# Patient Record
Sex: FEMALE | Race: WHITE | NOT HISPANIC OR LATINO | Employment: OTHER | URBAN - METROPOLITAN AREA
[De-identification: names, ages, dates, MRNs, and addresses within clinical notes are randomized per-mention and may not be internally consistent; named-entity substitution may affect disease eponyms.]

---

## 2017-02-06 ENCOUNTER — LAB VISIT (OUTPATIENT)
Dept: LAB | Facility: OTHER | Age: 82
End: 2017-02-06
Attending: INTERNAL MEDICINE
Payer: MEDICARE

## 2017-02-06 DIAGNOSIS — R35.0 FREQUENCY OF URINATION: ICD-10-CM

## 2017-02-06 LAB
BACTERIA #/AREA URNS AUTO: ABNORMAL /HPF
BILIRUB UR QL STRIP: NEGATIVE
CAOX CRY UR QL COMP ASSIST: ABNORMAL
CLARITY UR REFRACT.AUTO: ABNORMAL
COLOR UR AUTO: YELLOW
GLUCOSE UR QL STRIP: NEGATIVE
HGB UR QL STRIP: ABNORMAL
KETONES UR QL STRIP: NEGATIVE
LEUKOCYTE ESTERASE UR QL STRIP: ABNORMAL
MICROSCOPIC COMMENT: ABNORMAL
NITRITE UR QL STRIP: NEGATIVE
PH UR STRIP: 5 [PH] (ref 5–8)
PROT UR QL STRIP: NEGATIVE
RBC #/AREA URNS AUTO: 6 /HPF (ref 0–4)
SP GR UR STRIP: 1.01 (ref 1–1.03)
SQUAMOUS #/AREA URNS AUTO: 1 /HPF
URN SPEC COLLECT METH UR: ABNORMAL
UROBILINOGEN UR STRIP-ACNC: NEGATIVE EU/DL
WBC #/AREA URNS AUTO: 43 /HPF (ref 0–5)

## 2017-02-06 PROCEDURE — 87077 CULTURE AEROBIC IDENTIFY: CPT

## 2017-02-06 PROCEDURE — 87086 URINE CULTURE/COLONY COUNT: CPT

## 2017-02-06 PROCEDURE — 87088 URINE BACTERIA CULTURE: CPT

## 2017-02-06 PROCEDURE — 87186 SC STD MICRODIL/AGAR DIL: CPT

## 2017-02-06 PROCEDURE — 81001 URINALYSIS AUTO W/SCOPE: CPT

## 2017-02-08 LAB — BACTERIA UR CULT: NORMAL

## 2017-02-09 ENCOUNTER — LAB VISIT (OUTPATIENT)
Dept: LAB | Facility: OTHER | Age: 82
End: 2017-02-09
Attending: INTERNAL MEDICINE
Payer: MEDICARE

## 2017-02-09 DIAGNOSIS — R35.0 FREQUENCY OF URINATION: ICD-10-CM

## 2017-02-09 LAB
CRYPTOSP AG STL QL IA: NEGATIVE
G LAMBLIA AG STL QL IA: NEGATIVE

## 2017-02-09 PROCEDURE — 87046 STOOL CULTR AEROBIC BACT EA: CPT | Mod: 59

## 2017-02-09 PROCEDURE — 87209 SMEAR COMPLEX STAIN: CPT

## 2017-02-09 PROCEDURE — 89055 LEUKOCYTE ASSESSMENT FECAL: CPT

## 2017-02-09 PROCEDURE — 87329 GIARDIA AG IA: CPT

## 2017-02-09 PROCEDURE — 82272 OCCULT BLD FECES 1-3 TESTS: CPT

## 2017-02-09 PROCEDURE — 87427 SHIGA-LIKE TOXIN AG IA: CPT

## 2017-02-09 PROCEDURE — 87045 FECES CULTURE AEROBIC BACT: CPT

## 2017-02-10 LAB
E COLI SXT1 STL QL IA: NEGATIVE
E COLI SXT2 STL QL IA: NEGATIVE
O+P STL TRI STN: NORMAL
OB PNL STL: NEGATIVE
WBC #/AREA STL HPF: NORMAL /[HPF]

## 2017-02-13 LAB — BACTERIA STL CULT: NORMAL

## 2017-03-03 ENCOUNTER — HOSPITAL ENCOUNTER (OUTPATIENT)
Dept: CARDIOLOGY | Facility: OTHER | Age: 82
Discharge: HOME | End: 2017-03-03
Attending: INTERNAL MEDICINE
Payer: MEDICARE

## 2017-03-03 ENCOUNTER — HOSPITAL ENCOUNTER (OUTPATIENT)
Dept: RADIOLOGY | Facility: OTHER | Age: 82
Discharge: HOME | End: 2017-03-03
Attending: INTERNAL MEDICINE
Payer: MEDICARE

## 2017-03-03 DIAGNOSIS — M16.9 OSTEOARTHRITIS OF HIP, UNSPECIFIED LATERALITY, UNSPECIFIED OSTEOARTHRITIS TYPE: ICD-10-CM

## 2017-03-03 PROCEDURE — 93010 ELECTROCARDIOGRAM REPORT: CPT | Mod: ,,, | Performed by: INTERNAL MEDICINE

## 2017-03-03 PROCEDURE — 71020 XR CHEST PA AND LATERAL: CPT | Mod: 26,,, | Performed by: INTERNAL MEDICINE

## 2017-03-03 PROCEDURE — 93005 ELECTROCARDIOGRAM TRACING: CPT

## 2017-03-03 PROCEDURE — 71020 XR CHEST PA AND LATERAL: CPT | Mod: TC

## 2017-06-27 ENCOUNTER — LAB VISIT (OUTPATIENT)
Dept: LAB | Facility: OTHER | Age: 82
End: 2017-06-27
Attending: INTERNAL MEDICINE
Payer: MEDICARE

## 2017-06-27 DIAGNOSIS — Z11.59 ENCOUNTER FOR HEPATITIS C SCREENING TEST FOR LOW RISK PATIENT: ICD-10-CM

## 2017-06-27 DIAGNOSIS — R53.83 FATIGUE, UNSPECIFIED TYPE: ICD-10-CM

## 2017-06-27 LAB
ALBUMIN SERPL BCP-MCNC: 3.7 G/DL
ALP SERPL-CCNC: 84 U/L
ALT SERPL W/O P-5'-P-CCNC: 11 U/L
ANION GAP SERPL CALC-SCNC: 10 MMOL/L
AST SERPL-CCNC: 16 U/L
BASOPHILS # BLD AUTO: 0.05 K/UL
BASOPHILS NFR BLD: 1.1 %
BILIRUB SERPL-MCNC: 0.2 MG/DL
BUN SERPL-MCNC: 24 MG/DL
CALCIUM SERPL-MCNC: 8.9 MG/DL
CHLORIDE SERPL-SCNC: 105 MMOL/L
CO2 SERPL-SCNC: 25 MMOL/L
CREAT SERPL-MCNC: 0.7 MG/DL
DIFFERENTIAL METHOD: NORMAL
EOSINOPHIL # BLD AUTO: 0.1 K/UL
EOSINOPHIL NFR BLD: 1.7 %
ERYTHROCYTE [DISTWIDTH] IN BLOOD BY AUTOMATED COUNT: 13.3 %
ERYTHROCYTE [SEDIMENTATION RATE] IN BLOOD BY WESTERGREN METHOD: 5 MM/HR
EST. GFR  (AFRICAN AMERICAN): >60 ML/MIN/1.73 M^2
EST. GFR  (NON AFRICAN AMERICAN): >60 ML/MIN/1.73 M^2
GLUCOSE SERPL-MCNC: 83 MG/DL
HCT VFR BLD AUTO: 42.9 %
HGB BLD-MCNC: 13.8 G/DL
LYMPHOCYTES # BLD AUTO: 1.7 K/UL
LYMPHOCYTES NFR BLD: 35.4 %
MCH RBC QN AUTO: 30.5 PG
MCHC RBC AUTO-ENTMCNC: 32.2 %
MCV RBC AUTO: 95 FL
MONOCYTES # BLD AUTO: 0.5 K/UL
MONOCYTES NFR BLD: 10 %
NEUTROPHILS # BLD AUTO: 2.4 K/UL
NEUTROPHILS NFR BLD: 51.6 %
PLATELET # BLD AUTO: 275 K/UL
PMV BLD AUTO: 10.2 FL
POTASSIUM SERPL-SCNC: 4.1 MMOL/L
PROT SERPL-MCNC: 6.8 G/DL
RBC # BLD AUTO: 4.53 M/UL
SODIUM SERPL-SCNC: 140 MMOL/L
T4 FREE SERPL-MCNC: 0.84 NG/DL
TSH SERPL DL<=0.005 MIU/L-ACNC: 1.29 UIU/ML
VIT B12 SERPL-MCNC: 619 PG/ML
WBC # BLD AUTO: 4.69 K/UL

## 2017-06-27 PROCEDURE — 82607 VITAMIN B-12: CPT

## 2017-06-27 PROCEDURE — 84443 ASSAY THYROID STIM HORMONE: CPT

## 2017-06-27 PROCEDURE — 80053 COMPREHEN METABOLIC PANEL: CPT

## 2017-06-27 PROCEDURE — 84439 ASSAY OF FREE THYROXINE: CPT

## 2017-06-27 PROCEDURE — 85651 RBC SED RATE NONAUTOMATED: CPT

## 2017-06-27 PROCEDURE — 86803 HEPATITIS C AB TEST: CPT

## 2017-06-27 PROCEDURE — 36415 COLL VENOUS BLD VENIPUNCTURE: CPT

## 2017-06-27 PROCEDURE — 86038 ANTINUCLEAR ANTIBODIES: CPT

## 2017-06-27 PROCEDURE — 85025 COMPLETE CBC W/AUTO DIFF WBC: CPT

## 2017-06-28 LAB
ANA SER QL IF: NORMAL
HCV AB SERPL QL IA: NEGATIVE

## 2017-06-30 ENCOUNTER — HOSPITAL ENCOUNTER (OUTPATIENT)
Dept: RADIOLOGY | Facility: OTHER | Age: 82
Discharge: HOME | End: 2017-06-30
Attending: INTERNAL MEDICINE
Payer: MEDICARE

## 2017-06-30 DIAGNOSIS — M25.569 KNEE PAIN: Primary | ICD-10-CM

## 2017-06-30 DIAGNOSIS — M71.20 BAKER'S CYST: ICD-10-CM

## 2017-06-30 DIAGNOSIS — M25.569 KNEE PAIN: ICD-10-CM

## 2017-06-30 DIAGNOSIS — I82.409 DVT (DEEP VENOUS THROMBOSIS): ICD-10-CM

## 2017-06-30 PROCEDURE — 73560 X-RAY EXAM OF KNEE 1 OR 2: CPT | Mod: TC,LT

## 2017-06-30 PROCEDURE — 93971 EXTREMITY STUDY: CPT | Mod: TC

## 2017-06-30 PROCEDURE — 73560 X-RAY EXAM OF KNEE 1 OR 2: CPT | Mod: 26,LT,, | Performed by: RADIOLOGY

## 2017-06-30 PROCEDURE — 93971 EXTREMITY STUDY: CPT | Mod: 26,,, | Performed by: RADIOLOGY

## 2017-08-24 ENCOUNTER — LAB VISIT (OUTPATIENT)
Dept: LAB | Facility: OTHER | Age: 82
End: 2017-08-24
Attending: INTERNAL MEDICINE
Payer: MEDICARE

## 2017-08-24 DIAGNOSIS — R19.7 DIARRHEA, UNSPECIFIED TYPE: ICD-10-CM

## 2017-08-24 PROCEDURE — 89055 LEUKOCYTE ASSESSMENT FECAL: CPT

## 2017-08-24 PROCEDURE — 87209 SMEAR COMPLEX STAIN: CPT

## 2017-08-24 PROCEDURE — 87045 FECES CULTURE AEROBIC BACT: CPT

## 2017-08-24 PROCEDURE — 87046 STOOL CULTR AEROBIC BACT EA: CPT

## 2017-08-24 PROCEDURE — 82272 OCCULT BLD FECES 1-3 TESTS: CPT

## 2017-08-24 PROCEDURE — 87329 GIARDIA AG IA: CPT

## 2017-08-24 PROCEDURE — 87427 SHIGA-LIKE TOXIN AG IA: CPT | Mod: 59

## 2017-08-26 LAB
OB PNL STL: NEGATIVE
WBC #/AREA STL HPF: NORMAL /[HPF]

## 2017-08-27 LAB
CRYPTOSP AG STL QL IA: NEGATIVE
G LAMBLIA AG STL QL IA: NEGATIVE

## 2017-08-28 LAB
E COLI SXT1 STL QL IA: NEGATIVE
E COLI SXT2 STL QL IA: NEGATIVE
O+P STL TRI STN: NORMAL

## 2017-08-29 LAB
BACTERIA STL CULT: NORMAL
BACTERIA STL CULT: NORMAL

## 2018-02-20 ENCOUNTER — LAB VISIT (OUTPATIENT)
Dept: LAB | Facility: OTHER | Age: 83
End: 2018-02-20
Attending: INTERNAL MEDICINE
Payer: MEDICARE

## 2018-02-20 DIAGNOSIS — E53.8 B12 DEFICIENCY: ICD-10-CM

## 2018-02-20 DIAGNOSIS — R53.82 CHRONIC FATIGUE: ICD-10-CM

## 2018-02-20 DIAGNOSIS — R90.89 OTHER ABNORMAL FINDINGS ON DIAGNOSTIC IMAGING OF CENTRAL NERVOUS SYSTEM: ICD-10-CM

## 2018-02-20 DIAGNOSIS — M25.60 MORNING JOINT STIFFNESS: ICD-10-CM

## 2018-02-20 DIAGNOSIS — M25.50 ARTHRALGIA, UNSPECIFIED JOINT: ICD-10-CM

## 2018-02-20 DIAGNOSIS — R79.9 ABNORMAL FINDING OF BLOOD CHEMISTRY: ICD-10-CM

## 2018-02-20 LAB
ESTIMATED AVG GLUCOSE: 97 MG/DL
HBA1C MFR BLD HPLC: 5 %
RHEUMATOID FACT SERPL-ACNC: <10 IU/ML
VIT B12 SERPL-MCNC: 708 PG/ML

## 2018-02-20 PROCEDURE — 36415 COLL VENOUS BLD VENIPUNCTURE: CPT

## 2018-02-20 PROCEDURE — 85651 RBC SED RATE NONAUTOMATED: CPT

## 2018-02-20 PROCEDURE — 80053 COMPREHEN METABOLIC PANEL: CPT

## 2018-02-20 PROCEDURE — 84439 ASSAY OF FREE THYROXINE: CPT

## 2018-02-20 PROCEDURE — 86431 RHEUMATOID FACTOR QUANT: CPT

## 2018-02-20 PROCEDURE — 86038 ANTINUCLEAR ANTIBODIES: CPT

## 2018-02-20 PROCEDURE — 82607 VITAMIN B-12: CPT

## 2018-02-20 PROCEDURE — 84443 ASSAY THYROID STIM HORMONE: CPT

## 2018-02-20 PROCEDURE — 85025 COMPLETE CBC W/AUTO DIFF WBC: CPT

## 2018-02-20 PROCEDURE — 83036 HEMOGLOBIN GLYCOSYLATED A1C: CPT

## 2018-02-20 PROCEDURE — 82550 ASSAY OF CK (CPK): CPT

## 2018-02-21 ENCOUNTER — OFFICE VISIT (OUTPATIENT)
Dept: OBSTETRICS AND GYNECOLOGY | Facility: CLINIC | Age: 83
End: 2018-02-21
Payer: MEDICARE

## 2018-02-21 VITALS
WEIGHT: 138.25 LBS | HEIGHT: 64 IN | DIASTOLIC BLOOD PRESSURE: 78 MMHG | SYSTOLIC BLOOD PRESSURE: 112 MMHG | BODY MASS INDEX: 23.6 KG/M2

## 2018-02-21 DIAGNOSIS — Z78.0 MENOPAUSE: ICD-10-CM

## 2018-02-21 DIAGNOSIS — Z01.419 WELL WOMAN EXAM WITH ROUTINE GYNECOLOGICAL EXAM: Primary | ICD-10-CM

## 2018-02-21 DIAGNOSIS — Z01.419 PAP SMEAR, AS PART OF ROUTINE GYNECOLOGICAL EXAMINATION: ICD-10-CM

## 2018-02-21 LAB — ANA SER QL IF: NORMAL

## 2018-02-21 PROCEDURE — 99212 OFFICE O/P EST SF 10 MIN: CPT | Mod: PBBFAC | Performed by: OBSTETRICS & GYNECOLOGY

## 2018-02-21 PROCEDURE — 88175 CYTOPATH C/V AUTO FLUID REDO: CPT

## 2018-02-21 PROCEDURE — 99999 PR PBB SHADOW E&M-EST. PATIENT-LVL II: CPT | Mod: PBBFAC,,, | Performed by: OBSTETRICS & GYNECOLOGY

## 2018-02-21 PROCEDURE — G0101 CA SCREEN;PELVIC/BREAST EXAM: HCPCS | Mod: S$PBB,,, | Performed by: OBSTETRICS & GYNECOLOGY

## 2018-02-21 NOTE — PROGRESS NOTES
Subjective:       Patient ID: Cheyenne Garner is a 82 y.o. female.    Chief Complaint:  Gynecologic Exam      History of Present Illness  HPI  This 82 yr old female with history of hyst is here for routine exam.  She is new to me . Her sister had breast ca and her mother had some kind of abdominal tumor but does not think ovarian ca.  Her brother  in fall of bone ca.  She has never taken estrogen.    GYN & OB History  No LMP recorded. Patient has had a hysterectomy.   Date of Last Pap: No result found    OB History   No data available       Review of Systems  Review of Systems   Constitutional: Positive for fatigue. Negative for chills and fever.   Respiratory: Negative for shortness of breath.    Cardiovascular: Negative for chest pain.   Gastrointestinal: Negative for abdominal pain, nausea and vomiting.   Genitourinary: Negative for difficulty urinating, genital sores, menstrual problem, pelvic pain, vaginal bleeding, vaginal discharge and vaginal pain.   Skin: Negative for wound.   Hematological: Negative for adenopathy.   Psychiatric/Behavioral: Positive for sleep disturbance.           Objective:    Physical Exam:   Constitutional: She is oriented to person, place, and time. She appears well-developed and well-nourished.    HENT:   Head: Normocephalic.    Eyes: EOM are normal.    Neck: Normal range of motion.    Cardiovascular: Normal rate.     Pulmonary/Chest: Effort normal. She exhibits no mass and no tenderness. Right breast exhibits no inverted nipple, no mass, no skin change and no tenderness. Left breast exhibits no inverted nipple, no mass, no skin change and no tenderness.        Abdominal: Soft. She exhibits no distension. There is no tenderness.     Genitourinary: Vagina normal. There is no rash, tenderness or lesion on the right labia. There is no rash, tenderness or lesion on the left labia. Uterus is absent. Uterus is not tender. Cervix is normal. Right adnexum displays no mass, no tenderness  and no fullness. Left adnexum displays no mass, no tenderness and no fullness. Cervix exhibits no discharge.   Genitourinary Comments: Vaginal atrophy but tolerated exam well.           Musculoskeletal: Normal range of motion.       Neurological: She is alert and oriented to person, place, and time.    Skin: Skin is warm and dry.    Psychiatric: She has a normal mood and affect.          Assessment:        1. Well woman exam with routine gynecological exam    2. Pap smear, as part of routine gynecological examination    3. Menopause               Plan:      Pt does not need additional gyn exam unless she is having a gyn problem.   Mammogram yearly   Follow up with me as needed

## 2018-02-21 NOTE — LETTER
March 9, 2018      Mary Cortes MD  [address]  [address]  [facility] [address]           [facility] - OB/GYN 5th Floor  [address]  [place] [address]  Phone: [phone]          Patient: Cheyenne Garner   MR Number: [id number]   YOB: 1935   Date of Visit: 2/21/2018       Dear Dr. Mary Cortes:    Thank you for referring Cheyenne Garner to me for evaluation. Attached you will find relevant portions of my assessment and plan of care.    If you have questions, please do not hesitate to call me. I look forward to following Cheyenne Garner along with you.    Sincerely,    Eidlberto Willard  CC:  No Recipients    If you would like to receive this communication electronically, please contact [email or web address] or [phone] to request more information on [employer] access.    For providers and/or their staff who would like to refer a patient to [facility], please contact us through our one-stop-shop provider referral line, [facility] , at [phone].    If you feel you have received this communication in error or would no longer like to receive these types of communications, please e-mail [email or web address]

## 2018-02-26 LAB
ALBUMIN SERPL BCP-MCNC: 3.6 G/DL
ALP SERPL-CCNC: 76 U/L
ALT SERPL W/O P-5'-P-CCNC: 24 U/L
ANION GAP SERPL CALC-SCNC: 10 MMOL/L
AST SERPL-CCNC: 25 U/L
BASOPHILS # BLD AUTO: 0.03 K/UL
BASOPHILS NFR BLD: 0.7 %
BILIRUB SERPL-MCNC: 0.5 MG/DL
BUN SERPL-MCNC: 18 MG/DL
CALCIUM SERPL-MCNC: 8.7 MG/DL
CHLORIDE SERPL-SCNC: 106 MMOL/L
CK SERPL-CCNC: 74 U/L
CO2 SERPL-SCNC: 25 MMOL/L
CREAT SERPL-MCNC: 0.8 MG/DL
DIFFERENTIAL METHOD: NORMAL
EOSINOPHIL # BLD AUTO: 0.1 K/UL
EOSINOPHIL NFR BLD: 2.1 %
ERYTHROCYTE [DISTWIDTH] IN BLOOD BY AUTOMATED COUNT: 14.3 %
ERYTHROCYTE [SEDIMENTATION RATE] IN BLOOD BY WESTERGREN METHOD: 9 MM/HR
EST. GFR  (AFRICAN AMERICAN): >60 ML/MIN/1.73 M^2
EST. GFR  (NON AFRICAN AMERICAN): >60 ML/MIN/1.73 M^2
GLUCOSE SERPL-MCNC: 88 MG/DL
HCT VFR BLD AUTO: 39.5 %
HGB BLD-MCNC: 12.8 G/DL
LYMPHOCYTES # BLD AUTO: 1.4 K/UL
LYMPHOCYTES NFR BLD: 31.3 %
MCH RBC QN AUTO: 30.6 PG
MCHC RBC AUTO-ENTMCNC: 32.4 G/DL
MCV RBC AUTO: 95 FL
MONOCYTES # BLD AUTO: 0.5 K/UL
MONOCYTES NFR BLD: 11.8 %
NEUTROPHILS # BLD AUTO: 2.3 K/UL
NEUTROPHILS NFR BLD: 53.4 %
PLATELET # BLD AUTO: 297 K/UL
PMV BLD AUTO: 10.3 FL
POTASSIUM SERPL-SCNC: 3.9 MMOL/L
PROT SERPL-MCNC: 6.4 G/DL
RBC # BLD AUTO: 4.18 M/UL
SODIUM SERPL-SCNC: 141 MMOL/L
T4 FREE SERPL-MCNC: 0.87 NG/DL
TSH SERPL DL<=0.005 MIU/L-ACNC: 1.12 UIU/ML
WBC # BLD AUTO: 4.32 K/UL

## 2018-02-27 PROBLEM — M15.0 PRIMARY OSTEOARTHRITIS INVOLVING MULTIPLE JOINTS: Status: ACTIVE | Noted: 2018-02-27

## 2018-02-27 PROBLEM — M15.9 PRIMARY OSTEOARTHRITIS INVOLVING MULTIPLE JOINTS: Status: ACTIVE | Noted: 2018-02-27

## 2018-03-07 ENCOUNTER — TELEPHONE (OUTPATIENT)
Dept: OBSTETRICS AND GYNECOLOGY | Facility: CLINIC | Age: 83
End: 2018-03-07

## 2018-03-07 NOTE — TELEPHONE ENCOUNTER
----- Message from Kena Murphy MD sent at 3/6/2018  6:31 PM CST -----  Please let this pt know her pap isnormal

## 2018-06-04 ENCOUNTER — LAB VISIT (OUTPATIENT)
Dept: LAB | Facility: OTHER | Age: 83
End: 2018-06-04
Attending: INTERNAL MEDICINE
Payer: MEDICARE

## 2018-06-04 DIAGNOSIS — K21.9 GASTROESOPHAGEAL REFLUX DISEASE, ESOPHAGITIS PRESENCE NOT SPECIFIED: ICD-10-CM

## 2018-06-04 LAB
ANION GAP SERPL CALC-SCNC: 10 MMOL/L
BUN SERPL-MCNC: 13 MG/DL
CALCIUM SERPL-MCNC: 9.2 MG/DL
CHLORIDE SERPL-SCNC: 103 MMOL/L
CO2 SERPL-SCNC: 25 MMOL/L
CREAT SERPL-MCNC: 0.8 MG/DL
EST. GFR  (AFRICAN AMERICAN): >60 ML/MIN/1.73 M^2
EST. GFR  (NON AFRICAN AMERICAN): >60 ML/MIN/1.73 M^2
GLUCOSE SERPL-MCNC: 96 MG/DL
POTASSIUM SERPL-SCNC: 4.4 MMOL/L
SODIUM SERPL-SCNC: 138 MMOL/L

## 2018-06-04 PROCEDURE — 80048 BASIC METABOLIC PNL TOTAL CA: CPT

## 2018-06-04 PROCEDURE — 86677 HELICOBACTER PYLORI ANTIBODY: CPT

## 2018-06-04 PROCEDURE — 36415 COLL VENOUS BLD VENIPUNCTURE: CPT

## 2018-06-05 ENCOUNTER — HOSPITAL ENCOUNTER (OUTPATIENT)
Dept: RADIOLOGY | Facility: OTHER | Age: 83
Discharge: HOME | End: 2018-06-05
Attending: INTERNAL MEDICINE
Payer: MEDICARE

## 2018-06-05 DIAGNOSIS — R07.81 RIB PAIN ON RIGHT SIDE: ICD-10-CM

## 2018-06-05 PROCEDURE — 71110 X-RAY EXAM RIBS BIL 3 VIEWS: CPT | Mod: TC,FY

## 2018-06-05 PROCEDURE — 71110 X-RAY EXAM RIBS BIL 3 VIEWS: CPT | Mod: 26,,, | Performed by: RADIOLOGY

## 2018-06-06 LAB — H PYLORI IGG SERPL QL IA: NEGATIVE

## 2018-11-29 ENCOUNTER — TELEPHONE (OUTPATIENT)
Dept: DERMATOLOGY | Facility: CLINIC | Age: 83
End: 2018-11-29

## 2018-11-29 NOTE — TELEPHONE ENCOUNTER
----- Message from Abigail Valera sent at 11/29/2018  4:10 PM CST -----  Contact: Self [phone]  Pt is requesting a call back to schedule as a new patient for what she describes as an eyelid rash that sometimes itches.    Pt states she was recommended to see Dr. Coelho.  I attempted to schedule but was not given any appointments.    Pt may be reached at [phone].    Thank you.  LC

## 2018-12-12 ENCOUNTER — TELEPHONE (OUTPATIENT)
Dept: DERMATOLOGY | Facility: CLINIC | Age: 83
End: 2018-12-12

## 2018-12-12 NOTE — TELEPHONE ENCOUNTER
----- Message from Vishal Bills sent at 12/12/2018  3:09 PM CST -----  Contact: Patient   Needs Advice    Reason for call: please contact the patient in regard to rescheduling appt at soonest convenience         Communication Preference: [phone]    Additional Information:

## 2018-12-26 ENCOUNTER — OFFICE VISIT (OUTPATIENT)
Dept: DERMATOLOGY | Facility: CLINIC | Age: 83
End: 2018-12-26
Payer: MEDICARE

## 2018-12-26 VITALS — WEIGHT: 138 LBS | BODY MASS INDEX: 23.99 KG/M2

## 2018-12-26 DIAGNOSIS — L30.9 DERMATITIS: Primary | ICD-10-CM

## 2018-12-26 PROCEDURE — 99202 OFFICE O/P NEW SF 15 MIN: CPT | Mod: S$PBB,,, | Performed by: DERMATOLOGY

## 2018-12-26 PROCEDURE — 99213 OFFICE O/P EST LOW 20 MIN: CPT | Mod: PBBFAC,PO | Performed by: DERMATOLOGY

## 2018-12-26 PROCEDURE — 99999 PR PBB SHADOW E&M-EST. PATIENT-LVL III: CPT | Mod: PBBFAC,,, | Performed by: DERMATOLOGY

## 2018-12-26 NOTE — PROGRESS NOTES
Subjective:       Patient ID:  Cheyenne Garner is a 83 y.o. female who presents for   Chief Complaint   Patient presents with    Skin Check     bilateral eyelids     History of Present Illness: The patient presents with chief complaint of rash.  Location: eyelids  Duration: years  Signs/Symptoms: itch    Prior treatments: antibiotic eye ug          Review of Systems   Constitutional: Negative for fever.   Skin: Positive for itching and rash.   Hematologic/Lymphatic: Does not bruise/bleed easily.        Objective:    Physical Exam   Skin:   Areas Examined (abnormalities noted in diagram):   Head / Face Inspection Performed  Neck Inspection Performed  Chest / Axilla Inspection Performed  RUE Inspected  LUE Inspection Performed  Nails and Digits Inspection Performed                       Diagram Legend     Erythematous scaling macule/papule c/w actinic keratosis       Vascular papule c/w angioma      Pigmented verrucoid papule/plaque c/w seborrheic keratosis      Yellow umbilicated papule c/w sebaceous hyperplasia      Irregularly shaped tan macule c/w lentigo     1-2 mm smooth white papules consistent with Milia      Movable subcutaneous cyst with punctum c/w epidermal inclusion cyst      Subcutaneous movable cyst c/w pilar cyst      Firm pink to brown papule c/w dermatofibroma      Pedunculated fleshy papule(s) c/w skin tag(s)      Evenly pigmented macule c/w junctional nevus     Mildly variegated pigmented, slightly irregular-bordered macule c/w mildly atypical nevus      Flesh colored to evenly pigmented papule c/w intradermal nevus       Pink pearly papule/plaque c/w basal cell carcinoma      Erythematous hyperkeratotic cursted plaque c/w SCC      Surgical scar with no sign of skin cancer recurrence      Open and closed comedones      Inflammatory papules and pustules      Verrucoid papule consistent consistent with wart     Erythematous eczematous patches and plaques     Dystrophic onycholytic nail with  subungual debris c/w onychomycosis     Umbilicated papule    Erythematous-base heme-crusted tan verrucoid plaque consistent with inflamed seborrheic keratosis     Erythematous Silvery Scaling Plaque c/w Psoriasis     See annotation      Assessment / Plan:        Dermatitis, irritant  Dc rubbing eyes  Dc using tissues on eyelids  Dc eye make up remover, use cerave lotion instead  Use cerave lotion for itching, keep in fridge  eldel cream bid prn erythema    Also has onychomycosis of left great toenail, took pills previously but has recurred  Explained treatments are not 100%   Deferred for now             Follow-up in about 1 year (around 12/26/2019).

## 2019-01-08 ENCOUNTER — HOSPITAL ENCOUNTER (OUTPATIENT)
Dept: RADIOLOGY | Facility: OTHER | Age: 84
Discharge: HOME | End: 2019-01-08
Attending: INTERNAL MEDICINE
Payer: MEDICARE

## 2019-01-08 DIAGNOSIS — Z78.0 POSTMENOPAUSAL: ICD-10-CM

## 2019-01-08 PROCEDURE — 77080 DXA BONE DENSITY AXIAL: CPT | Mod: 26,,, | Performed by: RADIOLOGY

## 2019-01-08 PROCEDURE — 77080 DXA BONE DENSITY AXIAL: CPT | Mod: TC

## 2019-01-08 PROCEDURE — 77080 DEXA BONE DENSITY SPINE HIP: ICD-10-PCS | Mod: 26,,, | Performed by: RADIOLOGY

## 2019-01-14 PROBLEM — M81.0 OSTEOPOROSIS: Status: ACTIVE | Noted: 2019-01-14

## 2019-02-21 ENCOUNTER — TELEPHONE (OUTPATIENT)
Dept: OBSTETRICS AND GYNECOLOGY | Facility: CLINIC | Age: 84
End: 2019-02-21

## 2019-02-21 NOTE — TELEPHONE ENCOUNTER
----- Message from Blanca Fisher sent at 2/21/2019 11:33 AM CST -----  Contact: Self            Name of Who is Calling: MCKAYLA,MARY [[id number]]      What is the request in detail: Pt is unable to keep the appt on 03/21 due to she will be out of town. Pt states she can come anytime before. Please contact to further discuss and advise.      Can the clinic reply by [facility]: N      [facility]: [phone]

## 2019-02-21 NOTE — TELEPHONE ENCOUNTER
----- Message from Kai Gr sent at 2/21/2019 12:00 PM CST -----  Contact: RICKIE BLOCK [[id number]]  Type:  Patient Returning Call    Who Called: RICKIE BLOCK [[id number]]    Who Left Message for Patient: Ludwig    Does the patient know what this is regarding?:yes    Best Call Back Number: [phone]    Additional Information:

## 2019-02-21 NOTE — TELEPHONE ENCOUNTER
Attempted to reach patient.. Voicemail box not set up on cell phone and home phone is disconnected.

## 2019-04-25 ENCOUNTER — OFFICE VISIT (OUTPATIENT)
Dept: OBSTETRICS AND GYNECOLOGY | Facility: CLINIC | Age: 84
End: 2019-04-25
Attending: OBSTETRICS & GYNECOLOGY
Payer: MEDICARE

## 2019-04-25 VITALS
HEIGHT: 63 IN | DIASTOLIC BLOOD PRESSURE: 80 MMHG | BODY MASS INDEX: 24.73 KG/M2 | SYSTOLIC BLOOD PRESSURE: 122 MMHG | WEIGHT: 139.56 LBS

## 2019-04-25 DIAGNOSIS — Z01.419 WELL WOMAN EXAM WITH ROUTINE GYNECOLOGICAL EXAM: Primary | ICD-10-CM

## 2019-04-25 PROCEDURE — G0101 PR CA SCREEN;PELVIC/BREAST EXAM: ICD-10-PCS | Mod: S$GLB,,, | Performed by: OBSTETRICS & GYNECOLOGY

## 2019-04-25 PROCEDURE — G0101 CA SCREEN;PELVIC/BREAST EXAM: HCPCS | Mod: S$GLB,,, | Performed by: OBSTETRICS & GYNECOLOGY

## 2019-04-25 NOTE — PROGRESS NOTES
Subjective:       Patient ID: Cheyenne Garenr is a 83 y.o. female.    Chief Complaint:  Well Woman      History of Present Illness  HPI  This 83 yr old female who has had a hyst is here for routine exam.  She had normal pap last yr.  She is not on HRT  We discussed and she no longer needs to see gyn.  Her sister  of breast ca and she is getting mammograms from outside facility.    GYN & OB History  No LMP recorded. Patient has had a hysterectomy.   Date of Last Pap: 2018    OB History   No data available       Review of Systems  Review of Systems   Constitutional: Negative for chills and fever.   Respiratory: Negative for shortness of breath.    Cardiovascular: Negative for chest pain.   Gastrointestinal: Negative for abdominal pain, nausea and vomiting.   Genitourinary: Negative for difficulty urinating, dyspareunia, genital sores, menstrual problem, pelvic pain, vaginal bleeding, vaginal discharge and vaginal pain.   Musculoskeletal: Positive for arthralgias.   Skin: Negative for wound.   Hematological: Negative for adenopathy.           Objective:   Physical Exam:   Constitutional: She is oriented to person, place, and time. She appears well-developed and well-nourished.    HENT:   Head: Normocephalic.    Eyes: EOM are normal.    Neck: Normal range of motion.    Cardiovascular: Normal rate.     Pulmonary/Chest: Effort normal. She exhibits no mass and no tenderness. Right breast exhibits no inverted nipple, no mass, no skin change and no tenderness. Left breast exhibits no inverted nipple, no mass, no skin change and no tenderness.        Abdominal: Soft. She exhibits no distension. There is no tenderness.     Genitourinary: Vagina normal. There is no rash, tenderness or lesion on the right labia. There is no rash, tenderness or lesion on the left labia. Uterus is absent. Uterus is not tender. Cervix is normal. Right adnexum displays no mass, no tenderness and no fullness. Left adnexum displays no mass, no  tenderness and no fullness. Cervix exhibits no discharge.           Musculoskeletal: Normal range of motion.       Neurological: She is alert and oriented to person, place, and time.    Skin: Skin is warm and dry.    Psychiatric: She has a normal mood and affect.          Assessment:        1. Well woman exam with routine gynecological exam               Plan:      She no longer needs to see gyn.

## 2019-06-19 ENCOUNTER — OFFICE VISIT (OUTPATIENT)
Dept: PODIATRY | Facility: CLINIC | Age: 84
End: 2019-06-19
Payer: MEDICARE

## 2019-06-19 VITALS
HEART RATE: 98 BPM | WEIGHT: 139 LBS | DIASTOLIC BLOOD PRESSURE: 67 MMHG | SYSTOLIC BLOOD PRESSURE: 113 MMHG | BODY MASS INDEX: 24.63 KG/M2 | HEIGHT: 63 IN

## 2019-06-19 DIAGNOSIS — L84 CORN OR CALLUS: Primary | ICD-10-CM

## 2019-06-19 DIAGNOSIS — S90.811A ABRASION, RIGHT FOOT, INITIAL ENCOUNTER: ICD-10-CM

## 2019-06-19 DIAGNOSIS — L90.9 FAT PAD ATROPHY OF FOOT: ICD-10-CM

## 2019-06-19 PROCEDURE — 99999 PR PBB SHADOW E&M-EST. PATIENT-LVL III: CPT | Mod: PBBFAC,,,

## 2019-06-19 PROCEDURE — 99202 OFFICE O/P NEW SF 15 MIN: CPT | Mod: S$PBB,,,

## 2019-06-19 PROCEDURE — 99213 OFFICE O/P EST LOW 20 MIN: CPT | Mod: PBBFAC,PN

## 2019-06-19 PROCEDURE — 99999 PR PBB SHADOW E&M-EST. PATIENT-LVL III: ICD-10-PCS | Mod: PBBFAC,,,

## 2019-06-19 PROCEDURE — 99202 PR OFFICE/OUTPT VISIT, NEW, LEVL II, 15-29 MIN: ICD-10-PCS | Mod: S$PBB,,,

## 2019-06-19 NOTE — PATIENT INSTRUCTIONS
Shoes or sandals:    Mckayla Gutiérrez  Ecco  Vionic  Mephisto  Vionics  Spenco  Sketchers  New Balance   [facility]          Arch supports:    Spenco Orthotic Arch Supports                               SPENCO Orthotic Arch Supports (AKA Spenco Orthotic Insoles) are ¾ length nylon (plastic arch supports that are covered with [employer] classic green neoprene cushion material. SPENCO Orthotic Arch Supports are designed to support the medial and lateral arch. A SPENCO Orthotic Arch Support is ideal for people that need corrective support, but have tried other higher or harder orthotics and found them to be uncomfortable to wear. The nylon support of the SPENCO Orthotic Arch support is softer and more flexible than plastics used in other, harder orthotics and arch supports. This means that people with naturally lower arches or people with extremely flat feet with find that these arch supports are more comfortable to get used to than other higher arch supports.  May be obtained at:     [employer], [employer] or online

## 2019-06-24 NOTE — PROGRESS NOTES
Chief Complaint   Patient presents with    Tinea Pedis     Right Foot    Foot Pain     Pain under 5th met head bilateral         History of present illness: Patient presents to the clinic reporting an abrasion to her right foot.  It is healing and not painful.  She has been putting antibiotic cream on this.  She is also reporting bilateral 5th metatarsal head pain.  No history of injury.  She does not wear orthotics.  Wears dress shoes or tennis shoes.    ROS:  Constitution: Negative for chills, fever, weakness and malaise/fatigue.   HEENT: Negative for headaches.   Cardiovascular: Negative for chest pain and claudication.   Respiratory: Negative for cough and shortness of breath.   Musculoskeletal: Positive for foot pain.  Negative for muscle cramps and muscle weakness.   Gastrointestinal: Negative for nausea and vomiting.   Neurological: Negative for numbness and paresthesias.   Dermatological: Negative for wound.      Past Medical, Family and Social History reviewed.    Constitutional:  Patient is oriented to person, place, and time. Vital signs are normal.  Appears well-developed and well-nourished.     Vascular:  Dorsalis pedis pulses are 2+ on the right side, and 2+ on the left side.   Posterior tibial pulses are 2+ on the right side, and 2+ on the left side.   - digital hair growth, capillary fill time to all toes >4 secs    Musc/ortho:  Fat pad atrophy.  ROM pedal joints wnl.  Mild bunions, ttp p[lantar 5 th MTH    Skin/Dermatological:  Abrasion right foot that is nearly healed, no soi, no clubbing or cyanosis, feet warm.  Keratosis b/l 5th MTh.       Neurological:  No deficits to sharp/dull, light touch or vibratory sensation.     Assessment/Plan:    Corn or callus    Abrasion, right foot, initial encounter    Fat pad atrophy of foot      Discussion of the etiology of the problem/s and the treatment plan.      Quality supportive shoes.  Recommend Spenco orthotics, padding.  Callus pared to  tolerance.    Continue antibiotic cream and border dressing until abrasion healed.    RTC prn.

## 2019-08-07 ENCOUNTER — OFFICE VISIT (OUTPATIENT)
Dept: DERMATOLOGY | Facility: CLINIC | Age: 84
End: 2019-08-07
Payer: MEDICARE

## 2019-08-07 VITALS — BODY MASS INDEX: 24.62 KG/M2 | WEIGHT: 139 LBS

## 2019-08-07 DIAGNOSIS — B35.3 TINEA PEDIS OF RIGHT FOOT: ICD-10-CM

## 2019-08-07 DIAGNOSIS — L57.0 ACTINIC KERATOSIS: ICD-10-CM

## 2019-08-07 DIAGNOSIS — L30.9 DERMATITIS: Primary | ICD-10-CM

## 2019-08-07 PROCEDURE — 17000 DESTRUCT PREMALG LESION: CPT | Mod: S$PBB,,, | Performed by: DERMATOLOGY

## 2019-08-07 PROCEDURE — 99213 PR OFFICE/OUTPT VISIT, EST, LEVL III, 20-29 MIN: ICD-10-PCS | Mod: 25,S$PBB,, | Performed by: DERMATOLOGY

## 2019-08-07 PROCEDURE — 99999 PR PBB SHADOW E&M-EST. PATIENT-LVL III: ICD-10-PCS | Mod: PBBFAC,,, | Performed by: DERMATOLOGY

## 2019-08-07 PROCEDURE — 99213 OFFICE O/P EST LOW 20 MIN: CPT | Mod: 25,S$PBB,, | Performed by: DERMATOLOGY

## 2019-08-07 PROCEDURE — 17000 DESTRUCT PREMALG LESION: CPT | Mod: PBBFAC,PO | Performed by: DERMATOLOGY

## 2019-08-07 PROCEDURE — 99213 OFFICE O/P EST LOW 20 MIN: CPT | Mod: PBBFAC,PO,25 | Performed by: DERMATOLOGY

## 2019-08-07 PROCEDURE — 17000 PR DESTRUCTION(LASER SURGERY,CRYOSURGERY,CHEMOSURGERY),PREMALIGNANT LESIONS,FIRST LESION: ICD-10-PCS | Mod: S$PBB,,, | Performed by: DERMATOLOGY

## 2019-08-07 PROCEDURE — 99999 PR PBB SHADOW E&M-EST. PATIENT-LVL III: CPT | Mod: PBBFAC,,, | Performed by: DERMATOLOGY

## 2019-08-07 NOTE — PROGRESS NOTES
Subjective:       Patient ID:  Cheyenne Garner is a 83 y.o. female who presents for   Chief Complaint   Patient presents with    Spot     face,right foot,      The dermatitis on her eyelids has improved however she related there is scaling in the area on her right nose removed per Dr Kaminski last fall she is not sure if it was a cancer. She picks the dry skin off of it every morning.   Also new area on right lateral foot not painful not tx thinks a show may have rubbed it.  New lesion on right cheek not painful no tx.  Previous pt of Dr Monteiro    Spot  - Initial  Affected locations: face and right foot  Signs / symptoms: itching and pain  Aggravated by: nothing  Relieving factors/Treatments tried: nothing        Review of Systems   Constitutional: Negative for fever, chills, weight loss, weight gain, fatigue, night sweats and malaise.   Skin: Positive for wears hat. Negative for daily sunscreen use and activity-related sunscreen use.   Hematologic/Lymphatic: Does not bruise/bleed easily.        Objective:    Physical Exam   Constitutional: She appears well-developed and well-nourished.   Neurological: She is alert and oriented to person, place, and time.   Psychiatric: She has a normal mood and affect.   Skin:   Areas Examined (abnormalities noted in diagram):   Scalp / Hair Palpated and Inspected  Head / Face Inspection Performed  Neck Inspection Performed  Chest / Axilla Inspection Performed  RUE Inspected  LUE Inspection Performed  RLE Inspected  Nails and Digits Inspection Performed                       Diagram Legend     Erythematous scaling macule/papule c/w actinic keratosis       Vascular papule c/w angioma      Pigmented verrucoid papule/plaque c/w seborrheic keratosis      Yellow umbilicated papule c/w sebaceous hyperplasia      Irregularly shaped tan macule c/w lentigo     1-2 mm smooth white papules consistent with Milia      Movable subcutaneous cyst with punctum c/w epidermal inclusion cyst       Subcutaneous movable cyst c/w pilar cyst      Firm pink to brown papule c/w dermatofibroma      Pedunculated fleshy papule(s) c/w skin tag(s)      Evenly pigmented macule c/w junctional nevus     Mildly variegated pigmented, slightly irregular-bordered macule c/w mildly atypical nevus      Flesh colored to evenly pigmented papule c/w intradermal nevus       Pink pearly papule/plaque c/w basal cell carcinoma      Erythematous hyperkeratotic cursted plaque c/w SCC      Surgical scar with no sign of skin cancer recurrence      Open and closed comedones      Inflammatory papules and pustules      Verrucoid papule consistent consistent with wart     Erythematous eczematous patches and plaques     Dystrophic onycholytic nail with subungual debris c/w onychomycosis     Umbilicated papule    Erythematous-base heme-crusted tan verrucoid plaque consistent with inflamed seborrheic keratosis     Erythematous Silvery Scaling Plaque c/w Psoriasis     See annotation      Assessment / Plan:        Dermatitis right nose  Get outside path report Dr Haris Ch picking at area  cerave cream bid      Actinic keratosis   Cryosurgery Procedure Note    Verbal consent from the patient is obtained and the patient is aware of the precancerous quality and need for treatment of these lesions. Liquid nitrogen cryosurgery is applied to the 1 actinic keratoses, as detailed in the physical exam, to produce a freeze injury.;    Tinea pedis of right foot  luzu cream bid  Gold calderón cream               Follow up in about 6 months (around 2/7/2020).

## 2019-09-26 ENCOUNTER — OFFICE VISIT (OUTPATIENT)
Dept: PODIATRY | Facility: CLINIC | Age: 84
End: 2019-09-26
Payer: MEDICARE

## 2019-09-26 VITALS — DIASTOLIC BLOOD PRESSURE: 68 MMHG | SYSTOLIC BLOOD PRESSURE: 146 MMHG | HEART RATE: 86 BPM

## 2019-09-26 DIAGNOSIS — Q82.8 POROKERATOSIS: ICD-10-CM

## 2019-09-26 DIAGNOSIS — L90.9 FAT PAD ATROPHY OF FOOT: Primary | ICD-10-CM

## 2019-09-26 PROCEDURE — 99213 PR OFFICE/OUTPT VISIT, EST, LEVL III, 20-29 MIN: ICD-10-PCS | Mod: S$PBB,,, | Performed by: PODIATRIST

## 2019-09-26 PROCEDURE — 99213 OFFICE O/P EST LOW 20 MIN: CPT | Mod: PBBFAC,PN | Performed by: PODIATRIST

## 2019-09-26 PROCEDURE — 99999 PR PBB SHADOW E&M-EST. PATIENT-LVL III: CPT | Mod: PBBFAC,,, | Performed by: PODIATRIST

## 2019-09-26 PROCEDURE — 99999 PR PBB SHADOW E&M-EST. PATIENT-LVL III: ICD-10-PCS | Mod: PBBFAC,,, | Performed by: PODIATRIST

## 2019-09-26 PROCEDURE — 99213 OFFICE O/P EST LOW 20 MIN: CPT | Mod: S$PBB,,, | Performed by: PODIATRIST

## 2019-09-26 RX ORDER — LORAZEPAM 0.5 MG/1
0.5 TABLET ORAL NIGHTLY
Refills: 5 | COMMUNITY
Start: 2019-09-19 | End: 2020-06-30

## 2019-09-26 RX ORDER — ARIPIPRAZOLE 2 MG/1
2 TABLET ORAL EVERY MORNING
Refills: 11 | COMMUNITY
Start: 2019-09-19 | End: 2020-09-14

## 2019-09-26 RX ORDER — ZOLPIDEM TARTRATE 12.5 MG/1
5 TABLET, FILM COATED, EXTENDED RELEASE ORAL
COMMUNITY
End: 2020-06-30

## 2019-09-26 RX ORDER — TRAZODONE HYDROCHLORIDE 100 MG/1
100 TABLET ORAL
COMMUNITY
End: 2020-05-13

## 2019-09-26 RX ORDER — PITAVASTATIN CALCIUM 1.04 MG/1
1 TABLET, FILM COATED ORAL
COMMUNITY
End: 2020-05-13

## 2019-09-26 RX ORDER — AMMONIUM LACTATE 12 G/100G
CREAM TOPICAL
Qty: 140 G | Refills: 11 | Status: SHIPPED | OUTPATIENT
Start: 2019-09-26 | End: 2020-06-30

## 2019-09-26 RX ORDER — SIMVASTATIN 10 MG/1
10 TABLET, FILM COATED ORAL NIGHTLY
Refills: 3 | COMMUNITY
Start: 2019-09-19 | End: 2019-11-07

## 2019-09-26 RX ORDER — DICLOFENAC SODIUM 10 MG/G
GEL TOPICAL
COMMUNITY
Start: 2019-09-16 | End: 2020-06-30

## 2019-09-26 RX ORDER — MELOXICAM 15 MG/1
15 TABLET ORAL
COMMUNITY
End: 2020-05-13 | Stop reason: SDUPTHER

## 2019-09-29 NOTE — PROGRESS NOTES
Chief Complaint   Patient presents with    Callouses         History of present illness: Patient presents to the clinic reporting an abrasion to her right foot.  It is healing and not painful.  She has been putting antibiotic cream on this.  She is also reporting bilateral 5th metatarsal head pain.  No history of injury.  She does not wear orthotics.  Wears dress shoes or tennis shoes.  She is here for routine care for painful lesion both feet.    ROS:  Constitution: Negative for chills, fever, weakness and malaise/fatigue.   HEENT: Negative for headaches.   Cardiovascular: Negative for chest pain and claudication.   Respiratory: Negative for cough and shortness of breath.   Musculoskeletal: Positive for foot pain.  Negative for muscle cramps and muscle weakness.   Gastrointestinal: Negative for nausea and vomiting.   Neurological: Negative for numbness and paresthesias.   Dermatological: Negative for wound.      Past Medical, Family and Social History reviewed.    Constitutional:  Patient is oriented to person, place, and time. Vital signs are normal.  Appears well-developed and well-nourished.     Vascular:  Dorsalis pedis pulses are 2+ on the right side, and 2+ on the left side.   Posterior tibial pulses are 2+ on the right side, and 2+ on the left side.   - digital hair growth, capillary fill time to all toes >4 secs    Musc/ortho:  Fat pad atrophy.  ROM pedal joints wnl.  Mild bunions, ttp p[lantar 5 th MTH    Skin/Dermatological:  Abrasion right foot that is nearly healed, no soi, no clubbing or cyanosis, feet warm.  Keratosis b/l 5th MTh.       Neurological:  No deficits to sharp/dull, light touch or vibratory sensation.     Assessment/Plan:    Corn or callus    Abrasion, right foot, initial encounter    Fat pad atrophy of foot      Discussion of the etiology of the problem/s and the treatment plan.     Lesion Excision     Performed by: Darell Donahue DPM  Authorized by: Patient     Consent Done?:  Yes  (Verbal)     Care Type:  Debride/Excise  Location(s):  right foot  Patient tolerance:  Patient tolerated the procedure well with no immediate complications      With patient's permission, the lesion(s) mentioned above were aggressively reduced and debrided using a 15 blade, tissue nipper, and curette to remove all lesion and associated debris. Topical trichloroacetic acid applied with a sterile cover. The patient will continue to monitor the areas daily, inspect the feet, wear protective shoe gear when ambulatory, and moisturizer to maintain skin integrity.      Quality supportive shoes.  Recommend Spenco orthotics, padding.  Callus pared to tolerance.    Return as needed.

## 2019-10-04 ENCOUNTER — TELEPHONE (OUTPATIENT)
Dept: DERMATOLOGY | Facility: CLINIC | Age: 84
End: 2019-10-04

## 2019-10-04 NOTE — TELEPHONE ENCOUNTER
----- Message from Anny Coelho MD sent at 10/3/2019 12:55 PM CDT -----  Contact: pt at [phone]  fransisco is expensive brand name, she can use lamisil cream instead which is over the counter  ----- Message -----  From: Aundrea Stephens MA  Sent: 10/3/2019  10:41 AM CDT  To: MD Dr. Meliton Noriega I don't see the  Luzu cream 1%  On med card please send to   Preferred Pharmacy with phone number: [facility] pharmacy on [address]  ----- Message -----  From: Marika Tyler LPN  Sent: 10/2/2019   5:03 PM CDT  To: Aundrea Stephens MA        ----- Message -----  From: Sonja Molina  Sent: 10/2/2019   2:41 PM CDT  To: Meliton SARABIA Staff    Rx Refill/Request     Is this a Refill or New Rx:  refill  Rx Name and Strength:  Luzu cream 1%  Preferred Pharmacy with phone number: [facility] pharmacy on [address]  Communication Preference:call  Additional Information: Pt used the sample that was given and wants a rx called in for it to [facility] [place].

## 2019-10-29 ENCOUNTER — HOSPITAL ENCOUNTER (OUTPATIENT)
Dept: RADIOLOGY | Facility: OTHER | Age: 84
Discharge: HOME | End: 2019-10-29
Attending: INTERNAL MEDICINE
Payer: MEDICARE

## 2019-10-29 DIAGNOSIS — R05.9 COUGH: ICD-10-CM

## 2019-10-29 PROCEDURE — 71046 X-RAY EXAM CHEST 2 VIEWS: CPT | Mod: 26,,, | Performed by: RADIOLOGY

## 2019-10-29 PROCEDURE — 71046 X-RAY EXAM CHEST 2 VIEWS: CPT | Mod: TC,FY

## 2019-10-29 PROCEDURE — 71046 XR CHEST PA AND LATERAL: ICD-10-PCS | Mod: 26,,, | Performed by: RADIOLOGY

## 2019-11-20 ENCOUNTER — INFUSION (OUTPATIENT)
Dept: INFUSION THERAPY | Facility: OTHER | Age: 84
End: 2019-11-20
Attending: OBSTETRICS & GYNECOLOGY
Payer: MEDICARE

## 2019-11-20 VITALS
TEMPERATURE: 98 F | OXYGEN SATURATION: 97 % | DIASTOLIC BLOOD PRESSURE: 68 MMHG | SYSTOLIC BLOOD PRESSURE: 131 MMHG | HEART RATE: 81 BPM | RESPIRATION RATE: 20 BRPM

## 2019-11-20 DIAGNOSIS — M81.0 OSTEOPOROSIS WITHOUT CURRENT PATHOLOGICAL FRACTURE, UNSPECIFIED OSTEOPOROSIS TYPE: Primary | ICD-10-CM

## 2019-11-20 PROCEDURE — 63600175 PHARM REV CODE 636 W HCPCS: Mod: JG | Performed by: INTERNAL MEDICINE

## 2019-11-20 PROCEDURE — 96372 THER/PROPH/DIAG INJ SC/IM: CPT

## 2019-11-20 RX ADMIN — DENOSUMAB 60 MG: 60 INJECTION SUBCUTANEOUS at 10:11

## 2019-11-20 NOTE — PLAN OF CARE
Prolia injection to left upper arm complete. Pt tolerated well. VSS. NAD.   AVS provided. Pt verbalized understanding of discharge instructions before leaving with self.

## 2019-12-14 NOTE — PROGRESS NOTES
"Subjective:     Patient ID: Cheyenne Garner is a 84 y.o. female sent by Dr. Cortes for unspecified arthralgia    Chief Complaint: No chief complaint on file.       HPI   84 yr old lady here for muscle/joint aches. "I just hurt all over" x 2 years. Pain wakes her up at night.  Abilify & adderall prescribed by Dr. Olivo help her get started in the AM as she is stiff x 30 minutes or so. She tosses and turns at night and wakes up fatigued. Hot showers help. She denies joint swelling. Worse pains: upper & lower back, left arm and some R groin pain but has had bilateral THRs. He has had some left upper arm pain, upper   Nevertheless, she is able to exercise but tires easily. She used to exercise more. Now she just meets with a  twice a week and does some weight lifting. She has been on a statin x 2 years. She thinks maybe statins have something to do with her all over pain.     She has some whitish discoloration of her fingertips in the cold, but does not report any other color changes. She denies dysphagia, tight skin, oral ulcers, pleurisy, pericarditis, photosensitivity, skin rashes, miscarriages (0/2), thromboses. She has dryness in her eyes but not mouth. She has some hair thinning. She denies family hx of CTD but her sister had hands like hers (ulnarly deviated, especially on R).     She has persistent diarrhea dx as intestinal sarcoid in 2005 by bx at [facility]. She was on steroids for a while,but she wanted to get off them and so they switched her to cholestyramine. She is followed by a gastroenterologist at [facility]. Her diarrhea has not changed over the years.     She's been dx w depression by Dr. Olivo, but she does not think she is depressed.     She is not taking all meds listed. Only taken a statin, abilify, adderall, trazodone and valtrex for genital herpes prn.                Current Outpatient Medications   Medication Sig Dispense Refill    ARIPiprazole (ABILIFY) 2 MG Tab Take 2 mg by mouth " every morning.  11    cholestyramine, with sugar, 4 gram Powd mix ONE scoopful-in 4-8 ounces OF water OR beverage OF choice AND drink TWICE DAILY AS NEEDED 378 g 2    simvastatin (ZOCOR) 10 MG tablet Take 10 mg by mouth every evening.      traZODone (DESYREL) 100 MG tablet Take 100 mg by mouth.      valACYclovir (VALTREX) 1000 MG tablet TAKE ONE TABLET BY MOUTH TWICE DAILY FOR 5 DAYS FOR cold sores 10 tablet 3    alendronate (FOSAMAX) 70 MG tablet Take 1 tablet (70 mg total) by mouth every 7 days. (Patient not taking: Reported on 12/18/2019) 12 tablet 1    ammonium lactate 12 % Crea Apply twice daily to affected parts both feet as needed. (Patient not taking: Reported on 12/18/2019) 140 g 11    clonazePAM (KLONOPIN) 0.5 MG tablet Take 1 tablet (0.5 mg total) by mouth nightly as needed for Anxiety. (Patient not taking: Reported on 12/18/2019) 20 tablet 2    clotrimazole-betamethasone 1-0.05% (LOTRISONE) cream APPLY TO RASH THREE TIMES DAILY (Patient not taking: Reported on 12/18/2019) 15 g 1    dextroamphetamine-amphetamine 10 mg Tab       diclofenac sodium (VOLTAREN) 1 % Gel       diphenoxylate-atropine 2.5-0.025 mg (LOMOTIL) 2.5-0.025 mg per tablet Take 1 tablet by mouth 4 (four) times daily as needed for Diarrhea. (Patient not taking: Reported on 12/18/2019) 30 tablet 6    doxycycline (VIBRA-TABS) 100 MG tablet Take 1 tablet (100 mg total) by mouth 2 (two) times daily. 14 tablet 0    hydroCHLOROthiazide (HYDRODIURIL) 25 MG tablet Take 1 tablet (25 mg total) by mouth once daily. 30 tablet 6    loperamide (IMODIUM) 2 mg capsule       LORazepam (ATIVAN) 0.5 MG tablet Take 0.5 mg by mouth nightly.  5    meloxicam (MOBIC) 15 MG tablet Take 15 mg by mouth.      pitavastatin calcium (LIVALO) 1 mg Tab tablet Take 1 mg by mouth.      traZODone (DESYREL) 150 MG tablet Take 1 tablet (150 mg total) by mouth every evening. (Patient not taking: Reported on 12/18/2019) 90 tablet 1    zolpidem (AMBIEN CR) 12.5  MG CR tablet Take 5 mg by mouth.       No current facility-administered medications for this visit.    Also on vitamin D3 2,000 IU      Review of patient's allergies indicates:  No Known Allergies    Review of Systems   Constitutional: Positive for fatigue. Negative for diaphoresis and fever.   HENT: Negative.  Negative for mouth sores, sore throat, tinnitus and trouble swallowing.    Eyes: Positive for redness. Negative for visual disturbance.        Dry eyes   Respiratory: Positive for cough (has URI; white phlegm used to be green). Negative for choking, chest tightness, shortness of breath and wheezing.    Cardiovascular: Positive for leg swelling. Negative for chest pain and palpitations.   Gastrointestinal: Positive for diarrhea. Negative for abdominal distention, abdominal pain, blood in stool, constipation, nausea and vomiting.   Endocrine: Negative.  Negative for cold intolerance and heat intolerance.   Genitourinary: Negative for difficulty urinating, frequency, hematuria, menstrual problem and vaginal bleeding.   Musculoskeletal: Positive for back pain and myalgias. Negative for joint swelling, neck pain and neck stiffness.   Skin: Negative.  Negative for rash.   Neurological: Positive for tremors (R hand). Negative for dizziness, seizures, syncope, weakness, light-headedness and numbness.   Hematological: Negative for adenopathy. Does not bruise/bleed easily.   Psychiatric/Behavioral: Positive for dysphoric mood and sleep disturbance (needs meds but still wakes up in middle of night). The patient is nervous/anxious.         Also c/o memory loss;        Past Medical History:   Diagnosis Date    Cataract     Hyperlipidemia     Inflammatory bowel disease     Sarcoidosis with granulomatous hepatitis     UTI (urinary tract infection) 7/17/2013       Past Surgical History:   Procedure Laterality Date    BLADDER SURGERY      BREAST SURGERY      EYE SURGERY      HIP SURGERY      HYSTERECTOMY      JOINT  "REPLACEMENT      SINUS SURGERY         Family History   Problem Relation Age of Onset    Cancer Mother     Cancer Sister    Mom  at 84., Colon cancer at 70.  Dad  at 80 with cva. Glaucoma.   Brother is 69 with DM 1, had pancreatic and bilat kidney transplants.   Sister  at 50 with BR cancer.  Son  of drugs and alcoholic    Social History     Tobacco Use    Smoking status: Former Smoker    Smokeless tobacco: Never Used   Substance Use Topics    Alcohol use: Yes     Alcohol/week: 3.0 standard drinks     Types: 1 Glasses of wine, 1 Cans of beer, 1 Shots of liquor per week    Drug use: No    x2.   Relocated and down sized to [facility].   1 son  about 2 yrs ago. Poss drug versus intentional OD. Not sure.   One son () who is healthy. 3 grandkids; 2/3 attorneys.     Objective:       BP (!) 146/86   Pulse 74   Ht 5' 3.6" (1.615 m)   Wt 63.2 kg (139 lb 5.3 oz)   BMI 24.22 kg/m²     Physical Exam   Vitals reviewed.  Constitutional: She is oriented to person, place, and time and well-developed, well-nourished, and in no distress. No distress.   HENT:   Head: Normocephalic and atraumatic.   Mouth/Throat: Oropharynx is clear and moist. No oropharyngeal exudate.   No parotidomegaly;   Temporal arteries with good pulsations.   No temporal artery tenderness;   No scalp tenderness.  No oral ulcers;   Eyes: Conjunctivae and EOM are normal. Pupils are equal, round, and reactive to light. No scleral icterus.   Neck: No JVD present. No tracheal deviation present. No thyromegaly present.   Cardiovascular: Normal rate, regular rhythm, normal heart sounds and intact distal pulses.  Exam reveals no gallop and no friction rub.    No murmur heard.  Pulmonary/Chest: Effort normal and breath sounds normal. No respiratory distress. She has no wheezes. She has no rales. She exhibits no tenderness.   Abdominal: Soft. Bowel sounds are normal. She exhibits no distension and no mass. There is no splenomegaly " or hepatomegaly. There is no tenderness. There is no rebound and no guarding.   Lymphadenopathy:     She has no cervical adenopathy.        Right: No inguinal adenopathy present.        Left: No inguinal adenopathy present.   Neurological: She is alert and oriented to person, place, and time. She has normal reflexes. No cranial nerve deficit.   Motor strength: 4+/5 prox & distal.  Difficulty getting out of a chair wo using arms.    Skin: Skin is warm and dry. No rash noted.     Psychiatric: Mood, memory, affect and judgment normal.   Musculoskeletal: She exhibits edema (trace) and tenderness (traps very tight & tender).   Cspine adequate ROM no tenderness  Tspine adequate ROM no tenderness  Lspine adequate ROM no tenderness.  TMJ: unremarkable  Shoulders: bilateral decrease in abduction L>R; no tenderness; no synovitis  Elbows: FROM; no synovitis; no tophi or nodules  Wrists: FROM; no synovitis  MCPs: FROM; no synovitis; no metacarpalgia;  ok;  Bilateral ulnar deviation R>L;   PIPs:FROM; no synovitis;  DIPs: FROM; no synovitis  HIPS: Good ROM  Knees: FROM; no synovitis; no instability;  Ankles: FROM: no synovitis   Toes: ok;              2/26/18: ESR 9;CBC ok; CMP ok;   2/20/18: NANDO neg; RF neg;   Assessment:     Joint and muscle pain   R/O statin induced    Patient felt crestor resulted in myalgia    Mild bilateral adhesive capsulitis   L>R    Hand discoloration in cold (white)    Ulnar deviation hands   Sister same    Intestinal sarcoid by hx presenting as diarrhea   Dx 2005 at [facility]   S/P short term steroids    S/P B THR    Osteoporosis   On denosumab   On vitamin D 2,000 IU/d    Dyslipidemia   S/P myalgia due to crestor    Depression & ADHD   On abilify & adderall          Plan:   Myalgias discussed; appear benign.  In an abundance of caution will get labs & image hands.  3 types of ROM exercises shown for shoulders.  Educated on value of aerobic exercise.  Daily, aerobic low impact exercise.  A copy  of [facility] provided.  We will contact her if we need to see her again based on labs.         CC: Mary Cortes MD    Addendum: 12/18/19: ESR <2: CRP 0.8; CBC ok; CMPok; Vit D 36; RF neg; CCP neg;     12/18/19: Bilateral hands: personally viewed: R: OA DIPs & MCP;& wrist; L OA tam 1 C-MCP & carpals.

## 2019-12-18 ENCOUNTER — OFFICE VISIT (OUTPATIENT)
Dept: RHEUMATOLOGY | Facility: CLINIC | Age: 84
End: 2019-12-18
Payer: MEDICARE

## 2019-12-18 ENCOUNTER — HOSPITAL ENCOUNTER (OUTPATIENT)
Dept: RADIOLOGY | Facility: HOSPITAL | Age: 84
Discharge: HOME | End: 2019-12-18
Attending: INTERNAL MEDICINE
Payer: MEDICARE

## 2019-12-18 VITALS
HEART RATE: 74 BPM | BODY MASS INDEX: 23.78 KG/M2 | SYSTOLIC BLOOD PRESSURE: 146 MMHG | DIASTOLIC BLOOD PRESSURE: 86 MMHG | WEIGHT: 139.31 LBS | HEIGHT: 64 IN

## 2019-12-18 DIAGNOSIS — M75.01 BILATERAL ADHESIVE CAPSULITIS OF SHOULDERS: ICD-10-CM

## 2019-12-18 DIAGNOSIS — M25.50 PAIN IN JOINT INVOLVING MULTIPLE SITES: ICD-10-CM

## 2019-12-18 DIAGNOSIS — M25.50 PAIN IN JOINT INVOLVING MULTIPLE SITES: Primary | ICD-10-CM

## 2019-12-18 DIAGNOSIS — Z55.9 EDUCATIONAL CIRCUMSTANCE: ICD-10-CM

## 2019-12-18 DIAGNOSIS — E55.9 VITAMIN D INSUFFICIENCY: ICD-10-CM

## 2019-12-18 DIAGNOSIS — M75.02 BILATERAL ADHESIVE CAPSULITIS OF SHOULDERS: ICD-10-CM

## 2019-12-18 PROCEDURE — 73130 X-RAY EXAM OF HAND: CPT | Mod: 26,50,, | Performed by: RADIOLOGY

## 2019-12-18 PROCEDURE — 99213 OFFICE O/P EST LOW 20 MIN: CPT | Mod: PBBFAC,25 | Performed by: INTERNAL MEDICINE

## 2019-12-18 PROCEDURE — 73130 X-RAY EXAM OF HAND: CPT | Mod: 50,TC

## 2019-12-18 PROCEDURE — 99205 OFFICE O/P NEW HI 60 MIN: CPT | Mod: S$PBB,,, | Performed by: INTERNAL MEDICINE

## 2019-12-18 PROCEDURE — 73130 XR HAND COMPLETE 3 VIEWS BILATERAL: ICD-10-PCS | Mod: 26,50,, | Performed by: RADIOLOGY

## 2019-12-18 PROCEDURE — 1159F MED LIST DOCD IN RCRD: CPT | Mod: ,,, | Performed by: INTERNAL MEDICINE

## 2019-12-18 PROCEDURE — 99999 PR PBB SHADOW E&M-EST. PATIENT-LVL III: ICD-10-PCS | Mod: PBBFAC,,, | Performed by: INTERNAL MEDICINE

## 2019-12-18 PROCEDURE — 1159F PR MEDICATION LIST DOCUMENTED IN MEDICAL RECORD: ICD-10-PCS | Mod: ,,, | Performed by: INTERNAL MEDICINE

## 2019-12-18 PROCEDURE — 1125F PR PAIN SEVERITY QUANTIFIED, PAIN PRESENT: ICD-10-PCS | Mod: ,,, | Performed by: INTERNAL MEDICINE

## 2019-12-18 PROCEDURE — 99205 PR OFFICE/OUTPT VISIT, NEW, LEVL V, 60-74 MIN: ICD-10-PCS | Mod: S$PBB,,, | Performed by: INTERNAL MEDICINE

## 2019-12-18 PROCEDURE — 1125F AMNT PAIN NOTED PAIN PRSNT: CPT | Mod: ,,, | Performed by: INTERNAL MEDICINE

## 2019-12-18 PROCEDURE — 99999 PR PBB SHADOW E&M-EST. PATIENT-LVL III: CPT | Mod: PBBFAC,,, | Performed by: INTERNAL MEDICINE

## 2019-12-18 SDOH — SOCIAL DETERMINANTS OF HEALTH (SDOH): PROBLEMS RELATED TO EDUCATION AND LITERACY, UNSPECIFIED: Z55.9

## 2019-12-18 ASSESSMENT — ROUTINE ASSESSMENT OF PATIENT INDEX DATA (RAPID3)
AM STIFFNESS SCORE: 1, YES
FATIGUE SCORE: 8
PSYCHOLOGICAL DISTRESS SCORE: 3.3
TOTAL RAPID3 SCORE: 2.78
PATIENT GLOBAL ASSESSMENT SCORE: 4.5
WHEN YOU AWAKENED IN THE MORNING OVER THE LAST WEEK, PLEASE INDICATE THE AMOUNT OF TIME IT TAKES UNTIL YOU ARE AS LIMBER AS YOU WILL BE FOR THE DAY: 30MIN
PAIN SCORE: 2.5
MDHAQ FUNCTION SCORE: .4

## 2019-12-18 NOTE — LETTER
December 18, 2019      Mary Cortes MD  [address]  [address]  [place] [address]           [facility]  [address]  [place] [address]  Phone: [phone]  Fax: [phone]          Patient: Cheyenne Garner   MR Number: [id number]   YOB: 1935   Date of Visit: 12/18/2019       Dear Dr. Mary Cortes:    Thank you for referring Cheyenne Garner to me for evaluation. Attached you will find relevant portions of my assessment and plan of care.    If you have questions, please do not hesitate to call me. I look forward to following Cheyenne Garner along with you.    Sincerely,    Nette White MD    Enclosure  CC:  No Recipients    If you would like to receive this communication electronically, please contact [email or web address] or [phone] to request more information on [employer] access.    For providers and/or their staff who would like to refer a patient to [facility], please contact us through our one-stop-shop provider referral line, [facility], at [phone].    If you feel you have received this communication in error or would no longer like to receive these types of communications, please e-mail [email or web address]

## 2019-12-18 NOTE — PATIENT INSTRUCTIONS
Do the 3 range of motion exercises of the shoulders shown.    Daily, aerobic, low impact exercises.     Read [employer] [facility].

## 2020-01-06 ENCOUNTER — TELEPHONE (OUTPATIENT)
Dept: RHEUMATOLOGY | Facility: CLINIC | Age: 85
End: 2020-01-06

## 2020-01-06 NOTE — TELEPHONE ENCOUNTER
----- Message from Gabbi Cage sent at 1/6/2020  9:11 AM CST -----  Contact: self  Pt states she never received any information regarding her xray nor test results. Please give a call back to further discuss. (Labs and Xray)      Contact Info

## 2020-01-06 NOTE — TELEPHONE ENCOUNTER
No surprises, as expected.   All labs normal or negative.   Kidneys, liver, blood all ok.   Has degenerative osteoarthritis on imaging as expected.   We talked about this.   This is wear and tear arthritis.   She can follow up with Dr. Cortes. I sent her a copy of my note and labs    Called and informed patient of test results.  Verbalizes understanding.

## 2020-02-11 ENCOUNTER — OFFICE VISIT (OUTPATIENT)
Dept: DERMATOLOGY | Facility: CLINIC | Age: 85
End: 2020-02-11
Payer: MEDICARE

## 2020-02-11 VITALS — BODY MASS INDEX: 24.16 KG/M2 | WEIGHT: 139 LBS

## 2020-02-11 DIAGNOSIS — D48.5 NEOPLASM OF UNCERTAIN BEHAVIOR OF SKIN: ICD-10-CM

## 2020-02-11 DIAGNOSIS — L82.1 SEBORRHEIC KERATOSES: ICD-10-CM

## 2020-02-11 DIAGNOSIS — L30.9 DERMATITIS: Primary | ICD-10-CM

## 2020-02-11 PROCEDURE — 11102 PR TANGENTIAL BIOPSY, SKIN, SINGLE LESION: ICD-10-PCS | Mod: S$PBB,,, | Performed by: DERMATOLOGY

## 2020-02-11 PROCEDURE — 99999 PR PBB SHADOW E&M-EST. PATIENT-LVL III: ICD-10-PCS | Mod: PBBFAC,,, | Performed by: DERMATOLOGY

## 2020-02-11 PROCEDURE — 88305 TISSUE EXAM BY PATHOLOGIST: CPT | Mod: 26,,, | Performed by: PATHOLOGY

## 2020-02-11 PROCEDURE — 99213 OFFICE O/P EST LOW 20 MIN: CPT | Mod: PBBFAC,PO,25 | Performed by: DERMATOLOGY

## 2020-02-11 PROCEDURE — 99213 OFFICE O/P EST LOW 20 MIN: CPT | Mod: 25,S$PBB,, | Performed by: DERMATOLOGY

## 2020-02-11 PROCEDURE — 11102 TANGNTL BX SKIN SINGLE LES: CPT | Mod: PBBFAC,PO | Performed by: DERMATOLOGY

## 2020-02-11 PROCEDURE — 88305 TISSUE EXAM BY PATHOLOGIST: CPT | Performed by: PATHOLOGY

## 2020-02-11 PROCEDURE — 88305 TISSUE EXAM BY PATHOLOGIST: ICD-10-PCS | Mod: 26,,, | Performed by: PATHOLOGY

## 2020-02-11 PROCEDURE — 99999 PR PBB SHADOW E&M-EST. PATIENT-LVL III: CPT | Mod: PBBFAC,,, | Performed by: DERMATOLOGY

## 2020-02-11 PROCEDURE — 11102 TANGNTL BX SKIN SINGLE LES: CPT | Mod: S$PBB,,, | Performed by: DERMATOLOGY

## 2020-02-11 PROCEDURE — 99213 PR OFFICE/OUTPT VISIT, EST, LEVL III, 20-29 MIN: ICD-10-PCS | Mod: 25,S$PBB,, | Performed by: DERMATOLOGY

## 2020-02-11 RX ORDER — TACROLIMUS 0.3 MG/G
OINTMENT TOPICAL 2 TIMES DAILY
Qty: 60 G | Refills: 3 | Status: SHIPPED | OUTPATIENT
Start: 2020-02-11 | End: 2020-02-18

## 2020-02-11 NOTE — PROGRESS NOTES
Subjective:       Patient ID:  Cheyenne Garner is a 84 y.o. female who presents for   Chief Complaint   Patient presents with    Spot     upper and lower exam, itching      She still has problems with redness on eyelids the elidel does not help.  Mostly worried about an area of crusting at superior edge of scar from [facility] 2018 she does not have the report she thinks it was cancer not sure what type she was referred there per Dr Monteiro.  Also new spot on chest and an area on her right foot which is tender to touch present for months, see previous note.     Spot  - Initial  Affected locations: face, left arm, right arm, chest, back and abdomen  Signs / symptoms: itching  Aggravated by: nothing  Relieving factors/Treatments tried: nothing        Review of Systems   Constitutional: Negative for fever, chills, weight loss, weight gain, fatigue, night sweats and malaise.   Skin: Positive for daily sunscreen use and activity-related sunscreen use. Negative for wears hat.   Hematologic/Lymphatic: Does not bruise/bleed easily.        Objective:    Physical Exam   Constitutional: She appears well-developed and well-nourished.   Neurological: She is alert and oriented to person, place, and time.   Psychiatric: She has a normal mood and affect.   Skin:   Areas Examined (abnormalities noted in diagram):   Scalp / Hair Palpated and Inspected  Head / Face Inspection Performed  Neck Inspection Performed  Chest / Axilla Inspection Performed  RUE Inspected  LUE Inspection Performed  RLE Inspected  Nails and Digits Inspection Performed                           Diagram Legend     Erythematous scaling macule/papule c/w actinic keratosis       Vascular papule c/w angioma      Pigmented verrucoid papule/plaque c/w seborrheic keratosis      Yellow umbilicated papule c/w sebaceous hyperplasia      Irregularly shaped tan macule c/w lentigo     1-2 mm smooth white papules consistent with Milia      Movable subcutaneous cyst with  punctum c/w epidermal inclusion cyst      Subcutaneous movable cyst c/w pilar cyst      Firm pink to brown papule c/w dermatofibroma      Pedunculated fleshy papule(s) c/w skin tag(s)      Evenly pigmented macule c/w junctional nevus     Mildly variegated pigmented, slightly irregular-bordered macule c/w mildly atypical nevus      Flesh colored to evenly pigmented papule c/w intradermal nevus       Pink pearly papule/plaque c/w basal cell carcinoma      Erythematous hyperkeratotic cursted plaque c/w SCC      Surgical scar with no sign of skin cancer recurrence      Open and closed comedones      Inflammatory papules and pustules      Verrucoid papule consistent consistent with wart     Erythematous eczematous patches and plaques     Dystrophic onycholytic nail with subungual debris c/w onychomycosis     Umbilicated papule    Erythematous-base heme-crusted tan verrucoid plaque consistent with inflamed seborrheic keratosis     Erythematous Silvery Scaling Plaque c/w Psoriasis     See annotation      Assessment / Plan:      Pathology Orders:     Normal Orders This Visit    Specimen to Pathology, Dermatology     Questions:    Procedure Type:  Dermatology and skin neoplasms    Number of Specimens:  1    ------------------------:  -------------------------    Spec 1 Procedure:  Biopsy    Spec 1 Clinical Impression:  hypertrophic ak    Spec 1 Source:  right foot        Dermatitis eyelids irritant vs allergic  -     tacrolimus (PROTOPIC) 0.03 % ointment; Apply topically 2 (two) times daily.  Dispense: 60 g; Refill: 3  Do not rub eyelids  Consider patch tests  [facility]    Neoplasm of uncertain behavior of skin  -     Specimen to Pathology, Dermatology  Shave biopsy performed after verbal consent including risk of infection, scar, recurrence, need for additional treatment of site. Area prepped with alcohol, anesthetized with 1% lidocaine with epinephrine. . Hemostasis achieved with monsels. No  complications. Dressing applied. Wound care explained. Will need further rx if + ca          Seborrheic keratoses  reassurance               No follow-ups on file.

## 2020-02-14 LAB
FINAL PATHOLOGIC DIAGNOSIS: NORMAL
GROSS: NORMAL

## 2020-02-18 ENCOUNTER — TELEPHONE (OUTPATIENT)
Dept: DERMATOLOGY | Facility: CLINIC | Age: 85
End: 2020-02-18

## 2020-02-18 RX ORDER — TACROLIMUS 1 MG/G
OINTMENT TOPICAL 2 TIMES DAILY
Qty: 60 G | Refills: 3 | Status: SHIPPED | OUTPATIENT
Start: 2020-02-18 | End: 2021-10-14

## 2020-02-18 NOTE — TELEPHONE ENCOUNTER
----- Message from Yury Grace MA sent at 2/17/2020  4:49 PM CST -----  Contact: patient      ----- Message -----  From: Aundrea Coy  Sent: 2/17/2020   4:24 PM CST  To: Mel[facility] SARABIA Staff    Please call above patient at [phone] need to speak with the nurse about cancer waiting on a call back thanks.

## 2020-06-22 ENCOUNTER — LAB VISIT (OUTPATIENT)
Dept: LAB | Facility: OTHER | Age: 85
End: 2020-06-22
Attending: INTERNAL MEDICINE
Payer: MEDICARE

## 2020-06-22 DIAGNOSIS — M25.50 PAIN IN JOINT INVOLVING MULTIPLE SITES: ICD-10-CM

## 2020-06-22 LAB
ALBUMIN SERPL BCP-MCNC: 4 G/DL (ref 3.5–5.2)
ALP SERPL-CCNC: 61 U/L (ref 55–135)
ALT SERPL W/O P-5'-P-CCNC: 12 U/L (ref 10–44)
ANION GAP SERPL CALC-SCNC: 11 MMOL/L (ref 8–16)
AST SERPL-CCNC: 17 U/L (ref 10–40)
BILIRUB SERPL-MCNC: 0.4 MG/DL (ref 0.1–1)
BUN SERPL-MCNC: 22 MG/DL (ref 8–23)
CALCIUM SERPL-MCNC: 9.3 MG/DL (ref 8.7–10.5)
CHLORIDE SERPL-SCNC: 104 MMOL/L (ref 95–110)
CO2 SERPL-SCNC: 23 MMOL/L (ref 23–29)
CREAT SERPL-MCNC: 0.9 MG/DL (ref 0.5–1.4)
EST. GFR  (AFRICAN AMERICAN): >60 ML/MIN/1.73 M^2
EST. GFR  (NON AFRICAN AMERICAN): 60 ML/MIN/1.73 M^2
GLUCOSE SERPL-MCNC: 84 MG/DL (ref 70–110)
POTASSIUM SERPL-SCNC: 4.4 MMOL/L (ref 3.5–5.1)
PROT SERPL-MCNC: 6.8 G/DL (ref 6–8.4)
SODIUM SERPL-SCNC: 138 MMOL/L (ref 136–145)

## 2020-06-22 PROCEDURE — 36415 COLL VENOUS BLD VENIPUNCTURE: CPT

## 2020-06-22 PROCEDURE — 80053 COMPREHEN METABOLIC PANEL: CPT

## 2020-06-25 ENCOUNTER — INFUSION (OUTPATIENT)
Dept: INFUSION THERAPY | Facility: OTHER | Age: 85
End: 2020-06-25
Attending: INTERNAL MEDICINE
Payer: MEDICARE

## 2020-06-25 VITALS
RESPIRATION RATE: 18 BRPM | SYSTOLIC BLOOD PRESSURE: 162 MMHG | TEMPERATURE: 99 F | HEART RATE: 89 BPM | OXYGEN SATURATION: 98 % | DIASTOLIC BLOOD PRESSURE: 74 MMHG

## 2020-06-25 DIAGNOSIS — M81.0 OSTEOPOROSIS WITHOUT CURRENT PATHOLOGICAL FRACTURE, UNSPECIFIED OSTEOPOROSIS TYPE: Primary | ICD-10-CM

## 2020-06-25 PROCEDURE — 63600175 PHARM REV CODE 636 W HCPCS: Mod: JG | Performed by: INTERNAL MEDICINE

## 2020-06-25 PROCEDURE — 96372 THER/PROPH/DIAG INJ SC/IM: CPT

## 2020-06-25 RX ADMIN — DENOSUMAB 60 MG: 60 INJECTION SUBCUTANEOUS at 02:06

## 2020-06-25 NOTE — PLAN OF CARE
Prolia subcutaneous injection administered, no reaction. Patient tolerated well. No apparent distress noted. Discharge instructions given to patient. Patient understands instructions. Follow up appointment scheduled.

## 2020-07-28 ENCOUNTER — OFFICE VISIT (OUTPATIENT)
Dept: DERMATOLOGY | Facility: CLINIC | Age: 85
End: 2020-07-28
Payer: MEDICARE

## 2020-07-28 VITALS — BODY MASS INDEX: 24.16 KG/M2 | WEIGHT: 139 LBS

## 2020-07-28 DIAGNOSIS — Z87.2 HISTORY OF DERMATITIS: ICD-10-CM

## 2020-07-28 DIAGNOSIS — L57.0 MULTIPLE ACTINIC KERATOSES: Primary | ICD-10-CM

## 2020-07-28 DIAGNOSIS — L81.4 LENTIGINES: ICD-10-CM

## 2020-07-28 DIAGNOSIS — Z85.828 HISTORY OF SKIN CANCER: ICD-10-CM

## 2020-07-28 PROCEDURE — 17000 DESTRUCT PREMALG LESION: CPT | Mod: S$PBB,,, | Performed by: DERMATOLOGY

## 2020-07-28 PROCEDURE — 17000 DESTRUCT PREMALG LESION: CPT | Mod: PBBFAC,PO | Performed by: DERMATOLOGY

## 2020-07-28 PROCEDURE — 99212 OFFICE O/P EST SF 10 MIN: CPT | Mod: PBBFAC,PO,25 | Performed by: DERMATOLOGY

## 2020-07-28 PROCEDURE — 99999 PR PBB SHADOW E&M-EST. PATIENT-LVL II: CPT | Mod: PBBFAC,,, | Performed by: DERMATOLOGY

## 2020-07-28 PROCEDURE — 99999 PR PBB SHADOW E&M-EST. PATIENT-LVL II: ICD-10-PCS | Mod: PBBFAC,,, | Performed by: DERMATOLOGY

## 2020-07-28 PROCEDURE — 17003 DESTRUCT PREMALG LES 2-14: CPT | Mod: S$PBB,,, | Performed by: DERMATOLOGY

## 2020-07-28 PROCEDURE — 17003 DESTRUCTION, PREMALIGNANT LESIONS; SECOND THROUGH 14 LESIONS: ICD-10-PCS | Mod: S$PBB,,, | Performed by: DERMATOLOGY

## 2020-07-28 PROCEDURE — 99213 PR OFFICE/OUTPT VISIT, EST, LEVL III, 20-29 MIN: ICD-10-PCS | Mod: 25,S$PBB,, | Performed by: DERMATOLOGY

## 2020-07-28 PROCEDURE — 17000 PR DESTRUCTION(LASER SURGERY,CRYOSURGERY,CHEMOSURGERY),PREMALIGNANT LESIONS,FIRST LESION: ICD-10-PCS | Mod: S$PBB,,, | Performed by: DERMATOLOGY

## 2020-07-28 PROCEDURE — 99213 OFFICE O/P EST LOW 20 MIN: CPT | Mod: 25,S$PBB,, | Performed by: DERMATOLOGY

## 2020-07-28 PROCEDURE — 17003 DESTRUCT PREMALG LES 2-14: CPT | Mod: PBBFAC,PO | Performed by: DERMATOLOGY

## 2020-07-28 NOTE — PROGRESS NOTES
Subjective:       Patient ID:  Cheyenne Garner is a 84 y.o. female who presents for   Chief Complaint   Patient presents with    Skin Check     Upper and lower exam     Lesion     left mid lef, face, right foot     New lesions on right foot and lower legs not painful. This is a high risk patient here to check for the development of new lesions.      Lesion - Initial  Affected locations: face, right foot, left lower leg, right lower leg, left arm and right arm  Signs / symptoms: asymptomatic  Aggravated by: nothing  Relieving factors/Treatments tried: nothing        Review of Systems   Constitutional: Negative for fever, chills, weight loss, weight gain, fatigue, night sweats and malaise.   Skin: Positive for daily sunscreen use and activity-related sunscreen use. Negative for wears hat.   Hematologic/Lymphatic: Does not bruise/bleed easily.        Objective:    Physical Exam   Constitutional: She appears well-developed and well-nourished.   Neurological: She is alert and oriented to person, place, and time.   Psychiatric: She has a normal mood and affect.   Skin:   Areas Examined (abnormalities noted in diagram):   Head / Face Inspection Performed  Neck Inspection Performed  Chest / Axilla Inspection Performed  RUE Inspected  LUE Inspection Performed  RLE Inspected  Nails and Digits Inspection Performed                           Diagram Legend     Erythematous scaling macule/papule c/w actinic keratosis       Vascular papule c/w angioma      Pigmented verrucoid papule/plaque c/w seborrheic keratosis      Yellow umbilicated papule c/w sebaceous hyperplasia      Irregularly shaped tan macule c/w lentigo     1-2 mm smooth white papules consistent with Milia      Movable subcutaneous cyst with punctum c/w epidermal inclusion cyst      Subcutaneous movable cyst c/w pilar cyst      Firm pink to brown papule c/w dermatofibroma      Pedunculated fleshy papule(s) c/w skin tag(s)      Evenly pigmented macule c/w  "junctional nevus     Mildly variegated pigmented, slightly irregular-bordered macule c/w mildly atypical nevus      Flesh colored to evenly pigmented papule c/w intradermal nevus       Pink pearly papule/plaque c/w basal cell carcinoma      Erythematous hyperkeratotic cursted plaque c/w SCC      Surgical scar with no sign of skin cancer recurrence      Open and closed comedones      Inflammatory papules and pustules      Verrucoid papule consistent consistent with wart     Erythematous eczematous patches and plaques     Dystrophic onycholytic nail with subungual debris c/w onychomycosis     Umbilicated papule    Erythematous-base heme-crusted tan verrucoid plaque consistent with inflamed seborrheic keratosis     Erythematous Silvery Scaling Plaque c/w Psoriasis     See annotation      Assessment / Plan:        Multiple actinic keratoses   Cryosurgery Procedure Note    Verbal consent from the patient is obtained and the patient is aware of the precancerous quality and need for treatment of these lesions. Liquid nitrogen cryosurgery is applied to the 3  actinic keratoses, as detailed in the physical exam, to produce a freeze injury.      Lentigines  The "ABCD" rules to observe pigmented lesions were reviewed.      History of skin cancer  Scc right foot     History of dermatitis  Eyelid, resolved             Follow up in about 6 months (around 1/28/2021).  "

## 2020-09-14 PROBLEM — F51.01 PRIMARY INSOMNIA: Status: ACTIVE | Noted: 2020-09-14

## 2020-09-25 ENCOUNTER — APPOINTMENT (OUTPATIENT)
Dept: LAB | Facility: OTHER | Age: 85
End: 2020-09-25
Attending: INTERNAL MEDICINE
Payer: MEDICARE

## 2020-09-25 DIAGNOSIS — R19.7 DIARRHEA OF PRESUMED INFECTIOUS ORIGIN: Primary | ICD-10-CM

## 2020-09-25 PROCEDURE — 87493 C DIFF AMPLIFIED PROBE: CPT

## 2020-09-26 LAB — C DIFF TOX GENS STL QL NAA+PROBE: NEGATIVE

## 2020-10-13 ENCOUNTER — LAB VISIT (OUTPATIENT)
Dept: LAB | Facility: OTHER | Age: 85
End: 2020-10-13
Attending: INTERNAL MEDICINE
Payer: MEDICARE

## 2020-10-13 DIAGNOSIS — E53.8 FOLATE DEFICIENCY: ICD-10-CM

## 2020-10-13 DIAGNOSIS — Z00.00 ROUTINE GENERAL MEDICAL EXAMINATION AT A HEALTH CARE FACILITY: ICD-10-CM

## 2020-10-13 DIAGNOSIS — K52.9 INFLAMMATORY BOWEL DISEASE: ICD-10-CM

## 2020-10-13 DIAGNOSIS — R19.7 DIARRHEA, UNSPECIFIED TYPE: ICD-10-CM

## 2020-10-13 DIAGNOSIS — R19.7 DIARRHEA OF PRESUMED INFECTIOUS ORIGIN: Primary | ICD-10-CM

## 2020-10-13 LAB
ALBUMIN SERPL BCP-MCNC: 4.2 G/DL (ref 3.5–5.2)
ALP SERPL-CCNC: 56 U/L (ref 55–135)
ALT SERPL W/O P-5'-P-CCNC: 17 U/L (ref 10–44)
ANION GAP SERPL CALC-SCNC: 12 MMOL/L (ref 8–16)
AST SERPL-CCNC: 15 U/L (ref 10–40)
BASOPHILS # BLD AUTO: 0.04 K/UL (ref 0–0.2)
BASOPHILS NFR BLD: 0.4 % (ref 0–1.9)
BILIRUB SERPL-MCNC: 0.5 MG/DL (ref 0.1–1)
BUN SERPL-MCNC: 19 MG/DL (ref 8–23)
CALCIUM SERPL-MCNC: 9.1 MG/DL (ref 8.7–10.5)
CHLORIDE SERPL-SCNC: 104 MMOL/L (ref 95–110)
CO2 SERPL-SCNC: 24 MMOL/L (ref 23–29)
CREAT SERPL-MCNC: 1 MG/DL (ref 0.5–1.4)
CRP SERPL-MCNC: 0.8 MG/L (ref 0–8.2)
DIFFERENTIAL METHOD: ABNORMAL
EOSINOPHIL # BLD AUTO: 0 K/UL (ref 0–0.5)
EOSINOPHIL NFR BLD: 0.1 % (ref 0–8)
ERYTHROCYTE [DISTWIDTH] IN BLOOD BY AUTOMATED COUNT: 13.8 % (ref 11.5–14.5)
EST. GFR  (AFRICAN AMERICAN): >60 ML/MIN/1.73 M^2
EST. GFR  (NON AFRICAN AMERICAN): 52 ML/MIN/1.73 M^2
FOLATE SERPL-MCNC: 10.6 NG/ML (ref 4–24)
GLUCOSE SERPL-MCNC: 122 MG/DL (ref 70–110)
HCT VFR BLD AUTO: 45.3 % (ref 37–48.5)
HGB BLD-MCNC: 13.9 G/DL (ref 12–16)
IMM GRANULOCYTES # BLD AUTO: 0.12 K/UL (ref 0–0.04)
IMM GRANULOCYTES NFR BLD AUTO: 1.2 % (ref 0–0.5)
LYMPHOCYTES # BLD AUTO: 0.8 K/UL (ref 1–4.8)
LYMPHOCYTES NFR BLD: 7.7 % (ref 18–48)
MCH RBC QN AUTO: 30.8 PG (ref 27–31)
MCHC RBC AUTO-ENTMCNC: 30.7 G/DL (ref 32–36)
MCV RBC AUTO: 100 FL (ref 82–98)
MONOCYTES # BLD AUTO: 0.2 K/UL (ref 0.3–1)
MONOCYTES NFR BLD: 1.8 % (ref 4–15)
NEUTROPHILS # BLD AUTO: 9.2 K/UL (ref 1.8–7.7)
NEUTROPHILS NFR BLD: 88.8 % (ref 38–73)
NRBC BLD-RTO: 0 /100 WBC
PLATELET # BLD AUTO: 263 K/UL (ref 150–350)
PMV BLD AUTO: 10.8 FL (ref 9.2–12.9)
POTASSIUM SERPL-SCNC: 4.2 MMOL/L (ref 3.5–5.1)
PROT SERPL-MCNC: 6.9 G/DL (ref 6–8.4)
RBC # BLD AUTO: 4.51 M/UL (ref 4–5.4)
SODIUM SERPL-SCNC: 140 MMOL/L (ref 136–145)
WBC # BLD AUTO: 10.3 K/UL (ref 3.9–12.7)

## 2020-10-13 PROCEDURE — 87449 NOS EACH ORGANISM AG IA: CPT

## 2020-10-13 PROCEDURE — 80053 COMPREHEN METABOLIC PANEL: CPT

## 2020-10-13 PROCEDURE — 83516 IMMUNOASSAY NONANTIBODY: CPT | Mod: 59

## 2020-10-13 PROCEDURE — 82272 OCCULT BLD FECES 1-3 TESTS: CPT

## 2020-10-13 PROCEDURE — 87324 CLOSTRIDIUM AG IA: CPT

## 2020-10-13 PROCEDURE — 87046 STOOL CULTR AEROBIC BACT EA: CPT

## 2020-10-13 PROCEDURE — 87427 SHIGA-LIKE TOXIN AG IA: CPT

## 2020-10-13 PROCEDURE — 89055 LEUKOCYTE ASSESSMENT FECAL: CPT

## 2020-10-13 PROCEDURE — 82746 ASSAY OF FOLIC ACID SERUM: CPT

## 2020-10-13 PROCEDURE — 87045 FECES CULTURE AEROBIC BACT: CPT

## 2020-10-13 PROCEDURE — 85025 COMPLETE CBC W/AUTO DIFF WBC: CPT

## 2020-10-13 PROCEDURE — 36415 COLL VENOUS BLD VENIPUNCTURE: CPT

## 2020-10-13 PROCEDURE — 86140 C-REACTIVE PROTEIN: CPT

## 2020-10-15 LAB
C DIFF GDH STL QL: NEGATIVE
C DIFF TOX A+B STL QL IA: NEGATIVE
OB PNL STL: NEGATIVE
WBC #/AREA STL HPF: NORMAL /[HPF]

## 2020-10-16 LAB
E COLI SXT1 STL QL IA: NEGATIVE
E COLI SXT2 STL QL IA: NEGATIVE
GLIADIN PEPTIDE IGA SER-ACNC: 3 UNITS
GLIADIN PEPTIDE IGG SER-ACNC: 2 UNITS
IGA SERPL-MCNC: 146 MG/DL (ref 70–400)
TTG IGA SER-ACNC: 5 UNITS
TTG IGG SER-ACNC: 3 UNITS

## 2020-10-20 LAB — BACTERIA STL CULT: NORMAL

## 2020-12-14 PROBLEM — K52.9 COLITIS: Status: ACTIVE | Noted: 2020-12-14

## 2020-12-29 ENCOUNTER — INFUSION (OUTPATIENT)
Dept: INFUSION THERAPY | Facility: OTHER | Age: 85
End: 2020-12-29
Attending: INTERNAL MEDICINE
Payer: MEDICARE

## 2020-12-29 VITALS
SYSTOLIC BLOOD PRESSURE: 150 MMHG | RESPIRATION RATE: 16 BRPM | DIASTOLIC BLOOD PRESSURE: 70 MMHG | HEART RATE: 76 BPM | OXYGEN SATURATION: 98 % | TEMPERATURE: 99 F

## 2020-12-29 DIAGNOSIS — M81.0 OSTEOPOROSIS WITHOUT CURRENT PATHOLOGICAL FRACTURE, UNSPECIFIED OSTEOPOROSIS TYPE: Primary | ICD-10-CM

## 2020-12-29 PROCEDURE — 63600175 PHARM REV CODE 636 W HCPCS: Mod: JG | Performed by: INTERNAL MEDICINE

## 2020-12-29 PROCEDURE — 96372 THER/PROPH/DIAG INJ SC/IM: CPT

## 2020-12-29 RX ADMIN — DENOSUMAB 60 MG: 60 INJECTION SUBCUTANEOUS at 01:12

## 2020-12-29 NOTE — PLAN OF CARE
Prolia injection complete. Pt tolerated well. VSS. NAD. Pt verbalized understanding of discharge instructions before leaving.

## 2021-01-07 ENCOUNTER — IMMUNIZATION (OUTPATIENT)
Dept: INTERNAL MEDICINE | Facility: CLINIC | Age: 86
End: 2021-01-07
Payer: MEDICARE

## 2021-01-07 DIAGNOSIS — Z23 NEED FOR VACCINATION: ICD-10-CM

## 2021-01-07 PROCEDURE — 91300 COVID-19, MRNA, LNP-S, PF, 30 MCG/0.3 ML DOSE VACCINE: CPT | Mod: PBBFAC

## 2021-01-29 ENCOUNTER — IMMUNIZATION (OUTPATIENT)
Dept: INTERNAL MEDICINE | Facility: CLINIC | Age: 86
End: 2021-01-29
Payer: MEDICARE

## 2021-01-29 DIAGNOSIS — Z23 NEED FOR VACCINATION: Primary | ICD-10-CM

## 2021-01-29 PROCEDURE — 91300 COVID-19, MRNA, LNP-S, PF, 30 MCG/0.3 ML DOSE VACCINE: CPT | Mod: PBBFAC

## 2021-01-29 PROCEDURE — 0002A COVID-19, MRNA, LNP-S, PF, 30 MCG/0.3 ML DOSE VACCINE: CPT | Mod: PBBFAC

## 2021-02-24 ENCOUNTER — TELEPHONE (OUTPATIENT)
Dept: DERMATOLOGY | Facility: CLINIC | Age: 86
End: 2021-02-24

## 2021-02-24 RX ORDER — KETOCONAZOLE 20 MG/ML
SHAMPOO, SUSPENSION TOPICAL
Qty: 120 ML | Refills: 6 | Status: SHIPPED | OUTPATIENT
Start: 2021-02-24 | End: 2021-10-14

## 2021-04-01 ENCOUNTER — TELEPHONE (OUTPATIENT)
Dept: DERMATOLOGY | Facility: CLINIC | Age: 86
End: 2021-04-01

## 2021-04-05 ENCOUNTER — OFFICE VISIT (OUTPATIENT)
Dept: DERMATOLOGY | Facility: CLINIC | Age: 86
End: 2021-04-05
Payer: MEDICARE

## 2021-04-05 DIAGNOSIS — D18.01 CHERRY ANGIOMA: ICD-10-CM

## 2021-04-05 DIAGNOSIS — L82.0 INFLAMED SEBORRHEIC KERATOSIS: ICD-10-CM

## 2021-04-05 DIAGNOSIS — L82.1 SK (SEBORRHEIC KERATOSIS): ICD-10-CM

## 2021-04-05 DIAGNOSIS — L81.4 LENTIGINES: ICD-10-CM

## 2021-04-05 DIAGNOSIS — Z12.83 SCREENING FOR SKIN CANCER: ICD-10-CM

## 2021-04-05 DIAGNOSIS — D22.9 MULTIPLE BENIGN NEVI: Primary | ICD-10-CM

## 2021-04-05 DIAGNOSIS — L57.0 AK (ACTINIC KERATOSIS): ICD-10-CM

## 2021-04-05 DIAGNOSIS — Z85.828 HISTORY OF NONMELANOMA SKIN CANCER: ICD-10-CM

## 2021-04-05 PROCEDURE — 17110 DESTRUCTION B9 LES UP TO 14: CPT | Mod: 59,PBBFAC | Performed by: DERMATOLOGY

## 2021-04-05 PROCEDURE — 17000 DESTRUCT PREMALG LESION: CPT | Mod: S$PBB,59,, | Performed by: DERMATOLOGY

## 2021-04-05 PROCEDURE — 17110 DESTRUCTION B9 LES UP TO 14: CPT | Mod: S$PBB,,, | Performed by: DERMATOLOGY

## 2021-04-05 PROCEDURE — 99999 PR PBB SHADOW E&M-EST. PATIENT-LVL III: ICD-10-PCS | Mod: PBBFAC,,, | Performed by: DERMATOLOGY

## 2021-04-05 PROCEDURE — 99213 OFFICE O/P EST LOW 20 MIN: CPT | Mod: PBBFAC | Performed by: DERMATOLOGY

## 2021-04-05 PROCEDURE — 17000 DESTRUCT PREMALG LESION: CPT | Mod: PBBFAC | Performed by: DERMATOLOGY

## 2021-04-05 PROCEDURE — 99999 PR PBB SHADOW E&M-EST. PATIENT-LVL III: CPT | Mod: PBBFAC,,, | Performed by: DERMATOLOGY

## 2021-04-05 PROCEDURE — 17003 DESTRUCT PREMALG LES 2-14: CPT | Mod: S$PBB,59,, | Performed by: DERMATOLOGY

## 2021-04-05 PROCEDURE — 99213 PR OFFICE/OUTPT VISIT, EST, LEVL III, 20-29 MIN: ICD-10-PCS | Mod: 25,S$PBB,, | Performed by: DERMATOLOGY

## 2021-04-05 PROCEDURE — 17003 DESTRUCTION, PREMALIGNANT LESIONS; SECOND THROUGH 14 LESIONS: ICD-10-PCS | Mod: S$PBB,59,, | Performed by: DERMATOLOGY

## 2021-04-05 PROCEDURE — 17003 DESTRUCT PREMALG LES 2-14: CPT | Mod: PBBFAC | Performed by: DERMATOLOGY

## 2021-04-05 PROCEDURE — 17110 PR DESTRUCTION BENIGN LESIONS UP TO 14: ICD-10-PCS | Mod: S$PBB,,, | Performed by: DERMATOLOGY

## 2021-04-05 PROCEDURE — 99213 OFFICE O/P EST LOW 20 MIN: CPT | Mod: 25,S$PBB,, | Performed by: DERMATOLOGY

## 2021-04-05 PROCEDURE — 17000 PR DESTRUCTION(LASER SURGERY,CRYOSURGERY,CHEMOSURGERY),PREMALIGNANT LESIONS,FIRST LESION: ICD-10-PCS | Mod: S$PBB,59,, | Performed by: DERMATOLOGY

## 2021-05-24 ENCOUNTER — LAB VISIT (OUTPATIENT)
Dept: LAB | Facility: OTHER | Age: 86
End: 2021-05-24
Attending: INTERNAL MEDICINE
Payer: MEDICARE

## 2021-05-24 DIAGNOSIS — E78.5 HYPERLIPIDEMIA, UNSPECIFIED HYPERLIPIDEMIA TYPE: ICD-10-CM

## 2021-05-24 DIAGNOSIS — R53.83 FATIGUE, UNSPECIFIED TYPE: ICD-10-CM

## 2021-05-24 DIAGNOSIS — E53.8 VITAMIN B12 DEFICIENCY: ICD-10-CM

## 2021-05-24 DIAGNOSIS — R89.9 ABNORMAL LABORATORY TEST: ICD-10-CM

## 2021-05-24 DIAGNOSIS — R79.9 ABNORMAL BLOOD FINDINGS: ICD-10-CM

## 2021-05-24 LAB
ALBUMIN SERPL BCP-MCNC: 3.9 G/DL (ref 3.5–5.2)
ALP SERPL-CCNC: 65 U/L (ref 55–135)
ALT SERPL W/O P-5'-P-CCNC: 19 U/L (ref 10–44)
ANION GAP SERPL CALC-SCNC: 10 MMOL/L (ref 8–16)
AST SERPL-CCNC: 19 U/L (ref 10–40)
BASOPHILS # BLD AUTO: 0.07 K/UL (ref 0–0.2)
BASOPHILS NFR BLD: 0.9 % (ref 0–1.9)
BILIRUB SERPL-MCNC: 0.5 MG/DL (ref 0.1–1)
BUN SERPL-MCNC: 18 MG/DL (ref 8–23)
CALCIUM SERPL-MCNC: 8.9 MG/DL (ref 8.7–10.5)
CHLORIDE SERPL-SCNC: 103 MMOL/L (ref 95–110)
CO2 SERPL-SCNC: 24 MMOL/L (ref 23–29)
CREAT SERPL-MCNC: 0.9 MG/DL (ref 0.5–1.4)
DIFFERENTIAL METHOD: ABNORMAL
EOSINOPHIL # BLD AUTO: 0.1 K/UL (ref 0–0.5)
EOSINOPHIL NFR BLD: 0.9 % (ref 0–8)
ERYTHROCYTE [DISTWIDTH] IN BLOOD BY AUTOMATED COUNT: 12.2 % (ref 11.5–14.5)
EST. GFR  (AFRICAN AMERICAN): >60 ML/MIN/1.73 M^2
EST. GFR  (NON AFRICAN AMERICAN): 59 ML/MIN/1.73 M^2
ESTIMATED AVG GLUCOSE: 97 MG/DL (ref 68–131)
GLUCOSE SERPL-MCNC: 90 MG/DL (ref 70–110)
HBA1C MFR BLD: 5 % (ref 4–5.6)
HCT VFR BLD AUTO: 44.1 % (ref 37–48.5)
HGB BLD-MCNC: 14 G/DL (ref 12–16)
IMM GRANULOCYTES # BLD AUTO: 0.03 K/UL (ref 0–0.04)
IMM GRANULOCYTES NFR BLD AUTO: 0.4 % (ref 0–0.5)
LYMPHOCYTES # BLD AUTO: 2 K/UL (ref 1–4.8)
LYMPHOCYTES NFR BLD: 27.1 % (ref 18–48)
MCH RBC QN AUTO: 31.3 PG (ref 27–31)
MCHC RBC AUTO-ENTMCNC: 31.7 G/DL (ref 32–36)
MCV RBC AUTO: 98 FL (ref 82–98)
MONOCYTES # BLD AUTO: 0.7 K/UL (ref 0.3–1)
MONOCYTES NFR BLD: 8.8 % (ref 4–15)
NEUTROPHILS # BLD AUTO: 4.6 K/UL (ref 1.8–7.7)
NEUTROPHILS NFR BLD: 61.9 % (ref 38–73)
NRBC BLD-RTO: 0 /100 WBC
PLATELET # BLD AUTO: 328 K/UL (ref 150–450)
PMV BLD AUTO: 9.9 FL (ref 9.2–12.9)
POTASSIUM SERPL-SCNC: 4.4 MMOL/L (ref 3.5–5.1)
PROT SERPL-MCNC: 6.9 G/DL (ref 6–8.4)
RBC # BLD AUTO: 4.48 M/UL (ref 4–5.4)
SODIUM SERPL-SCNC: 137 MMOL/L (ref 136–145)
T4 FREE SERPL-MCNC: 0.97 NG/DL (ref 0.71–1.51)
TSH SERPL DL<=0.005 MIU/L-ACNC: 1.58 UIU/ML (ref 0.4–4)
WBC # BLD AUTO: 7.49 K/UL (ref 3.9–12.7)

## 2021-05-24 PROCEDURE — 80053 COMPREHEN METABOLIC PANEL: CPT | Performed by: INTERNAL MEDICINE

## 2021-05-24 PROCEDURE — 84443 ASSAY THYROID STIM HORMONE: CPT | Performed by: INTERNAL MEDICINE

## 2021-05-24 PROCEDURE — 84439 ASSAY OF FREE THYROXINE: CPT | Performed by: INTERNAL MEDICINE

## 2021-05-24 PROCEDURE — 36415 COLL VENOUS BLD VENIPUNCTURE: CPT | Performed by: INTERNAL MEDICINE

## 2021-05-24 PROCEDURE — 83036 HEMOGLOBIN GLYCOSYLATED A1C: CPT | Performed by: INTERNAL MEDICINE

## 2021-05-24 PROCEDURE — 85025 COMPLETE CBC W/AUTO DIFF WBC: CPT | Performed by: INTERNAL MEDICINE

## 2021-05-25 ENCOUNTER — HOSPITAL ENCOUNTER (OUTPATIENT)
Dept: RADIOLOGY | Facility: OTHER | Age: 86
Discharge: HOME | End: 2021-05-25
Attending: INTERNAL MEDICINE
Payer: MEDICARE

## 2021-05-25 DIAGNOSIS — M89.8X8 BONY PELVIC PAIN: ICD-10-CM

## 2021-05-25 PROCEDURE — 72190 X-RAY EXAM OF PELVIS: CPT | Mod: TC,FY

## 2021-05-25 PROCEDURE — 72190 XR PELVIS COMPLETE MIN 3 VIEWS: ICD-10-PCS | Mod: 26,,, | Performed by: RADIOLOGY

## 2021-05-25 PROCEDURE — 72190 X-RAY EXAM OF PELVIS: CPT | Mod: 26,,, | Performed by: RADIOLOGY

## 2021-09-20 ENCOUNTER — INFUSION (OUTPATIENT)
Dept: INFUSION THERAPY | Facility: OTHER | Age: 86
End: 2021-09-20
Attending: STUDENT IN AN ORGANIZED HEALTH CARE EDUCATION/TRAINING PROGRAM
Payer: MEDICARE

## 2021-09-20 VITALS
RESPIRATION RATE: 16 BRPM | OXYGEN SATURATION: 98 % | HEART RATE: 84 BPM | DIASTOLIC BLOOD PRESSURE: 69 MMHG | TEMPERATURE: 98 F | SYSTOLIC BLOOD PRESSURE: 122 MMHG

## 2021-09-20 DIAGNOSIS — M81.0 OSTEOPOROSIS WITHOUT CURRENT PATHOLOGICAL FRACTURE, UNSPECIFIED OSTEOPOROSIS TYPE: Primary | ICD-10-CM

## 2021-09-20 PROCEDURE — 63600175 PHARM REV CODE 636 W HCPCS: Mod: JG | Performed by: INTERNAL MEDICINE

## 2021-09-20 PROCEDURE — 96372 THER/PROPH/DIAG INJ SC/IM: CPT

## 2021-09-20 RX ADMIN — DENOSUMAB 60 MG: 60 INJECTION SUBCUTANEOUS at 10:09

## 2021-10-11 ENCOUNTER — LAB VISIT (OUTPATIENT)
Dept: LAB | Facility: OTHER | Age: 86
End: 2021-10-11
Attending: INTERNAL MEDICINE
Payer: MEDICARE

## 2021-10-11 DIAGNOSIS — M81.0 OSTEOPOROSIS WITHOUT CURRENT PATHOLOGICAL FRACTURE, UNSPECIFIED OSTEOPOROSIS TYPE: ICD-10-CM

## 2021-10-11 DIAGNOSIS — E53.8 VITAMIN B12 DEFICIENCY: ICD-10-CM

## 2021-10-11 DIAGNOSIS — M15.9 PRIMARY OSTEOARTHRITIS INVOLVING MULTIPLE JOINTS: ICD-10-CM

## 2021-10-11 DIAGNOSIS — E78.5 HYPERLIPIDEMIA, UNSPECIFIED HYPERLIPIDEMIA TYPE: ICD-10-CM

## 2021-10-11 LAB
ALBUMIN SERPL BCP-MCNC: 3.7 G/DL (ref 3.5–5.2)
ALP SERPL-CCNC: 68 U/L (ref 55–135)
ALT SERPL W/O P-5'-P-CCNC: 13 U/L (ref 10–44)
ANION GAP SERPL CALC-SCNC: 9 MMOL/L (ref 8–16)
AST SERPL-CCNC: 15 U/L (ref 10–40)
BASOPHILS # BLD AUTO: 0.07 K/UL (ref 0–0.2)
BASOPHILS NFR BLD: 1.2 % (ref 0–1.9)
BILIRUB SERPL-MCNC: 0.5 MG/DL (ref 0.1–1)
BUN SERPL-MCNC: 18 MG/DL (ref 8–23)
CALCIUM SERPL-MCNC: 8.7 MG/DL (ref 8.7–10.5)
CHLORIDE SERPL-SCNC: 108 MMOL/L (ref 95–110)
CO2 SERPL-SCNC: 23 MMOL/L (ref 23–29)
CREAT SERPL-MCNC: 0.7 MG/DL (ref 0.5–1.4)
DIFFERENTIAL METHOD: ABNORMAL
EOSINOPHIL # BLD AUTO: 0.1 K/UL (ref 0–0.5)
EOSINOPHIL NFR BLD: 1.5 % (ref 0–8)
ERYTHROCYTE [DISTWIDTH] IN BLOOD BY AUTOMATED COUNT: 12.7 % (ref 11.5–14.5)
EST. GFR  (AFRICAN AMERICAN): >60 ML/MIN/1.73 M^2
EST. GFR  (NON AFRICAN AMERICAN): >60 ML/MIN/1.73 M^2
GLUCOSE SERPL-MCNC: 89 MG/DL (ref 70–110)
HCT VFR BLD AUTO: 40.7 % (ref 37–48.5)
HGB BLD-MCNC: 13.2 G/DL (ref 12–16)
IMM GRANULOCYTES # BLD AUTO: 0.05 K/UL (ref 0–0.04)
IMM GRANULOCYTES NFR BLD AUTO: 0.9 % (ref 0–0.5)
LYMPHOCYTES # BLD AUTO: 1.6 K/UL (ref 1–4.8)
LYMPHOCYTES NFR BLD: 26.5 % (ref 18–48)
MCH RBC QN AUTO: 31.4 PG (ref 27–31)
MCHC RBC AUTO-ENTMCNC: 32.4 G/DL (ref 32–36)
MCV RBC AUTO: 97 FL (ref 82–98)
MONOCYTES # BLD AUTO: 0.6 K/UL (ref 0.3–1)
MONOCYTES NFR BLD: 9.7 % (ref 4–15)
NEUTROPHILS # BLD AUTO: 3.5 K/UL (ref 1.8–7.7)
NEUTROPHILS NFR BLD: 60.2 % (ref 38–73)
NRBC BLD-RTO: 0 /100 WBC
PLATELET # BLD AUTO: 269 K/UL (ref 150–450)
PMV BLD AUTO: 10.1 FL (ref 9.2–12.9)
POTASSIUM SERPL-SCNC: 4 MMOL/L (ref 3.5–5.1)
PROT SERPL-MCNC: 6.6 G/DL (ref 6–8.4)
RBC # BLD AUTO: 4.2 M/UL (ref 4–5.4)
SODIUM SERPL-SCNC: 140 MMOL/L (ref 136–145)
TSH SERPL DL<=0.005 MIU/L-ACNC: 0.98 UIU/ML (ref 0.4–4)
VIT B12 SERPL-MCNC: 451 PG/ML (ref 210–950)
WBC # BLD AUTO: 5.85 K/UL (ref 3.9–12.7)

## 2021-10-11 PROCEDURE — 84443 ASSAY THYROID STIM HORMONE: CPT | Performed by: INTERNAL MEDICINE

## 2021-10-11 PROCEDURE — 80053 COMPREHEN METABOLIC PANEL: CPT | Performed by: INTERNAL MEDICINE

## 2021-10-11 PROCEDURE — 36415 COLL VENOUS BLD VENIPUNCTURE: CPT | Performed by: INTERNAL MEDICINE

## 2021-10-11 PROCEDURE — 82607 VITAMIN B-12: CPT | Performed by: INTERNAL MEDICINE

## 2021-10-11 PROCEDURE — 85025 COMPLETE CBC W/AUTO DIFF WBC: CPT | Performed by: INTERNAL MEDICINE

## 2022-03-07 ENCOUNTER — CLINICAL SUPPORT (OUTPATIENT)
Dept: INTERNAL MEDICINE | Facility: CLINIC | Age: 87
End: 2022-03-07
Payer: MEDICARE

## 2022-03-07 DIAGNOSIS — Z23 IMMUNIZATION DUE: Primary | ICD-10-CM

## 2022-03-07 PROCEDURE — 99999 PR PBB SHADOW E&M-EST. PATIENT-LVL I: CPT | Mod: PBBFAC,,,

## 2022-03-07 PROCEDURE — 90732 PPSV23 VACC 2 YRS+ SUBQ/IM: CPT | Mod: PBBFAC

## 2022-03-07 PROCEDURE — 99999 PR PBB SHADOW E&M-EST. PATIENT-LVL I: ICD-10-PCS | Mod: PBBFAC,,,

## 2022-03-07 PROCEDURE — 99211 OFF/OP EST MAY X REQ PHY/QHP: CPT | Mod: PBBFAC,25

## 2022-03-07 PROCEDURE — G0009 ADMIN PNEUMOCOCCAL VACCINE: HCPCS | Mod: PBBFAC

## 2022-03-07 NOTE — PROGRESS NOTES
"Patient was given vaccine information sheet for the [id number] (pneumococcal polyvalent) vaccine. The area of injection was palpated using the acromion process as a landmark. This area was cleaned with alcohol. Using a 25g 1" safety needle, 0.5mL of the vaccine was placed into the left deltoid muscle. The injection site was dressed with a bandage. Patient experienced no complications and was discharged in stable condition. Pneumovax 23 (pneumococcal polyvalent) vaccine Lot: [id number] Exp: 01/11/2023.      "

## 2022-03-25 ENCOUNTER — INFUSION (OUTPATIENT)
Dept: INFUSION THERAPY | Facility: OTHER | Age: 87
End: 2022-03-25
Attending: INTERNAL MEDICINE
Payer: MEDICARE

## 2022-03-25 VITALS
OXYGEN SATURATION: 98 % | SYSTOLIC BLOOD PRESSURE: 124 MMHG | DIASTOLIC BLOOD PRESSURE: 63 MMHG | TEMPERATURE: 98 F | RESPIRATION RATE: 17 BRPM | HEART RATE: 72 BPM

## 2022-03-25 DIAGNOSIS — M81.0 OSTEOPOROSIS WITHOUT CURRENT PATHOLOGICAL FRACTURE, UNSPECIFIED OSTEOPOROSIS TYPE: Primary | ICD-10-CM

## 2022-03-25 PROCEDURE — 63600175 PHARM REV CODE 636 W HCPCS: Mod: JG | Performed by: INTERNAL MEDICINE

## 2022-03-25 PROCEDURE — 96372 THER/PROPH/DIAG INJ SC/IM: CPT

## 2022-03-25 RX ADMIN — DENOSUMAB 60 MG: 60 INJECTION SUBCUTANEOUS at 10:03

## 2022-04-19 ENCOUNTER — TELEPHONE (OUTPATIENT)
Dept: DERMATOLOGY | Facility: CLINIC | Age: 87
End: 2022-04-19
Payer: MEDICARE

## 2022-04-25 ENCOUNTER — TELEPHONE (OUTPATIENT)
Dept: DERMATOLOGY | Facility: CLINIC | Age: 87
End: 2022-04-25
Payer: MEDICARE

## 2022-04-27 ENCOUNTER — TELEPHONE (OUTPATIENT)
Dept: DERMATOLOGY | Facility: CLINIC | Age: 87
End: 2022-04-27
Payer: MEDICARE

## 2022-05-04 ENCOUNTER — HOSPITAL ENCOUNTER (OUTPATIENT)
Dept: RADIOLOGY | Facility: OTHER | Age: 87
Discharge: HOME | End: 2022-05-04
Attending: INTERNAL MEDICINE
Payer: MEDICARE

## 2022-05-04 DIAGNOSIS — R52 VAGUE BODILY DISCOMFORT: ICD-10-CM

## 2022-05-04 PROCEDURE — 76856 US EXAM PELVIC COMPLETE: CPT | Mod: 26,,, | Performed by: RADIOLOGY

## 2022-05-04 PROCEDURE — 76856 US PELVIS COMPLETE NON OB: ICD-10-PCS | Mod: 26,,, | Performed by: RADIOLOGY

## 2022-05-04 PROCEDURE — 76856 US EXAM PELVIC COMPLETE: CPT | Mod: TC

## 2022-06-10 ENCOUNTER — HOSPITAL ENCOUNTER (OUTPATIENT)
Dept: RADIOLOGY | Facility: OTHER | Age: 87
Discharge: HOME | DRG: 392 | End: 2022-06-10
Attending: INTERNAL MEDICINE
Payer: MEDICARE

## 2022-06-10 ENCOUNTER — HOSPITAL ENCOUNTER (INPATIENT)
Facility: OTHER | Age: 87
LOS: 8 days | Discharge: HOME | DRG: 392 | End: 2022-06-18
Attending: EMERGENCY MEDICINE | Admitting: INTERNAL MEDICINE
Payer: MEDICARE

## 2022-06-10 DIAGNOSIS — K57.20 DIVERTICULITIS OF LARGE INTESTINE WITH PERFORATION AND ABSCESS WITHOUT BLEEDING: ICD-10-CM

## 2022-06-10 DIAGNOSIS — K57.90 DIVERTICULOSIS: ICD-10-CM

## 2022-06-10 DIAGNOSIS — R10.9 ABDOMINAL PAIN: ICD-10-CM

## 2022-06-10 DIAGNOSIS — Z80.0 FAMILY HISTORY OF COLON CANCER: ICD-10-CM

## 2022-06-10 DIAGNOSIS — K52.9 CHRONIC DIARRHEA: ICD-10-CM

## 2022-06-10 DIAGNOSIS — R10.9 LEFT SIDED ABDOMINAL PAIN: ICD-10-CM

## 2022-06-10 LAB
ALBUMIN SERPL BCP-MCNC: 2.8 G/DL (ref 3.5–5.2)
ALP SERPL-CCNC: 117 U/L (ref 55–135)
ALT SERPL W/O P-5'-P-CCNC: 8 U/L (ref 10–44)
ANION GAP SERPL CALC-SCNC: 18 MMOL/L (ref 8–16)
AST SERPL-CCNC: 13 U/L (ref 10–40)
BASOPHILS # BLD AUTO: 0.04 K/UL (ref 0–0.2)
BASOPHILS NFR BLD: 0.2 % (ref 0–1.9)
BILIRUB SERPL-MCNC: 0.4 MG/DL (ref 0.1–1)
BUN SERPL-MCNC: 13 MG/DL (ref 8–23)
CALCIUM SERPL-MCNC: 9 MG/DL (ref 8.7–10.5)
CHLORIDE SERPL-SCNC: 95 MMOL/L (ref 95–110)
CO2 SERPL-SCNC: 20 MMOL/L (ref 23–29)
CREAT SERPL-MCNC: 0.8 MG/DL (ref 0.5–1.4)
CTP QC/QA: YES
DIFFERENTIAL METHOD: ABNORMAL
EOSINOPHIL # BLD AUTO: 0 K/UL (ref 0–0.5)
EOSINOPHIL NFR BLD: 0 % (ref 0–8)
ERYTHROCYTE [DISTWIDTH] IN BLOOD BY AUTOMATED COUNT: 12.2 % (ref 11.5–14.5)
EST. GFR  (AFRICAN AMERICAN): >60 ML/MIN/1.73 M^2
EST. GFR  (NON AFRICAN AMERICAN): >60 ML/MIN/1.73 M^2
GLUCOSE SERPL-MCNC: 96 MG/DL (ref 70–110)
HCT VFR BLD AUTO: 37.6 % (ref 37–48.5)
HGB BLD-MCNC: 12.5 G/DL (ref 12–16)
IMM GRANULOCYTES # BLD AUTO: 0.26 K/UL (ref 0–0.04)
IMM GRANULOCYTES NFR BLD AUTO: 1.2 % (ref 0–0.5)
LACTATE SERPL-SCNC: 1.5 MMOL/L (ref 0.5–2.2)
LYMPHOCYTES # BLD AUTO: 1.4 K/UL (ref 1–4.8)
LYMPHOCYTES NFR BLD: 6.5 % (ref 18–48)
MCH RBC QN AUTO: 31.5 PG (ref 27–31)
MCHC RBC AUTO-ENTMCNC: 33.2 G/DL (ref 32–36)
MCV RBC AUTO: 95 FL (ref 82–98)
MONOCYTES # BLD AUTO: 1.9 K/UL (ref 0.3–1)
MONOCYTES NFR BLD: 8.8 % (ref 4–15)
NEUTROPHILS # BLD AUTO: 17.8 K/UL (ref 1.8–7.7)
NEUTROPHILS NFR BLD: 83.3 % (ref 38–73)
NRBC BLD-RTO: 0 /100 WBC
PLATELET # BLD AUTO: 492 K/UL (ref 150–450)
PMV BLD AUTO: 9 FL (ref 9.2–12.9)
POTASSIUM SERPL-SCNC: 3.7 MMOL/L (ref 3.5–5.1)
PROT SERPL-MCNC: 7.2 G/DL (ref 6–8.4)
RBC # BLD AUTO: 3.97 M/UL (ref 4–5.4)
SARS-COV-2 RDRP RESP QL NAA+PROBE: NEGATIVE
SODIUM SERPL-SCNC: 133 MMOL/L (ref 136–145)
WBC # BLD AUTO: 21.38 K/UL (ref 3.9–12.7)

## 2022-06-10 PROCEDURE — 25500020 PHARM REV CODE 255: Performed by: INTERNAL MEDICINE

## 2022-06-10 PROCEDURE — 85025 COMPLETE CBC W/AUTO DIFF WBC: CPT | Performed by: EMERGENCY MEDICINE

## 2022-06-10 PROCEDURE — A9698 NON-RAD CONTRAST MATERIALNOC: HCPCS | Performed by: INTERNAL MEDICINE

## 2022-06-10 PROCEDURE — 99222 PR INITIAL HOSPITAL CARE,LEVL II: ICD-10-PCS | Mod: ,,, | Performed by: SPECIALIST

## 2022-06-10 PROCEDURE — 99222 1ST HOSP IP/OBS MODERATE 55: CPT | Mod: ,,, | Performed by: SPECIALIST

## 2022-06-10 PROCEDURE — 99223 1ST HOSP IP/OBS HIGH 75: CPT | Mod: ,,, | Performed by: PHYSICIAN ASSISTANT

## 2022-06-10 PROCEDURE — 83605 ASSAY OF LACTIC ACID: CPT | Performed by: EMERGENCY MEDICINE

## 2022-06-10 PROCEDURE — 93010 EKG 12-LEAD: ICD-10-PCS | Mod: ,,, | Performed by: INTERNAL MEDICINE

## 2022-06-10 PROCEDURE — 25000003 PHARM REV CODE 250: Performed by: PHYSICIAN ASSISTANT

## 2022-06-10 PROCEDURE — 74176 CT ABDOMEN PELVIS WITHOUT CONTRAST: ICD-10-PCS | Mod: 26,,, | Performed by: RADIOLOGY

## 2022-06-10 PROCEDURE — U0002 COVID-19 LAB TEST NON-CDC: HCPCS | Performed by: EMERGENCY MEDICINE

## 2022-06-10 PROCEDURE — 74176 CT ABD & PELVIS W/O CONTRAST: CPT | Mod: TC

## 2022-06-10 PROCEDURE — 99223 PR INITIAL HOSPITAL CARE,LEVL III: ICD-10-PCS | Mod: ,,, | Performed by: PHYSICIAN ASSISTANT

## 2022-06-10 PROCEDURE — 93005 ELECTROCARDIOGRAM TRACING: CPT

## 2022-06-10 PROCEDURE — 63600175 PHARM REV CODE 636 W HCPCS: Performed by: PHYSICIAN ASSISTANT

## 2022-06-10 PROCEDURE — 80053 COMPREHEN METABOLIC PANEL: CPT | Performed by: EMERGENCY MEDICINE

## 2022-06-10 PROCEDURE — 74176 CT ABD & PELVIS W/O CONTRAST: CPT | Mod: 26,,, | Performed by: RADIOLOGY

## 2022-06-10 PROCEDURE — 63600175 PHARM REV CODE 636 W HCPCS: Performed by: EMERGENCY MEDICINE

## 2022-06-10 PROCEDURE — 99285 EMERGENCY DEPT VISIT HI MDM: CPT | Mod: 25

## 2022-06-10 PROCEDURE — 11000001 HC ACUTE MED/SURG PRIVATE ROOM

## 2022-06-10 PROCEDURE — 25000003 PHARM REV CODE 250: Performed by: EMERGENCY MEDICINE

## 2022-06-10 PROCEDURE — 93010 ELECTROCARDIOGRAM REPORT: CPT | Mod: ,,, | Performed by: INTERNAL MEDICINE

## 2022-06-10 RX ORDER — ONDANSETRON 2 MG/ML
4 INJECTION INTRAMUSCULAR; INTRAVENOUS EVERY 8 HOURS PRN
Status: DISCONTINUED | OUTPATIENT
Start: 2022-06-10 | End: 2022-06-18 | Stop reason: HOSPADM

## 2022-06-10 RX ORDER — HYDROXYZINE HYDROCHLORIDE 25 MG/1
25 TABLET, FILM COATED ORAL 3 TIMES DAILY PRN
Status: DISCONTINUED | OUTPATIENT
Start: 2022-06-10 | End: 2022-06-18 | Stop reason: HOSPADM

## 2022-06-10 RX ORDER — HEPARIN SODIUM 5000 [USP'U]/ML
5000 INJECTION, SOLUTION INTRAVENOUS; SUBCUTANEOUS EVERY 8 HOURS
Status: DISCONTINUED | OUTPATIENT
Start: 2022-06-10 | End: 2022-06-13

## 2022-06-10 RX ORDER — PRAVASTATIN SODIUM 20 MG/1
20 TABLET ORAL NIGHTLY
Refills: 3 | Status: DISCONTINUED | OUTPATIENT
Start: 2022-06-10 | End: 2022-06-18 | Stop reason: HOSPADM

## 2022-06-10 RX ORDER — CIPROFLOXACIN 500 MG/1
500 TABLET ORAL 2 TIMES DAILY
Status: ON HOLD | COMMUNITY
Start: 2022-06-08 | End: 2022-06-18 | Stop reason: SDUPTHER

## 2022-06-10 RX ORDER — ACETAMINOPHEN 325 MG/1
650 TABLET ORAL EVERY 8 HOURS PRN
Status: DISCONTINUED | OUTPATIENT
Start: 2022-06-10 | End: 2022-06-18 | Stop reason: HOSPADM

## 2022-06-10 RX ORDER — MIRTAZAPINE 7.5 MG/1
7.5 TABLET, FILM COATED ORAL NIGHTLY
Status: DISCONTINUED | OUTPATIENT
Start: 2022-06-10 | End: 2022-06-18 | Stop reason: HOSPADM

## 2022-06-10 RX ORDER — AMOXICILLIN 250 MG
1 CAPSULE ORAL 2 TIMES DAILY PRN
Status: DISCONTINUED | OUTPATIENT
Start: 2022-06-10 | End: 2022-06-18 | Stop reason: HOSPADM

## 2022-06-10 RX ORDER — SODIUM CHLORIDE 9 MG/ML
1000 INJECTION, SOLUTION INTRAVENOUS
Status: COMPLETED | OUTPATIENT
Start: 2022-06-10 | End: 2022-06-10

## 2022-06-10 RX ORDER — MORPHINE SULFATE 2 MG/ML
2 INJECTION, SOLUTION INTRAMUSCULAR; INTRAVENOUS EVERY 4 HOURS PRN
Status: DISCONTINUED | OUTPATIENT
Start: 2022-06-10 | End: 2022-06-18 | Stop reason: HOSPADM

## 2022-06-10 RX ORDER — MORPHINE SULFATE 4 MG/ML
4 INJECTION, SOLUTION INTRAMUSCULAR; INTRAVENOUS
Status: COMPLETED | OUTPATIENT
Start: 2022-06-10 | End: 2022-06-10

## 2022-06-10 RX ORDER — HYDROXYZINE HYDROCHLORIDE 25 MG/1
25 TABLET, FILM COATED ORAL 3 TIMES DAILY PRN
Status: DISCONTINUED | OUTPATIENT
Start: 2022-06-10 | End: 2022-06-10

## 2022-06-10 RX ORDER — METRONIDAZOLE 500 MG/1
500 TABLET ORAL 3 TIMES DAILY
Status: ON HOLD | COMMUNITY
Start: 2022-06-08 | End: 2022-06-18 | Stop reason: SDUPTHER

## 2022-06-10 RX ORDER — SODIUM CHLORIDE 0.9 % (FLUSH) 0.9 %
3 SYRINGE (ML) INJECTION EVERY 8 HOURS PRN
Status: DISCONTINUED | OUTPATIENT
Start: 2022-06-10 | End: 2022-06-18 | Stop reason: HOSPADM

## 2022-06-10 RX ORDER — DEXTROSE MONOHYDRATE AND SODIUM CHLORIDE 5; .9 G/100ML; G/100ML
INJECTION, SOLUTION INTRAVENOUS CONTINUOUS
Status: DISCONTINUED | OUTPATIENT
Start: 2022-06-10 | End: 2022-06-12

## 2022-06-10 RX ORDER — ONDANSETRON 2 MG/ML
4 INJECTION INTRAMUSCULAR; INTRAVENOUS
Status: COMPLETED | OUTPATIENT
Start: 2022-06-10 | End: 2022-06-10

## 2022-06-10 RX ORDER — TRAZODONE HYDROCHLORIDE 50 MG/1
50 TABLET ORAL NIGHTLY
Status: DISCONTINUED | OUTPATIENT
Start: 2022-06-10 | End: 2022-06-18 | Stop reason: HOSPADM

## 2022-06-10 RX ORDER — NALOXONE HCL 0.4 MG/ML
0.02 VIAL (ML) INJECTION
Status: DISCONTINUED | OUTPATIENT
Start: 2022-06-10 | End: 2022-06-18 | Stop reason: HOSPADM

## 2022-06-10 RX ORDER — TALC
6 POWDER (GRAM) TOPICAL NIGHTLY PRN
Status: DISCONTINUED | OUTPATIENT
Start: 2022-06-10 | End: 2022-06-18 | Stop reason: HOSPADM

## 2022-06-10 RX ADMIN — PRAVASTATIN SODIUM 20 MG: 20 TABLET ORAL at 08:06

## 2022-06-10 RX ADMIN — ONDANSETRON 4 MG: 2 INJECTION INTRAMUSCULAR; INTRAVENOUS at 04:06

## 2022-06-10 RX ADMIN — HEPARIN SODIUM 5000 UNITS: 5000 INJECTION INTRAVENOUS; SUBCUTANEOUS at 09:06

## 2022-06-10 RX ADMIN — IOHEXOL 1000 ML: 9 SOLUTION ORAL at 10:06

## 2022-06-10 RX ADMIN — SODIUM CHLORIDE 1000 ML: 0.9 INJECTION, SOLUTION INTRAVENOUS at 04:06

## 2022-06-10 RX ADMIN — TRAZODONE HYDROCHLORIDE 50 MG: 50 TABLET ORAL at 09:06

## 2022-06-10 RX ADMIN — DEXTROSE AND SODIUM CHLORIDE: 5; .9 INJECTION, SOLUTION INTRAVENOUS at 07:06

## 2022-06-10 RX ADMIN — PIPERACILLIN AND TAZOBACTAM 4.5 G: 4; .5 INJECTION, POWDER, LYOPHILIZED, FOR SOLUTION INTRAVENOUS; PARENTERAL at 04:06

## 2022-06-10 RX ADMIN — HYDROXYZINE HYDROCHLORIDE 25 MG: 25 TABLET, FILM COATED ORAL at 08:06

## 2022-06-10 RX ADMIN — MIRTAZAPINE 7.5 MG: 7.5 TABLET ORAL at 08:06

## 2022-06-10 RX ADMIN — MORPHINE SULFATE 2 MG: 2 INJECTION, SOLUTION INTRAMUSCULAR; INTRAVENOUS at 08:06

## 2022-06-10 RX ADMIN — MORPHINE SULFATE 4 MG: 4 INJECTION, SOLUTION INTRAMUSCULAR; INTRAVENOUS at 04:06

## 2022-06-10 NOTE — H&P
[facility]  History & Physical    Patient Name: Cheyenne Garner  MRN: [id number]  Patient Class: IP- Inpatient  Admission Date: 6/10/2022  Attending Physician: Moncho Burroughs II, MD   Primary Care Provider: Mary Cortes MD         Patient information was obtained from patient, past medical records and ER records.     Subjective:     Principal Problem:Diverticulitis of large intestine with perforation and abscess without bleeding    Chief Complaint:   Chief Complaint   Patient presents with    sent by md     Pt was sent by md due to ct resulted ruptured diverticulum. Pt presented to md office 2 days ago due to diffuse abd pain 1 week ago.  AAO x 3 nadn skin w.d.          HPI: 86-year-old female with past medical history of depression, hyperlipidemia, ADHD presents to the ED with abdominal pain for the last 6 days associated with decreased appetite.  Patient reports symptoms started when she felt constipated and was unable to use the bathroom last Saturday.  States she has not been eating secondary to constipation.  On Wednesday (2 days ago) she went to go see her gastroenterologist who prescribed ciprofloxacin and metronidazole and ordered a CT scan for today.  She denies any associated fever, chills, chest pain, shortness of breath, cough, N/V/D, or urinary symptoms.  Occasional alcohol, former smoker.  ED evaluation WBC 21.38, afebrile.  Lactic acid pending.  CT A/P with findings most consistent with perforated sigmoid diverticulitis with pelvic abscess measuring approximately 5.3 x 4.4 cm.  Interposed small and large bowel with make percutaneous drainage of this collection significantly difficult.  General surgery consulted. Patient admitted for further evaluation and treatment.      Past Medical History:   Diagnosis Date    Cataract     Hyperlipidemia     Inflammatory bowel disease     Sarcoidosis with granulomatous hepatitis     UTI (urinary tract infection)  7/17/2013       Past Surgical History:   Procedure Laterality Date    BLADDER SURGERY      BREAST SURGERY      EYE SURGERY      HIP SURGERY      HYSTERECTOMY      JOINT REPLACEMENT      SINUS SURGERY         Review of patient's allergies indicates:  No Known Allergies    Current Facility-Administered Medications on File Prior to Encounter   Medication    [COMPLETED] iohexoL (OMNIPAQUE 9) oral solution 1,000 mL     Current Outpatient Medications on File Prior to Encounter   Medication Sig    cholestyramine, with sugar, 4 gram Powd Take 1 application by mouth 2 (two) times daily as needed. Give her the canister of the regular ((not light) version with  the next refil. She does not need it now. (Patient taking differently: Take 1 application by mouth 2 (two) times daily as needed. Give her the canister of the regular ((not light) version with  the next refil. She does not need it now.  Takes every morning)    ciprofloxacin HCl (CIPRO) 500 MG tablet Take 500 mg by mouth 2 (two) times daily.    dextroamphetamine-amphetamine 10 mg Tab Take by mouth.    ergocalciferol (ERGOCALCIFEROL) 50,000 unit Cap Take 1 capsule (50,000 Units total) by mouth twice a week.    hydroCHLOROthiazide (HYDRODIURIL) 12.5 MG Tab Take 1 tablet (12.5 mg total) by mouth daily as needed (le edema).    loperamide (IMODIUM) 2 mg capsule     meloxicam (MOBIC) 15 MG tablet Take 1 tablet (15 mg total) by mouth once daily for arthritis    metroNIDAZOLE (FLAGYL) 500 MG tablet Take 500 mg by mouth 3 (three) times daily.    mirtazapine (REMERON) 7.5 MG Tab Take 1 tablet by mouth Daily.    simvastatin (ZOCOR) 10 MG tablet Take 1 tablet (10 mg total) by mouth every evening.    [DISCONTINUED] metoprolol succinate (TOPROL-XL) 25 MG 24 hr tablet Take 0.5 tablets (12.5 mg total) by mouth once daily.    valACYclovir (VALTREX) 1000 MG tablet TAKE ONE TABLET BY MOUTH TWICE DAILY FOR 5 DAYS FOR cold sores    ziprasidone (GEODON) 20 MG Cap      [DISCONTINUED] erythromycin (ROMYCIN) ophthalmic ointment Place into both eyes 2 (two) times daily.    [DISCONTINUED] TOBRADEX 0.3-0.1 % DrpS Place 1 drop into both eyes 3 (three) times daily.     Family History       Problem Relation (Age of Onset)    Cancer Mother, Sister          Tobacco Use    Smoking status: Former Smoker    Smokeless tobacco: Never Used   Substance and Sexual Activity    Alcohol use: Yes     Alcohol/week: 3.0 standard drinks     Types: 1 Glasses of wine, 1 Cans of beer, 1 Shots of liquor per week    Drug use: No    Sexual activity: Not Currently     Review of Systems   Constitutional:  Positive for fatigue. Negative for chills, fever and unexpected weight change.   HENT:  Negative for congestion and trouble swallowing.    Eyes:  Negative for discharge and visual disturbance.   Respiratory:  Negative for cough, chest tightness and shortness of breath.    Cardiovascular:  Negative for chest pain, palpitations and leg swelling.   Gastrointestinal:  Positive for abdominal pain and constipation. Negative for abdominal distention, diarrhea, nausea and vomiting.   Genitourinary:  Negative for difficulty urinating, dysuria, frequency, hematuria and urgency.   Musculoskeletal:  Positive for arthralgias. Negative for myalgias.   Skin:  Negative for rash and wound.   Neurological:  Positive for weakness (Generalized). Negative for dizziness, syncope, light-headedness, numbness and headaches.   Psychiatric/Behavioral:  Negative for confusion.    Objective:     Vital Signs (Most Recent):  Temp: 97.9 °F (36.6 °C) (06/10/22 1438)  Pulse: 105 (06/10/22 1453)  Resp: 17 (06/10/22 1621)  BP: (!) 113/57 (06/10/22 1453)  SpO2: 99 % (06/10/22 1453)   Vital Signs (24h Range):  Temp:  [97.9 °F (36.6 °C)] 97.9 °F (36.6 °C)  Pulse:  [105-116] 105  Resp:  [17-23] 17  SpO2:  [99 %] 99 %  BP: (113-139)/(57-61) 113/57     Weight: 58.5 kg (129 lb)  Body mass index is 22.85 kg/m².    Physical Exam  Vitals and nursing  note reviewed.   Constitutional:       General: She is not in acute distress.     Appearance: She is well-developed. She is not ill-appearing or diaphoretic.   HENT:      Head: Normocephalic and atraumatic.      Mouth/Throat:      Mouth: Mucous membranes are dry.   Eyes:      General: No scleral icterus.     Conjunctiva/sclera: Conjunctivae normal.   Cardiovascular:      Rate and Rhythm: Normal rate and regular rhythm.      Heart sounds: Normal heart sounds.   Pulmonary:      Effort: Pulmonary effort is normal. No respiratory distress.      Breath sounds: Normal breath sounds. No wheezing.   Abdominal:      General: Bowel sounds are normal. There is no distension.      Palpations: Abdomen is soft.      Tenderness: There is abdominal tenderness (Left lower quadrant TTP). There is guarding (Voluntary). There is no rebound.   Musculoskeletal:         General: Normal range of motion.      Cervical back: Normal range of motion and neck supple.   Skin:     General: Skin is warm and dry.      Capillary Refill: Capillary refill takes less than 2 seconds.   Neurological:      Mental Status: She is alert and oriented to person, place, and time.           Significant Labs: All pertinent labs within the past 24 hours have been reviewed.  CBC:   Recent Labs   Lab 06/10/22  1505   WBC 21.38*   HGB 12.5   HCT 37.6   *     CMP:   Recent Labs   Lab 06/10/22  1505   *   K 3.7   CL 95   CO2 20*   GLU 96   BUN 13   CREATININE 0.8   CALCIUM 9.0   PROT 7.2   ALBUMIN 2.8*   BILITOT 0.4   ALKPHOS 117   AST 13   ALT 8*   ANIONGAP 18*   EGFRNONAA >60     Lactic Acid: No results for input(s): LACTATE in the last 48 hours.      Significant Imaging: I have reviewed all pertinent imaging results/findings within the past 24 hours.  Narrative & Impression  EXAMINATION:  CT ABDOMEN PELVIS WITHOUT CONTRAST     CLINICAL HISTORY:  Noninfective gastroenteritis and colitis, unspecified     TECHNIQUE:  Low dose axial images, sagittal and  coronal reformations were obtained from the lung bases to the pubic symphysis.  1000 mL of oral Omnipaque 9 was administered.     COMPARISON:  04/10/2013     FINDINGS:  Limited evaluation of the structures at the base of chest show no unchanged calcified granuloma at the right lung base.     Liver is normal in size.  No solid mass noting lack of IV contrast.  No biliary ductal dilatation.  Gallbladder is grossly unremarkable.     Spleen, adrenal glands, and pancreas show no focal abnormality.  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     Bilateral kidneys show no evidence of hydronephrosis or solid mass.  There is a right renal cyst.  Urinary bladder is not well visualized due to artifact from bilateral hip prostheses.     No evidence of an intestinal obstruction.  There is sigmoid diverticulosis with wall thickening of the sigmoid colon.  There is a gas and fluid collection adjacent to the sigmoid colon measuring approximately 5.3 x 4.4 cm.  There is mild adjacent mesenteric stranding.  A normal appendix is identified.  No concerning lymphadenopathy.  No lux free gas in the peritoneal cavity.     Vascular structures show atherosclerosis without aneurysm.  Degenerative changes of the lumbar spine without an acute fracture.     Impression:     Findings most consistent with perforated sigmoid diverticulitis with pelvic abscess measuring approximately 5.3 x 4.4 cm.  Interposed small and large bowel would make percutaneous drainage of this collection significantly difficult.     This report was flagged in Epic as abnormal.        Electronically signed by: Dmitriy Gregorio MD  Date:                                            06/10/2022  Time:                                           13:07    Assessment/Plan:     * Diverticulitis of large intestine with perforation and abscess without bleeding  - afebrile, WBC 21.38; CT A/P Findings most consistent  with perforated sigmoid diverticulitis with pelvic abscess measuring approximately 5.3 x 4.4 cm.  With impression percutaneous drainage of this collection significantly difficult.  - NPO for now  - IV fluids, Zosyn  - general surgery consulted; spoke with Dr. Bojorquez conservative management at this time will keep NPO today and monitor  - pain management      Primary insomnia  Depression  - continue mirtazapine at night, melatonin p.r.n.      VTE Risk Mitigation (From admission, onward)         Ordered     heparin (porcine) injection 5,000 Units  Every 8 hours         06/10/22 1710     IP VTE HIGH RISK PATIENT  Once         06/10/22 1710     Place sequential compression device  Until discontinued         06/10/22 1710                   Traci Butler PA-C  Department of Hospital Medicine   [facility] - Emergency Dept

## 2022-06-10 NOTE — CONSULTS
[facility] - Emergency Dept  Consult Note      Date of Consultation:6/10/2022    Reason for Consult:  Abdominal pain  SUBJECTIVE:     History of Present Illness:  86-year-old female in normal state of health until 6 days ago when she began to experience obstipation, abdominal pain, distension.  Patient was seen by Dr. Filippo Morales, GI Medicine any ordered a CT scan of the abdomen.  CT scan showed a diverticular abscess.  IR felt that this was not amenable to percutaneous drainage.  Patient denies fever, chills, nausea, vomiting.  Patient was placed on oral antibiotics 2 days ago.  Ciprofloxacin and metronidazole.    Past Medical History:   Diagnosis Date    Cataract     Hyperlipidemia     Inflammatory bowel disease     Sarcoidosis with granulomatous hepatitis     UTI (urinary tract infection) 7/17/2013     Past Surgical History:   Procedure Laterality Date    BLADDER SURGERY      BREAST SURGERY      EYE SURGERY      HIP SURGERY      HYSTERECTOMY      JOINT REPLACEMENT      SINUS SURGERY       Family History   Problem Relation Age of Onset    Cancer Mother     Cancer Sister      Social History     Tobacco Use    Smoking status: Former Smoker    Smokeless tobacco: Never Used   Substance Use Topics    Alcohol use: Yes     Alcohol/week: 3.0 standard drinks     Types: 1 Glasses of wine, 1 Cans of beer, 1 Shots of liquor per week    Drug use: No       Review of patient's allergies indicates:  No Known Allergies    Review of Systems:  Constitutional: No fever or chills, diaphoresis, night sweats  Respiratory: No cough or shortness of breath, sputum production, hemoptysis  Cardiovascular: No chest pain or palpitations, PND, orthopnea, no dyspnea on exertion  Gastrointestinal: No nausea or vomiting, positive constipation, positive diffuse abdominal pain.  Neurological: No confusion or weakness no tremors, no syncope    Current Facility-Administered Medications   Medication    acetaminophen tablet 650 mg     dextrose 5 % and 0.9 % NaCl infusion    heparin (porcine) injection 5,000 Units    melatonin tablet 6 mg    mirtazapine tablet 7.5 mg    morphine injection 2 mg    naloxone 0.4 mg/mL injection 0.02 mg    ondansetron injection 4 mg    [START ON 6/11/2022] piperacillin-tazobactam 4.5 g in dextrose 5 % 100 mL IVPB (ready to mix system)    pravastatin tablet 20 mg    senna-docusate 8.6-50 mg per tablet 1 tablet    sodium chloride 0.9% flush 3 mL     Current Outpatient Medications   Medication Sig    cholestyramine, with sugar, 4 gram Powd Take 1 application by mouth 2 (two) times daily as needed. Give her the canister of the regular ((not light) version with  the next refil. She does not need it now. (Patient taking differently: Take 1 application by mouth 2 (two) times daily as needed. Give her the canister of the regular ((not light) version with  the next refil. She does not need it now.  Takes every morning)    ciprofloxacin HCl (CIPRO) 500 MG tablet Take 500 mg by mouth 2 (two) times daily.    dextroamphetamine-amphetamine 10 mg Tab Take by mouth.    ergocalciferol (ERGOCALCIFEROL) 50,000 unit Cap Take 1 capsule (50,000 Units total) by mouth twice a week.    hydroCHLOROthiazide (HYDRODIURIL) 12.5 MG Tab Take 1 tablet (12.5 mg total) by mouth daily as needed (le edema).    loperamide (IMODIUM) 2 mg capsule     meloxicam (MOBIC) 15 MG tablet Take 1 tablet (15 mg total) by mouth once daily for arthritis    metroNIDAZOLE (FLAGYL) 500 MG tablet Take 500 mg by mouth 3 (three) times daily.    mirtazapine (REMERON) 7.5 MG Tab Take 1 tablet by mouth Daily.    simvastatin (ZOCOR) 10 MG tablet Take 1 tablet (10 mg total) by mouth every evening.    valACYclovir (VALTREX) 1000 MG tablet TAKE ONE TABLET BY MOUTH TWICE DAILY FOR 5 DAYS FOR cold sores    ziprasidone (GEODON) 20 MG Cap        OBJECTIVE:     Physical Exam:  General appearance: Well developed, well nourished, no acute distress, not toxic  Eyes:   Conjunctivae/corneas clear.  Extraocular movements intact, anicteric  Lungs: Normal respiratory effort,   clear to auscultation bilaterally , no rales, no rhonchi  Heart: Regular rate and rhythm, S1, S2 normal, no murmur, rub or home.  Abdomen: Soft, distended, diffuse tenderness to deep palpation, hypoactive bowel sounds, no guarding, no rebound  Extremities: No cyanosis or clubbing. No edema.    Skin: Skin color, texture, turgor normal. No rashes or lesions  Neurologic: Normal strength and tone. No focal numbness or weakness       Laboratory:  Recent Labs   Lab 06/10/22  1505   WBC 21.38*   RBC 3.97*   HGB 12.5   HCT 37.6   *   MCV 95   MCH 31.5*   MCHC 33.2     BMP:   Recent Labs   Lab 06/10/22  1505   GLU 96   *   K 3.7   CL 95   CO2 20*   BUN 13   CREATININE 0.8   CALCIUM 9.0     Lab Results   Component Value Date    CALCIUM 9.0 06/10/2022     BNP  No results for input(s): BNP, BNPTRIAGEBLO in the last 168 hours.No results found for: URICACID  Lab Results   Component Value Date    FERRITIN 122 04/21/2022     Lab Results   Component Value Date    CALCIUM 9.0 06/10/2022       Diagnostic Results:  No orders to display       Impression/plan:  Acute diverticulitis with pelvic abscess not amenable to IR drainage.  Surgical intervention at this time will likely result in colostomy.  Most appropriate therapy at this time in this elderly patient his intravenous antibiotics and NPO status.  Will follow with serial exam        Thank you for the opportunity of seeing Cheyenne Garner in consultation

## 2022-06-10 NOTE — ED PROVIDER NOTES
Encounter Date: 6/10/2022    SCRIBE #1 NOTE: I, Fam Bui, aarti scribing for, and in the presence of, Moncho Burroughs II, MD.       History     Chief Complaint   Patient presents with    sent by md     Pt was sent by md due to ct resulted ruptured diverticulum. Pt presented to md office 2 days ago due to diffuse abd pain 1 week ago.  AAO x 3 nadn skin w.d.       Time seen by provider: 3:46 PM    This is a 86 y.o. female, with a PMHx of HLD, who presents to the ED for evaluation. She was prompted to the ED by Dr. Morales due to a ruptured diverticulum resulted from a CAT scan. Patient notes diffuse abdominal pain for the past 6 days. She further complains of constipation for the past 6 days, which she reports is unusual for her as she typically has chronic diarrhea. She denies having a fever. Patient states she started taking Ciprofloxacin and Flagyl 2 days ago. She has a PSHx of a hysterectomy. She admits to drinking 1-2 martinis per day, but denies smoking tobacco. This is the extent of the patient's complaints at this time.    The history is provided by the patient and medical records.     Review of patient's allergies indicates:  No Known Allergies  Past Medical History:   Diagnosis Date    Cataract     Hyperlipidemia     Inflammatory bowel disease     Sarcoidosis with granulomatous hepatitis     UTI (urinary tract infection) 7/17/2013     Past Surgical History:   Procedure Laterality Date    BLADDER SURGERY      BREAST SURGERY      EYE SURGERY      HIP SURGERY      HYSTERECTOMY      JOINT REPLACEMENT      SINUS SURGERY       Family History   Problem Relation Age of Onset    Cancer Mother     Cancer Sister      Social History     Tobacco Use    Smoking status: Former Smoker    Smokeless tobacco: Never Used   Substance Use Topics    Alcohol use: Yes     Alcohol/week: 3.0 standard drinks     Types: 1 Glasses of wine, 1 Cans of beer, 1 Shots of liquor per week    Drug use: No     Review of  Systems   Constitutional: Negative for chills and fever.   HENT: Negative for congestion, rhinorrhea and sore throat.    Eyes: Negative for visual disturbance.   Respiratory: Negative for cough and shortness of breath.    Cardiovascular: Negative for chest pain.   Gastrointestinal: Positive for abdominal pain and constipation. Negative for nausea and vomiting.   Genitourinary: Negative for dysuria, frequency and hematuria.   Musculoskeletal: Negative for back pain.   Skin: Negative for rash.   Neurological: Negative for dizziness, weakness and headaches.       Physical Exam     Initial Vitals [06/10/22 1438]   BP Pulse Resp Temp SpO2   139/61 (!) 116 18 97.9 °F (36.6 °C) 99 %      MAP       --         Physical Exam    Nursing note and vitals reviewed.  Constitutional: She appears well-developed and well-nourished. She is not diaphoretic. No distress.   HENT:   Head: Normocephalic and atraumatic.   Mildly dry mucous membranes.   Eyes: EOM are normal. Pupils are equal, round, and reactive to light.   No pallor or icterus.   Neck: Neck supple.   Normal range of motion.  Cardiovascular: Normal rate, regular rhythm and normal heart sounds. Exam reveals no gallop and no friction rub.    No murmur heard.  Pulmonary/Chest: Breath sounds normal. No respiratory distress. She has no wheezes. She has no rhonchi. She has no rales.   Abdominal: Abdomen is soft. Bowel sounds are normal.   Diffuse, non-specific tenderness.  There is no rebound and no guarding.   Musculoskeletal:         General: No tenderness or edema. Normal range of motion.      Cervical back: Normal range of motion and neck supple.     Lymphadenopathy:     She has no cervical adenopathy.   Neurological: She is alert and oriented to person, place, and time.   Skin: Skin is warm and dry.   Psychiatric: She has a normal mood and affect. Her behavior is normal. Judgment and thought content normal.         ED Course   Procedures  Labs Reviewed   COMPREHENSIVE  METABOLIC PANEL - Abnormal; Notable for the following components:       Result Value    Sodium 133 (*)     CO2 20 (*)     Albumin 2.8 (*)     ALT 8 (*)     Anion Gap 18 (*)     All other components within normal limits   CBC W/ AUTO DIFFERENTIAL - Abnormal; Notable for the following components:    WBC 21.38 (*)     RBC 3.97 (*)     MCH 31.5 (*)     Platelets 492 (*)     MPV 9.0 (*)     Immature Granulocytes 1.2 (*)     Gran # (ANC) 17.8 (*)     Immature Grans (Abs) 0.26 (*)     Mono # 1.9 (*)     Gran % 83.3 (*)     Lymph % 6.5 (*)     All other components within normal limits   LACTIC ACID, PLASMA   SARS-COV-2 RDRP GENE     EKG Readings: (Independently Interpreted)   Sinus tachycardia with a rate of 107 bpm. No ST or T wave changes. No acute ischemia or arrhythmia.        Imaging Results    None          Medications   mirtazapine tablet 7.5 mg (has no administration in time range)   pravastatin tablet 20 mg (has no administration in time range)   sodium chloride 0.9% flush 3 mL (has no administration in time range)   melatonin tablet 6 mg (has no administration in time range)   senna-docusate 8.6-50 mg per tablet 1 tablet (has no administration in time range)   acetaminophen tablet 650 mg (has no administration in time range)   naloxone 0.4 mg/mL injection 0.02 mg (has no administration in time range)   heparin (porcine) injection 5,000 Units (has no administration in time range)   morphine injection 2 mg (has no administration in time range)   ondansetron injection 4 mg (has no administration in time range)   dextrose 5 % and 0.9 % NaCl infusion ( Intravenous New Bag 6/10/22 1948)   piperacillin-tazobactam 4.5 g in dextrose 5 % 100 mL IVPB (ready to mix system) (has no administration in time range)   traZODone tablet 50 mg (has no administration in time range)   hydrOXYzine HCL tablet 25 mg (has no administration in time range)   0.9%  NaCl infusion (0 mLs Intravenous Stopped 6/10/22 4018)    piperacillin-tazobactam 4.5 g in dextrose 5 % 100 mL IVPB (ready to mix system) (0 g Intravenous Stopped 6/10/22 1655)   morphine injection 4 mg (4 mg Intravenous Given 6/10/22 1621)   ondansetron injection 4 mg (4 mg Intravenous Given 6/10/22 1622)     Medical Decision Making:   Independently Interpreted Test(s):   I have ordered and independently interpreted EKG Reading(s) - see prior notes  Clinical Tests:   Lab Tests: Reviewed and Ordered  Medical Tests: Reviewed and Ordered    Patient presents referred by her gastroenterologist, Dr. Ramírez after outpatient CT scan performed earlier today showed evidence of diverticulitis with perforation and intra-abdominal abscess.  On exam patient is afebrile.  Tachycardic but good blood pressure.  Should does have abdominal tenderness, but does not have signs of peritonitis or an acute abdomen.  Laboratory studies show leukocytosis with left shift.  Chemistries show mild hyponatremia, otherwise unremarkable.  Lactic acid is negative.  Patient has been started on IV antibiotics.  Case discussed with Dr. Bojorquez of General surgery, agrees with the current treatment plan.  Case discussed with the hospitalist service to whom she will be admitted.  Patient and family member at bedside updated with findings and plan of care.          Scribe Attestation:   Scribe #1: I performed the above scribed service and the documentation accurately describes the services I performed. I attest to the accuracy of the note.          Physician Attestation for Scribe: I, [facility], reviewed documentation as scribed in my presence, which is both accurate and complete.       Clinical Impression:   Final diagnoses:  [R10.9] Abdominal pain          ED Disposition Condition    Admit               Moncho Burroughs II, MD  06/10/22 2019

## 2022-06-10 NOTE — SUBJECTIVE & OBJECTIVE
Past Medical History:   Diagnosis Date    Cataract     Hyperlipidemia     Inflammatory bowel disease     Sarcoidosis with granulomatous hepatitis     UTI (urinary tract infection) 7/17/2013       Past Surgical History:   Procedure Laterality Date    BLADDER SURGERY      BREAST SURGERY      EYE SURGERY      HIP SURGERY      HYSTERECTOMY      JOINT REPLACEMENT      SINUS SURGERY         Review of patient's allergies indicates:  No Known Allergies    Current Facility-Administered Medications on File Prior to Encounter   Medication    [COMPLETED] iohexoL (OMNIPAQUE 9) oral solution 1,000 mL     Current Outpatient Medications on File Prior to Encounter   Medication Sig    cholestyramine, with sugar, 4 gram Powd Take 1 application by mouth 2 (two) times daily as needed. Give her the canister of the regular ((not light) version with  the next refil. She does not need it now. (Patient taking differently: Take 1 application by mouth 2 (two) times daily as needed. Give her the canister of the regular ((not light) version with  the next refil. She does not need it now.  Takes every morning)    ciprofloxacin HCl (CIPRO) 500 MG tablet Take 500 mg by mouth 2 (two) times daily.    dextroamphetamine-amphetamine 10 mg Tab Take by mouth.    ergocalciferol (ERGOCALCIFEROL) 50,000 unit Cap Take 1 capsule (50,000 Units total) by mouth twice a week.    hydroCHLOROthiazide (HYDRODIURIL) 12.5 MG Tab Take 1 tablet (12.5 mg total) by mouth daily as needed (le edema).    loperamide (IMODIUM) 2 mg capsule     meloxicam (MOBIC) 15 MG tablet Take 1 tablet (15 mg total) by mouth once daily for arthritis    metroNIDAZOLE (FLAGYL) 500 MG tablet Take 500 mg by mouth 3 (three) times daily.    mirtazapine (REMERON) 7.5 MG Tab Take 1 tablet by mouth Daily.    simvastatin (ZOCOR) 10 MG tablet Take 1 tablet (10 mg total) by mouth every evening.    [DISCONTINUED] metoprolol succinate (TOPROL-XL) 25 MG 24 hr tablet Take 0.5 tablets (12.5 mg total) by mouth  once daily.    valACYclovir (VALTREX) 1000 MG tablet TAKE ONE TABLET BY MOUTH TWICE DAILY FOR 5 DAYS FOR cold sores    ziprasidone (GEODON) 20 MG Cap     [DISCONTINUED] erythromycin (ROMYCIN) ophthalmic ointment Place into both eyes 2 (two) times daily.    [DISCONTINUED] TOBRADEX 0.3-0.1 % DrpS Place 1 drop into both eyes 3 (three) times daily.     Family History       Problem Relation (Age of Onset)    Cancer Mother, Sister          Tobacco Use    Smoking status: Former Smoker    Smokeless tobacco: Never Used   Substance and Sexual Activity    Alcohol use: Yes     Alcohol/week: 3.0 standard drinks     Types: 1 Glasses of wine, 1 Cans of beer, 1 Shots of liquor per week    Drug use: No    Sexual activity: Not Currently     Review of Systems   Constitutional:  Positive for fatigue. Negative for chills, fever and unexpected weight change.   HENT:  Negative for congestion and trouble swallowing.    Eyes:  Negative for discharge and visual disturbance.   Respiratory:  Negative for cough, chest tightness and shortness of breath.    Cardiovascular:  Negative for chest pain, palpitations and leg swelling.   Gastrointestinal:  Positive for abdominal pain and constipation. Negative for abdominal distention, diarrhea, nausea and vomiting.   Genitourinary:  Negative for difficulty urinating, dysuria, frequency, hematuria and urgency.   Musculoskeletal:  Positive for arthralgias. Negative for myalgias.   Skin:  Negative for rash and wound.   Neurological:  Positive for weakness (Generalized). Negative for dizziness, syncope, light-headedness, numbness and headaches.   Psychiatric/Behavioral:  Negative for confusion.    Objective:     Vital Signs (Most Recent):  Temp: 97.9 °F (36.6 °C) (06/10/22 1438)  Pulse: 105 (06/10/22 1453)  Resp: 17 (06/10/22 1621)  BP: (!) 113/57 (06/10/22 1453)  SpO2: 99 % (06/10/22 1453)   Vital Signs (24h Range):  Temp:  [97.9 °F (36.6 °C)] 97.9 °F (36.6 °C)  Pulse:  [105-116] 105  Resp:  [17-23]  17  SpO2:  [99 %] 99 %  BP: (113-139)/(57-61) 113/57     Weight: 58.5 kg (129 lb)  Body mass index is 22.85 kg/m².    Physical Exam  Vitals and nursing note reviewed.   Constitutional:       General: She is not in acute distress.     Appearance: She is well-developed. She is not ill-appearing or diaphoretic.   HENT:      Head: Normocephalic and atraumatic.      Mouth/Throat:      Mouth: Mucous membranes are dry.   Eyes:      General: No scleral icterus.     Conjunctiva/sclera: Conjunctivae normal.   Cardiovascular:      Rate and Rhythm: Normal rate and regular rhythm.      Heart sounds: Normal heart sounds.   Pulmonary:      Effort: Pulmonary effort is normal. No respiratory distress.      Breath sounds: Normal breath sounds. No wheezing.   Abdominal:      General: Bowel sounds are normal. There is no distension.      Palpations: Abdomen is soft.      Tenderness: There is abdominal tenderness (Left lower quadrant TTP). There is guarding (Voluntary). There is no rebound.   Musculoskeletal:         General: Normal range of motion.      Cervical back: Normal range of motion and neck supple.   Skin:     General: Skin is warm and dry.      Capillary Refill: Capillary refill takes less than 2 seconds.   Neurological:      Mental Status: She is alert and oriented to person, place, and time.           Significant Labs: All pertinent labs within the past 24 hours have been reviewed.  CBC:   Recent Labs   Lab 06/10/22  1505   WBC 21.38*   HGB 12.5   HCT 37.6   *     CMP:   Recent Labs   Lab 06/10/22  1505   *   K 3.7   CL 95   CO2 20*   GLU 96   BUN 13   CREATININE 0.8   CALCIUM 9.0   PROT 7.2   ALBUMIN 2.8*   BILITOT 0.4   ALKPHOS 117   AST 13   ALT 8*   ANIONGAP 18*   EGFRNONAA >60     Lactic Acid: No results for input(s): LACTATE in the last 48 hours.      Significant Imaging: I have reviewed all pertinent imaging results/findings within the past 24 hours.  Narrative & Impression  EXAMINATION:  CT ABDOMEN  PELVIS WITHOUT CONTRAST     CLINICAL HISTORY:  Noninfective gastroenteritis and colitis, unspecified     TECHNIQUE:  Low dose axial images, sagittal and coronal reformations were obtained from the lung bases to the pubic symphysis.  1000 mL of oral Omnipaque 9 was administered.     COMPARISON:  04/10/2013     FINDINGS:  Limited evaluation of the structures at the base of chest show no unchanged calcified granuloma at the right lung base.     Liver is normal in size.  No solid mass noting lack of IV contrast.  No biliary ductal dilatation.  Gallbladder is grossly unremarkable.     Spleen, adrenal glands, and pancreas show no focal abnormality.  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     Bilateral kidneys show no evidence of hydronephrosis or solid mass.  There is a right renal cyst.  Urinary bladder is not well visualized due to artifact from bilateral hip prostheses.     No evidence of an intestinal obstruction.  There is sigmoid diverticulosis with wall thickening of the sigmoid colon.  There is a gas and fluid collection adjacent to the sigmoid colon measuring approximately 5.3 x 4.4 cm.  There is mild adjacent mesenteric stranding.  A normal appendix is identified.  No concerning lymphadenopathy.  No lux free gas in the peritoneal cavity.     Vascular structures show atherosclerosis without aneurysm.  Degenerative changes of the lumbar spine without an acute fracture.     Impression:     Findings most consistent with perforated sigmoid diverticulitis with pelvic abscess measuring approximately 5.3 x 4.4 cm.  Interposed small and large bowel would make percutaneous drainage of this collection significantly difficult.     This report was flagged in Epic as abnormal.        Electronically signed by: Dmitriy Gregorio MD  Date:                                            06/10/2022  Time:                                           13:07

## 2022-06-10 NOTE — HPI
"From H&P by Traci LOBO:  "86-year-old female with past medical history of depression, hyperlipidemia, ADHD presents to the ED with abdominal pain for the last 6 days associated with decreased appetite.  Patient reports symptoms started when she felt constipated and was unable to use the bathroom last Saturday.  States she has not been eating secondary to constipation.  On Wednesday (2 days ago) she went to go see her gastroenterologist who prescribed ciprofloxacin and metronidazole and ordered a CT scan for today.  She denies any associated fever, chills, chest pain, shortness of breath, cough, N/V/D, or urinary symptoms.  Occasional alcohol, former smoker.  ED evaluation WBC 21.38, afebrile.  Lactic acid pending.  CT A/P with findings most consistent with perforated sigmoid diverticulitis with pelvic abscess measuring approximately 5.3 x 4.4 cm.  Interposed small and large bowel with make percutaneous drainage of this collection significantly difficult.  General surgery consulted. Patient admitted for further evaluation and treatment."  "

## 2022-06-10 NOTE — ASSESSMENT & PLAN NOTE
- afebrile, WBC 21.38; CT A/P Findings most consistent with perforated sigmoid diverticulitis with pelvic abscess measuring approximately 5.3 x 4.4 cm.  With impression percutaneous drainage of this collection significantly difficult.  - NPO for now  - IV fluids, Zosyn  - general surgery consulted; spoke with Dr. Bojorquez conservative management at this time will keep NPO today and monitor  - pain management

## 2022-06-11 PROBLEM — E83.39 HYPOPHOSPHATEMIA: Status: ACTIVE | Noted: 2022-06-11

## 2022-06-11 LAB
ALBUMIN SERPL BCP-MCNC: 2.3 G/DL (ref 3.5–5.2)
ALP SERPL-CCNC: 92 U/L (ref 55–135)
ALT SERPL W/O P-5'-P-CCNC: 9 U/L (ref 10–44)
ANION GAP SERPL CALC-SCNC: 13 MMOL/L (ref 8–16)
AST SERPL-CCNC: 16 U/L (ref 10–40)
BASOPHILS # BLD AUTO: 0.04 K/UL (ref 0–0.2)
BASOPHILS NFR BLD: 0.3 % (ref 0–1.9)
BILIRUB SERPL-MCNC: 0.3 MG/DL (ref 0.1–1)
BUN SERPL-MCNC: 9 MG/DL (ref 8–23)
CALCIUM SERPL-MCNC: 7.8 MG/DL (ref 8.7–10.5)
CHLORIDE SERPL-SCNC: 102 MMOL/L (ref 95–110)
CO2 SERPL-SCNC: 20 MMOL/L (ref 23–29)
CREAT SERPL-MCNC: 0.8 MG/DL (ref 0.5–1.4)
DIFFERENTIAL METHOD: ABNORMAL
EOSINOPHIL # BLD AUTO: 0 K/UL (ref 0–0.5)
EOSINOPHIL NFR BLD: 0.2 % (ref 0–8)
ERYTHROCYTE [DISTWIDTH] IN BLOOD BY AUTOMATED COUNT: 12.4 % (ref 11.5–14.5)
EST. GFR  (AFRICAN AMERICAN): >60 ML/MIN/1.73 M^2
EST. GFR  (NON AFRICAN AMERICAN): >60 ML/MIN/1.73 M^2
GLUCOSE SERPL-MCNC: 119 MG/DL (ref 70–110)
HCT VFR BLD AUTO: 34.1 % (ref 37–48.5)
HGB BLD-MCNC: 11 G/DL (ref 12–16)
IMM GRANULOCYTES # BLD AUTO: 0.12 K/UL (ref 0–0.04)
IMM GRANULOCYTES NFR BLD AUTO: 1 % (ref 0–0.5)
LYMPHOCYTES # BLD AUTO: 1 K/UL (ref 1–4.8)
LYMPHOCYTES NFR BLD: 8 % (ref 18–48)
MAGNESIUM SERPL-MCNC: 1.7 MG/DL (ref 1.6–2.6)
MCH RBC QN AUTO: 30.6 PG (ref 27–31)
MCHC RBC AUTO-ENTMCNC: 32.3 G/DL (ref 32–36)
MCV RBC AUTO: 95 FL (ref 82–98)
MONOCYTES # BLD AUTO: 1.4 K/UL (ref 0.3–1)
MONOCYTES NFR BLD: 11.2 % (ref 4–15)
NEUTROPHILS # BLD AUTO: 9.6 K/UL (ref 1.8–7.7)
NEUTROPHILS NFR BLD: 79.3 % (ref 38–73)
NRBC BLD-RTO: 0 /100 WBC
PHOSPHATE SERPL-MCNC: 2 MG/DL (ref 2.7–4.5)
PLATELET # BLD AUTO: 408 K/UL (ref 150–450)
PMV BLD AUTO: 8.6 FL (ref 9.2–12.9)
POTASSIUM SERPL-SCNC: 3.5 MMOL/L (ref 3.5–5.1)
PROT SERPL-MCNC: 5.8 G/DL (ref 6–8.4)
RBC # BLD AUTO: 3.6 M/UL (ref 4–5.4)
SODIUM SERPL-SCNC: 135 MMOL/L (ref 136–145)
WBC # BLD AUTO: 12.1 K/UL (ref 3.9–12.7)

## 2022-06-11 PROCEDURE — 85025 COMPLETE CBC W/AUTO DIFF WBC: CPT | Performed by: PHYSICIAN ASSISTANT

## 2022-06-11 PROCEDURE — 25000003 PHARM REV CODE 250: Performed by: PHYSICIAN ASSISTANT

## 2022-06-11 PROCEDURE — 80053 COMPREHEN METABOLIC PANEL: CPT | Performed by: PHYSICIAN ASSISTANT

## 2022-06-11 PROCEDURE — 36415 COLL VENOUS BLD VENIPUNCTURE: CPT | Performed by: PHYSICIAN ASSISTANT

## 2022-06-11 PROCEDURE — 99231 PR SUBSEQUENT HOSPITAL CARE,LEVL I: ICD-10-PCS | Mod: ,,, | Performed by: SPECIALIST

## 2022-06-11 PROCEDURE — 97161 PT EVAL LOW COMPLEX 20 MIN: CPT

## 2022-06-11 PROCEDURE — 25000003 PHARM REV CODE 250: Performed by: INTERNAL MEDICINE

## 2022-06-11 PROCEDURE — 99233 SBSQ HOSP IP/OBS HIGH 50: CPT | Mod: ,,, | Performed by: INTERNAL MEDICINE

## 2022-06-11 PROCEDURE — 84100 ASSAY OF PHOSPHORUS: CPT | Performed by: PHYSICIAN ASSISTANT

## 2022-06-11 PROCEDURE — 97165 OT EVAL LOW COMPLEX 30 MIN: CPT

## 2022-06-11 PROCEDURE — 99231 SBSQ HOSP IP/OBS SF/LOW 25: CPT | Mod: ,,, | Performed by: SPECIALIST

## 2022-06-11 PROCEDURE — 63600175 PHARM REV CODE 636 W HCPCS: Performed by: PHYSICIAN ASSISTANT

## 2022-06-11 PROCEDURE — 11000001 HC ACUTE MED/SURG PRIVATE ROOM

## 2022-06-11 PROCEDURE — 99233 PR SUBSEQUENT HOSPITAL CARE,LEVL III: ICD-10-PCS | Mod: ,,, | Performed by: INTERNAL MEDICINE

## 2022-06-11 PROCEDURE — 83735 ASSAY OF MAGNESIUM: CPT | Performed by: PHYSICIAN ASSISTANT

## 2022-06-11 PROCEDURE — 97530 THERAPEUTIC ACTIVITIES: CPT

## 2022-06-11 RX ORDER — SODIUM,POTASSIUM PHOSPHATES 280-250MG
1 POWDER IN PACKET (EA) ORAL ONCE
Status: COMPLETED | OUTPATIENT
Start: 2022-06-11 | End: 2022-06-11

## 2022-06-11 RX ORDER — OXYCODONE AND ACETAMINOPHEN 7.5; 325 MG/1; MG/1
1 TABLET ORAL EVERY 4 HOURS PRN
Status: DISCONTINUED | OUTPATIENT
Start: 2022-06-11 | End: 2022-06-18 | Stop reason: HOSPADM

## 2022-06-11 RX ADMIN — MORPHINE SULFATE 2 MG: 2 INJECTION, SOLUTION INTRAMUSCULAR; INTRAVENOUS at 04:06

## 2022-06-11 RX ADMIN — OXYCODONE AND ACETAMINOPHEN 1 TABLET: 7.5; 325 TABLET ORAL at 11:06

## 2022-06-11 RX ADMIN — PIPERACILLIN SODIUM AND TAZOBACTAM SODIUM 4.5 G: 4; .5 INJECTION, POWDER, LYOPHILIZED, FOR SOLUTION INTRAVENOUS at 11:06

## 2022-06-11 RX ADMIN — DEXTROSE AND SODIUM CHLORIDE: 5; .9 INJECTION, SOLUTION INTRAVENOUS at 01:06

## 2022-06-11 RX ADMIN — POTASSIUM & SODIUM PHOSPHATES POWDER PACK 280-160-250 MG 1 PACKET: 280-160-250 PACK at 04:06

## 2022-06-11 RX ADMIN — PIPERACILLIN SODIUM AND TAZOBACTAM SODIUM 4.5 G: 4; .5 INJECTION, POWDER, LYOPHILIZED, FOR SOLUTION INTRAVENOUS at 04:06

## 2022-06-11 RX ADMIN — PRAVASTATIN SODIUM 20 MG: 20 TABLET ORAL at 08:06

## 2022-06-11 RX ADMIN — HEPARIN SODIUM 5000 UNITS: 5000 INJECTION INTRAVENOUS; SUBCUTANEOUS at 05:06

## 2022-06-11 RX ADMIN — MORPHINE SULFATE 2 MG: 2 INJECTION, SOLUTION INTRAMUSCULAR; INTRAVENOUS at 08:06

## 2022-06-11 RX ADMIN — MORPHINE SULFATE 2 MG: 2 INJECTION, SOLUTION INTRAMUSCULAR; INTRAVENOUS at 03:06

## 2022-06-11 RX ADMIN — HYDROXYZINE HYDROCHLORIDE 25 MG: 25 TABLET, FILM COATED ORAL at 05:06

## 2022-06-11 RX ADMIN — MIRTAZAPINE 7.5 MG: 7.5 TABLET ORAL at 08:06

## 2022-06-11 RX ADMIN — PIPERACILLIN SODIUM AND TAZOBACTAM SODIUM 4.5 G: 4; .5 INJECTION, POWDER, LYOPHILIZED, FOR SOLUTION INTRAVENOUS at 12:06

## 2022-06-11 RX ADMIN — MORPHINE SULFATE 2 MG: 2 INJECTION, SOLUTION INTRAMUSCULAR; INTRAVENOUS at 12:06

## 2022-06-11 RX ADMIN — TRAZODONE HYDROCHLORIDE 50 MG: 50 TABLET ORAL at 08:06

## 2022-06-11 RX ADMIN — PIPERACILLIN SODIUM AND TAZOBACTAM SODIUM 4.5 G: 4; .5 INJECTION, POWDER, LYOPHILIZED, FOR SOLUTION INTRAVENOUS at 08:06

## 2022-06-11 RX ADMIN — HEPARIN SODIUM 5000 UNITS: 5000 INJECTION INTRAVENOUS; SUBCUTANEOUS at 09:06

## 2022-06-11 NOTE — PT/OT/SLP EVAL
"Occupational Therapy   Evaluation    Name: Cheyenne Garner  MRN: [id number]  Admitting Diagnosis:  Diverticulitis of large intestine with perforation and abscess without bleeding  Recent Surgery: * No surgery found *      Recommendations:     Discharge Recommendations:  (TBD pending participation with OT)  Discharge Equipment Recommendations:   (TBD pending progress)  Barriers to discharge:  Decreased caregiver support    Assessment:   Initial OT eval complete.  Evaluation limited by 8/10 abdominal pain.  Supine<>sit SBA with HOB elevated when coming into sitting.  Scooting towards HOB while supine with supervision and verbal cuing for task initiation as Pt. Abruptly initiating sit while low in bed and stating, "I don't want to do this" and "I'm not getting up."  Donned/doffed socks seated EOB via cross leg tech with supervision demos good dynamic sitting balance during task.  Has shower chair, grab bars, and convenience height toilet.  DME and post acute OT needs TBD pending participation with OT due to limited evaluation.  To benefit from continued acute care OT services to increase independence in self-care/functional transfers.  OT to follow.     Cheyenne Garner is a 86 y.o. female with a medical diagnosis of Diverticulitis of large intestine with perforation and abscess without bleeding.  She presents with below deficits decreasing independence in self-care/functional transfers. Performance deficits affecting function: impaired self care skills, pain, decreased safety awareness.      Rehab Prognosis: Good; patient would benefit from acute skilled OT services to address these deficits and reach maximum level of function.       Plan:     Patient to be seen 4 x/week to address the above listed problems via self-care/home management, therapeutic activities, therapeutic exercises  · Plan of Care Expires: 06/25/22  · Plan of Care Reviewed with: patient    Subjective     Chief Complaint: With c/o abdominal pain. " "  Patient/Family Comments/goals: No goals stated at this time.  Pt. Stating, "I don't want to do this" and "I'm not getting up" after being educated on the purpose of visit, participating in client interview, and EOB activities.      Occupational Profile:  Lives alone in [address] [place] [address] with elevator access; bathroom setup as walk-in shower with grab bars and shower chair.  Previously independent in ADL.  Previously independent in ADL tasks.   Equipment Used at Home:  grab bar, shower chair (convenience hieght toilet)  Assistance upon Discharge: Pt. Lives alone.     Pain/Comfort:  · Pain Rating 1: 8/10  · Location - Side 1: Bilateral  · Location - Orientation 1: generalized  · Location 1: abdomen  · Pain Addressed 1: Distraction, Nurse notified, Cessation of Activity  · Pain Rating Post-Intervention 1: 8/10    Patients cultural, spiritual, Caodaism conflicts given the current situation:  (None stated.)    Objective:     Communicated with: Crys NOVOA RN prior to session.  Patient found HOB elevated with peripheral IV and daughter-in-law present upon OT entry to room; daughter-in-law leaving at start of session.    General Precautions: Standard, fall   Orthopedic Precautions:N/A   Braces: N/A  Respiratory Status: Room air    Occupational Performance:    Bed Mobility:    · Supine<>sit with SBA and HOB elevated when coming into sitting; demos good static sitting while seated EOB.     Functional Mobility/Transfers:  · Pt. Unwilling to perform OOB activity and abruptly refusing and returning self to bed.  Attempted to coax and educate on importance of OOB activity and participation with OT though with continued refusal.     Activities of Daily Living:  · Donned/doffed socks while seated EOB via cross leg tech demos good dynamic sitting balance during ADL task.     Cognitive/Visual Perceptual:  Cognitive/Psychosocial Skills:  -       Oriented to: Person, Place, Time and Situation   -       Follows Commands/attention:Follows " one-step commands  -       Communication: answers questions appropriately and makes basic needs known  -       Memory: No Deficits noted  -       Safety awareness/insight to disability: impaired   -       Mood/Affect/Coping skills/emotional control: Appropriate to situation  Visual/Perceptual:  -grossly intact    Physical Exam:  Edema:  None noted  Sensation: -       Intact  light/touch and denies paraesthesias  Dominant hand: -       R  Upper Extremity Range of Motion:  -       Right Upper Extremity: WFL  -       Left Upper Extremity: WFL  Upper Extremity Strength: -       Right Upper Extremity: 4/5 GROSS  -       Left Upper Extremity: 4/5 GROSS   Strength: -       Right Upper Extremity: WFL  -       Left Upper Extremity: WFL  Fine Motor Coordination: -       Intact  Left hand thumb/finger opposition skills and Right hand thumb/finger opposition skills    AMPA 6 Click ADL:  AMPAC Total Score: 19    Treatment & Education:  · Educated on role of OT and POC.   · Supine<>sit with SBA and HOB elevated when coming into sitting; demos good static sitting while seated EOB.   · Pt. Unwilling to perform OOB activity and abruptly refusing and returning self to bed.  Attempted to coax and educate on importance of OOB activity and participation with OT though with continued refusal.   · Donned/doffed socks while seated EOB via cross leg tech demos good dynamic sitting balance during ADL task.   · Received review of call light and importance of calling for assist as needed.   Education:    Patient left HOB elevated with all lines intact, call button in reach, bed alarm on, nursing notified and PCT present    GOALS:   Multidisciplinary Problems     Occupational Therapy Goals        Problem: Occupational Therapy    Goal Priority Disciplines Outcome Interventions   Occupational Therapy Goal     OT, PT/OT Ongoing, Progressing    Description: Goals to be met by: 6/25/2022     Patient will increase functional independence with  ADLs by performing:    UE Dressing with Supervision.  LE Dressing to don underwear with Contact Guard Assistance.  Toileting to be assessed.   Toilet transfer to be assessed.                       History:     Past Medical History:   Diagnosis Date    Cataract     Hyperlipidemia     Inflammatory bowel disease     Sarcoidosis with granulomatous hepatitis     UTI (urinary tract infection) 7/17/2013       Past Surgical History:   Procedure Laterality Date    BLADDER SURGERY      BREAST SURGERY      EYE SURGERY      HIP SURGERY      HYSTERECTOMY      JOINT REPLACEMENT      SINUS SURGERY         Time Tracking:     OT Date of Treatment: 06/11/22  OT Start Time: 1604  OT Stop Time: 1621  OT Total Time (min): 17 min    Billable Minutes:Evaluation 17    6/11/2022

## 2022-06-11 NOTE — PLAN OF CARE
Rec'd pt in bed pt complaining of pain to abd pt stated it was gas pains, I assisted pt up to [facility] , small loose BM noted, pt cleaned and assisted back to bed.  PT noted to be AAOx4. Pt noted to be on room air with no SOB noted no distress noted. Iv to right arm noted intact infusing without difficulty. CB noted in reach  SR up x2 and bed place in lowest position  with HOB elevated.  POC reviewed with pt , pain management reviewed with pt and daughter law at , pt has been requesting IV morphine I educated on pain scale for medication and how often pt can receive pain mediation and that it is not scheduled she has to call and ask staff for medication both voiced understanding.   Purposeful rounding completed this shift.     Problem: Adult Inpatient Plan of Care  Goal: Plan of Care Review  Outcome: Ongoing, Progressing  Goal: Patient-Specific Goal (Individualized)  Outcome: Ongoing, Progressing  Goal: Absence of Hospital-Acquired Illness or Injury  Outcome: Ongoing, Progressing  Goal: Optimal Comfort and Wellbeing  Outcome: Ongoing, Progressing  Goal: Readiness for Transition of Care  Outcome: Ongoing, Progressing

## 2022-06-11 NOTE — PROGRESS NOTES
[facility] Medicine  Progress Note    Patient Name: Cheyenne Garner  MRN: [id number]  Patient Class: IP- Inpatient   Admission Date: 6/10/2022  Length of Stay: 1 days  Attending Physician: Aparna Nino MD  Primary Care Provider: Mary Cortes MD        Subjective:     Principal Problem:Diverticulitis of large intestine with perforation and abscess without bleeding        HPI:  86-year-old female with past medical history of depression, hyperlipidemia, ADHD presents to the ED with abdominal pain for the last 6 days associated with decreased appetite.  Patient reports symptoms started when she felt constipated and was unable to use the bathroom last Saturday.  States she has not been eating secondary to constipation.  On Wednesday (2 days ago) she went to go see her gastroenterologist who prescribed ciprofloxacin and metronidazole and ordered a CT scan for today.  She denies any associated fever, chills, chest pain, shortness of breath, cough, N/V/D, or urinary symptoms.  Occasional alcohol, former smoker.  ED evaluation WBC 21.38, afebrile.  Lactic acid pending.  CT A/P with findings most consistent with perforated sigmoid diverticulitis with pelvic abscess measuring approximately 5.3 x 4.4 cm.  Interposed small and large bowel with make percutaneous drainage of this collection significantly difficult.  General surgery consulted. Patient admitted for further evaluation and treatment.      Overview/Hospital Course:  Wbc better.      Interval History: still c/o abdominal pain diffusely, improved with pain med, no nausea, had liquid stool today.    Review of Systems   Constitutional:  Negative for chills and fever.   HENT:  Negative for trouble swallowing.    Respiratory:  Negative for cough and shortness of breath.    Cardiovascular:  Negative for chest pain and leg swelling.   Gastrointestinal:  Positive for abdominal pain. Negative for blood in stool, nausea and vomiting.    Genitourinary:  Negative for dysuria and hematuria.   Musculoskeletal:  Negative for gait problem.   Skin:  Negative for rash.   Neurological:  Positive for weakness. Negative for headaches.   Psychiatric/Behavioral:  Negative for confusion.    Objective:     Vital Signs (Most Recent):  Temp: 98.2 °F (36.8 °C) (06/11/22 1136)  Pulse: 100 (06/11/22 1136)  Resp: 18 (06/11/22 1215)  BP: 137/71 (06/11/22 1136)  SpO2: (!) 93 % (06/11/22 1136)   Vital Signs (24h Range):  Temp:  [97.5 °F (36.4 °C)-100.1 °F (37.8 °C)] 98.2 °F (36.8 °C)  Pulse:  [] 100  Resp:  [16-34] 18  SpO2:  [91 %-99 %] 93 %  BP: (130-167)/(61-71) 137/71     Weight: 60.6 kg (133 lb 9.6 oz)  Body mass index is 23.67 kg/m².  No intake or output data in the 24 hours ending 06/11/22 1617   Physical Exam  Vitals reviewed.   Constitutional:       General: She is not in acute distress.     Appearance: She is well-developed.   HENT:      Head: Normocephalic and atraumatic.   Eyes:      Extraocular Movements: Extraocular movements intact.      Pupils: Pupils are equal, round, and reactive to light.   Cardiovascular:      Rate and Rhythm: Normal rate and regular rhythm.   Pulmonary:      Effort: Pulmonary effort is normal. No respiratory distress.      Breath sounds: Normal breath sounds.   Abdominal:      General: Bowel sounds are normal. There is no distension.      Palpations: Abdomen is soft.      Tenderness: There is abdominal tenderness (mild tenderness in lower abdomen, otherwise soft benign exam). There is no guarding or rebound.   Musculoskeletal:         General: No swelling. Normal range of motion.      Cervical back: Normal range of motion and neck supple.   Skin:     General: Skin is warm.      Findings: No rash.   Neurological:      General: No focal deficit present.      Mental Status: She is alert and oriented to person, place, and time.   Psychiatric:         Mood and Affect: Mood normal.         Behavior: Behavior normal.        Significant Labs: All pertinent labs within the past 24 hours have been reviewed.    Significant Imaging: I have reviewed all pertinent imaging results/findings within the past 24 hours.      Assessment/Plan:      * Diverticulitis of large intestine with perforation and abscess without bleeding  - afebrile, WBC 21.38; CT A/P Findings most consistent with perforated sigmoid diverticulitis with pelvic abscess measuring approximately 5.3 x 4.4 cm.  With impression percutaneous drainage of this collection significantly difficult.  - NPO for now except ice chips  - IV fluids, Zosyn  - general surgery following, conservative management   - pain management  - wbc improving, no fever      Hypophosphatemia  replace      Primary insomnia  Depression  - continue mirtazapine at night, melatonin p.r.n.      Depression  Continue home meds        VTE Risk Mitigation (From admission, onward)         Ordered     heparin (porcine) injection 5,000 Units  Every 8 hours         06/10/22 1710     IP VTE HIGH RISK PATIENT  Once         06/10/22 1710     Place sequential compression device  Until discontinued         06/10/22 1710                Discharge Planning   PAULA:      Code Status: Full Code   Is the patient medically ready for discharge?:     Reason for patient still in hospital (select all that apply): Patient trending condition and Treatment  Discharge Plan A: Home                  Aparna Nino MD  Department of Hospital Medicine   [facility] Med [facility] ([place])

## 2022-06-11 NOTE — SUBJECTIVE & OBJECTIVE
Interval History: still c/o abdominal pain diffusely, improved with pain med, no nausea, had liquid stool today.    Review of Systems   Constitutional:  Negative for chills and fever.   HENT:  Negative for trouble swallowing.    Respiratory:  Negative for cough and shortness of breath.    Cardiovascular:  Negative for chest pain and leg swelling.   Gastrointestinal:  Positive for abdominal pain. Negative for blood in stool, nausea and vomiting.   Genitourinary:  Negative for dysuria and hematuria.   Musculoskeletal:  Negative for gait problem.   Skin:  Negative for rash.   Neurological:  Positive for weakness. Negative for headaches.   Psychiatric/Behavioral:  Negative for confusion.    Objective:     Vital Signs (Most Recent):  Temp: 98.2 °F (36.8 °C) (06/11/22 1136)  Pulse: 100 (06/11/22 1136)  Resp: 18 (06/11/22 1215)  BP: 137/71 (06/11/22 1136)  SpO2: (!) 93 % (06/11/22 1136)   Vital Signs (24h Range):  Temp:  [97.5 °F (36.4 °C)-100.1 °F (37.8 °C)] 98.2 °F (36.8 °C)  Pulse:  [] 100  Resp:  [16-34] 18  SpO2:  [91 %-99 %] 93 %  BP: (130-167)/(61-71) 137/71     Weight: 60.6 kg (133 lb 9.6 oz)  Body mass index is 23.67 kg/m².  No intake or output data in the 24 hours ending 06/11/22 1617   Physical Exam  Vitals reviewed.   Constitutional:       General: She is not in acute distress.     Appearance: She is well-developed.   HENT:      Head: Normocephalic and atraumatic.   Eyes:      Extraocular Movements: Extraocular movements intact.      Pupils: Pupils are equal, round, and reactive to light.   Cardiovascular:      Rate and Rhythm: Normal rate and regular rhythm.   Pulmonary:      Effort: Pulmonary effort is normal. No respiratory distress.      Breath sounds: Normal breath sounds.   Abdominal:      General: Bowel sounds are normal. There is no distension.      Palpations: Abdomen is soft.      Tenderness: There is abdominal tenderness (mild tenderness in lower abdomen, otherwise soft benign exam). There is  no guarding or rebound.   Musculoskeletal:         General: No swelling. Normal range of motion.      Cervical back: Normal range of motion and neck supple.   Skin:     General: Skin is warm.      Findings: No rash.   Neurological:      General: No focal deficit present.      Mental Status: She is alert and oriented to person, place, and time.   Psychiatric:         Mood and Affect: Mood normal.         Behavior: Behavior normal.       Significant Labs: All pertinent labs within the past 24 hours have been reviewed.    Significant Imaging: I have reviewed all pertinent imaging results/findings within the past 24 hours.

## 2022-06-11 NOTE — PROGRESS NOTES
House day 2.  Diagnosis-sigmoid diverticulitis with localized perforation, abscess not amenable to percutaneous drainage    Subjective  Multiple loose bowel movements  Complain of hunger    PE  Afebrile  Vital signs stable  HEENT-anicteric, atraumatic  Chest-clear  Heart-regular rate and rhythm  Abdomen-soft, nondistended, mild diffuse tenderness, positive bowel sounds  Extremities-no tenderness    Lab  White blood cell count now within normal limits    Impression/plan  Modest improvement  Continue IV antibiotics  Will try clear liquids

## 2022-06-11 NOTE — PLAN OF CARE
PCP: Mary Cortes  Pharmacy: [facility]  Independent - denied any anticipated discharge needs        06/11/22 1116   Discharge Assessment   Assessment Type Discharge Planning Assessment   Confirmed/corrected address, phone number and insurance Yes   Confirmed Demographics Correct on Facesheet   Source of Information patient   Lives With alone   Do you expect to return to your current living situation? Yes   Prior to hospitilization cognitive status: Alert/Oriented   Current cognitive status: Alert/Oriented   Equipment Currently Used at Home none   Patient currently being followed by outpatient case management? No   Do you currently have service(s) that help you manage your care at home? No   Do you take prescription medications? Yes   Do you have prescription coverage? Yes   Do you have any problems affording any of your prescribed medications? No   Is the patient taking medications as prescribed? yes   How do you get to doctors appointments? car, drives self   Discharge Plan A Home   Discharge Plan B Home Health

## 2022-06-11 NOTE — PLAN OF CARE
"Problem: Occupational Therapy  Goal: Occupational Therapy Goal  Description: Goals to be met by: 6/25/2022     Patient will increase functional independence with ADLs by performing:    UE Dressing with Supervision.  LE Dressing to don underwear with Contact Guard Assistance.  Toileting to be assessed.   Toilet transfer to be assessed.      Outcome: Ongoing, Progressing     Initial OT eval complete.  Evaluation limited by 8/10 abdominal pain.  Supine<>sit SBA with HOB elevated when coming into sitting.  Scooting towards HOB while supine with supervision and verbal cuing for task initiation as Pt. Abruptly initiating sit while low in bed and stating, "I don't want to do this" and "I'm not getting up."  Donned/doffed socks seated EOB via cross leg tech with supervision demos good dynamic sitting balance during task.  Has shower chair, grab bars, and convenience height toilet.  DME and post acute OT needs TBD pending participation with OT due to limited evaluation.  To benefit from continued acute care OT services to increase independence in self-care/functional transfers.  OT to follow.     "

## 2022-06-11 NOTE — PT/OT/SLP EVAL
"Physical Therapy Evaluation    Patient Name:  Cheyenne Garner   MRN:  [id number]    Recommendations:     Discharge Recommendations:  home   Discharge Equipment Recommendations: none   Barriers to discharge: None    Assessment:     Cheyenne Garner is a 86 y.o. female admitted with a medical diagnosis of Diverticulitis of large intestine with perforation and abscess without bleeding.  She presents with the following impairments/functional limitations:  weakness, impaired functional mobilty, gait instability, impaired balance .     Pt c[place]o stomach pain[place] [place]y and had loose BM. Ambulated in room 5 ft with handheld assist. Acute PT to follow and DC home likely.    Rehab Prognosis: Good; patient would benefit from acute skilled PT services to address these deficits and reach maximum level of function.    Recent Surgery: * No surgery found *      Plan:     During this hospitalization, patient to be seen 3 x/week to address the identified rehab impairments via gait training and progress toward the following goals:    · Plan of Care Expires:  07/01/22    Subjective     Chief Complaint: stomach pain  Patient/Family Comments/goals: "I need to go to the bathroom" "It is so loud in this place"  Pain/Comfort:  · Pain Rating 1: 4/10  · Location - Orientation 1: generalized  · Location 1: abdomen  · Pain Addressed 1: Pre-medicate for activity  · Pain Rating Post-Intervention 1: 4/10    Patients cultural, spiritual, Hoahaoism conflicts given the current situation: no    Living Environment:  · Pt lives alone in a 1 story apt with elevator access    · Pt has a tub/shower with normal height toilet   Prior level of function:  · Ambulation: indep  · Falls: none      Prior to admission, patients level of function was indep.  Equipment used at home: none.  DME owned (not currently used): none.  Upon discharge, patient will have assistance from family.    Objective:     Communicated with nurse prior to " session.  Patient found HOB elevated with peripheral IV  upon PT entry to room.    General Precautions: Standard, fall   Orthopedic Precautions:N/A   Braces: N/A  Respiratory Status: Room air    Exams:  · Cognitive Exam:  Patient is oriented to Person, Place, Time and Situation  · RLE Strength: WFL  · LLE Strength: WFL    Functional Mobility:  · Bed Mobility:     · Supine to Sit: contact guard assistance  · Sit to Supine: contact guard assistance  · Transfers:     · Toilet Transfer: contact guard assistance with  BSC  using  Step Transfer  · Gait: 5 ft in room with handheld assist    Therapeutic Activities and Exercises:   gown changed  Rear pericare performed  Wiped floor and BSC with orange wipes    AM-PAC 6 CLICK MOBILITY  Total Score:24     Patient left HOB elevated with all lines intact, call button in reach and PCT present.    GOALS:   Multidisciplinary Problems     Physical Therapy Goals        Problem: Physical Therapy    Goal Priority Disciplines Outcome Goal Variances Interventions   Physical Therapy Goal     PT, PT/OT Ongoing, Progressing     Description: Patient will increase functional independence with mobility by performin. Sit<>supine with INDEP.  2. Sit<>stand using NO AD and INDEP.  3. Gait x 150 ft using NO AD and  INDEP.  4. Ascend/descend 4 flight(s) using UNIL handrail(s) and SBA.                          History:     Past Medical History:   Diagnosis Date    Cataract     Hyperlipidemia     Inflammatory bowel disease     Sarcoidosis with granulomatous hepatitis     UTI (urinary tract infection) 2013       Past Surgical History:   Procedure Laterality Date    BLADDER SURGERY      BREAST SURGERY      EYE SURGERY      HIP SURGERY      HYSTERECTOMY      JOINT REPLACEMENT      SINUS SURGERY         Time Tracking:     PT Received On: 22  PT Start Time: 1111     PT Stop Time: 1134  PT Total Time (min): 23 min     Billable Minutes: Evaluation 10 and Therapeutic Activity  13      06/11/2022

## 2022-06-11 NOTE — PLAN OF CARE
Problem: Physical Therapy  Goal: Physical Therapy Goal  Description: Patient will increase functional independence with mobility by performin. Sit<>supine with INDEP.  2. Sit<>stand using NO AD and INDEP.  3. Gait x 150 ft using NO AD and  INDEP.  4. Ascend/descend 4 flight(s) using UNIL handrail(s) and SBA.         Outcome: Ongoing, Progressing   Pt c/o stomach pain, [place] [place]y and had loose BM. Ambulated in room 5 ft with handheld assist. Acute PT to follow and DC home likely.

## 2022-06-11 NOTE — ASSESSMENT & PLAN NOTE
- afebrile, WBC 21.38; CT A/P Findings most consistent with perforated sigmoid diverticulitis with pelvic abscess measuring approximately 5.3 x 4.4 cm.  With impression percutaneous drainage of this collection significantly difficult.  - NPO for now except ice chips  - IV fluids, Zosyn  - general surgery following, conservative management   - pain management  - wbc improving, no fever

## 2022-06-11 NOTE — HOSPITAL COURSE
Patient admitted and treated with IV antibiotics.  IR consulted for drainage but it was not amenable to percutaneous drainage.  Surgery followed with serial exams and recommended monitoring on IV antibiotics alone for now.  She had good clinical improvement and leukocytosis resolved.  Diet advanced to low residue.  CT scan repeated to re-evaluate abscess, showed interval decrease in size.  PT/OT evaluated, felt patient was safe to return home.  Discussed with surgeon, approved to change to oral antibiotics in preparation for discharge home.  She was feeling well on 6/18 and was discharged home in improved condition.  She was prescribed an additional 3 weeks of Cipro and Flagyl and will need to follow up with surgery in about 10 days.  For now she will need to hold the cholestyramine until diverticulitis resolves.

## 2022-06-12 PROBLEM — E87.6 HYPOKALEMIA: Status: ACTIVE | Noted: 2022-06-12

## 2022-06-12 LAB
ANION GAP SERPL CALC-SCNC: 11 MMOL/L (ref 8–16)
BASOPHILS # BLD AUTO: 0.06 K/UL (ref 0–0.2)
BASOPHILS NFR BLD: 0.5 % (ref 0–1.9)
BUN SERPL-MCNC: 4 MG/DL (ref 8–23)
CALCIUM SERPL-MCNC: 7.6 MG/DL (ref 8.7–10.5)
CHLORIDE SERPL-SCNC: 103 MMOL/L (ref 95–110)
CO2 SERPL-SCNC: 22 MMOL/L (ref 23–29)
CREAT SERPL-MCNC: 0.7 MG/DL (ref 0.5–1.4)
DIFFERENTIAL METHOD: ABNORMAL
EOSINOPHIL # BLD AUTO: 0.1 K/UL (ref 0–0.5)
EOSINOPHIL NFR BLD: 0.5 % (ref 0–8)
ERYTHROCYTE [DISTWIDTH] IN BLOOD BY AUTOMATED COUNT: 12.7 % (ref 11.5–14.5)
EST. GFR  (AFRICAN AMERICAN): >60 ML/MIN/1.73 M^2
EST. GFR  (NON AFRICAN AMERICAN): >60 ML/MIN/1.73 M^2
GLUCOSE SERPL-MCNC: 115 MG/DL (ref 70–110)
HCT VFR BLD AUTO: 33.6 % (ref 37–48.5)
HGB BLD-MCNC: 11 G/DL (ref 12–16)
IMM GRANULOCYTES # BLD AUTO: 0.13 K/UL (ref 0–0.04)
IMM GRANULOCYTES NFR BLD AUTO: 1.2 % (ref 0–0.5)
LYMPHOCYTES # BLD AUTO: 1.1 K/UL (ref 1–4.8)
LYMPHOCYTES NFR BLD: 9.7 % (ref 18–48)
MAGNESIUM SERPL-MCNC: 1.8 MG/DL (ref 1.6–2.6)
MCH RBC QN AUTO: 31.2 PG (ref 27–31)
MCHC RBC AUTO-ENTMCNC: 32.7 G/DL (ref 32–36)
MCV RBC AUTO: 95 FL (ref 82–98)
MONOCYTES # BLD AUTO: 1.2 K/UL (ref 0.3–1)
MONOCYTES NFR BLD: 11.2 % (ref 4–15)
NEUTROPHILS # BLD AUTO: 8.4 K/UL (ref 1.8–7.7)
NEUTROPHILS NFR BLD: 76.9 % (ref 38–73)
NRBC BLD-RTO: 0 /100 WBC
PHOSPHATE SERPL-MCNC: 2.4 MG/DL (ref 2.7–4.5)
PLATELET # BLD AUTO: 444 K/UL (ref 150–450)
PMV BLD AUTO: 8.8 FL (ref 9.2–12.9)
POTASSIUM SERPL-SCNC: 3 MMOL/L (ref 3.5–5.1)
RBC # BLD AUTO: 3.53 M/UL (ref 4–5.4)
SODIUM SERPL-SCNC: 136 MMOL/L (ref 136–145)
WBC # BLD AUTO: 10.95 K/UL (ref 3.9–12.7)

## 2022-06-12 PROCEDURE — 63600175 PHARM REV CODE 636 W HCPCS: Performed by: PHYSICIAN ASSISTANT

## 2022-06-12 PROCEDURE — 99233 PR SUBSEQUENT HOSPITAL CARE,LEVL III: ICD-10-PCS | Mod: ,,, | Performed by: INTERNAL MEDICINE

## 2022-06-12 PROCEDURE — 80048 BASIC METABOLIC PNL TOTAL CA: CPT | Performed by: INTERNAL MEDICINE

## 2022-06-12 PROCEDURE — 85025 COMPLETE CBC W/AUTO DIFF WBC: CPT | Performed by: PHYSICIAN ASSISTANT

## 2022-06-12 PROCEDURE — 84100 ASSAY OF PHOSPHORUS: CPT | Performed by: INTERNAL MEDICINE

## 2022-06-12 PROCEDURE — 36415 COLL VENOUS BLD VENIPUNCTURE: CPT | Performed by: PHYSICIAN ASSISTANT

## 2022-06-12 PROCEDURE — 83735 ASSAY OF MAGNESIUM: CPT | Performed by: INTERNAL MEDICINE

## 2022-06-12 PROCEDURE — 99233 SBSQ HOSP IP/OBS HIGH 50: CPT | Mod: ,,, | Performed by: INTERNAL MEDICINE

## 2022-06-12 PROCEDURE — 11000001 HC ACUTE MED/SURG PRIVATE ROOM

## 2022-06-12 PROCEDURE — 97535 SELF CARE MNGMENT TRAINING: CPT

## 2022-06-12 PROCEDURE — 25000003 PHARM REV CODE 250: Performed by: INTERNAL MEDICINE

## 2022-06-12 PROCEDURE — 25000003 PHARM REV CODE 250: Performed by: PHYSICIAN ASSISTANT

## 2022-06-12 RX ORDER — POTASSIUM CHLORIDE 20 MEQ/1
40 TABLET, EXTENDED RELEASE ORAL ONCE
Status: COMPLETED | OUTPATIENT
Start: 2022-06-12 | End: 2022-06-12

## 2022-06-12 RX ORDER — SODIUM,POTASSIUM PHOSPHATES 280-250MG
1 POWDER IN PACKET (EA) ORAL ONCE
Status: COMPLETED | OUTPATIENT
Start: 2022-06-12 | End: 2022-06-12

## 2022-06-12 RX ADMIN — DEXTROSE AND SODIUM CHLORIDE: 5; .9 INJECTION, SOLUTION INTRAVENOUS at 06:06

## 2022-06-12 RX ADMIN — POTASSIUM CHLORIDE 40 MEQ: 20 TABLET, EXTENDED RELEASE ORAL at 10:06

## 2022-06-12 RX ADMIN — HEPARIN SODIUM 5000 UNITS: 5000 INJECTION INTRAVENOUS; SUBCUTANEOUS at 10:06

## 2022-06-12 RX ADMIN — TRAZODONE HYDROCHLORIDE 50 MG: 50 TABLET ORAL at 08:06

## 2022-06-12 RX ADMIN — PIPERACILLIN SODIUM AND TAZOBACTAM SODIUM 4.5 G: 4; .5 INJECTION, POWDER, LYOPHILIZED, FOR SOLUTION INTRAVENOUS at 05:06

## 2022-06-12 RX ADMIN — OXYCODONE AND ACETAMINOPHEN 1 TABLET: 7.5; 325 TABLET ORAL at 02:06

## 2022-06-12 RX ADMIN — OXYCODONE AND ACETAMINOPHEN 1 TABLET: 7.5; 325 TABLET ORAL at 08:06

## 2022-06-12 RX ADMIN — POTASSIUM & SODIUM PHOSPHATES POWDER PACK 280-160-250 MG 1 PACKET: 280-160-250 PACK at 10:06

## 2022-06-12 RX ADMIN — PIPERACILLIN SODIUM AND TAZOBACTAM SODIUM 4.5 G: 4; .5 INJECTION, POWDER, LYOPHILIZED, FOR SOLUTION INTRAVENOUS at 10:06

## 2022-06-12 RX ADMIN — MIRTAZAPINE 7.5 MG: 7.5 TABLET ORAL at 08:06

## 2022-06-12 RX ADMIN — PRAVASTATIN SODIUM 20 MG: 20 TABLET ORAL at 08:06

## 2022-06-12 RX ADMIN — HEPARIN SODIUM 5000 UNITS: 5000 INJECTION INTRAVENOUS; SUBCUTANEOUS at 06:06

## 2022-06-12 RX ADMIN — HEPARIN SODIUM 5000 UNITS: 5000 INJECTION INTRAVENOUS; SUBCUTANEOUS at 02:06

## 2022-06-12 NOTE — PROGRESS NOTES
[facility], [place] Medicine  Progress Note    Patient Name: Cheyenne Garner  MRN: [id number]  Patient Class: IP- Inpatient   Admission Date: 6/10/2022  Length of Stay: 2 days  Attending Physician: Aparna Nino MD  Primary Care Provider: Mary Cortes MD        Subjective:     Principal Problem:Diverticulitis of large intestine with perforation and abscess without bleeding        HPI:  86-year-old female with past medical history of depression, hyperlipidemia, ADHD presents to the ED with abdominal pain for the last 6 days associated with decreased appetite.  Patient reports symptoms started when she felt constipated and was unable to use the bathroom last Saturday.  States she has not been eating secondary to constipation.  On Wednesday (2 days ago) she went to go see her gastroenterologist who prescribed ciprofloxacin and metronidazole and ordered a CT scan for today.  She denies any associated fever, chills, chest pain, shortness of breath, cough, N/V/D, or urinary symptoms.  Occasional alcohol, former smoker.  ED evaluation WBC 21.38, afebrile.  Lactic acid pending.  CT A/P with findings most consistent with perforated sigmoid diverticulitis with pelvic abscess measuring approximately 5.3 x 4.4 cm.  Interposed small and large bowel with make percutaneous drainage of this collection significantly difficult.  General surgery consulted. Patient admitted for further evaluation and treatment.      Overview/Hospital Course:  Wbc better.Tolerating clear liquid diet.      Interval History: still c/o abdominal pain diffusely, improved from yesterday,  no nausea, had 1 liquid stool today.    Review of Systems   Constitutional:  Negative for chills and fever.   HENT:  Negative for trouble swallowing.    Respiratory:  Negative for cough and shortness of breath.    Cardiovascular:  Negative for chest pain and leg swelling.   Gastrointestinal:  Positive for abdominal pain. Negative for blood in  stool, nausea and vomiting.   Genitourinary:  Negative for dysuria and hematuria.   Musculoskeletal:  Negative for gait problem.   Skin:  Negative for rash.   Neurological:  Negative for headaches.   Psychiatric/Behavioral:  Negative for confusion.    Objective:     Vital Signs (Most Recent):  Temp: 97.9 °F (36.6 °C) (06/12/22 0734)  Pulse: 71 (06/12/22 0734)  Resp: 16 (06/12/22 0839)  BP: (!) 143/67 (06/12/22 0734)  SpO2: 95 % (06/12/22 0734)   Vital Signs (24h Range):  Temp:  [97.9 °F (36.6 °C)-100.1 °F (37.8 °C)] 97.9 °F (36.6 °C)  Pulse:  [] 71  Resp:  [16-18] 16  SpO2:  [93 %-98 %] 95 %  BP: (137-157)/(63-79) 143/67     Weight: 60.6 kg (133 lb 9.6 oz)  Body mass index is 23.67 kg/m².    Intake/Output Summary (Last 24 hours) at 6/12/2022 1107  Last data filed at 6/11/2022 2200  Gross per 24 hour   Intake 360 ml   Output --   Net 360 ml        Physical Exam  Vitals reviewed.   Constitutional:       General: She is not in acute distress.     Appearance: She is well-developed.   HENT:      Head: Normocephalic and atraumatic.   Eyes:      Extraocular Movements: Extraocular movements intact.      Pupils: Pupils are equal, round, and reactive to light.   Cardiovascular:      Rate and Rhythm: Normal rate and regular rhythm.   Pulmonary:      Effort: Pulmonary effort is normal. No respiratory distress.      Breath sounds: Normal breath sounds.   Abdominal:      General: Bowel sounds are normal. There is no distension.      Palpations: Abdomen is soft.      Tenderness: There is abdominal tenderness (in left lower quadrant.). There is no guarding or rebound.   Musculoskeletal:         General: No swelling. Normal range of motion.      Cervical back: Normal range of motion and neck supple.   Skin:     General: Skin is warm.      Findings: No rash.   Neurological:      General: No focal deficit present.      Mental Status: She is alert and oriented to person, place, and time.   Psychiatric:         Mood and Affect:  Mood normal.         Behavior: Behavior normal.       Significant Labs: All pertinent labs within the past 24 hours have been reviewed.    Significant Imaging: I have reviewed all pertinent imaging results/findings within the past 24 hours.      Assessment/Plan:      * Diverticulitis of large intestine with perforation and abscess without bleeding  - afebrile, WBC 21.38; CT A/P Findings most consistent with perforated sigmoid diverticulitis with pelvic abscess measuring approximately 5.3 x 4.4 cm.  With impression percutaneous drainage of this collection significantly difficult.  - NPO for now except ice chips  - IV fluids, Zosyn  - general surgery following, conservative management   - pain management  - wbc improving, no fever  - started clear liquid diet      Hypokalemia  Replace oral      Hypophosphatemia  Replace oral      Primary insomnia  Depression  - continue mirtazapine at night, melatonin p.r.n.      Depression  Continue home meds        VTE Risk Mitigation (From admission, onward)         Ordered     heparin (porcine) injection 5,000 Units  Every 8 hours         06/10/22 1710     IP VTE HIGH RISK PATIENT  Once         06/10/22 1710     Place sequential compression device  Until discontinued         06/10/22 1710                Discharge Planning   PAULA:      Code Status: Full Code   Is the patient medically ready for discharge?:     Reason for patient still in hospital (select all that apply): Patient trending condition and Treatment  Discharge Plan A: Home                  Aparna Nino MD  Department of Hospital Medicine   [employer] [facility] ([place])

## 2022-06-12 NOTE — NURSING
Patient complained of 8/10 abdominal pain. When this nurse returned with pain pill, patient sleeping. Medication returned to [facility].

## 2022-06-12 NOTE — PT/OT/SLP PROGRESS
"Occupational Therapy   Treatment    Name: Cheyenne Garner  MRN: [id number]  Admitting Diagnosis:  Diverticulitis of large intestine with perforation and abscess without bleeding       Recommendations:     Discharge Recommendations: home health PT, home health OT  Discharge Equipment Recommendations:  bedside commode  Barriers to discharge:  Decreased caregiver support (current functional level)    Assessment:   Progressing towards goals.  Requiring increased cuing for RW safety during ambulation task as Pt. Lift RW from floor.  Demos slight impulsivity during task; needs to be instructed on slowing down; abruptly making turns during ambulation task and requiring cues to sure not getting tangled in peripheral IV lines.  Requiring CGA for steadying during lift from toilet and also using grab bars during task.  Cleaning front/back gabriele region while seated with SBA though refusal to change adult brief even after bringing attention to stool markings and educating on UTI's; Pt. With continued refusal of changing brief stating, "It just comes back."  Needs BSC.  Recommend post acute HH OT/PT.  To benefit from continued acute care OT services to increase independence in self-care/functional transfers.  Continue POC.     Cheyenne Garner is a 86 y.o. female with a medical diagnosis of Diverticulitis of large intestine with perforation and abscess without bleeding.  She presents with below deficits decreasing independence in self-care/functional transfers. Performance deficits affecting function are impaired endurance, impaired self care skills, impaired functional mobilty, gait instability, impaired balance, pain, decreased safety awareness.     Rehab Prognosis:  Good; patient would benefit from acute skilled OT services to address these deficits and reach maximum level of function.       Plan:     Patient to be seen 4 x/week to address the above listed problems via self-care/home management, therapeutic activities, " therapeutic exercises  · Plan of Care Expires: 06/25/22  · Plan of Care Reviewed with: patient    Subjective     Pain/Comfort:  · Pain Rating 1: 8/10  · Location - Side 1: Bilateral  · Location - Orientation 1: generalized  · Location 1: abdomen  · Pain Addressed 1: Distraction, Cessation of Activity, Nurse notified  · Pain Rating Post-Intervention 1: 8/10    Objective:     Communicated with: Abigail SEBASTIAN RN prior to session.  Patient found HOB elevated with peripheral IV upon OT entry to room.    General Precautions: Standard, fall   Orthopedic Precautions:N/A   Braces: N/A  Respiratory Status: Room air     Occupational Performance:     Bed Mobility:    · Supine<>sit with supervision and HOB elevated when coming into sit.     Functional Mobility/Transfers:  · Sit>stand EOB>RW and ambulating short household distance bed>bathroom>sink and return to bed with CGA/SBA with cuing for safe RW use and increased cuing for attention to peripheral IV line during task.  Step transfer to toilet with use of grab bar during controlled descent and CGA for steadying for lift.      Activities of Daily Living:  · Having small soft stool and also voiding while seated at toilet.  Cleaning front/back gabriele region with RUE and forward flexed trunk while seated.  Instructed on changing adult brief due to stool markings found though Pt. Adamantly refusing at this time.  Educated on causes of UTI's though Pt. With continued refusal.  Hand hygiene in stance at sink with MIN cuing for safe RW positioning at sink for task.       [facility] 6 Click ADL: 19    Treatment & Education:  · Educated on role of OT, review of call light, and importance of calling for assist as needed.   · Supine<>sit with supervision and HOB elevated when coming into sit.   · Sit>stand EOB>RW and ambulating short household distance bed>bathroom>sink and return to bed with CGA/SBA with cuing for safe RW use and increased cuing for attention to peripheral IV line during task.   Step transfer to toilet with use of grab bar during controlled descent and CGA for steadying for lift.    · Having small soft stool and also voiding while seated at toilet.  Cleaning front/back gabriele region with RUE and forward flexed trunk while seated.  Instructed on changing adult brief due to stool markings found though Pt. Adamantly refusing at this time.  Educated on causes of UTI's though Pt. With continued refusal.  Hand hygiene in stance at sink with MIN cuing for safe RW positioning at sink for task.     Patient left HOB elevated with all lines intact, call button in reach and nursing notifiedEducation:      GOALS:   Multidisciplinary Problems     Occupational Therapy Goals        Problem: Occupational Therapy    Goal Priority Disciplines Outcome Interventions   Occupational Therapy Goal     OT, PT/OT Ongoing, Progressing    Description: Goals to be met by: 6/25/2022     Patient will increase functional independence with ADLs by performing:    UE Dressing with Supervision.  LE Dressing to don underwear with Stand-by Assistance.  Grooming/hygiene at sink for 3 tasks with Supervision.  Toileting to BSC with Supervision.  Toilet transfer to BSC with Supervision.    COMPLETED GOALS:  Toileting to be assessed.  GOAL MET 6/12/2022.  Toilet transfer to be assessed.  GOAL MET 6/12/2022.                     Time Tracking:     OT Date of Treatment: 06/12/22  OT Start Time: 1204  OT Stop Time: 1216  OT Total Time (min): 12 min    Billable Minutes:Self Care/Home Management 12    OT/EDMOND: OT          6/12/2022

## 2022-06-12 NOTE — SUBJECTIVE & OBJECTIVE
Interval History: still c/o abdominal pain diffusely, improved from yesterday,  no nausea, had 1 liquid stool today.    Review of Systems   Constitutional:  Negative for chills and fever.   HENT:  Negative for trouble swallowing.    Respiratory:  Negative for cough and shortness of breath.    Cardiovascular:  Negative for chest pain and leg swelling.   Gastrointestinal:  Positive for abdominal pain. Negative for blood in stool, nausea and vomiting.   Genitourinary:  Negative for dysuria and hematuria.   Musculoskeletal:  Negative for gait problem.   Skin:  Negative for rash.   Neurological:  Negative for headaches.   Psychiatric/Behavioral:  Negative for confusion.    Objective:     Vital Signs (Most Recent):  Temp: 97.9 °F (36.6 °C) (06/12/22 0734)  Pulse: 71 (06/12/22 0734)  Resp: 16 (06/12/22 0839)  BP: (!) 143/67 (06/12/22 0734)  SpO2: 95 % (06/12/22 0734)   Vital Signs (24h Range):  Temp:  [97.9 °F (36.6 °C)-100.1 °F (37.8 °C)] 97.9 °F (36.6 °C)  Pulse:  [] 71  Resp:  [16-18] 16  SpO2:  [93 %-98 %] 95 %  BP: (137-157)/(63-79) 143/67     Weight: 60.6 kg (133 lb 9.6 oz)  Body mass index is 23.67 kg/m².    Intake/Output Summary (Last 24 hours) at 6/12/2022 1107  Last data filed at 6/11/2022 2200  Gross per 24 hour   Intake 360 ml   Output --   Net 360 ml        Physical Exam  Vitals reviewed.   Constitutional:       General: She is not in acute distress.     Appearance: She is well-developed.   HENT:      Head: Normocephalic and atraumatic.   Eyes:      Extraocular Movements: Extraocular movements intact.      Pupils: Pupils are equal, round, and reactive to light.   Cardiovascular:      Rate and Rhythm: Normal rate and regular rhythm.   Pulmonary:      Effort: Pulmonary effort is normal. No respiratory distress.      Breath sounds: Normal breath sounds.   Abdominal:      General: Bowel sounds are normal. There is no distension.      Palpations: Abdomen is soft.      Tenderness: There is abdominal tenderness  (in left lower quadrant.). There is no guarding or rebound.   Musculoskeletal:         General: No swelling. Normal range of motion.      Cervical back: Normal range of motion and neck supple.   Skin:     General: Skin is warm.      Findings: No rash.   Neurological:      General: No focal deficit present.      Mental Status: She is alert and oriented to person, place, and time.   Psychiatric:         Mood and Affect: Mood normal.         Behavior: Behavior normal.       Significant Labs: All pertinent labs within the past 24 hours have been reviewed.    Significant Imaging: I have reviewed all pertinent imaging results/findings within the past 24 hours.

## 2022-06-12 NOTE — PROGRESS NOTES
Diagnosis-diverticulitis coli of sigmoid colon with pelvic abscess not amenable to percutaneous drainage    Subjective  Had loose bowel movement  Wishes to advance diet  Complain of abdominal pain    PE  Low-grade temp  Vital signs stable  Chest-clear  Heart-regular rate and rhythm  Abdomen-mild distention, positive bowel sounds, diffuse mild tenderness, no rebound, no guarding  Extremities-no tenderness    Impression/plan  Sigmoid diverticulitis with pelvic abscess  Continue-antibiotics  Advanced to full liquids

## 2022-06-12 NOTE — PLAN OF CARE
Problem: Occupational Therapy  Goal: Occupational Therapy Goal  Description: Goals to be met by: 6/25/2022     Patient will increase functional independence with ADLs by performing:    UE Dressing with Supervision.  LE Dressing to don underwear with Stand-by Assistance.  Grooming/hygiene at sink for 3 tasks with Supervision.  Toileting to BSC with Supervision.  Toilet transfer to BSC with Supervision.    COMPLETED GOALS:  Toileting to be assessed.  GOAL MET 6/12/2022.  Toilet transfer to be assessed.  GOAL MET 6/12/2022.    Outcome: Ongoing, Progressing     Progressing towards goals.  Needs BSC.  Recommend post acute HH OT/PT.  To benefit from continued acute care OT services to increase independence in self-care/functional transfers.  Continue POC.

## 2022-06-12 NOTE — ASSESSMENT & PLAN NOTE
- afebrile, WBC 21.38; CT A/P Findings most consistent with perforated sigmoid diverticulitis with pelvic abscess measuring approximately 5.3 x 4.4 cm.  With impression percutaneous drainage of this collection significantly difficult.  - NPO for now except ice chips  - IV fluids, Zosyn  - general surgery following, conservative management   - pain management  - wbc improving, no fever  - started clear liquid diet

## 2022-06-13 ENCOUNTER — PATIENT OUTREACH (OUTPATIENT)
Dept: ADMINISTRATIVE | Facility: OTHER | Age: 87
End: 2022-06-13
Payer: COMMERCIAL

## 2022-06-13 LAB
ANION GAP SERPL CALC-SCNC: 11 MMOL/L (ref 8–16)
BASOPHILS # BLD AUTO: 0.05 K/UL (ref 0–0.2)
BASOPHILS NFR BLD: 0.5 % (ref 0–1.9)
BUN SERPL-MCNC: 4 MG/DL (ref 8–23)
CALCIUM SERPL-MCNC: 8 MG/DL (ref 8.7–10.5)
CHLORIDE SERPL-SCNC: 108 MMOL/L (ref 95–110)
CO2 SERPL-SCNC: 22 MMOL/L (ref 23–29)
CREAT SERPL-MCNC: 0.7 MG/DL (ref 0.5–1.4)
DIFFERENTIAL METHOD: ABNORMAL
EOSINOPHIL # BLD AUTO: 0.1 K/UL (ref 0–0.5)
EOSINOPHIL NFR BLD: 0.9 % (ref 0–8)
ERYTHROCYTE [DISTWIDTH] IN BLOOD BY AUTOMATED COUNT: 12.8 % (ref 11.5–14.5)
EST. GFR  (AFRICAN AMERICAN): >60 ML/MIN/1.73 M^2
EST. GFR  (NON AFRICAN AMERICAN): >60 ML/MIN/1.73 M^2
GLUCOSE SERPL-MCNC: 107 MG/DL (ref 70–110)
HCT VFR BLD AUTO: 35.6 % (ref 37–48.5)
HGB BLD-MCNC: 11.5 G/DL (ref 12–16)
IMM GRANULOCYTES # BLD AUTO: 0.15 K/UL (ref 0–0.04)
IMM GRANULOCYTES NFR BLD AUTO: 1.4 % (ref 0–0.5)
LYMPHOCYTES # BLD AUTO: 1.3 K/UL (ref 1–4.8)
LYMPHOCYTES NFR BLD: 12.1 % (ref 18–48)
MAGNESIUM SERPL-MCNC: 1.8 MG/DL (ref 1.6–2.6)
MCH RBC QN AUTO: 31 PG (ref 27–31)
MCHC RBC AUTO-ENTMCNC: 32.3 G/DL (ref 32–36)
MCV RBC AUTO: 96 FL (ref 82–98)
MONOCYTES # BLD AUTO: 1.3 K/UL (ref 0.3–1)
MONOCYTES NFR BLD: 11.9 % (ref 4–15)
NEUTROPHILS # BLD AUTO: 7.8 K/UL (ref 1.8–7.7)
NEUTROPHILS NFR BLD: 73.2 % (ref 38–73)
NRBC BLD-RTO: 0 /100 WBC
PHOSPHATE SERPL-MCNC: 2.5 MG/DL (ref 2.7–4.5)
PLATELET # BLD AUTO: 465 K/UL (ref 150–450)
PMV BLD AUTO: 8.7 FL (ref 9.2–12.9)
POTASSIUM SERPL-SCNC: 3.6 MMOL/L (ref 3.5–5.1)
RBC # BLD AUTO: 3.71 M/UL (ref 4–5.4)
SODIUM SERPL-SCNC: 141 MMOL/L (ref 136–145)
WBC # BLD AUTO: 10.69 K/UL (ref 3.9–12.7)

## 2022-06-13 PROCEDURE — 99233 SBSQ HOSP IP/OBS HIGH 50: CPT | Mod: ,,, | Performed by: INTERNAL MEDICINE

## 2022-06-13 PROCEDURE — 85025 COMPLETE CBC W/AUTO DIFF WBC: CPT | Performed by: PHYSICIAN ASSISTANT

## 2022-06-13 PROCEDURE — 99233 PR SUBSEQUENT HOSPITAL CARE,LEVL III: ICD-10-PCS | Mod: ,,, | Performed by: INTERNAL MEDICINE

## 2022-06-13 PROCEDURE — 63600175 PHARM REV CODE 636 W HCPCS: Performed by: INTERNAL MEDICINE

## 2022-06-13 PROCEDURE — 11000001 HC ACUTE MED/SURG PRIVATE ROOM

## 2022-06-13 PROCEDURE — S4991 NICOTINE PATCH NONLEGEND: HCPCS | Performed by: INTERNAL MEDICINE

## 2022-06-13 PROCEDURE — 63600175 PHARM REV CODE 636 W HCPCS: Performed by: PHYSICIAN ASSISTANT

## 2022-06-13 PROCEDURE — 25000003 PHARM REV CODE 250: Performed by: PHYSICIAN ASSISTANT

## 2022-06-13 PROCEDURE — 25000003 PHARM REV CODE 250: Performed by: INTERNAL MEDICINE

## 2022-06-13 PROCEDURE — 83735 ASSAY OF MAGNESIUM: CPT | Performed by: INTERNAL MEDICINE

## 2022-06-13 PROCEDURE — 80048 BASIC METABOLIC PNL TOTAL CA: CPT | Performed by: INTERNAL MEDICINE

## 2022-06-13 PROCEDURE — 84100 ASSAY OF PHOSPHORUS: CPT | Performed by: INTERNAL MEDICINE

## 2022-06-13 RX ORDER — ENOXAPARIN SODIUM 100 MG/ML
40 INJECTION SUBCUTANEOUS EVERY 24 HOURS
Status: DISCONTINUED | OUTPATIENT
Start: 2022-06-13 | End: 2022-06-18 | Stop reason: HOSPADM

## 2022-06-13 RX ORDER — IBUPROFEN 200 MG
1 TABLET ORAL DAILY
Status: DISCONTINUED | OUTPATIENT
Start: 2022-06-13 | End: 2022-06-18 | Stop reason: HOSPADM

## 2022-06-13 RX ORDER — SODIUM,POTASSIUM PHOSPHATES 280-250MG
1 POWDER IN PACKET (EA) ORAL ONCE
Status: COMPLETED | OUTPATIENT
Start: 2022-06-13 | End: 2022-06-13

## 2022-06-13 RX ORDER — L. ACIDOPHILUS/L.BULGARICUS 100MM CELL
1 GRANULES IN PACKET (EA) ORAL 2 TIMES DAILY
Status: DISCONTINUED | OUTPATIENT
Start: 2022-06-13 | End: 2022-06-18 | Stop reason: HOSPADM

## 2022-06-13 RX ADMIN — POTASSIUM & SODIUM PHOSPHATES POWDER PACK 280-160-250 MG 1 PACKET: 280-160-250 PACK at 08:06

## 2022-06-13 RX ADMIN — OXYCODONE AND ACETAMINOPHEN 1 TABLET: 7.5; 325 TABLET ORAL at 02:06

## 2022-06-13 RX ADMIN — ENOXAPARIN SODIUM 40 MG: 100 INJECTION SUBCUTANEOUS at 04:06

## 2022-06-13 RX ADMIN — PIPERACILLIN SODIUM AND TAZOBACTAM SODIUM 4.5 G: 4; .5 INJECTION, POWDER, LYOPHILIZED, FOR SOLUTION INTRAVENOUS at 12:06

## 2022-06-13 RX ADMIN — PIPERACILLIN SODIUM AND TAZOBACTAM SODIUM 4.5 G: 4; .5 INJECTION, POWDER, LYOPHILIZED, FOR SOLUTION INTRAVENOUS at 04:06

## 2022-06-13 RX ADMIN — NICOTINE 1 PATCH: 14 PATCH TRANSDERMAL at 08:06

## 2022-06-13 RX ADMIN — OXYCODONE AND ACETAMINOPHEN 1 TABLET: 7.5; 325 TABLET ORAL at 09:06

## 2022-06-13 RX ADMIN — OXYCODONE AND ACETAMINOPHEN 1 TABLET: 7.5; 325 TABLET ORAL at 03:06

## 2022-06-13 RX ADMIN — TRAZODONE HYDROCHLORIDE 50 MG: 50 TABLET ORAL at 09:06

## 2022-06-13 RX ADMIN — PRAVASTATIN SODIUM 20 MG: 20 TABLET ORAL at 09:06

## 2022-06-13 RX ADMIN — MIRTAZAPINE 7.5 MG: 7.5 TABLET ORAL at 09:06

## 2022-06-13 RX ADMIN — LACTOBACILLUS ACIDOPHILUS / LACTOBACILLUS BULGARICUS 1 EACH: 100 MILLION CFU STRENGTH GRANULES at 09:06

## 2022-06-13 RX ADMIN — PIPERACILLIN SODIUM AND TAZOBACTAM SODIUM 4.5 G: 4; .5 INJECTION, POWDER, LYOPHILIZED, FOR SOLUTION INTRAVENOUS at 08:06

## 2022-06-13 RX ADMIN — HEPARIN SODIUM 5000 UNITS: 5000 INJECTION INTRAVENOUS; SUBCUTANEOUS at 06:06

## 2022-06-13 NOTE — PROGRESS NOTES
Diagnosis-sigmoid diverticulitis with pelvic abscess not amenable to percutaneous drainage    Subjective  Feeling better  Tolerating liquids  Positive flatus    PE, advance diet as toleratedChest-clear  Heart-regular rate and rhythm  Abdomen-soft, nontender, nondistended, no guarding, no rebound  Extremities-no tenderness, edema    Impression/plan  Sigmoid diverticulitis with abscess, improving on IV antibiotics and limited diet  Continue antibiotics

## 2022-06-13 NOTE — PROGRESS NOTES
06/13/2022 @ 12:16pm RSW attempted to meet with patient. RSW unable to complete initial visit due to pt sleeping and not responding to RSW attempt to wake pt up. RSW will follow up with patient at a later time.    CHARLEE Rudd

## 2022-06-13 NOTE — PROGRESS NOTES
[facility] ([place])  Wound Care    Patient Name:  Cheyenne Garner   MRN:  [id number]  Date: 6/13/2022  Diagnosis: Diverticulitis of large intestine with perforation and abscess without bleeding    History:     Past Medical History:   Diagnosis Date    Cataract     Hyperlipidemia     Inflammatory bowel disease     Sarcoidosis with granulomatous hepatitis     UTI (urinary tract infection) 7/17/2013       Social History     Socioeconomic History    Marital status:    Tobacco Use    Smoking status: Former Smoker    Smokeless tobacco: Never Used   Substance and Sexual Activity    Alcohol use: Yes     Alcohol/week: 3.0 standard drinks     Types: 1 Glasses of wine, 1 Cans of beer, 1 Shots of liquor per week    Drug use: No    Sexual activity: Not Currently       Precautions:     Allergies as of 06/10/2022    (No Known Allergies)       WO Assessment Details/Treatment   Identified as at risk for pressure injury development with Ismael score of 18.Prevention orders entered.    06/13/2022

## 2022-06-13 NOTE — ASSESSMENT & PLAN NOTE
- afebrile, WBC 21.38; CT A/P Findings most consistent with perforated sigmoid diverticulitis with pelvic abscess measuring approximately 5.3 x 4.4 cm.  With impression percutaneous drainage of this collection significantly difficult.  - Initially NPO for now except ice chips  - IV fluids, Zosyn  - general surgery following, conservative management   - pain management  - wbc improving, no fever  - started clear liquid diet, now on full liquid diet, will continue, start probiotics  - serial abdominal exams  - discussed with patient and daughter in law.

## 2022-06-13 NOTE — ASSESSMENT & PLAN NOTE
MRN:[id number]                      After Visit Summary   9/19/2017    Brodie Carnes    MRN: [id number]           Thank you!     Thank you for choosing [facility] for your care. Our goal is always to provide you with excellent care.        Patient Information     Date Of Birth          1983        Designated Caregiver       Most Recent Value    Caregiver    Will someone help with your care after discharge? no      About your hospital stay     You were admitted on:  September 20, 2017 You last received care in the:  [facility]    You were discharged on:  September 22, 2017       Who to Call     For medical emergencies, please call 911.  For non-urgent questions about your medical care, please call your primary care provider or clinic, None          Attending Provider     Provider Specialty    Alec Montana MD Emergency Medicine    Rodrick, Janak Niño MD Emergency Medicine    Stanislav, Darian Shafer MD Psychiatry       Primary Care Provider    Physician No Ref-Primary      Further instructions from your care team        Behavioral Discharge Planning and Instructions      Summary:  You were admitted on 9/19/2017  due to Anxiety, Manic Symptomology and Agressive Behaviors.  You were treated by Dr Darian Colorado MD and discharged on 9/22/2017 from [facility]      Principal Diagnosis: Bipolar Disorder vs. Substance Induced Mood Disorder      Health Care Follow-up Appointments:   [place] [id number] [facility],  [address]  [place] [address]  Phone: [phone]  Fax: [phone] HUC TO FAX DISCHARGE INSTRUCTIONS   *You have a primary care provider appointment scheduled with Vandana Zacarias PA-C for Tuesday, October 3rd at 10:00 am.   Attend all scheduled appointments with your outpatient providers. Call at least 24 hours in advance if you need to reschedule an appointment to ensure continued access to your outpatient providers.   Major Treatments,  Continue home meds     "Procedures and Findings:  You were provided with: a psychiatric assessment, assessed for medical stability, medication evaluation and/or management, group therapy and milieu management    Symptoms to Report: feeling more aggressive, increased confusion, losing more sleep, mood getting worse or thoughts of suicide    Early warning signs can include: increased depression or anxiety sleep disturbances increased thoughts or behaviors of suicide or self-harm  increased unusual thinking, such as paranoia or hearing voices    Safety and Wellness:  Take all medicines as directed.  Make no changes unless your doctor suggests them.      Follow treatment recommendations.  Refrain from alcohol and non-prescribed drugs.  Ask your support system to help you reduce your access to items that could harm yourself or others. If there is a concern for safety, call 911.    Resources:   Crisis Intervention: 573.724.2038 or 323-358-9059 (TTY: 397.331.3032).  Call anytime for help.  National [place] on Mental Illness (www.[place].aurelio.org): 464.276.4025 or 774-832-0080.  Alcoholics Anonymous (www.alcoholics-anonymous.org): Check your phone book for your local chapter.  Suicide Awareness Voices of Education (SAVE) (www.save.org): 329-509-RRLF (3289)  National Suicide Prevention Line (www.mentalhealthmn.org): 371-282-BJMQ (4060)  Mental Health Consumer/Survivor Network of [place] (www.mhcsn.net): 538.164.5527 or 223-013-4618  Mental Health Association of [place] (www.mentalhealth.org): 904.141.4544 or 357-986-9250  Self- Management and Recovery Training., SMART-- Toll free: 694.986.8938  www.[employer].Wikets  [facility] Crisis (COPE) Response - Adult 112 744-5704  Text 4 Life: txt \"LIFE\" to 74534 for immediate support and crisis intervention  Crisis text line: Text \"START\" to 166-126. Free, confidential, 24/7.  Crisis Intervention: 556.782.5851 or 347-594-9886. Call anytime for help.     The treatment team has appreciated the opportunity to work " "with you.     Please take care and make your recovery a daily recovery.   If you have any questions or concerns our unit number is [phone].  Thank you.         Pending Results     No orders found from 2017 to 2017.            Admission Information     Date & Time Provider Department Dept. Phone    2017 Darian Colorado MD  [facility] [phone]      Your Vitals Were     Blood Pressure Pulse Temperature Respirations Height Weight    131/85 78 96.4  F (35.8  C) (Oral) 16 1.753 m (5' 9\") 60.1 kg (132 lb 8 oz)    Pulse Oximetry BMI (Body Mass Index)                100% 19.57 kg/m2          MyChart Information     [employer] lets you send messages to your doctor, view your test results, renew your prescriptions, schedule appointments and more. To sign up, go to [email or web address] . Click on \"Log in\" on the left side of the screen, which will take you to the Welcome page. Then click on \"Sign up Now\" on the right side of the page.     You will be asked to enter the access code listed below, as well as some personal information. Please follow the directions to create your username and password.     Your access code is: [email or web address]  Expires: 2017  3:31 PM     Your access code will  in 90 days. If you need help or a new code, please call your [place] clinic or [phone].        Care EveryWhere ID     This is your Care EveryWhere ID. This could be used by other organizations to access your [place] medical records  [id number]        Equal Access to Services     [facility] AH: Hadii marc may Sodionte, waaxda luqadaha, qaybta kaalmada eldon staples. So [facility] [phone].    ATENCIÓN: Si habla español, tiene a stern disposición servicios gratuitos de asistencia lingüística. Llame al [phone].    We comply with applicable federal civil rights laws and [place] laws. We do not discriminate on the basis of race, color, national origin, age, " disability sex, sexual orientation or gender identity.               Review of your medicines      START taking        Dose / Directions    hydrOXYzine 25 MG tablet   Commonly known as:  ATARAX        Dose:  50 mg   Take 2 tablets (50 mg) by mouth every 4 hours as needed for anxiety   Quantity:  60 tablet   Refills:  0       multivitamin, therapeutic with minerals Tabs tablet        Dose:  1 tablet   Take 1 tablet by mouth daily   Quantity:  30 each   Refills:  0       nicotine polacrilex 4 MG gum   Commonly known as:  NICORETTE        Dose:  4 mg   Place 1 each (4 mg) inside cheek every hour as needed for smoking cessation   Quantity:  120 tablet   Refills:  0       OLANZapine 10 MG tablet   Commonly known as:  zyPREXA        Dose:  10 mg   Take 1 tablet (10 mg) by mouth At Bedtime   Quantity:  30 tablet   Refills:  0       traZODone 50 MG tablet   Commonly known as:  DESYREL        Dose:  50 mg   Take 1 tablet (50 mg) by mouth nightly as needed for sleep   Quantity:  30 tablet   Refills:  0         CONTINUE these medicines which may have CHANGED, or have new prescriptions. If we are uncertain of the size of tablets/capsules you have at home, strength may be listed as something that might have changed.        Dose / Directions    busPIRone 15 MG tablet   Commonly known as:  BUSPAR   This may have changed:    - how much to take  - how to take this  - when to take this  - additional instructions        Dose:  15 mg   Take 1 tablet (15 mg) by mouth 2 times daily   Quantity:  60 tablet   Refills:  0            Where to get your medicines      These medications were sent to [facility] [place], [place] - [address]  [address] [place] [address], [facility] [address]     Phone:  [phone]     busPIRone 15 MG tablet    hydrOXYzine 25 MG tablet    multivitamin, therapeutic with minerals Tabs tablet    nicotine polacrilex 4 MG gum    OLANZapine 10 MG tablet    traZODone 50 MG tablet                Protect  others around you: Learn how to safely use, store and throw away your medicines at www.disposemymeds.org.             Medication List: This is a list of all your medications and when to take them. Check marks below indicate your daily home schedule. Keep this list as a reference.      Medications           Morning Afternoon Evening Bedtime As Needed    busPIRone 15 MG tablet   Commonly known as:  BUSPAR   Take 1 tablet (15 mg) by mouth 2 times daily   Last time this was given:  15 mg on 9/22/2017  8:11 AM                                hydrOXYzine 25 MG tablet   Commonly known as:  ATARAX   Take 2 tablets (50 mg) by mouth every 4 hours as needed for anxiety   Last time this was given:  50 mg on 9/22/2017 11:14 AM                                multivitamin, therapeutic with minerals Tabs tablet   Take 1 tablet by mouth daily   Last time this was given:  1 tablet on 9/22/2017  8:11 AM                                nicotine polacrilex 4 MG gum   Commonly known as:  NICORETTE   Place 1 each (4 mg) inside cheek every hour as needed for smoking cessation   Last time this was given:  8 mg on 9/22/2017 12:41 PM                                OLANZapine 10 MG tablet   Commonly known as:  zyPREXA   Take 1 tablet (10 mg) by mouth At Bedtime   Last time this was given:  10 mg on 9/21/2017  8:54 PM                                traZODone 50 MG tablet   Commonly known as:  DESYREL   Take 1 tablet (50 mg) by mouth nightly as needed for sleep   Last time this was given:  50 mg on 9/20/2017 10:33 PM

## 2022-06-13 NOTE — PROGRESS NOTES
[facility] Medicine  Progress Note    Patient Name: Cheyenne Garner  MRN: [id number]  Patient Class: IP- Inpatient   Admission Date: 6/10/2022  Length of Stay: 3 days  Attending Physician: Aparna Nino MD  Primary Care Provider: Mary Cortes MD        Subjective:     Principal Problem:Diverticulitis of large intestine with perforation and abscess without bleeding        HPI:  86-year-old female with past medical history of depression, hyperlipidemia, ADHD presents to the ED with abdominal pain for the last 6 days associated with decreased appetite.  Patient reports symptoms started when she felt constipated and was unable to use the bathroom last Saturday.  States she has not been eating secondary to constipation.  On Wednesday (2 days ago) she went to go see her gastroenterologist who prescribed ciprofloxacin and metronidazole and ordered a CT scan for today.  She denies any associated fever, chills, chest pain, shortness of breath, cough, N/V/D, or urinary symptoms.  Occasional alcohol, former smoker.  ED evaluation WBC 21.38, afebrile.  Lactic acid pending.  CT A/P with findings most consistent with perforated sigmoid diverticulitis with pelvic abscess measuring approximately 5.3 x 4.4 cm.  Interposed small and large bowel with make percutaneous drainage of this collection significantly difficult.  General surgery consulted. Patient admitted for further evaluation and treatment.      Overview/Hospital Course:  Wbc better.Tolerating full liquid diet, c/o pain.      Interval History: still c/o abdominal pain diffusely,more in lower abdomen,  sometimes may be gas pain,  no nausea, having liquid stools.    Review of Systems   Constitutional:  Negative for chills and fever.   HENT:  Negative for trouble swallowing.    Respiratory:  Negative for cough and shortness of breath.    Cardiovascular:  Negative for chest pain and leg swelling.   Gastrointestinal:  Positive for abdominal  pain. Negative for blood in stool, nausea and vomiting.   Genitourinary:  Negative for dysuria and hematuria.   Musculoskeletal:  Negative for gait problem.   Skin:  Negative for rash.   Neurological:  Negative for headaches.   Psychiatric/Behavioral:  Negative for confusion.    Objective:     Vital Signs (Most Recent):  Temp: 98.8 °F (37.1 °C) (06/13/22 1130)  Pulse: 84 (06/13/22 1130)  Resp: 16 (06/13/22 1130)  BP: (!) 156/83 (06/13/22 1130)  SpO2: (!) 94 % (06/13/22 1130)   Vital Signs (24h Range):  Temp:  [96.3 °F (35.7 °C)-98.8 °F (37.1 °C)] 98.8 °F (37.1 °C)  Pulse:  [] 84  Resp:  [15-20] 16  SpO2:  [93 %-96 %] 94 %  BP: (114-156)/(53-83) 156/83     Weight: 60.6 kg (133 lb 9.6 oz)  Body mass index is 23.67 kg/m².  No intake or output data in the 24 hours ending 06/13/22 1322     Physical Exam  Vitals reviewed.   Constitutional:       General: She is not in acute distress.     Appearance: She is well-developed.   HENT:      Head: Normocephalic and atraumatic.   Eyes:      Extraocular Movements: Extraocular movements intact.      Pupils: Pupils are equal, round, and reactive to light.   Cardiovascular:      Rate and Rhythm: Normal rate and regular rhythm.   Pulmonary:      Effort: Pulmonary effort is normal. No respiratory distress.      Breath sounds: Normal breath sounds.   Abdominal:      General: Bowel sounds are normal. There is no distension.      Palpations: Abdomen is soft.      Tenderness: There is abdominal tenderness (in lower abdomen, more in  left lower quadrant.). There is no guarding or rebound.   Musculoskeletal:         General: No swelling. Normal range of motion.      Cervical back: Normal range of motion and neck supple.   Skin:     General: Skin is warm.      Findings: No rash.   Neurological:      General: No focal deficit present.      Mental Status: She is alert and oriented to person, place, and time.   Psychiatric:         Mood and Affect: Mood normal.         Behavior: Behavior  normal.       Significant Labs: All pertinent labs within the past 24 hours have been reviewed.    Significant Imaging: I have reviewed all pertinent imaging results/findings within the past 24 hours.      Assessment/Plan:      * Diverticulitis of large intestine with perforation and abscess without bleeding  - afebrile, WBC 21.38; CT A/P Findings most consistent with perforated sigmoid diverticulitis with pelvic abscess measuring approximately 5.3 x 4.4 cm.  With impression percutaneous drainage of this collection significantly difficult.  - Initially NPO for now except ice chips  - IV fluids, Zosyn  - general surgery following, conservative management   - pain management  - wbc improving, no fever  - started clear liquid diet, now on full liquid diet, will continue, start probiotics  - serial abdominal exams  - discussed with patient and daughter in law.      Hypokalemia  Replaced oral  improved      Hypophosphatemia  Replace oral      Primary insomnia  Depression  - continue mirtazapine at night, melatonin p.r.n.      Depression  Continue home meds        VTE Risk Mitigation (From admission, onward)         Ordered     enoxaparin injection 40 mg  Daily         06/13/22 1321     IP VTE HIGH RISK PATIENT  Once         06/10/22 1710     Place sequential compression device  Until discontinued         06/10/22 1710                Discharge Planning   PAULA:      Code Status: Full Code   Is the patient medically ready for discharge?:     Reason for patient still in hospital (select all that apply): Patient trending condition and Treatment  Discharge Plan A: Home                  Aparna Nino MD  Department of Hospital Medicine   [facility] - Med [facility] ([place])

## 2022-06-13 NOTE — SUBJECTIVE & OBJECTIVE
Interval History: still c/o abdominal pain diffusely,more in lower abdomen,  sometimes may be gas pain,  no nausea, having liquid stools.    Review of Systems   Constitutional:  Negative for chills and fever.   HENT:  Negative for trouble swallowing.    Respiratory:  Negative for cough and shortness of breath.    Cardiovascular:  Negative for chest pain and leg swelling.   Gastrointestinal:  Positive for abdominal pain. Negative for blood in stool, nausea and vomiting.   Genitourinary:  Negative for dysuria and hematuria.   Musculoskeletal:  Negative for gait problem.   Skin:  Negative for rash.   Neurological:  Negative for headaches.   Psychiatric/Behavioral:  Negative for confusion.    Objective:     Vital Signs (Most Recent):  Temp: 98.8 °F (37.1 °C) (06/13/22 1130)  Pulse: 84 (06/13/22 1130)  Resp: 16 (06/13/22 1130)  BP: (!) 156/83 (06/13/22 1130)  SpO2: (!) 94 % (06/13/22 1130)   Vital Signs (24h Range):  Temp:  [96.3 °F (35.7 °C)-98.8 °F (37.1 °C)] 98.8 °F (37.1 °C)  Pulse:  [] 84  Resp:  [15-20] 16  SpO2:  [93 %-96 %] 94 %  BP: (114-156)/(53-83) 156/83     Weight: 60.6 kg (133 lb 9.6 oz)  Body mass index is 23.67 kg/m².  No intake or output data in the 24 hours ending 06/13/22 1322     Physical Exam  Vitals reviewed.   Constitutional:       General: She is not in acute distress.     Appearance: She is well-developed.   HENT:      Head: Normocephalic and atraumatic.   Eyes:      Extraocular Movements: Extraocular movements intact.      Pupils: Pupils are equal, round, and reactive to light.   Cardiovascular:      Rate and Rhythm: Normal rate and regular rhythm.   Pulmonary:      Effort: Pulmonary effort is normal. No respiratory distress.      Breath sounds: Normal breath sounds.   Abdominal:      General: Bowel sounds are normal. There is no distension.      Palpations: Abdomen is soft.      Tenderness: There is abdominal tenderness (in lower abdomen, more in  left lower quadrant.). There is no  guarding or rebound.   Musculoskeletal:         General: No swelling. Normal range of motion.      Cervical back: Normal range of motion and neck supple.   Skin:     General: Skin is warm.      Findings: No rash.   Neurological:      General: No focal deficit present.      Mental Status: She is alert and oriented to person, place, and time.   Psychiatric:         Mood and Affect: Mood normal.         Behavior: Behavior normal.       Significant Labs: All pertinent labs within the past 24 hours have been reviewed.    Significant Imaging: I have reviewed all pertinent imaging results/findings within the past 24 hours.

## 2022-06-13 NOTE — NURSING
Reviewed midline consent with patient. States that she is not sure she wants one. I explained what the midline is and the procedure to insert one.  I also explained that Zosyn can be harsh on vessel walls.States that she will think about it and to insert an IV for now. If IV goes bad, will consent to midline. I inserted IV without difficulty, R.N. In room.

## 2022-06-13 NOTE — PLAN OF CARE
Pt resting in bed, had a moderately large incontinent bowel movement.Cleaned patient and instructed to ask for assistance whenever needed. Pt c/o of abdominal pain during the  evening and received oxycodone 7.5mg/325mg and it gave total relief. Safety checks done. No ss of distress noted during the night. Will continue to monitor.

## 2022-06-13 NOTE — PLAN OF CARE
Patient alert and oriented x 4 and able to make needs known. VS WNL. Complained of pain once in abdomen. Prn pain meds given per MAR. Ambulating to bathroom x 1 assist. POC reviewed. No needs at this time.

## 2022-06-14 LAB
ANION GAP SERPL CALC-SCNC: 10 MMOL/L (ref 8–16)
BASOPHILS # BLD AUTO: 0.04 K/UL (ref 0–0.2)
BASOPHILS NFR BLD: 0.6 % (ref 0–1.9)
BUN SERPL-MCNC: 5 MG/DL (ref 8–23)
CALCIUM SERPL-MCNC: 8.4 MG/DL (ref 8.7–10.5)
CHLORIDE SERPL-SCNC: 103 MMOL/L (ref 95–110)
CO2 SERPL-SCNC: 26 MMOL/L (ref 23–29)
CREAT SERPL-MCNC: 0.7 MG/DL (ref 0.5–1.4)
DIFFERENTIAL METHOD: ABNORMAL
EOSINOPHIL # BLD AUTO: 0.1 K/UL (ref 0–0.5)
EOSINOPHIL NFR BLD: 1.9 % (ref 0–8)
ERYTHROCYTE [DISTWIDTH] IN BLOOD BY AUTOMATED COUNT: 12.7 % (ref 11.5–14.5)
EST. GFR  (AFRICAN AMERICAN): >60 ML/MIN/1.73 M^2
EST. GFR  (NON AFRICAN AMERICAN): >60 ML/MIN/1.73 M^2
GLUCOSE SERPL-MCNC: 98 MG/DL (ref 70–110)
HCT VFR BLD AUTO: 38.1 % (ref 37–48.5)
HGB BLD-MCNC: 12.1 G/DL (ref 12–16)
IMM GRANULOCYTES # BLD AUTO: 0.15 K/UL (ref 0–0.04)
IMM GRANULOCYTES NFR BLD AUTO: 2.1 % (ref 0–0.5)
LYMPHOCYTES # BLD AUTO: 1.7 K/UL (ref 1–4.8)
LYMPHOCYTES NFR BLD: 24.4 % (ref 18–48)
MAGNESIUM SERPL-MCNC: 1.8 MG/DL (ref 1.6–2.6)
MCH RBC QN AUTO: 30.5 PG (ref 27–31)
MCHC RBC AUTO-ENTMCNC: 31.8 G/DL (ref 32–36)
MCV RBC AUTO: 96 FL (ref 82–98)
MONOCYTES # BLD AUTO: 0.8 K/UL (ref 0.3–1)
MONOCYTES NFR BLD: 12 % (ref 4–15)
NEUTROPHILS # BLD AUTO: 4.2 K/UL (ref 1.8–7.7)
NEUTROPHILS NFR BLD: 59 % (ref 38–73)
NRBC BLD-RTO: 0 /100 WBC
PHOSPHATE SERPL-MCNC: 2.8 MG/DL (ref 2.7–4.5)
PLATELET # BLD AUTO: 518 K/UL (ref 150–450)
PMV BLD AUTO: 8.9 FL (ref 9.2–12.9)
POTASSIUM SERPL-SCNC: 3.9 MMOL/L (ref 3.5–5.1)
RBC # BLD AUTO: 3.97 M/UL (ref 4–5.4)
SODIUM SERPL-SCNC: 139 MMOL/L (ref 136–145)
WBC # BLD AUTO: 7.02 K/UL (ref 3.9–12.7)

## 2022-06-14 PROCEDURE — 25000003 PHARM REV CODE 250: Performed by: INTERNAL MEDICINE

## 2022-06-14 PROCEDURE — 63600175 PHARM REV CODE 636 W HCPCS: Performed by: INTERNAL MEDICINE

## 2022-06-14 PROCEDURE — 84100 ASSAY OF PHOSPHORUS: CPT | Performed by: INTERNAL MEDICINE

## 2022-06-14 PROCEDURE — 11000001 HC ACUTE MED/SURG PRIVATE ROOM

## 2022-06-14 PROCEDURE — 63600175 PHARM REV CODE 636 W HCPCS: Performed by: PHYSICIAN ASSISTANT

## 2022-06-14 PROCEDURE — 97116 GAIT TRAINING THERAPY: CPT | Mod: CQ

## 2022-06-14 PROCEDURE — 99232 SBSQ HOSP IP/OBS MODERATE 35: CPT | Mod: ,,, | Performed by: INTERNAL MEDICINE

## 2022-06-14 PROCEDURE — 83735 ASSAY OF MAGNESIUM: CPT | Performed by: INTERNAL MEDICINE

## 2022-06-14 PROCEDURE — 85025 COMPLETE CBC W/AUTO DIFF WBC: CPT | Performed by: INTERNAL MEDICINE

## 2022-06-14 PROCEDURE — 99232 PR SUBSEQUENT HOSPITAL CARE,LEVL II: ICD-10-PCS | Mod: ,,, | Performed by: INTERNAL MEDICINE

## 2022-06-14 PROCEDURE — S4991 NICOTINE PATCH NONLEGEND: HCPCS | Performed by: INTERNAL MEDICINE

## 2022-06-14 PROCEDURE — 36415 COLL VENOUS BLD VENIPUNCTURE: CPT | Performed by: INTERNAL MEDICINE

## 2022-06-14 PROCEDURE — 80048 BASIC METABOLIC PNL TOTAL CA: CPT | Performed by: INTERNAL MEDICINE

## 2022-06-14 PROCEDURE — 97110 THERAPEUTIC EXERCISES: CPT | Mod: CQ

## 2022-06-14 PROCEDURE — 25000003 PHARM REV CODE 250: Performed by: PHYSICIAN ASSISTANT

## 2022-06-14 RX ORDER — SIMETHICONE 80 MG
1 TABLET,CHEWABLE ORAL 3 TIMES DAILY PRN
Status: DISCONTINUED | OUTPATIENT
Start: 2022-06-14 | End: 2022-06-18 | Stop reason: HOSPADM

## 2022-06-14 RX ADMIN — NICOTINE 1 PATCH: 14 PATCH TRANSDERMAL at 08:06

## 2022-06-14 RX ADMIN — LACTOBACILLUS ACIDOPHILUS / LACTOBACILLUS BULGARICUS 1 EACH: 100 MILLION CFU STRENGTH GRANULES at 08:06

## 2022-06-14 RX ADMIN — MIRTAZAPINE 7.5 MG: 7.5 TABLET ORAL at 08:06

## 2022-06-14 RX ADMIN — PIPERACILLIN SODIUM AND TAZOBACTAM SODIUM 4.5 G: 4; .5 INJECTION, POWDER, LYOPHILIZED, FOR SOLUTION INTRAVENOUS at 08:06

## 2022-06-14 RX ADMIN — PRAVASTATIN SODIUM 20 MG: 20 TABLET ORAL at 08:06

## 2022-06-14 RX ADMIN — ONDANSETRON 4 MG: 2 INJECTION INTRAMUSCULAR; INTRAVENOUS at 04:06

## 2022-06-14 RX ADMIN — ONDANSETRON 4 MG: 2 INJECTION INTRAMUSCULAR; INTRAVENOUS at 08:06

## 2022-06-14 RX ADMIN — ENOXAPARIN SODIUM 40 MG: 100 INJECTION SUBCUTANEOUS at 04:06

## 2022-06-14 RX ADMIN — PIPERACILLIN SODIUM AND TAZOBACTAM SODIUM 4.5 G: 4; .5 INJECTION, POWDER, LYOPHILIZED, FOR SOLUTION INTRAVENOUS at 12:06

## 2022-06-14 RX ADMIN — PIPERACILLIN SODIUM AND TAZOBACTAM SODIUM 4.5 G: 4; .5 INJECTION, POWDER, LYOPHILIZED, FOR SOLUTION INTRAVENOUS at 04:06

## 2022-06-14 RX ADMIN — TRAZODONE HYDROCHLORIDE 50 MG: 50 TABLET ORAL at 08:06

## 2022-06-14 NOTE — PROGRESS NOTES
House day 5.  Diagnosis-acute diverticulitis with pelvic abscess not amenable to percutaneous drainage    Subjective  Tolerating clear liquids  Passing liquid stool  Denies nausea  Intermittent abdominal pain    PE   Afebrile  Vital signs stable  Chest-clear  Heart-regular rate and rhythm  Abdomen-soft, nontender, no masses, no guarding, no rebound  Extremities-no tenderness    Lab  White blood cell count-within normal limits  Shannon okay      Impression/plan  Patient continues daily modest improvement  Will advance to low residue diet  Will need follow-up CT scan to evaluate efficacy of treatment

## 2022-06-14 NOTE — SUBJECTIVE & OBJECTIVE
Interval History: still c/o abdominal pain diffusely,,  no nausea, having liquid stools ( chronic diarrhea and usually on cholestyramine).    Review of Systems   Constitutional:  Negative for chills and fever.   HENT:  Negative for trouble swallowing.    Respiratory:  Negative for cough and shortness of breath.    Cardiovascular:  Negative for chest pain and leg swelling.   Gastrointestinal:  Positive for abdominal pain. Negative for blood in stool, nausea and vomiting.   Genitourinary:  Negative for dysuria and hematuria.   Musculoskeletal:  Negative for gait problem.   Skin:  Negative for rash.   Neurological:  Negative for headaches.   Psychiatric/Behavioral:  Negative for confusion.    Objective:     Vital Signs (Most Recent):  Temp: 98 °F (36.7 °C) (06/14/22 0734)  Pulse: 74 (06/14/22 0734)  Resp: 18 (06/14/22 0734)  BP: 133/66 (06/14/22 0734)  SpO2: 96 % (06/14/22 0734)   Vital Signs (24h Range):  Temp:  [98 °F (36.7 °C)-98.8 °F (37.1 °C)] 98 °F (36.7 °C)  Pulse:  [74-92] 74  Resp:  [16-18] 18  SpO2:  [94 %-96 %] 96 %  BP: (132-156)/(59-83) 133/66     Weight: 60.6 kg (133 lb 9.6 oz)  Body mass index is 23.67 kg/m².    Intake/Output Summary (Last 24 hours) at 6/14/2022 1009  Last data filed at 6/13/2022 1255  Gross per 24 hour   Intake 240 ml   Output --   Net 240 ml        Physical Exam  Vitals reviewed.   Constitutional:       General: She is not in acute distress.     Appearance: She is well-developed.   HENT:      Head: Normocephalic and atraumatic.   Eyes:      Extraocular Movements: Extraocular movements intact.      Pupils: Pupils are equal, round, and reactive to light.   Cardiovascular:      Rate and Rhythm: Normal rate and regular rhythm.   Pulmonary:      Effort: Pulmonary effort is normal. No respiratory distress.      Breath sounds: Normal breath sounds.   Abdominal:      General: Bowel sounds are normal. There is no distension.      Palpations: Abdomen is soft.      Tenderness: There is abdominal  tenderness (in lower abdomen, more in  left lower quadrant.). There is no guarding or rebound.   Musculoskeletal:         General: No swelling. Normal range of motion.      Cervical back: Normal range of motion and neck supple.   Skin:     General: Skin is warm.      Findings: No rash.   Neurological:      General: No focal deficit present.      Mental Status: She is alert and oriented to person, place, and time.   Psychiatric:         Mood and Affect: Mood normal.         Behavior: Behavior normal.       Significant Labs: All pertinent labs within the past 24 hours have been reviewed.    Significant Imaging: I have reviewed all pertinent imaging results/findings within the past 24 hours.

## 2022-06-14 NOTE — PROGRESS NOTES
[facility] Medicine  Progress Note    Patient Name: Cheyenne Garner  MRN: [id number]  Patient Class: IP- Inpatient   Admission Date: 6/10/2022  Length of Stay: 4 days  Attending Physician: David Costa MD  Primary Care Provider: Mary Cortes MD        Subjective:     Principal Problem:Diverticulitis of large intestine with perforation and abscess without bleeding        HPI:  86-year-old female with past medical history of depression, hyperlipidemia, ADHD presents to the ED with abdominal pain for the last 6 days associated with decreased appetite.  Patient reports symptoms started when she felt constipated and was unable to use the bathroom last Saturday.  States she has not been eating secondary to constipation.  On Wednesday (2 days ago) she went to go see her gastroenterologist who prescribed ciprofloxacin and metronidazole and ordered a CT scan for today.  She denies any associated fever, chills, chest pain, shortness of breath, cough, N/V/D, or urinary symptoms.  Occasional alcohol, former smoker.  ED evaluation WBC 21.38, afebrile.  Lactic acid pending.  CT A/P with findings most consistent with perforated sigmoid diverticulitis with pelvic abscess measuring approximately 5.3 x 4.4 cm.  Interposed small and large bowel with make percutaneous drainage of this collection significantly difficult.  General surgery consulted. Patient admitted for further evaluation and treatment.      Overview/Hospital Course:  Wbc better.Tolerating full liquid diet, c/o pain.      Interval History: still c/o abdominal pain diffusely,,  no nausea, having liquid stools ( chronic diarrhea and usually on cholestyramine).    Review of Systems   Constitutional:  Negative for chills and fever.   HENT:  Negative for trouble swallowing.    Respiratory:  Negative for cough and shortness of breath.    Cardiovascular:  Negative for chest pain and leg swelling.   Gastrointestinal:  Positive for abdominal  pain. Negative for blood in stool, nausea and vomiting.   Genitourinary:  Negative for dysuria and hematuria.   Musculoskeletal:  Negative for gait problem.   Skin:  Negative for rash.   Neurological:  Negative for headaches.   Psychiatric/Behavioral:  Negative for confusion.    Objective:     Vital Signs (Most Recent):  Temp: 98 °F (36.7 °C) (06/14/22 0734)  Pulse: 74 (06/14/22 0734)  Resp: 18 (06/14/22 0734)  BP: 133/66 (06/14/22 0734)  SpO2: 96 % (06/14/22 0734)   Vital Signs (24h Range):  Temp:  [98 °F (36.7 °C)-98.8 °F (37.1 °C)] 98 °F (36.7 °C)  Pulse:  [74-92] 74  Resp:  [16-18] 18  SpO2:  [94 %-96 %] 96 %  BP: (132-156)/(59-83) 133/66     Weight: 60.6 kg (133 lb 9.6 oz)  Body mass index is 23.67 kg/m².    Intake/Output Summary (Last 24 hours) at 6/14/2022 1009  Last data filed at 6/13/2022 1255  Gross per 24 hour   Intake 240 ml   Output --   Net 240 ml        Physical Exam  Vitals reviewed.   Constitutional:       General: She is not in acute distress.     Appearance: She is well-developed.   HENT:      Head: Normocephalic and atraumatic.   Eyes:      Extraocular Movements: Extraocular movements intact.      Pupils: Pupils are equal, round, and reactive to light.   Cardiovascular:      Rate and Rhythm: Normal rate and regular rhythm.   Pulmonary:      Effort: Pulmonary effort is normal. No respiratory distress.      Breath sounds: Normal breath sounds.   Abdominal:      General: Bowel sounds are normal. There is no distension.      Palpations: Abdomen is soft.      Tenderness: There is abdominal tenderness (in lower abdomen, more in  left lower quadrant.). There is no guarding or rebound.   Musculoskeletal:         General: No swelling. Normal range of motion.      Cervical back: Normal range of motion and neck supple.   Skin:     General: Skin is warm.      Findings: No rash.   Neurological:      General: No focal deficit present.      Mental Status: She is alert and oriented to person, place, and time.    Psychiatric:         Mood and Affect: Mood normal.         Behavior: Behavior normal.       Significant Labs: All pertinent labs within the past 24 hours have been reviewed.    Significant Imaging: I have reviewed all pertinent imaging results/findings within the past 24 hours.      Assessment/Plan:      * Diverticulitis of large intestine with perforation and abscess without bleeding  CT A/P Findings most consistent with perforated sigmoid diverticulitis with pelvic abscess measuring approximately 5.3 x 4.4 cm.  With impression percutaneous drainage of this collection significantly difficult.  - improving on IV antibiotics and limited diet  -Continue zosyn (started 6/11/22)    Hypokalemia  Monitor and replace PRN      Hypophosphatemia  Monitor and replace PRN      Primary insomnia  Depression  - continue mirtazapine at night, melatonin p.r.n.      Depression  Continue home meds        VTE Risk Mitigation (From admission, onward)         Ordered     enoxaparin injection 40 mg  Daily         06/13/22 1321     IP VTE HIGH RISK PATIENT  Once         06/10/22 1710     Place sequential compression device  Until discontinued         06/10/22 1710                Discharge Planning   PAULA:      Code Status: Full Code   Is the patient medically ready for discharge?:     Reason for patient still in hospital (select all that apply): Treatment  Discharge Plan A: Home                  David Costa MD  Department of Hospital Medicine   [facility] ([place])

## 2022-06-14 NOTE — ASSESSMENT & PLAN NOTE
CT A/P Findings most consistent with perforated sigmoid diverticulitis with pelvic abscess measuring approximately 5.3 x 4.4 cm.  With impression percutaneous drainage of this collection significantly difficult.  - improving on IV antibiotics and limited diet  -Continue zosyn (started 6/11/22)

## 2022-06-14 NOTE — PT/OT/SLP PROGRESS
Physical Therapy Treatment    Patient Name:  Cheyenne Garner   MRN:  [id number]    Recommendations:     Discharge Recommendations:  home   Discharge Equipment Recommendations: none   Barriers to discharge: None    Assessment:     Cheyenne Garner is a 86 y.o. female admitted with a medical diagnosis of Diverticulitis of large intestine with perforation and abscess without bleeding.  She presents with the following impairments/functional limitations:  pain, gait instability, impaired balance, impaired endurance, impaired functional mobilty, impaired self care skills, decreased safety awareness.    Supine<>sit with SPV  Sit>stand with SPV and no AD  Toilet transfer with SPV and no AD, step transfer   Amb 200' with HHA, pt with decreased stability but no actual LoB  Static standing x 5 mins with SPV for gabriele hygiene  Pt with good mobility, slightly unsteady  Rec HHPT    Rehab Prognosis: Good; patient would benefit from acute skilled PT services to address these deficits and reach maximum level of function.    Recent Surgery: * No surgery found *      Plan:     During this hospitalization, patient to be seen 3 x/week to address the identified rehab impairments via gait training and progress toward the following goals:    · Plan of Care Expires:  07/01/22    Subjective     Chief Complaint: I eat, and then BOOM, it's diarrhea.  Patient/Family Comments/goals: pt agreeable to therapy  Pain/Comfort:  · Pain Rating 1: 7/10  · Location - Side 1: Left  · Location - Orientation 1: generalized  · Location 1: abdomen  · Pain Addressed 1: Reposition, Distraction, Cessation of Activity  · Pain Rating Post-Intervention 1: 7/10      Objective:     Communicated with nurse Campbell prior to session.  Patient found HOB elevated with peripheral IV upon PT entry to room.     General Precautions: Standard, fall   Orthopedic Precautions:N/A   Braces:    Respiratory Status: Room air     Functional Mobility:  · Bed Mobility:     · Supine to  Sit: supervision  · Sit to Supine: supervision  · Transfers:     · Sit to Stand:  supervision with no AD  · Toilet Transfer: supervision with  no AD  using  Step Transfer  · Gait: 200' with HHA, pt with decreased stability but no actual LoB      AM-PAC 6 CLICK MOBILITY  Turning over in bed (including adjusting bedclothes, sheets and blankets)?: 4  Sitting down on and standing up from a chair with arms (e.g., wheelchair, bedside commode, etc.): 4  Moving from lying on back to sitting on the side of the bed?: 4  Moving to and from a bed to a chair (including a wheelchair)?: 4  Need to walk in hospital room?: 4  Climbing 3-5 steps with a railing?: 4  Basic Mobility Total Score: 24       Therapeutic Activities and Exercises:   Supine therex B LE: SLR, hip abd/add x 10  Static standing x 5 mins with SPV, intermittent UE support on bed rail    Patient left HOB elevated with all lines intact, call button in reach and nurse Georgia notified..    GOALS:   Multidisciplinary Problems     Physical Therapy Goals        Problem: Physical Therapy    Goal Priority Disciplines Outcome Goal Variances Interventions   Physical Therapy Goal     PT, PT/OT Ongoing, Progressing     Description: Patient will increase functional independence with mobility by performin. Sit<>supine with INDEP.  2. Sit<>stand using NO AD and INDEP.  3. Gait x 150 ft using NO AD and  INDEP.  4. Ascend/descend 4 stair(s) using UNIL handrail(s) and SBA.                          Time Tracking:     PT Received On: 22  PT Start Time: 1356     PT Stop Time: 1421  PT Total Time (min): 25 min     Billable Minutes: Gait Training 17 and Therapeutic Exercise 8    Treatment Type: Treatment  PT/PTA: PTA     PTA Visit Number: 1     2022

## 2022-06-15 PROCEDURE — 99231 SBSQ HOSP IP/OBS SF/LOW 25: CPT | Mod: ,,, | Performed by: SPECIALIST

## 2022-06-15 PROCEDURE — 99233 SBSQ HOSP IP/OBS HIGH 50: CPT | Mod: ,,, | Performed by: HOSPITALIST

## 2022-06-15 PROCEDURE — 11000001 HC ACUTE MED/SURG PRIVATE ROOM

## 2022-06-15 PROCEDURE — 25000003 PHARM REV CODE 250: Performed by: INTERNAL MEDICINE

## 2022-06-15 PROCEDURE — 25000003 PHARM REV CODE 250: Performed by: PHYSICIAN ASSISTANT

## 2022-06-15 PROCEDURE — 63600175 PHARM REV CODE 636 W HCPCS: Performed by: INTERNAL MEDICINE

## 2022-06-15 PROCEDURE — 99233 PR SUBSEQUENT HOSPITAL CARE,LEVL III: ICD-10-PCS | Mod: ,,, | Performed by: HOSPITALIST

## 2022-06-15 PROCEDURE — 97535 SELF CARE MNGMENT TRAINING: CPT

## 2022-06-15 PROCEDURE — S4991 NICOTINE PATCH NONLEGEND: HCPCS | Performed by: INTERNAL MEDICINE

## 2022-06-15 PROCEDURE — 97116 GAIT TRAINING THERAPY: CPT | Mod: CQ

## 2022-06-15 PROCEDURE — 63600175 PHARM REV CODE 636 W HCPCS: Performed by: PHYSICIAN ASSISTANT

## 2022-06-15 PROCEDURE — 99231 PR SUBSEQUENT HOSPITAL CARE,LEVL I: ICD-10-PCS | Mod: ,,, | Performed by: SPECIALIST

## 2022-06-15 RX ADMIN — Medication 6 MG: at 11:06

## 2022-06-15 RX ADMIN — MIRTAZAPINE 7.5 MG: 7.5 TABLET ORAL at 08:06

## 2022-06-15 RX ADMIN — LACTOBACILLUS ACIDOPHILUS / LACTOBACILLUS BULGARICUS 1 EACH: 100 MILLION CFU STRENGTH GRANULES at 10:06

## 2022-06-15 RX ADMIN — PIPERACILLIN SODIUM AND TAZOBACTAM SODIUM 4.5 G: 4; .5 INJECTION, POWDER, LYOPHILIZED, FOR SOLUTION INTRAVENOUS at 11:06

## 2022-06-15 RX ADMIN — TRAZODONE HYDROCHLORIDE 50 MG: 50 TABLET ORAL at 08:06

## 2022-06-15 RX ADMIN — PIPERACILLIN SODIUM AND TAZOBACTAM SODIUM 4.5 G: 4; .5 INJECTION, POWDER, LYOPHILIZED, FOR SOLUTION INTRAVENOUS at 04:06

## 2022-06-15 RX ADMIN — PRAVASTATIN SODIUM 20 MG: 20 TABLET ORAL at 08:06

## 2022-06-15 RX ADMIN — PIPERACILLIN SODIUM AND TAZOBACTAM SODIUM 4.5 G: 4; .5 INJECTION, POWDER, LYOPHILIZED, FOR SOLUTION INTRAVENOUS at 12:06

## 2022-06-15 RX ADMIN — LACTOBACILLUS ACIDOPHILUS / LACTOBACILLUS BULGARICUS 1 EACH: 100 MILLION CFU STRENGTH GRANULES at 08:06

## 2022-06-15 RX ADMIN — ONDANSETRON 4 MG: 2 INJECTION INTRAMUSCULAR; INTRAVENOUS at 08:06

## 2022-06-15 RX ADMIN — NICOTINE 1 PATCH: 14 PATCH TRANSDERMAL at 10:06

## 2022-06-15 RX ADMIN — ENOXAPARIN SODIUM 40 MG: 100 INJECTION SUBCUTANEOUS at 04:06

## 2022-06-15 RX ADMIN — Medication 6 MG: at 12:06

## 2022-06-15 RX ADMIN — PIPERACILLIN SODIUM AND TAZOBACTAM SODIUM 4.5 G: 4; .5 INJECTION, POWDER, LYOPHILIZED, FOR SOLUTION INTRAVENOUS at 10:06

## 2022-06-15 NOTE — PROGRESS NOTES
IP Liaison - Initial Visit Note    Patient: Cheyenne Garner  MRN:  [id number]  Date of Service:  6/15/2022  Completed by:  CHARLEE Rudd    Reason for Visit   Patient presents with    IP Liaison Initial Visit       RSW met with patient at bedside in order to complete SDOH questionnaire and liaison assessment.  Pt has identified no barriers to care.    The following were addressed during this visit:  - Review SDOH Questions   - Complete patient assessment        Patient Summary     IP Liaison Patient Assessment    General  Level of Caregiver support: Member independent and does not need caregiver assistance  Have you had to make a decision between paying for any of the following in the last 2 months?: None  Transportation means: Self  Employment status: Retired and not working  Current symptoms: None  Assessments  Was the PHQ Depression Screening completed this visit?: Yes  Was the GELACIO-7 Screening completed this visit?: No       CHARLEE Rudd

## 2022-06-15 NOTE — PLAN OF CARE
Patient alert and oriented x 4 and able to make needs known. VS Wnl. No complaints of pain. Tolerating bland diet. Requesting psyllium husk for soft bm. Educated on low fiber diet and need to hold off for now. Verbalized understanding. Poc reviewed. Hourly rounding completed. No needs at this time.

## 2022-06-15 NOTE — PT/OT/SLP PROGRESS
Physical Therapy Treatment    Patient Name:  Cheyenne Garner   MRN:  [id number]    Recommendations:     Discharge Recommendations:  home   Discharge Equipment Recommendations: none   Barriers to discharge: None    Assessment:     Cheyenne Garner is a 86 y.o. female admitted with a medical diagnosis of Diverticulitis of large intestine with perforation and abscess without bleeding.  She presents with the following impairments/functional limitations:  pain, gait instability, impaired balance, impaired endurance, impaired functional mobilty, impaired self care skills, decreased safety awareness.    Supine<>sit with Indep  Sit>stand with no AD and Indep  Amb 300' with no AD and SBA, pt with decreased stability and c/o abd pain, no actual LoB  Pt demos good mobility, encouraged to sit up in chair per tolerance  Rec Home    Rehab Prognosis: Good; patient would benefit from acute skilled PT services to address these deficits and reach maximum level of function.    Recent Surgery: * No surgery found *      Plan:     During this hospitalization, patient to be seen 3 x/week to address the identified rehab impairments via gait training and progress toward the following goals:    · Plan of Care Expires:  07/01/22    Subjective     Chief Complaint: pain  Patient/Family Comments/goals: It feels good to sit up like this  Pain/Comfort:  · Pain Rating 1: other (see comments) (not rated)  · Location - Side 1: Left  · Location - Orientation 1: generalized  · Location 1: abdomen  · Pain Addressed 1: Reposition, Distraction, Cessation of Activity  · Pain Rating Post-Intervention 1: other (see comments) (pain expressed with ambulation, unrated)      Objective:     Communicated with nurse Hayes prior to session.  Patient found HOB elevated with peripheral IV upon PT entry to room.     General Precautions: Standard, fall   Orthopedic Precautions:N/A   Braces:    Respiratory Status: Room air     Functional Mobility:  · Bed Mobility:      · Supine to Sit: independence  · Sit to Supine: independence  · Transfers:     · Sit to Stand:  independence with no AD  · Gait: 300' with no AD and SBA, pt with decreased stability and c/o abd pain, no actual LoB      AM-PAC 6 CLICK MOBILITY  Turning over in bed (including adjusting bedclothes, sheets and blankets)?: 4  Sitting down on and standing up from a chair with arms (e.g., wheelchair, bedside commode, etc.): 4  Moving from lying on back to sitting on the side of the bed?: 4  Moving to and from a bed to a chair (including a wheelchair)?: 4  Need to walk in hospital room?: 4  Climbing 3-5 steps with a railing?: 4  Basic Mobility Total Score: 24       Therapeutic Activities and Exercises:   Gait training as noted    Patient left up in chair with all lines intact, call button in reach and nurse Abigail notified..    GOALS:   Multidisciplinary Problems     Physical Therapy Goals        Problem: Physical Therapy    Goal Priority Disciplines Outcome Goal Variances Interventions   Physical Therapy Goal     PT, PT/OT Ongoing, Progressing     Description: Patient will increase functional independence with mobility by performin. Sit<>supine with INDEP.MET 6/15/22  2. Sit<>stand using NO AD and INDEP. MET 6/15/22  3. Gait x 150 ft using NO AD and  INDEP.  4. Ascend/descend 4 stair(s) using UNIL handrail(s) and SBA.                          Time Tracking:     PT Received On: 06/15/22  PT Start Time: 0950     PT Stop Time: 1004  PT Total Time (min): 14 min     Billable Minutes: Gait Training 14    Treatment Type: Treatment  PT/PTA: PTA     PTA Visit Number: 2     06/15/2022

## 2022-06-15 NOTE — PROGRESS NOTES
"[facility] Medicine  Progress Note    Patient Name: Cheyenne Ganrer  MRN: [id number]  Patient Class: IP- Inpatient   Admission Date: 6/10/2022  Length of Stay: 5 days  Attending Physician: Adali Hale MD  Primary Care Provider: Mary Cortes MD        Subjective:     Principal Problem:Diverticulitis of large intestine with perforation and abscess without bleeding        HPI:  From H&P by Traci Butler PA:  "86-year-old female with past medical history of depression, hyperlipidemia, ADHD presents to the ED with abdominal pain for the last 6 days associated with decreased appetite.  Patient reports symptoms started when she felt constipated and was unable to use the bathroom last Saturday.  States she has not been eating secondary to constipation.  On Wednesday (2 days ago) she went to go see her gastroenterologist who prescribed ciprofloxacin and metronidazole and ordered a CT scan for today.  She denies any associated fever, chills, chest pain, shortness of breath, cough, N/V/D, or urinary symptoms.  Occasional alcohol, former smoker.  ED evaluation WBC 21.38, afebrile.  Lactic acid pending.  CT A/P with findings most consistent with perforated sigmoid diverticulitis with pelvic abscess measuring approximately 5.3 x 4.4 cm.  Interposed small and large bowel with make percutaneous drainage of this collection significantly difficult.  General surgery consulted. Patient admitted for further evaluation and treatment."      Overview/Hospital Course:  Patient admitted and treated with IV antibiotics.  IR consulted for drainage but it was not amenable to percutaneous drainage.  Surgery followed with serial exams and recommended monitoring on IV antibiotics alone for now.  She had good clinical improvement and leukocytosis resolved.  Diet advanced to low residue.  PT/OT evaluated.      Interval History:  Patient is new to me.  She says her legs hurt and she thinks it is from shingles.  " "Clarified that both knees feel "sore."  Otherwise she has a low residue diet ordered, has not tried eating yet but feels hungry.    Review of Systems   Constitutional:  Negative for chills and fever.   Respiratory:  Negative for cough and shortness of breath.    Cardiovascular:  Negative for chest pain and palpitations.   Objective:     Vital Signs (Most Recent):  Temp: 97.8 °F (36.6 °C) (06/15/22 1116)  Pulse: 83 (06/15/22 1116)  Resp: 16 (06/15/22 1116)  BP: 124/60 (06/15/22 1116)  SpO2: 95 % (06/15/22 1116) Vital Signs (24h Range):  Temp:  [97.5 °F (36.4 °C)-98.2 °F (36.8 °C)] 97.8 °F (36.6 °C)  Pulse:  [69-98] 83  Resp:  [16-20] 16  SpO2:  [95 %-97 %] 95 %  BP: (122-143)/(60-88) 124/60     Weight: 60.6 kg (133 lb 9.6 oz)  Body mass index is 23.67 kg/m².    Intake/Output Summary (Last 24 hours) at 6/15/2022 1507  Last data filed at 6/15/2022 0000  Gross per 24 hour   Intake 100 ml   Output --   Net 100 ml      Physical Exam  Constitutional:       Comments: Frail, elderly woman in NAD.   Cardiovascular:      Rate and Rhythm: Normal rate and regular rhythm.      Heart sounds: No murmur heard.    No gallop.   Pulmonary:      Effort: Pulmonary effort is normal.      Breath sounds: Normal breath sounds.   Abdominal:      Palpations: Abdomen is soft.      Comments: Bowel sounds hyperactive.   Musculoskeletal:         General: Normal range of motion.   Skin:     General: Skin is warm and dry.      Comments: No rash or other evidence of zoster.   Neurological:      General: No focal deficit present.      Mental Status: She is alert.       Significant Labs: All pertinent labs within the past 24 hours have been reviewed.    Significant Imaging: I have reviewed all pertinent imaging results/findings within the past 24 hours.      Assessment/Plan:      * Diverticulitis of large intestine with perforation and abscess without bleeding  CT A/P Findings most consistent with perforated sigmoid diverticulitis with pelvic abscess " measuring approximately 5.3 x 4.4 cm.  Percutaneous drainage of this collection could not be done.  - improving on IV antibiotics and limited diet  -Continue zosyn (started 6/11/22)    Hypokalemia  Monitor and replace PRN      Hypophosphatemia  Monitor and replace PRN      Primary insomnia  Depression  - continue mirtazapine at night, melatonin p.r.n.      Depression  Continue home meds        VTE Risk Mitigation (From admission, onward)         Ordered     enoxaparin injection 40 mg  Daily         06/13/22 1321     IP VTE HIGH RISK PATIENT  Once         06/10/22 1710     Place sequential compression device  Until discontinued         06/10/22 1710                            Adali Deshpande MD  Department of Hospital Medicine   [facility] - Med Surg ([place])

## 2022-06-15 NOTE — PLAN OF CARE
Problem: Physical Therapy  Goal: Physical Therapy Goal  Description: Patient will increase functional independence with mobility by performin. Sit<>supine with INDEP.MET 6/15/22  2. Sit<>stand using NO AD and INDEP. MET 6/15/22  3. Gait x 150 ft using NO AD and  INDEP.  4. Ascend/descend 4 stair(s) using UNIL handrail(s) and SBA.     Supine<>sit with Indep  Sit>stand with no AD and Indep  Amb 300' with no AD and SBA, pt with decreased stability and c/o abd pain, no actual LoB  Pt demos good mobility, encouraged to sit up in chair per tolerance  Rec Home    Outcome: Ongoing, Progressing

## 2022-06-15 NOTE — ASSESSMENT & PLAN NOTE
CT A/P Findings most consistent with perforated sigmoid diverticulitis with pelvic abscess measuring approximately 5.3 x 4.4 cm.  Percutaneous drainage of this collection could not be done.  - improving on IV antibiotics and limited diet  -Continue zosyn (started 6/11/22)

## 2022-06-15 NOTE — SUBJECTIVE & OBJECTIVE
"Interval History:  Patient is new to me.  She says her legs hurt and she thinks it is from shingles.  Clarified that both knees feel "sore."  Otherwise she has a low residue diet ordered, has not tried eating yet but feels hungry.    Review of Systems   Constitutional:  Negative for chills and fever.   Respiratory:  Negative for cough and shortness of breath.    Cardiovascular:  Negative for chest pain and palpitations.   Objective:     Vital Signs (Most Recent):  Temp: 97.8 °F (36.6 °C) (06/15/22 1116)  Pulse: 83 (06/15/22 1116)  Resp: 16 (06/15/22 1116)  BP: 124/60 (06/15/22 1116)  SpO2: 95 % (06/15/22 1116) Vital Signs (24h Range):  Temp:  [97.5 °F (36.4 °C)-98.2 °F (36.8 °C)] 97.8 °F (36.6 °C)  Pulse:  [69-98] 83  Resp:  [16-20] 16  SpO2:  [95 %-97 %] 95 %  BP: (122-143)/(60-88) 124/60     Weight: 60.6 kg (133 lb 9.6 oz)  Body mass index is 23.67 kg/m².    Intake/Output Summary (Last 24 hours) at 6/15/2022 1507  Last data filed at 6/15/2022 0000  Gross per 24 hour   Intake 100 ml   Output --   Net 100 ml      Physical Exam  Constitutional:       Comments: Frail, elderly woman in NAD.   Cardiovascular:      Rate and Rhythm: Normal rate and regular rhythm.      Heart sounds: No murmur heard.    No gallop.   Pulmonary:      Effort: Pulmonary effort is normal.      Breath sounds: Normal breath sounds.   Abdominal:      Palpations: Abdomen is soft.      Comments: Bowel sounds hyperactive.   Musculoskeletal:         General: Normal range of motion.   Skin:     General: Skin is warm and dry.      Comments: No rash or other evidence of zoster.   Neurological:      General: No focal deficit present.      Mental Status: She is alert.       Significant Labs: All pertinent labs within the past 24 hours have been reviewed.    Significant Imaging: I have reviewed all pertinent imaging results/findings within the past 24 hours.  "

## 2022-06-16 ENCOUNTER — PATIENT OUTREACH (OUTPATIENT)
Dept: ADMINISTRATIVE | Facility: OTHER | Age: 87
End: 2022-06-16
Payer: COMMERCIAL

## 2022-06-16 PROBLEM — E87.6 HYPOKALEMIA: Status: RESOLVED | Noted: 2022-06-12 | Resolved: 2022-06-16

## 2022-06-16 PROBLEM — E83.39 HYPOPHOSPHATEMIA: Status: RESOLVED | Noted: 2022-06-11 | Resolved: 2022-06-16

## 2022-06-16 PROCEDURE — 25000003 PHARM REV CODE 250: Performed by: HOSPITALIST

## 2022-06-16 PROCEDURE — 25500020 PHARM REV CODE 255: Performed by: HOSPITALIST

## 2022-06-16 PROCEDURE — 11000001 HC ACUTE MED/SURG PRIVATE ROOM

## 2022-06-16 PROCEDURE — 25000003 PHARM REV CODE 250: Performed by: PHYSICIAN ASSISTANT

## 2022-06-16 PROCEDURE — A9698 NON-RAD CONTRAST MATERIALNOC: HCPCS | Performed by: HOSPITALIST

## 2022-06-16 PROCEDURE — 99232 PR SUBSEQUENT HOSPITAL CARE,LEVL II: ICD-10-PCS | Mod: ,,, | Performed by: HOSPITALIST

## 2022-06-16 PROCEDURE — 25000003 PHARM REV CODE 250: Performed by: INTERNAL MEDICINE

## 2022-06-16 PROCEDURE — S4991 NICOTINE PATCH NONLEGEND: HCPCS | Performed by: INTERNAL MEDICINE

## 2022-06-16 PROCEDURE — 63600175 PHARM REV CODE 636 W HCPCS: Performed by: INTERNAL MEDICINE

## 2022-06-16 PROCEDURE — 99232 SBSQ HOSP IP/OBS MODERATE 35: CPT | Mod: ,,, | Performed by: HOSPITALIST

## 2022-06-16 PROCEDURE — 63600175 PHARM REV CODE 636 W HCPCS: Performed by: PHYSICIAN ASSISTANT

## 2022-06-16 RX ORDER — SODIUM CHLORIDE 9 MG/ML
INJECTION, SOLUTION INTRAVENOUS
Status: DISCONTINUED | OUTPATIENT
Start: 2022-06-16 | End: 2022-06-18 | Stop reason: HOSPADM

## 2022-06-16 RX ADMIN — SODIUM CHLORIDE: 0.9 INJECTION, SOLUTION INTRAVENOUS at 09:06

## 2022-06-16 RX ADMIN — SODIUM CHLORIDE: 0.9 INJECTION, SOLUTION INTRAVENOUS at 06:06

## 2022-06-16 RX ADMIN — ENOXAPARIN SODIUM 40 MG: 100 INJECTION SUBCUTANEOUS at 06:06

## 2022-06-16 RX ADMIN — ONDANSETRON 4 MG: 2 INJECTION INTRAMUSCULAR; INTRAVENOUS at 10:06

## 2022-06-16 RX ADMIN — PRAVASTATIN SODIUM 20 MG: 20 TABLET ORAL at 09:06

## 2022-06-16 RX ADMIN — IOHEXOL 1000 ML: 12 SOLUTION ORAL at 10:06

## 2022-06-16 RX ADMIN — LACTOBACILLUS ACIDOPHILUS / LACTOBACILLUS BULGARICUS 1 EACH: 100 MILLION CFU STRENGTH GRANULES at 09:06

## 2022-06-16 RX ADMIN — HYDROXYZINE HYDROCHLORIDE 25 MG: 25 TABLET, FILM COATED ORAL at 09:06

## 2022-06-16 RX ADMIN — TRAZODONE HYDROCHLORIDE 50 MG: 50 TABLET ORAL at 09:06

## 2022-06-16 RX ADMIN — NICOTINE 1 PATCH: 14 PATCH TRANSDERMAL at 09:06

## 2022-06-16 RX ADMIN — PIPERACILLIN SODIUM AND TAZOBACTAM SODIUM 4.5 G: 4; .5 INJECTION, POWDER, LYOPHILIZED, FOR SOLUTION INTRAVENOUS at 09:06

## 2022-06-16 RX ADMIN — PIPERACILLIN SODIUM AND TAZOBACTAM SODIUM 4.5 G: 4; .5 INJECTION, POWDER, LYOPHILIZED, FOR SOLUTION INTRAVENOUS at 06:06

## 2022-06-16 RX ADMIN — MIRTAZAPINE 7.5 MG: 7.5 TABLET ORAL at 09:06

## 2022-06-16 NOTE — PROGRESS NOTES
Diagnosis-sigmoid diverticulitis with abscess not amenable to percutaneous drainage,:  Chronic colitis with diarrhea    Subjective  Denies abdominal pain  Passing liquid stool-her usual  Tolerating low residue diet    PE  Afebrile  Vital signs stable  Chest-clear  Heart-regular rate and rhythm  Abdomen-soft, nontender, nondistended, no guarding, no rebound, no rigidity  Extremities-no tenderness    Impression/plan  Surgically stable  Will repeat CT scan to reassess pelvic abscess

## 2022-06-16 NOTE — PROGRESS NOTES
RSW met with patient, patient has no additional needs. Patient just finished drinking contrast for CT. RSW to follow up.     CHARLEE Rudd

## 2022-06-16 NOTE — PROGRESS NOTES
"[facility] Medicine  Progress Note    Patient Name: Cheyenne Garner  MRN: [id number]  Patient Class: IP- Inpatient   Admission Date: 6/10/2022  Length of Stay: 6 days  Attending Physician: Adali Hale MD  Primary Care Provider: Mary Cortes MD        Subjective:     Principal Problem:Diverticulitis of large intestine with perforation and abscess without bleeding        HPI:  From H&P by Traci Butler PA:  "86-year-old female with past medical history of depression, hyperlipidemia, ADHD presents to the ED with abdominal pain for the last 6 days associated with decreased appetite.  Patient reports symptoms started when she felt constipated and was unable to use the bathroom last Saturday.  States she has not been eating secondary to constipation.  On Wednesday (2 days ago) she went to go see her gastroenterologist who prescribed ciprofloxacin and metronidazole and ordered a CT scan for today.  She denies any associated fever, chills, chest pain, shortness of breath, cough, N/V/D, or urinary symptoms.  Occasional alcohol, former smoker.  ED evaluation WBC 21.38, afebrile.  Lactic acid pending.  CT A/P with findings most consistent with perforated sigmoid diverticulitis with pelvic abscess measuring approximately 5.3 x 4.4 cm.  Interposed small and large bowel with make percutaneous drainage of this collection significantly difficult.  General surgery consulted. Patient admitted for further evaluation and treatment."      Overview/Hospital Course:  Patient admitted and treated with IV antibiotics.  IR consulted for drainage but it was not amenable to percutaneous drainage.  Surgery followed with serial exams and recommended monitoring on IV antibiotics alone for now.  She had good clinical improvement and leukocytosis resolved.  Diet advanced to low residue.  CT scan repeated to re-evaluate abscess, showed interval decrease in size.  PT/OT evaluated, recommended home " health.      Interval History:  She complains of diarrhea.  This is a chronic problem she has had for years.  Tells me this is due to sarcoidosis and has been treated with cholestyramine.  This was held due to the diverticulitis.  She also tells me she still has abdominal pain, about 3/10.  She had expected this would resolve completely with treatment.    Review of Systems   Constitutional:  Negative for chills and fever.   Respiratory:  Negative for cough and shortness of breath.    Cardiovascular:  Negative for chest pain and palpitations.   Gastrointestinal:  Positive for abdominal pain and diarrhea.   Objective:     Vital Signs (Most Recent):  Temp: 97.5 °F (36.4 °C) (06/16/22 1130)  Pulse: 84 (06/16/22 1130)  Resp: 16 (06/16/22 1130)  BP: (!) 149/67 (06/16/22 1130)  SpO2: 98 % (06/16/22 1130)   Vital Signs (24h Range):  Temp:  [97.5 °F (36.4 °C)-98.7 °F (37.1 °C)] 97.5 °F (36.4 °C)  Pulse:  [62-84] 84  Resp:  [16-18] 16  SpO2:  [94 %-98 %] 98 %  BP: (129-157)/(67-75) 149/67     Weight: 60.6 kg (133 lb 9.6 oz)  Body mass index is 23.67 kg/m².    Intake/Output Summary (Last 24 hours) at 6/16/2022 1541  Last data filed at 6/15/2022 2330  Gross per 24 hour   Intake 200 ml   Output --   Net 200 ml      Physical Exam  Constitutional:       Comments: Frail, elderly woman in NAD.   Cardiovascular:      Rate and Rhythm: Normal rate and regular rhythm.      Heart sounds: No murmur heard.    No gallop.   Pulmonary:      Effort: Pulmonary effort is normal.      Breath sounds: Normal breath sounds.   Abdominal:      Palpations: Abdomen is soft.      Comments: Bowel sounds hyperactive.   Musculoskeletal:         General: Normal range of motion.   Skin:     General: Skin is warm and dry.   Neurological:      General: No focal deficit present.      Mental Status: She is alert.       Significant Labs: All pertinent labs within the past 24 hours have been reviewed.    Significant Imaging: I have reviewed all pertinent imaging  results/findings within the past 24 hours.      Assessment/Plan:      * Diverticulitis of large intestine with perforation and abscess without bleeding  CT A/P Findings most consistent with perforated sigmoid diverticulitis with pelvic abscess measuring approximately 5.3 x 4.4 cm.  Percutaneous drainage of this collection could not be done.  - improving on IV antibiotics and limited diet.  Advanced to low residue  -Continue zosyn (started 6/11/22)  -Surgery following.  For now Questran for chronic diarrhea is not advisable.  -Repeat imaging ordered shows some interval decrease in size of abscess.  - PT/OT following, recommend home health therapy.    Primary insomnia  Depression  - continue mirtazapine at night, melatonin p.r.n.      Depression  Continue home meds        VTE Risk Mitigation (From admission, onward)         Ordered     enoxaparin injection 40 mg  Daily         06/13/22 1321     IP VTE HIGH RISK PATIENT  Once         06/10/22 1710     Place sequential compression device  Until discontinued         06/10/22 1710                          Adali Deshpande MD  Department of Hospital Medicine   [facility] - Med Surg ([place])

## 2022-06-16 NOTE — PROGRESS NOTES
Per pt. Nurse pt. Can drink oral contrast, nurse stated pt. Never refused drinking contrast the other day. Unsure of how miscommunication started that pt. Was refusing to drink. Instructions where given to pt.'s nurse. Pt. Can travel by stretcher no oxygen 1 hour after pt. Begins drinking oral contrast. Nurse stated she will be giving pt. meds for nausea.

## 2022-06-16 NOTE — SUBJECTIVE & OBJECTIVE
Interval History:  She complains of diarrhea.  This is a chronic problem she has had for years.  Tells me this is due to sarcoidosis and has been treated with cholestyramine.  This was held due to the diverticulitis.  She also tells me she still has abdominal pain, about 3/10.  She had expected this would resolve completely with treatment.    Review of Systems   Constitutional:  Negative for chills and fever.   Respiratory:  Negative for cough and shortness of breath.    Cardiovascular:  Negative for chest pain and palpitations.   Gastrointestinal:  Positive for abdominal pain and diarrhea.   Objective:     Vital Signs (Most Recent):  Temp: 97.5 °F (36.4 °C) (06/16/22 1130)  Pulse: 84 (06/16/22 1130)  Resp: 16 (06/16/22 1130)  BP: (!) 149/67 (06/16/22 1130)  SpO2: 98 % (06/16/22 1130)   Vital Signs (24h Range):  Temp:  [97.5 °F (36.4 °C)-98.7 °F (37.1 °C)] 97.5 °F (36.4 °C)  Pulse:  [62-84] 84  Resp:  [16-18] 16  SpO2:  [94 %-98 %] 98 %  BP: (129-157)/(67-75) 149/67     Weight: 60.6 kg (133 lb 9.6 oz)  Body mass index is 23.67 kg/m².    Intake/Output Summary (Last 24 hours) at 6/16/2022 1541  Last data filed at 6/15/2022 2330  Gross per 24 hour   Intake 200 ml   Output --   Net 200 ml      Physical Exam  Constitutional:       Comments: Frail, elderly woman in NAD.   Cardiovascular:      Rate and Rhythm: Normal rate and regular rhythm.      Heart sounds: No murmur heard.    No gallop.   Pulmonary:      Effort: Pulmonary effort is normal.      Breath sounds: Normal breath sounds.   Abdominal:      Palpations: Abdomen is soft.      Comments: Bowel sounds hyperactive.   Musculoskeletal:         General: Normal range of motion.   Skin:     General: Skin is warm and dry.   Neurological:      General: No focal deficit present.      Mental Status: She is alert.       Significant Labs: All pertinent labs within the past 24 hours have been reviewed.    Significant Imaging: I have reviewed all pertinent imaging  results/findings within the past 24 hours.

## 2022-06-16 NOTE — ASSESSMENT & PLAN NOTE
CT A/P Findings most consistent with perforated sigmoid diverticulitis with pelvic abscess measuring approximately 5.3 x 4.4 cm.  Percutaneous drainage of this collection could not be done.  - improving on IV antibiotics and limited diet.  Advanced to low residue  -Continue zosyn (started 6/11/22)  -Surgery following.  For now Questran for chronic diarrhea is not advisable.  -Repeat imaging ordered shows some interval decrease in size of abscess.  - PT/OT following, recommend home health therapy.

## 2022-06-16 NOTE — PLAN OF CARE
Problem: Occupational Therapy  Goal: Occupational Therapy Goal  Description: Goals to be met by: 6/25/2022     Patient will increase functional independence with ADLs by performing:    UE Dressing with Supervision.  LE Dressing to don underwear with Stand-by Assistance.    [place] [facility] [place]      COMPLETED GOALS:  Toileting to be assessed.  GOAL MET 6/12/2022.  Toilet transfer to be assessed.  GOAL MET 6/12/2022.  Grooming/hygiene at sink for 3 tasks with Supervision.  MET 6/15/2022 standing at sink  [place] [facility] [place].  MET 6/15/2022     Outcome: Ongoing, Progressing   Pt doffed/donned socks seated EOB at Supervision level, stood at sink to perform 3 grooming tasks at Supervision and performed toileting at Supervision level today.  Recommend discharge to home with assist as needed and Home Health OT and PT.

## 2022-06-16 NOTE — PT/OT/SLP PROGRESS
Occupational Therapy   Treatment    Name: Cheyenne Garner  MRN: [id number]  Admitting Diagnosis:  Diverticulitis of large intestine with perforation and abscess without bleeding       Recommendations:     Discharge Recommendations: home health OT, home health PT  Discharge Equipment Recommendations:  none  Barriers to discharge:  Decreased caregiver support    Assessment:     Cheyenne Garner is a 86 y.o. female with a medical diagnosis of Diverticulitis of large intestine with perforation and abscess without bleeding.  She presents alert and agreeable to participate in OT tx session. Performance deficits affecting function are impaired self care skills, impaired endurance, gait instability, impaired balance, impaired functional mobilty, decreased safety awareness.     Rehab Prognosis:  Good; patient would benefit from acute skilled OT services to address these deficits and reach maximum level of function.       Plan:     Patient to be seen 4 x/week to address the above listed problems via self-care/home management, therapeutic activities  · Plan of Care Expires: 07/25/22  · Plan of Care Reviewed with: patient    Subjective     Pain/Comfort:  · Pain Rating 1:  (Not rated)  · Location 1: abdomen  · Pain Addressed 1: Reposition, Distraction, Cessation of Activity  · Pain Rating Post-Intervention 1: 0/10    Objective:     Communicated with:   Patient found HOB elevated with peripheral IV upon OT entry to room.    General Precautions: Standard, fall   Orthopedic Precautions:N/A   Braces: N/A  Respiratory Status: Room air     Occupational Performance:     Bed Mobility:    · Supine to sit: Indep  · Sit to supine: Indep    Functional Mobility/Transfers:  · Sit to stand: Indep  · Toilet transfer: SBA  · Functional Mobility: No LOB     Activities of Daily Living:  · Grooming: Supervision standing at sink for 3 tasks  · LB Dressing: Supervision for doffing/donning socks sitting EOB  · Toileting: Supervision using toilet in  bathroom    [facility] 6 Click ADL: 19    Treatment & Education:  Role of OT, POC, safety wtih ADL and ADL mobility, discharge recs    Patient left HOB elevated with all lines intact and call button in reachEducation:      GOALS:   Multidisciplinary Problems     Occupational Therapy Goals        Problem: Occupational Therapy    Goal Priority Disciplines Outcome Interventions   Occupational Therapy Goal     OT, PT/OT Ongoing, Progressing    Description: Goals to be met by: 6/25/2022     Patient will increase functional independence with ADLs by performing:    UE Dressing with Supervision.  LE Dressing to don underwear with Stand-by Assistance.    [place] [facility] [place] [place].      COMPLETED GOALS:  Toileting to be assessed.  GOAL MET 6/12/2022.  Toilet transfer to be assessed.  GOAL MET 6/12/2022.  Grooming/hygiene at sink for 3 tasks with Supervision.  MET 6/15/2022 standing at sink  [place] [facility] [place]vision.  MET 6/15/2022                      Time Tracking:     OT Date of Treatment: 06/15/22  OT Start Time: 1635  OT Stop Time: 1651  OT Total Time (min): 16 min    Billable Minutes:Self Care/Home Management 16    OT/EDMOND: OT          6/15/2022

## 2022-06-16 NOTE — PT/OT/SLP PROGRESS
"Occupational Therapy      Patient Name:  Cheyenne Garner   MRN:  [id number]    Patient not seen today secondary to  (Pt declining participating in therapy and getting OOB.  Pt reporting she "feels bad" and nurse is aware.  Pt wanting to just stay in bed and rest today.). Will follow-up on 6/17/22.    6/16/2022  "

## 2022-06-17 PROCEDURE — 25000003 PHARM REV CODE 250: Performed by: INTERNAL MEDICINE

## 2022-06-17 PROCEDURE — S4991 NICOTINE PATCH NONLEGEND: HCPCS | Performed by: INTERNAL MEDICINE

## 2022-06-17 PROCEDURE — 99231 PR SUBSEQUENT HOSPITAL CARE,LEVL I: ICD-10-PCS | Mod: ,,, | Performed by: SPECIALIST

## 2022-06-17 PROCEDURE — 99233 PR SUBSEQUENT HOSPITAL CARE,LEVL III: ICD-10-PCS | Mod: ,,, | Performed by: HOSPITALIST

## 2022-06-17 PROCEDURE — 25000003 PHARM REV CODE 250: Performed by: HOSPITALIST

## 2022-06-17 PROCEDURE — 97535 SELF CARE MNGMENT TRAINING: CPT

## 2022-06-17 PROCEDURE — 25000003 PHARM REV CODE 250: Performed by: PHYSICIAN ASSISTANT

## 2022-06-17 PROCEDURE — 11000001 HC ACUTE MED/SURG PRIVATE ROOM

## 2022-06-17 PROCEDURE — 63600175 PHARM REV CODE 636 W HCPCS: Performed by: PHYSICIAN ASSISTANT

## 2022-06-17 PROCEDURE — 63600175 PHARM REV CODE 636 W HCPCS: Performed by: INTERNAL MEDICINE

## 2022-06-17 PROCEDURE — 97116 GAIT TRAINING THERAPY: CPT | Mod: CQ

## 2022-06-17 PROCEDURE — 99231 SBSQ HOSP IP/OBS SF/LOW 25: CPT | Mod: ,,, | Performed by: SPECIALIST

## 2022-06-17 PROCEDURE — 97530 THERAPEUTIC ACTIVITIES: CPT | Mod: CQ

## 2022-06-17 PROCEDURE — 99233 SBSQ HOSP IP/OBS HIGH 50: CPT | Mod: ,,, | Performed by: HOSPITALIST

## 2022-06-17 RX ORDER — CIPROFLOXACIN 250 MG/1
500 TABLET, FILM COATED ORAL EVERY 12 HOURS
Status: DISCONTINUED | OUTPATIENT
Start: 2022-06-17 | End: 2022-06-18 | Stop reason: HOSPADM

## 2022-06-17 RX ORDER — METRONIDAZOLE 500 MG/1
500 TABLET ORAL EVERY 8 HOURS
Status: DISCONTINUED | OUTPATIENT
Start: 2022-06-17 | End: 2022-06-18 | Stop reason: HOSPADM

## 2022-06-17 RX ADMIN — NICOTINE 1 PATCH: 14 PATCH TRANSDERMAL at 09:06

## 2022-06-17 RX ADMIN — PRAVASTATIN SODIUM 20 MG: 20 TABLET ORAL at 09:06

## 2022-06-17 RX ADMIN — PIPERACILLIN SODIUM AND TAZOBACTAM SODIUM 4.5 G: 4; .5 INJECTION, POWDER, LYOPHILIZED, FOR SOLUTION INTRAVENOUS at 12:06

## 2022-06-17 RX ADMIN — LACTOBACILLUS ACIDOPHILUS / LACTOBACILLUS BULGARICUS 1 EACH: 100 MILLION CFU STRENGTH GRANULES at 09:06

## 2022-06-17 RX ADMIN — HYDROXYZINE HYDROCHLORIDE 25 MG: 25 TABLET, FILM COATED ORAL at 09:06

## 2022-06-17 RX ADMIN — ENOXAPARIN SODIUM 40 MG: 100 INJECTION SUBCUTANEOUS at 05:06

## 2022-06-17 RX ADMIN — TRAZODONE HYDROCHLORIDE 50 MG: 50 TABLET ORAL at 09:06

## 2022-06-17 RX ADMIN — PIPERACILLIN SODIUM AND TAZOBACTAM SODIUM 4.5 G: 4; .5 INJECTION, POWDER, LYOPHILIZED, FOR SOLUTION INTRAVENOUS at 09:06

## 2022-06-17 RX ADMIN — METRONIDAZOLE 500 MG: 500 TABLET ORAL at 09:06

## 2022-06-17 RX ADMIN — CIPROFLOXACIN HYDROCHLORIDE 500 MG: 250 TABLET, FILM COATED ORAL at 09:06

## 2022-06-17 RX ADMIN — MIRTAZAPINE 7.5 MG: 7.5 TABLET ORAL at 09:06

## 2022-06-17 NOTE — PT/OT/SLP PROGRESS
Occupational Therapy   Treatment    Name: Cheyenne Garner  MRN: [id number]  Admitting Diagnosis:  Diverticulitis of large intestine with perforation and abscess without bleeding       Recommendations:     Discharge Recommendations: home health OT, home health PT (Assist as needed from family/friends)  Discharge Equipment Recommendations:  none  Barriers to discharge:  Decreased caregiver support    Assessment:     Cheyenne Garner is a 86 y.o. female with a medical diagnosis of Diverticulitis of large intestine with perforation and abscess without bleeding.  She presents alert and agreeable to participating in OT tx session in preparation for upcoming discharge to home setting. Performance deficits affecting function are gait instability, impaired balance, impaired self care skills, impaired functional mobilty, impaired endurance, decreased safety awareness.     Rehab Prognosis:  Good to return to OF; patient would benefit from acute skilled OT services to address these deficits and reach maximum level of function.       Plan:     Patient to be seen 4 x/week to address the above listed problems via self-care/home management, therapeutic activities  · Plan of Care Expires: 07/25/22  · Plan of Care Reviewed with: patient    Subjective     Pain/Comfort:  · Pain Rating 1: 0/10  · Pain Rating Post-Intervention 1: 0/10    Objective:     Communicated with: nurse and PTA, prior to session.  Patient found HOB elevated with peripheral IV upon OT entry to room.    General Precautions: Standard, fall   Orthopedic Precautions:N/A   Braces: N/A  Respiratory Status: Room air     Occupational Performance:     Bed Mobility:    · NT    Functional Mobility/Transfers:  · Functional Mobility:NT    Activities of Daily Living:  · Pt did not get OOB for tx session but receptive to all education for energy conservation and safety strategies to improve ADL performance, increase activity tolerance, and decrease fall risk.      [facility] 6  Click ADL: 19    Treatment & Education:  Role of OT, POC, importance of being OOB most of the day when returning home and getting back to regular routine, being in contact with family/friends daily when returning home, taking deep breaths several times an hour, energy conservation, safety recs    Patient left HOB elevated with all lines intact and call button in reachEducation:      GOALS:   Multidisciplinary Problems     Occupational Therapy Goals        Problem: Occupational Therapy    Goal Priority Disciplines Outcome Interventions   Occupational Therapy Goal     OT, PT/OT Ongoing, Progressing    Description: Goals to be met by: 6/25/2022     Patient will increase functional independence with ADLs by performing:    UE Dressing with Supervision.  LE Dressing to don underwear with Stand-by Assistance.    Toilet transfer to C with Supervision.      COMPLETED GOALS:  Toileting to be assessed.  GOAL MET 6/12/2022.  Toilet transfer to be assessed.  GOAL MET 6/12/2022.  Grooming/hygiene at sink for 3 tasks with Supervision.  MET 6/15/2022 standing at sink  Toileting to BSC with Supervision.  MET 6/15/2022                      Time Tracking:     OT Date of Treatment: 06/17/22  OT Start Time: 1617  OT Stop Time: 1627  OT Total Time (min): 10 min    Billable Minutes:Self Care/Home Management 10    OT/EDMOND: OT          6/17/2022

## 2022-06-17 NOTE — PT/OT/SLP PROGRESS
Physical Therapy Treatment    Patient Name:  Cheyenne Garner   MRN:  [id number]    Recommendations:     Discharge Recommendations:  home   Discharge Equipment Recommendations: none   Barriers to discharge: None    Assessment:     Cheyenne Garner is a 86 y.o. female admitted with a medical diagnosis of Diverticulitis of large intestine with perforation and abscess without bleeding.  She presents with the following impairments/functional limitations:  gait instability, impaired balance, impaired endurance, impaired functional mobilty, impaired self care skills, decreased safety awareness.    Supine>sit with  Indep  Sit>stand with no AD and indep  Toilet transfer with SPV and no AD  Amb 300' with no AD and SBA, no LoB  Ascended/descended 4 steps with B HR and SBA  Pt progressing well toward goals  Rec Home    Rehab Prognosis: Good; patient would benefit from acute skilled PT services to address these deficits and reach maximum level of function.    Recent Surgery: * No surgery found *      Plan:     During this hospitalization, patient to be seen 3 x/week to address the identified rehab impairments via gait training and progress toward the following goals:    · Plan of Care Expires:  07/01/22    Subjective     Chief Complaint: need to use bathroom  Patient/Family Comments/goals: But I don't have any steps at home  Pain/Comfort:  · Pain Rating 1:  (low-level, not rated)  · Location 1: abdomen  · Pain Addressed 1: Reposition, Distraction, Cessation of Activity  · Pain Rating Post-Intervention 1: other (see comments) (improved, not rated)      Objective:     Communicated with nurse Spangler prior to session.  Patient found HOB elevated with peripheral IV upon PT entry to room.     General Precautions: Standard, fall   Orthopedic Precautions:N/A   Braces:    Respiratory Status: Room air     Functional Mobility:  · Bed Mobility:     · Supine to Sit: independence  · Transfers:     · Sit to Stand:  independence with no  AD  · Toilet Transfer: supervision with  no AD  using  Step Transfer  · Gait: 300' with no AD and SBA, no LoB  · Stairs:  Pt ascended/descended 4 stair(s) with No Assistive Device with bilateral handrails with Stand-by Assistance.       AM-PAC 6 CLICK MOBILITY  Turning over in bed (including adjusting bedclothes, sheets and blankets)?: 4  Sitting down on and standing up from a chair with arms (e.g., wheelchair, bedside commode, etc.): 4  Moving from lying on back to sitting on the side of the bed?: 4  Moving to and from a bed to a chair (including a wheelchair)?: 4  Need to walk in hospital room?: 4  Climbing 3-5 steps with a railing?: 4  Basic Mobility Total Score: 24       Therapeutic Activities and Exercises:   Gait and stair training as noted    Patient left up in chair with all lines intact, call button in reach and family present..    GOALS:   Multidisciplinary Problems     Physical Therapy Goals        Problem: Physical Therapy    Goal Priority Disciplines Outcome Goal Variances Interventions   Physical Therapy Goal     PT, PT/OT Ongoing, Progressing     Description: Patient will increase functional independence with mobility by performin. Sit<>supine with INDEP.MET 6/15/22  2. Sit<>stand using NO AD and INDEP. MET 6/15/22  3. Gait x 150 ft using NO AD and  INDEP.  4. Ascend/descend 4 stair(s) using UNIL handrail(s) and SBA.                          Time Tracking:     PT Received On: 22  PT Start Time: 913     PT Stop Time: 955  PT Total Time (min): 42 min     Billable Minutes: Gait Training 30 and Therapeutic Activity 12    Treatment Type: Treatment  PT/PTA: PTA     PTA Visit Number: 3     2022

## 2022-06-17 NOTE — SUBJECTIVE & OBJECTIVE
Interval History:  Discussed with patient the plan for her to change to oral antibiotics and return home.  She is nervous about going home, afraid the diverticulitis will get worse again and shocked to hear she will need to take antibiotics for the next 3 weeks.  Discussed with her son as well, arrangements being made for someone to look after her.  She still has abdominal cramping.    Review of Systems   Constitutional:  Negative for chills and fever.   Respiratory:  Negative for cough and shortness of breath.    Cardiovascular:  Negative for chest pain and palpitations.   Gastrointestinal:  Positive for abdominal pain.   Objective:     Vital Signs (Most Recent):  Temp: 98 °F (36.7 °C) (06/17/22 1632)  Pulse: 79 (06/17/22 1632)  Resp: 20 (06/17/22 1632)  BP: 119/68 (06/17/22 1632)  SpO2: (!) 94 % (06/17/22 1632)   Vital Signs (24h Range):  Temp:  [97.6 °F (36.4 °C)-98.7 °F (37.1 °C)] 98 °F (36.7 °C)  Pulse:  [68-79] 79  Resp:  [18-20] 20  SpO2:  [93 %-96 %] 94 %  BP: (119-134)/(56-68) 119/68     Weight: 60.6 kg (133 lb 9.6 oz)  Body mass index is 23.67 kg/m².    Intake/Output Summary (Last 24 hours) at 6/17/2022 1633  Last data filed at 6/17/2022 0707  Gross per 24 hour   Intake 560.75 ml   Output --   Net 560.75 ml      Physical Exam  Constitutional:       Comments: Frail, elderly woman in NAD.   Cardiovascular:      Rate and Rhythm: Normal rate and regular rhythm.      Heart sounds: No murmur heard.    No gallop.   Pulmonary:      Effort: Pulmonary effort is normal.      Breath sounds: Normal breath sounds.   Abdominal:      Palpations: Abdomen is soft.      Comments: Bowel sounds hyperactive.   Musculoskeletal:         General: Normal range of motion.   Skin:     General: Skin is warm and dry.   Neurological:      General: No focal deficit present.      Mental Status: She is alert.       Significant Labs: All pertinent labs within the past 24 hours have been reviewed.    Significant Imaging: I have reviewed all  pertinent imaging results/findings within the past 24 hours.

## 2022-06-17 NOTE — ASSESSMENT & PLAN NOTE
CT A/P Findings most consistent with perforated sigmoid diverticulitis with pelvic abscess measuring approximately 5.3 x 4.4 cm.  Percutaneous drainage of this collection could not be done.  - improving on IV antibiotics and limited diet.  Advanced to low residue  -Continue zosyn (started 6/11/22).  Today is #7, OK to change to oral antibiotics for a 3 week course.  -Surgery following.  For now Questran for chronic diarrhea is not advisable despite frequent stools.  -Repeat imaging ordered shows some interval decrease in size of abscess.  - PT/OT following, recommend home without home health.  - Family to arrange for more supervision.

## 2022-06-17 NOTE — PLAN OF CARE
Pt noted in bed noted to be awake alert and oriented x4. PT voiced no complaints of SOB at this time remains on room air. IV to left arm intact flushes without difficulty. POC reviewed with pt she voiced understanding but voiced that thoughts of her being discharged makes her extra anxious. Bed noted in lowest position , HOB elevated, CB in reach, SR up x2. Purposeful rounding completed          Problem: Adult Inpatient Plan of Care  Goal: Plan of Care Review  Outcome: Ongoing, Progressing  Goal: Patient-Specific Goal (Individualized)  Outcome: Ongoing, Progressing  Goal: Absence of Hospital-Acquired Illness or Injury  Outcome: Ongoing, Progressing  Goal: Optimal Comfort and Wellbeing  Outcome: Ongoing, Progressing  Goal: Readiness for Transition of Care  Outcome: Ongoing, Progressing     Problem: Skin Injury Risk Increased  Goal: Skin Health and Integrity  Outcome: Ongoing, Progressing

## 2022-06-17 NOTE — PROGRESS NOTES
"[facility]  Progress Note    Patient Name: Cheyenne Garner  MRN: [id number]  Patient Class: IP- Inpatient   Admission Date: 6/10/2022  Length of Stay: 7 days  Attending Physician: Adali Hale MD  Primary Care Provider: Mary Cortes MD        Subjective:     Principal Problem:Diverticulitis of large intestine with perforation and abscess without bleeding        HPI:  From H&P by Traci Butler PA:  "86-year-old female with past medical history of depression, hyperlipidemia, ADHD presents to the ED with abdominal pain for the last 6 days associated with decreased appetite.  Patient reports symptoms started when she felt constipated and was unable to use the bathroom last Saturday.  States she has not been eating secondary to constipation.  On Wednesday (2 days ago) she went to go see her gastroenterologist who prescribed ciprofloxacin and metronidazole and ordered a CT scan for today.  She denies any associated fever, chills, chest pain, shortness of breath, cough, N/V/D, or urinary symptoms.  Occasional alcohol, former smoker.  ED evaluation WBC 21.38, afebrile.  Lactic acid pending.  CT A/P with findings most consistent with perforated sigmoid diverticulitis with pelvic abscess measuring approximately 5.3 x 4.4 cm.  Interposed small and large bowel with make percutaneous drainage of this collection significantly difficult.  General surgery consulted. Patient admitted for further evaluation and treatment."      Overview/Hospital Course:  Patient admitted and treated with IV antibiotics.  IR consulted for drainage but it was not amenable to percutaneous drainage.  Surgery followed with serial exams and recommended monitoring on IV antibiotics alone for now.  She had good clinical improvement and leukocytosis resolved.  Diet advanced to low residue.  CT scan repeated to re-evaluate abscess, showed interval decrease in size.  PT/OT evaluated, felt patient was safe to " return home.  Discussed with surgeon, approved to change to oral antibiotics in preparation for discharge home.      Interval History:  Discussed with patient the plan for her to change to oral antibiotics and return home.  She is nervous about going home, afraid the diverticulitis will get worse again and shocked to hear she will need to take antibiotics for the next 3 weeks.  Discussed with her son as well, arrangements being made for someone to look after her.  She still has abdominal cramping.    Review of Systems   Constitutional:  Negative for chills and fever.   Respiratory:  Negative for cough and shortness of breath.    Cardiovascular:  Negative for chest pain and palpitations.   Gastrointestinal:  Positive for abdominal pain.   Objective:     Vital Signs (Most Recent):  Temp: 98 °F (36.7 °C) (06/17/22 1632)  Pulse: 79 (06/17/22 1632)  Resp: 20 (06/17/22 1632)  BP: 119/68 (06/17/22 1632)  SpO2: (!) 94 % (06/17/22 1632)   Vital Signs (24h Range):  Temp:  [97.6 °F (36.4 °C)-98.7 °F (37.1 °C)] 98 °F (36.7 °C)  Pulse:  [68-79] 79  Resp:  [18-20] 20  SpO2:  [93 %-96 %] 94 %  BP: (119-134)/(56-68) 119/68     Weight: 60.6 kg (133 lb 9.6 oz)  Body mass index is 23.67 kg/m².    Intake/Output Summary (Last 24 hours) at 6/17/2022 1633  Last data filed at 6/17/2022 0707  Gross per 24 hour   Intake 560.75 ml   Output --   Net 560.75 ml      Physical Exam  Constitutional:       Comments: Frail, elderly woman in NAD.   Cardiovascular:      Rate and Rhythm: Normal rate and regular rhythm.      Heart sounds: No murmur heard.    No gallop.   Pulmonary:      Effort: Pulmonary effort is normal.      Breath sounds: Normal breath sounds.   Abdominal:      Palpations: Abdomen is soft.      Comments: Bowel sounds hyperactive.   Musculoskeletal:         General: Normal range of motion.   Skin:     General: Skin is warm and dry.   Neurological:      General: No focal deficit present.      Mental Status: She is alert.        Significant Labs: All pertinent labs within the past 24 hours have been reviewed.    Significant Imaging: I have reviewed all pertinent imaging results/findings within the past 24 hours.      Assessment/Plan:      * Diverticulitis of large intestine with perforation and abscess without bleeding  CT A/P Findings most consistent with perforated sigmoid diverticulitis with pelvic abscess measuring approximately 5.3 x 4.4 cm.  Percutaneous drainage of this collection could not be done.  - improving on IV antibiotics and limited diet.  Advanced to low residue  -Continue zosyn (started 6/11/22).  Today is #7, OK to change to oral antibiotics for a 3 week course.  -Surgery following.  For now Questran for chronic diarrhea is not advisable despite frequent stools.  -Repeat imaging ordered shows some interval decrease in size of abscess.  - PT/OT following, recommend home without home health.  - Family to arrange for more supervision.    Primary insomnia  Depression  - continue mirtazapine at night, melatonin p.r.n.      Depression  Continue home meds        VTE Risk Mitigation (From admission, onward)         Ordered     enoxaparin injection 40 mg  Daily         06/13/22 1321     IP VTE HIGH RISK PATIENT  Once         06/10/22 1710     Place sequential compression device  Until discontinued         06/10/22 1710                        Adali Deshpande MD  Department of Hospital Medicine   [facility] ([place])

## 2022-06-17 NOTE — PROGRESS NOTES
Diagnosis-diverticular abscess not amenable to percutaneous drainage, it history of colitis    Subjective  Denies pain, nausea  Tolerating low residue diet    PE  Anicteric  Chest-clear  Heart-regular rate and rhythm  Abdomen-soft, nontender, no guarding, no rebound, no masses  Extremities-no tenderness    Imaging  CT scan shows partial resolution of abscesses, some residual inflammatory changes of sigmoid colon    Impression/plan  Clinically much improved  Switch to oral antibiotics, can discharge and follow-up as outpatient

## 2022-06-17 NOTE — PLAN OF CARE
Pt remained free of falls and injuries throughout shift. AAOx4. Pt intermittently anxious throughout shift, disappointed that she did not get discharged yesterday and worried about getting her bills paid. PRN hydroxyzine given for anxiety. Purposeful hourly rounding performed. Pt swallows meds whole. Pt received IV Zosyn per MAR, IV flushed and saline locked. Pt c/o abd discomfort, declines pain medicine or requesting alternative PRN from NP. Patient up to BSC independently. VSS on room air. Pt sleeping in bed, even rise and fall of chest. Bed low and locked, call light in reach. Side rails up x2. Will continue to monitor.

## 2022-06-18 VITALS
WEIGHT: 133.63 LBS | DIASTOLIC BLOOD PRESSURE: 62 MMHG | HEIGHT: 63 IN | TEMPERATURE: 99 F | RESPIRATION RATE: 16 BRPM | SYSTOLIC BLOOD PRESSURE: 110 MMHG | HEART RATE: 80 BPM | BODY MASS INDEX: 23.68 KG/M2 | OXYGEN SATURATION: 95 %

## 2022-06-18 PROCEDURE — 99239 HOSP IP/OBS DSCHRG MGMT >30: CPT | Mod: ,,, | Performed by: HOSPITALIST

## 2022-06-18 PROCEDURE — 25000003 PHARM REV CODE 250: Performed by: INTERNAL MEDICINE

## 2022-06-18 PROCEDURE — 99239 PR HOSPITAL DISCHARGE DAY,>30 MIN: ICD-10-PCS | Mod: ,,, | Performed by: HOSPITALIST

## 2022-06-18 PROCEDURE — 25000003 PHARM REV CODE 250: Performed by: HOSPITALIST

## 2022-06-18 RX ORDER — METRONIDAZOLE 500 MG/1
500 TABLET ORAL 3 TIMES DAILY
Qty: 60 TABLET | Refills: 0 | Status: SHIPPED | OUTPATIENT
Start: 2022-06-18 | End: 2022-06-18 | Stop reason: SDUPTHER

## 2022-06-18 RX ORDER — CHOLESTYRAMINE 4 G/9G
1 POWDER, FOR SUSPENSION ORAL DAILY PRN
Qty: 348.6 G | Refills: 11 | Status: SHIPPED | OUTPATIENT
Start: 2022-06-18 | End: 2023-01-16

## 2022-06-18 RX ORDER — CIPROFLOXACIN 500 MG/1
500 TABLET ORAL 2 TIMES DAILY
Qty: 40 TABLET | Refills: 0 | Status: SHIPPED | OUTPATIENT
Start: 2022-06-18 | End: 2022-06-18 | Stop reason: SDUPTHER

## 2022-06-18 RX ORDER — METRONIDAZOLE 500 MG/1
500 TABLET ORAL 3 TIMES DAILY
Qty: 60 TABLET | Refills: 0 | Status: SHIPPED | OUTPATIENT
Start: 2022-06-18 | End: 2022-07-08

## 2022-06-18 RX ORDER — CIPROFLOXACIN 500 MG/1
500 TABLET ORAL 2 TIMES DAILY
Qty: 40 TABLET | Refills: 0 | Status: SHIPPED | OUTPATIENT
Start: 2022-06-18 | End: 2022-07-08

## 2022-06-18 RX ADMIN — METRONIDAZOLE 500 MG: 500 TABLET ORAL at 06:06

## 2022-06-18 RX ADMIN — CIPROFLOXACIN HYDROCHLORIDE 500 MG: 250 TABLET, FILM COATED ORAL at 09:06

## 2022-06-18 RX ADMIN — LACTOBACILLUS ACIDOPHILUS / LACTOBACILLUS BULGARICUS 1 EACH: 100 MILLION CFU STRENGTH GRANULES at 09:06

## 2022-06-18 NOTE — PLAN OF CARE
06/18/22 1019   Final Note   Assessment Type Final Discharge Note   Anticipated Discharge Disposition Home   Post-Acute Status   Discharge Delays None known at this time     No needs, son will pick her up.

## 2022-06-18 NOTE — NURSING
Pt discharged home pt requested a bedside commode at time of dc, case manger notified but pt did not want to wait on one to be brought up or delivered to her . Pt discahrge home with son. No distress noted no complaints of pain voiced all belongings removed from room and sent with pt.

## 2022-06-18 NOTE — NURSING
Iv removed for discharge catheter noted intact dressing applied. Dc instructions reviewed with pt . Pt notified of next times her antibiotics are due she voiced understanding of all.  Pt dc paperwork placed with pt's bag per her request. Pt awaiting family to drive her home

## 2022-06-18 NOTE — DISCHARGE SUMMARY
"[facility], [place], operated by [facility] Medicine  Discharge Summary      Patient Name: Cheyenne Garner  MRN: [id number]  Patient Class: IP- Inpatient  Admission Date: 6/10/2022  Hospital Length of Stay: 8 days  Discharge Date and Time:  06/18/2022 11:12 AM  Attending Physician: Adali Hale MD   Discharging Provider: Aadli Hale MD  Primary Care Provider: Mary Cortes MD      HPI:   From H&P by Traci Butler PA:  "86-year-old female with past medical history of depression, hyperlipidemia, ADHD presents to the ED with abdominal pain for the last 6 days associated with decreased appetite.  Patient reports symptoms started when she felt constipated and was unable to use the bathroom last Saturday.  States she has not been eating secondary to constipation.  On Wednesday (2 days ago) she went to go see her gastroenterologist who prescribed ciprofloxacin and metronidazole and ordered a CT scan for today.  She denies any associated fever, chills, chest pain, shortness of breath, cough, N/V/D, or urinary symptoms.  Occasional alcohol, former smoker.  ED evaluation WBC 21.38, afebrile.  Lactic acid pending.  CT A/P with findings most consistent with perforated sigmoid diverticulitis with pelvic abscess measuring approximately 5.3 x 4.4 cm.  Interposed small and large bowel with make percutaneous drainage of this collection significantly difficult.  General surgery consulted. Patient admitted for further evaluation and treatment."            Hospital Course:   Patient admitted and treated with IV antibiotics.  IR consulted for drainage but it was not amenable to percutaneous drainage.  Surgery followed with serial exams and recommended monitoring on IV antibiotics alone for now.  She had good clinical improvement and leukocytosis resolved.  Diet advanced to low residue.  CT scan repeated to re-evaluate abscess, showed interval decrease in size.  PT/OT evaluated, felt patient was safe to return home.  Discussed " with surgeon, approved to change to oral antibiotics in preparation for discharge home.  She was feeling well on 6/18 and was discharged home in improved condition.  She was prescribed an additional 3 weeks of Cipro and Flagyl and will need to follow up with surgery in about 10 days.  For now she will need to hold the cholestyramine until diverticulitis resolves.       Goals of Care Treatment Preferences:  Code Status: Full Code      Consults:   Consults (From admission, onward)        Status Ordering Provider     Inpatient consult to Midline team  Once        Provider:  (Not yet assigned)    Acknowledged RENETTA THOMAS     Inpatient consult to General Surgery  Once        Provider:  Jeremias Bojorquez Jr., MD    Acknowledged DOMINGO WALLS            Final Active Diagnoses:    Diagnosis Date Noted POA    PRINCIPAL PROBLEM:  Diverticulitis of large intestine with perforation and abscess without bleeding [K57.20] 06/10/2022 Yes      Problems Resolved During this Admission:    Diagnosis Date Noted Date Resolved POA    Hypokalemia [E87.6] 06/12/2022 06/16/2022 Yes    Hypophosphatemia [E83.39] 06/11/2022 06/16/2022 Yes       Discharged Condition: stable    Disposition: Home or Self Care    Follow Up:   Follow-up Information     Mary Cortes MD Follow up.    Specialty: Internal Medicine  Why: Follow up in 1-2 weeks  Contact information:  [address]  [address]  [facility] [id number] [phone]             Jeremias Bojorquez Jr, MD Follow up.    Specialties: General Surgery, Vascular Surgery  Why: Follow up in 10 days  Contact information:  [address]  [address]  [facility] [id number] [phone]             Filippo Morales MD Follow up.    Specialty: Gastroenterology  Why: Follow up for the next available appointment  Contact information:  [address]  [address]  [facility] [id number] [phone]                       Patient Instructions:      Diet Adult Regular     Order Specific Question  Answer Comments   Additional restrictions: Low Fiber      Activity as tolerated       Medications:  Reconciled Home Medications:      Medication List      CHANGE how you take these medications    cholestyramine (with sugar) 4 gram Powd  Take 1 application by mouth daily as needed (Diarrhea). Hold until you see the surgeon or gastroenterologist.  What changed:   · when to take this  · reasons to take this  · additional instructions        CONTINUE taking these medications    ciprofloxacin HCl 500 MG tablet  Commonly known as: CIPRO  Take 1 tablet (500 mg total) by mouth 2 (two) times daily. for 20 days     dextroamphetamine-amphetamine 10 mg Tab  Take by mouth once daily.     ergocalciferol 50,000 unit Cap  Commonly known as: ERGOCALCIFEROL  Take 1 capsule (50,000 Units total) by mouth twice a week.     hydroCHLOROthiazide 12.5 MG Tab  Commonly known as: HYDRODIURIL  Take 1 tablet (12.5 mg total) by mouth daily as needed (le edema).     meloxicam 15 MG tablet  Commonly known as: MOBIC  Take 1 tablet (15 mg total) by mouth once daily for arthritis     metroNIDAZOLE 500 MG tablet  Commonly known as: FLAGYL  Take 1 tablet (500 mg total) by mouth 3 (three) times daily. for 20 days     mirtazapine 7.5 MG Tab  Commonly known as: REMERON  Take 1 tablet by mouth Daily.     simvastatin 10 MG tablet  Commonly known as: ZOCOR  Take 1 tablet (10 mg total) by mouth every evening.     valACYclovir 1000 MG tablet  Commonly known as: VALTREX  TAKE ONE TABLET BY MOUTH TWICE DAILY FOR 5 DAYS FOR cold sores     ziprasidone 20 MG Cap  Commonly known as: GEODON            Time spent on the discharge of patient: >30 minutes         Adali Deshpande MD  Department of Hospital Medicine  [facility])

## 2022-06-20 ENCOUNTER — PATIENT OUTREACH (OUTPATIENT)
Dept: ADMINISTRATIVE | Facility: OTHER | Age: 87
End: 2022-06-20
Payer: COMMERCIAL

## 2022-06-20 NOTE — PROGRESS NOTES
IP Liaison - Final Visit Note    Patient: Cheyenne Garner  MRN:  [id number]  Date of Service:  6/20/2022  Completed by:  CHARLEE Rudd    Reason for Visit   Patient presents with    IP Liaison Chart Review           Patient Summary     Discharge Date: 6/18/2022  Discharge telephone number/address: [phone] [facility] [address] [facility] [address]  Follow up provider: Mary Cortes MD  Follow up appointments: saw patient in hospital  Home Health agency & telephone number: n/a  DME ordered &  name: n/a  Assigned OPCM RN/SW: n/a  Report sent to follow up team (PCP/OPCM) via in basket message: n/a  Community Resources arranged including agency name & contact info: No support & services have been documented.      CHARLEE Rudd

## 2022-07-07 ENCOUNTER — OFFICE VISIT (OUTPATIENT)
Dept: SURGERY | Facility: CLINIC | Age: 87
End: 2022-07-07
Attending: SPECIALIST
Payer: MEDICARE

## 2022-07-07 ENCOUNTER — HOSPITAL ENCOUNTER (OUTPATIENT)
Dept: RADIOLOGY | Facility: OTHER | Age: 87
Discharge: HOME | End: 2022-07-07
Attending: SPECIALIST
Payer: MEDICARE

## 2022-07-07 VITALS
HEART RATE: 113 BPM | BODY MASS INDEX: 22.5 KG/M2 | OXYGEN SATURATION: 98 % | SYSTOLIC BLOOD PRESSURE: 115 MMHG | DIASTOLIC BLOOD PRESSURE: 74 MMHG | HEIGHT: 63 IN | WEIGHT: 127 LBS

## 2022-07-07 DIAGNOSIS — K57.20 COLONIC DIVERTICULAR ABSCESS: Primary | ICD-10-CM

## 2022-07-07 DIAGNOSIS — K57.20 COLONIC DIVERTICULAR ABSCESS: ICD-10-CM

## 2022-07-07 PROCEDURE — 99212 OFFICE O/P EST SF 10 MIN: CPT | Mod: S$PBB,,, | Performed by: SPECIALIST

## 2022-07-07 PROCEDURE — 99999 PR PBB SHADOW E&M-EST. PATIENT-LVL IV: ICD-10-PCS | Mod: PBBFAC,,, | Performed by: SPECIALIST

## 2022-07-07 PROCEDURE — 74177 CT ABD & PELVIS W/CONTRAST: CPT | Mod: 26,,, | Performed by: RADIOLOGY

## 2022-07-07 PROCEDURE — 74177 CT ABD & PELVIS W/CONTRAST: CPT | Mod: TC

## 2022-07-07 PROCEDURE — 25500020 PHARM REV CODE 255: Performed by: SPECIALIST

## 2022-07-07 PROCEDURE — 99214 OFFICE O/P EST MOD 30 MIN: CPT | Mod: PBBFAC | Performed by: SPECIALIST

## 2022-07-07 PROCEDURE — 74177 CT ABDOMEN PELVIS WITH CONTRAST: ICD-10-PCS | Mod: 26,,, | Performed by: RADIOLOGY

## 2022-07-07 PROCEDURE — A9698 NON-RAD CONTRAST MATERIALNOC: HCPCS | Performed by: SPECIALIST

## 2022-07-07 PROCEDURE — 99999 PR PBB SHADOW E&M-EST. PATIENT-LVL IV: CPT | Mod: PBBFAC,,, | Performed by: SPECIALIST

## 2022-07-07 PROCEDURE — 99212 PR OFFICE/OUTPT VISIT, EST, LEVL II, 10-19 MIN: ICD-10-PCS | Mod: S$PBB,,, | Performed by: SPECIALIST

## 2022-07-07 RX ADMIN — IOHEXOL 1000 ML: 9 SOLUTION ORAL at 04:07

## 2022-07-07 RX ADMIN — IOHEXOL 75 ML: 350 INJECTION, SOLUTION INTRAVENOUS at 05:07

## 2022-07-07 NOTE — PROGRESS NOTES
86-year-old female admitted to [facility] on 06/10/2022 with pelvic abscess secondary to sigmoid diverticulitis.  Patient evaluated by invasive Radiology however, no available window for percutaneous drainage.  Abscess this significantly improved on follow-up CT well hospitalized  Patient treated with IV antibiotics and subsequent oral antibiotics.  Oral antibiotics finished today    Subjective  No fever chills  Loose stool  The patient complain of fatigue    PE  Well-developed well-nourished female in no acute distress  Abdomen-soft, nontender, no masses, no guarding, no rebound    Impression/plan  History of diverticular abscess, completed course of antibiotic  Follow-up CT scan and blood work to evaluate status of abscess

## 2022-07-11 ENCOUNTER — TELEPHONE (OUTPATIENT)
Dept: SURGERY | Facility: CLINIC | Age: 87
End: 2022-07-11
Payer: COMMERCIAL

## 2022-07-11 RX ORDER — CIPROFLOXACIN 500 MG/1
500 TABLET ORAL EVERY 12 HOURS
Qty: 20 TABLET | Refills: 1 | Status: SHIPPED | OUTPATIENT
Start: 2022-07-11 | End: 2022-08-02 | Stop reason: SDUPTHER

## 2022-07-11 RX ORDER — METRONIDAZOLE 500 MG/1
500 TABLET ORAL 3 TIMES DAILY
Qty: 30 TABLET | Refills: 1 | Status: SHIPPED | OUTPATIENT
Start: 2022-07-11 | End: 2022-08-02 | Stop reason: SDUPTHER

## 2022-07-19 ENCOUNTER — LAB VISIT (OUTPATIENT)
Dept: LAB | Facility: OTHER | Age: 87
End: 2022-07-19
Attending: INTERNAL MEDICINE
Payer: MEDICARE

## 2022-07-19 DIAGNOSIS — K57.20 COLONIC DIVERTICULAR ABSCESS: ICD-10-CM

## 2022-07-19 DIAGNOSIS — R63.4 WEIGHT LOSS: ICD-10-CM

## 2022-07-19 LAB
25(OH)D3+25(OH)D2 SERPL-MCNC: 34 NG/ML (ref 30–96)
ALBUMIN SERPL BCP-MCNC: 3.2 G/DL (ref 3.5–5.2)
ALP SERPL-CCNC: 38 U/L (ref 55–135)
ALT SERPL W/O P-5'-P-CCNC: 17 U/L (ref 10–44)
ANION GAP SERPL CALC-SCNC: 12 MMOL/L (ref 8–16)
AST SERPL-CCNC: 24 U/L (ref 10–40)
BASOPHILS # BLD AUTO: 0.06 K/UL (ref 0–0.2)
BASOPHILS NFR BLD: 1 % (ref 0–1.9)
BILIRUB SERPL-MCNC: 0.5 MG/DL (ref 0.1–1)
BUN SERPL-MCNC: 12 MG/DL (ref 8–23)
CALCIUM SERPL-MCNC: 8.5 MG/DL (ref 8.7–10.5)
CHLORIDE SERPL-SCNC: 111 MMOL/L (ref 95–110)
CO2 SERPL-SCNC: 19 MMOL/L (ref 23–29)
CREAT SERPL-MCNC: 0.8 MG/DL (ref 0.5–1.4)
DIFFERENTIAL METHOD: NORMAL
EOSINOPHIL # BLD AUTO: 0.2 K/UL (ref 0–0.5)
EOSINOPHIL NFR BLD: 2.6 % (ref 0–8)
ERYTHROCYTE [DISTWIDTH] IN BLOOD BY AUTOMATED COUNT: 14.5 % (ref 11.5–14.5)
EST. GFR  (AFRICAN AMERICAN): >60 ML/MIN/1.73 M^2
EST. GFR  (NON AFRICAN AMERICAN): >60 ML/MIN/1.73 M^2
GLUCOSE SERPL-MCNC: 82 MG/DL (ref 70–110)
HCT VFR BLD AUTO: 39.8 % (ref 37–48.5)
HGB BLD-MCNC: 12.8 G/DL (ref 12–16)
IMM GRANULOCYTES # BLD AUTO: 0.02 K/UL (ref 0–0.04)
IMM GRANULOCYTES NFR BLD AUTO: 0.3 % (ref 0–0.5)
LYMPHOCYTES # BLD AUTO: 2.4 K/UL (ref 1–4.8)
LYMPHOCYTES NFR BLD: 39.4 % (ref 18–48)
MAGNESIUM SERPL-MCNC: 1.9 MG/DL (ref 1.6–2.6)
MCH RBC QN AUTO: 30.4 PG (ref 27–31)
MCHC RBC AUTO-ENTMCNC: 32.2 G/DL (ref 32–36)
MCV RBC AUTO: 95 FL (ref 82–98)
MONOCYTES # BLD AUTO: 0.6 K/UL (ref 0.3–1)
MONOCYTES NFR BLD: 9.6 % (ref 4–15)
NEUTROPHILS # BLD AUTO: 2.9 K/UL (ref 1.8–7.7)
NEUTROPHILS NFR BLD: 47.1 % (ref 38–73)
NRBC BLD-RTO: 0 /100 WBC
PLATELET # BLD AUTO: 307 K/UL (ref 150–450)
PMV BLD AUTO: 10.1 FL (ref 9.2–12.9)
POTASSIUM SERPL-SCNC: 3.9 MMOL/L (ref 3.5–5.1)
PROT SERPL-MCNC: 6.2 G/DL (ref 6–8.4)
RBC # BLD AUTO: 4.21 M/UL (ref 4–5.4)
SODIUM SERPL-SCNC: 142 MMOL/L (ref 136–145)
TSH SERPL DL<=0.005 MIU/L-ACNC: 1.37 UIU/ML (ref 0.4–4)
VIT B12 SERPL-MCNC: 378 PG/ML (ref 210–950)
WBC # BLD AUTO: 6.14 K/UL (ref 3.9–12.7)

## 2022-07-19 PROCEDURE — 36415 COLL VENOUS BLD VENIPUNCTURE: CPT | Performed by: INTERNAL MEDICINE

## 2022-07-19 PROCEDURE — 82306 VITAMIN D 25 HYDROXY: CPT | Performed by: INTERNAL MEDICINE

## 2022-07-19 PROCEDURE — 84443 ASSAY THYROID STIM HORMONE: CPT | Performed by: INTERNAL MEDICINE

## 2022-07-19 PROCEDURE — 82607 VITAMIN B-12: CPT | Performed by: INTERNAL MEDICINE

## 2022-07-19 PROCEDURE — 85025 COMPLETE CBC W/AUTO DIFF WBC: CPT | Performed by: INTERNAL MEDICINE

## 2022-07-19 PROCEDURE — 83735 ASSAY OF MAGNESIUM: CPT | Performed by: INTERNAL MEDICINE

## 2022-07-19 PROCEDURE — 80053 COMPREHEN METABOLIC PANEL: CPT | Performed by: INTERNAL MEDICINE

## 2022-07-28 ENCOUNTER — OFFICE VISIT (OUTPATIENT)
Dept: SURGERY | Facility: CLINIC | Age: 87
End: 2022-07-28
Attending: SPECIALIST
Payer: MEDICARE

## 2022-07-28 VITALS — DIASTOLIC BLOOD PRESSURE: 43 MMHG | SYSTOLIC BLOOD PRESSURE: 116 MMHG | HEART RATE: 140 BPM | OXYGEN SATURATION: 96 %

## 2022-07-28 DIAGNOSIS — K57.20 COLONIC DIVERTICULAR ABSCESS: Primary | ICD-10-CM

## 2022-07-28 PROCEDURE — 99213 OFFICE O/P EST LOW 20 MIN: CPT | Mod: PBBFAC | Performed by: SPECIALIST

## 2022-07-28 PROCEDURE — 99212 PR OFFICE/OUTPT VISIT, EST, LEVL II, 10-19 MIN: ICD-10-PCS | Mod: S$PBB,,, | Performed by: SPECIALIST

## 2022-07-28 PROCEDURE — 99212 OFFICE O/P EST SF 10 MIN: CPT | Mod: S$PBB,,, | Performed by: SPECIALIST

## 2022-07-28 PROCEDURE — 99999 PR PBB SHADOW E&M-EST. PATIENT-LVL III: ICD-10-PCS | Mod: PBBFAC,,, | Performed by: SPECIALIST

## 2022-07-28 PROCEDURE — 99999 PR PBB SHADOW E&M-EST. PATIENT-LVL III: CPT | Mod: PBBFAC,,, | Performed by: SPECIALIST

## 2022-07-28 RX ORDER — CLOTRIMAZOLE AND BETAMETHASONE DIPROPIONATE 10; .64 MG/G; MG/G
CREAM TOPICAL 2 TIMES DAILY
Qty: 45 G | Refills: 1 | Status: SHIPPED | OUTPATIENT
Start: 2022-07-28 | End: 2023-01-16

## 2022-07-28 NOTE — PROGRESS NOTES
86-year-old female with history of-was pelvic abscess due to sigmoid diverticulitis.  Invasive radiology unable to obtain a favorable angle for percutaneous drainage.  Patient treated with hospitalization and IV antibiotics with partial resolution of abscess.  Patient subsequently discharged on oral Cipro and Flagyl  Follow-up CT scan on 07/07/2022 showed partial resolution of abscess  Patient continued on oral Flagyl and Cipro  Patient complain of loose stool  No fever/chills/diaphoresis    PE  Abdomen-soft, nontender, no masses, no guarding    Lab  Stool studies pending    Impression/plan  Diverticular abscess  Will check follow-up CT scan

## 2022-08-01 ENCOUNTER — HOSPITAL ENCOUNTER (OUTPATIENT)
Dept: RADIOLOGY | Facility: OTHER | Age: 87
Discharge: HOME | End: 2022-08-01
Attending: INTERNAL MEDICINE
Payer: MEDICARE

## 2022-08-01 ENCOUNTER — HOSPITAL ENCOUNTER (OUTPATIENT)
Dept: RADIOLOGY | Facility: OTHER | Age: 87
Discharge: HOME | End: 2022-08-01
Attending: SPECIALIST
Payer: MEDICARE

## 2022-08-01 DIAGNOSIS — K57.20 COLONIC DIVERTICULAR ABSCESS: ICD-10-CM
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
## 2022-08-01 DIAGNOSIS — R53.1 WEAKNESS: ICD-10-CM

## 2022-08-01 PROCEDURE — 70450 CT HEAD/BRAIN W/O DYE: CPT | Mod: 26,,, | Performed by: RADIOLOGY

## 2022-08-01 PROCEDURE — 74177 CT ABDOMEN PELVIS WITH CONTRAST: ICD-10-PCS | Mod: 26,,, | Performed by: RADIOLOGY

## 2022-08-01 PROCEDURE — A9698 NON-RAD CONTRAST MATERIALNOC: HCPCS | Performed by: SPECIALIST

## 2022-08-01 PROCEDURE — 25500020 PHARM REV CODE 255: Performed by: SPECIALIST

## 2022-08-01 PROCEDURE — 70450 CT HEAD/BRAIN W/O DYE: CPT | Mod: TC

## 2022-08-01 PROCEDURE — 74177 CT ABD & PELVIS W/CONTRAST: CPT | Mod: 26,,, | Performed by: RADIOLOGY

## 2022-08-01 PROCEDURE — 70450 CT HEAD WITHOUT CONTRAST: ICD-10-PCS | Mod: 26,,, | Performed by: RADIOLOGY

## 2022-08-01 PROCEDURE — 74177 CT ABD & PELVIS W/CONTRAST: CPT | Mod: TC

## 2022-08-01 RX ADMIN — IOHEXOL 1000 ML: 9 SOLUTION ORAL at 01:08

## 2022-08-01 RX ADMIN — IOHEXOL 75 ML: 350 INJECTION, SOLUTION INTRAVENOUS at 02:08

## 2022-08-04 ENCOUNTER — TELEPHONE (OUTPATIENT)
Dept: SURGERY | Facility: CLINIC | Age: 87
End: 2022-08-04
Payer: COMMERCIAL

## 2022-08-08 ENCOUNTER — HOSPITAL ENCOUNTER (OUTPATIENT)
Facility: OTHER | Age: 87
Discharge: HOME HEALTH | End: 2022-08-11
Attending: EMERGENCY MEDICINE | Admitting: HOSPITALIST
Payer: MEDICARE

## 2022-08-08 ENCOUNTER — TELEPHONE (OUTPATIENT)
Dept: SURGERY | Facility: CLINIC | Age: 87
End: 2022-08-08
Payer: COMMERCIAL

## 2022-08-08 DIAGNOSIS — K57.20 COLONIC DIVERTICULAR ABSCESS: ICD-10-CM

## 2022-08-08 DIAGNOSIS — R53.1 GENERALIZED WEAKNESS: Primary | ICD-10-CM

## 2022-08-08 DIAGNOSIS — E78.5 HYPERLIPIDEMIA, UNSPECIFIED HYPERLIPIDEMIA TYPE: ICD-10-CM

## 2022-08-08 DIAGNOSIS — R11.2 NON-INTRACTABLE VOMITING WITH NAUSEA: ICD-10-CM

## 2022-08-08 DIAGNOSIS — R53.1 WEAKNESS: ICD-10-CM

## 2022-08-08 LAB
ALBUMIN SERPL BCP-MCNC: 3.3 G/DL (ref 3.5–5.2)
ALP SERPL-CCNC: 42 U/L (ref 55–135)
ALT SERPL W/O P-5'-P-CCNC: 22 U/L (ref 10–44)
ANION GAP SERPL CALC-SCNC: 13 MMOL/L (ref 8–16)
AST SERPL-CCNC: 36 U/L (ref 10–40)
BACTERIA #/AREA URNS HPF: NORMAL /HPF
BASOPHILS # BLD AUTO: 0.09 K/UL (ref 0–0.2)
BASOPHILS NFR BLD: 1.3 % (ref 0–1.9)
BILIRUB SERPL-MCNC: 0.2 MG/DL (ref 0.1–1)
BILIRUB UR QL STRIP: NEGATIVE
BUN SERPL-MCNC: 20 MG/DL (ref 8–23)
CALCIUM SERPL-MCNC: 8.6 MG/DL (ref 8.7–10.5)
CHLORIDE SERPL-SCNC: 108 MMOL/L (ref 95–110)
CLARITY UR: CLEAR
CO2 SERPL-SCNC: 20 MMOL/L (ref 23–29)
COLOR UR: YELLOW
CREAT SERPL-MCNC: 0.7 MG/DL (ref 0.5–1.4)
CTP QC/QA: YES
DIFFERENTIAL METHOD: ABNORMAL
EOSINOPHIL # BLD AUTO: 0.1 K/UL (ref 0–0.5)
EOSINOPHIL NFR BLD: 1.3 % (ref 0–8)
ERYTHROCYTE [DISTWIDTH] IN BLOOD BY AUTOMATED COUNT: 14.6 % (ref 11.5–14.5)
EST. GFR  (NO RACE VARIABLE): >60 ML/MIN/1.73 M^2
GLUCOSE SERPL-MCNC: 98 MG/DL (ref 70–110)
GLUCOSE UR QL STRIP: NEGATIVE
HCT VFR BLD AUTO: 41.8 % (ref 37–48.5)
HGB BLD-MCNC: 13.5 G/DL (ref 12–16)
HGB UR QL STRIP: NEGATIVE
IMM GRANULOCYTES # BLD AUTO: 0.03 K/UL (ref 0–0.04)
IMM GRANULOCYTES NFR BLD AUTO: 0.4 % (ref 0–0.5)
KETONES UR QL STRIP: NEGATIVE
LEUKOCYTE ESTERASE UR QL STRIP: NEGATIVE
LYMPHOCYTES # BLD AUTO: 3 K/UL (ref 1–4.8)
LYMPHOCYTES NFR BLD: 41.6 % (ref 18–48)
MCH RBC QN AUTO: 31 PG (ref 27–31)
MCHC RBC AUTO-ENTMCNC: 32.3 G/DL (ref 32–36)
MCV RBC AUTO: 96 FL (ref 82–98)
MICROSCOPIC COMMENT: NORMAL
MONOCYTES # BLD AUTO: 0.9 K/UL (ref 0.3–1)
MONOCYTES NFR BLD: 12.1 % (ref 4–15)
NEUTROPHILS # BLD AUTO: 3.1 K/UL (ref 1.8–7.7)
NEUTROPHILS NFR BLD: 43.3 % (ref 38–73)
NITRITE UR QL STRIP: POSITIVE
NRBC BLD-RTO: 0 /100 WBC
PH UR STRIP: 6 [PH] (ref 5–8)
PLATELET # BLD AUTO: 292 K/UL (ref 150–450)
PMV BLD AUTO: 9 FL (ref 9.2–12.9)
POTASSIUM SERPL-SCNC: 5.1 MMOL/L (ref 3.5–5.1)
PROT SERPL-MCNC: 7 G/DL (ref 6–8.4)
PROT UR QL STRIP: NEGATIVE
RBC # BLD AUTO: 4.35 M/UL (ref 4–5.4)
RBC #/AREA URNS HPF: 1 /HPF (ref 0–4)
SARS-COV-2 RDRP RESP QL NAA+PROBE: NEGATIVE
SODIUM SERPL-SCNC: 141 MMOL/L (ref 136–145)
SP GR UR STRIP: >=1.03 (ref 1–1.03)
URN SPEC COLLECT METH UR: ABNORMAL
UROBILINOGEN UR STRIP-ACNC: NEGATIVE EU/DL
WBC # BLD AUTO: 7.18 K/UL (ref 3.9–12.7)
WBC #/AREA URNS HPF: 2 /HPF (ref 0–5)

## 2022-08-08 PROCEDURE — 93010 EKG 12-LEAD: ICD-10-PCS | Mod: ,,, | Performed by: INTERNAL MEDICINE

## 2022-08-08 PROCEDURE — 99285 EMERGENCY DEPT VISIT HI MDM: CPT | Mod: 25

## 2022-08-08 PROCEDURE — 81000 URINALYSIS NONAUTO W/SCOPE: CPT | Performed by: EMERGENCY MEDICINE

## 2022-08-08 PROCEDURE — 99220 PR INITIAL OBSERVATION CARE,LEVL III: CPT | Mod: ,,, | Performed by: PHYSICIAN ASSISTANT

## 2022-08-08 PROCEDURE — G0378 HOSPITAL OBSERVATION PER HR: HCPCS

## 2022-08-08 PROCEDURE — 93010 ELECTROCARDIOGRAM REPORT: CPT | Mod: ,,, | Performed by: INTERNAL MEDICINE

## 2022-08-08 PROCEDURE — 85025 COMPLETE CBC W/AUTO DIFF WBC: CPT | Performed by: EMERGENCY MEDICINE

## 2022-08-08 PROCEDURE — 96360 HYDRATION IV INFUSION INIT: CPT

## 2022-08-08 PROCEDURE — 80053 COMPREHEN METABOLIC PANEL: CPT | Performed by: EMERGENCY MEDICINE

## 2022-08-08 PROCEDURE — 99220 PR INITIAL OBSERVATION CARE,LEVL III: ICD-10-PCS | Mod: ,,, | Performed by: PHYSICIAN ASSISTANT

## 2022-08-08 PROCEDURE — 93005 ELECTROCARDIOGRAM TRACING: CPT

## 2022-08-08 PROCEDURE — U0002 COVID-19 LAB TEST NON-CDC: HCPCS | Performed by: EMERGENCY MEDICINE

## 2022-08-08 PROCEDURE — 25000003 PHARM REV CODE 250: Performed by: EMERGENCY MEDICINE

## 2022-08-08 RX ORDER — NALOXONE HCL 0.4 MG/ML
0.02 VIAL (ML) INJECTION
Status: DISCONTINUED | OUTPATIENT
Start: 2022-08-09 | End: 2022-08-11 | Stop reason: HOSPADM

## 2022-08-08 RX ORDER — SODIUM CHLORIDE 0.9 % (FLUSH) 0.9 %
10 SYRINGE (ML) INJECTION EVERY 8 HOURS
Status: DISCONTINUED | OUTPATIENT
Start: 2022-08-09 | End: 2022-08-11 | Stop reason: HOSPADM

## 2022-08-08 RX ORDER — SODIUM CHLORIDE 9 MG/ML
INJECTION, SOLUTION INTRAVENOUS CONTINUOUS
Status: DISCONTINUED | OUTPATIENT
Start: 2022-08-09 | End: 2022-08-10

## 2022-08-08 RX ORDER — AMOXICILLIN 250 MG
1 CAPSULE ORAL 2 TIMES DAILY PRN
Status: DISCONTINUED | OUTPATIENT
Start: 2022-08-09 | End: 2022-08-09

## 2022-08-08 RX ORDER — ATORVASTATIN CALCIUM 10 MG/1
10 TABLET, FILM COATED ORAL DAILY
Refills: 3 | Status: DISCONTINUED | OUTPATIENT
Start: 2022-08-09 | End: 2022-08-11 | Stop reason: HOSPADM

## 2022-08-08 RX ORDER — CIPROFLOXACIN 250 MG/1
500 TABLET, FILM COATED ORAL EVERY 12 HOURS
Status: DISCONTINUED | OUTPATIENT
Start: 2022-08-09 | End: 2022-08-09

## 2022-08-08 RX ORDER — ZIPRASIDONE HYDROCHLORIDE 20 MG/1
20 CAPSULE ORAL
Status: DISCONTINUED | OUTPATIENT
Start: 2022-08-09 | End: 2022-08-11 | Stop reason: HOSPADM

## 2022-08-08 RX ORDER — TALC
6 POWDER (GRAM) TOPICAL NIGHTLY PRN
Status: DISCONTINUED | OUTPATIENT
Start: 2022-08-09 | End: 2022-08-11 | Stop reason: HOSPADM

## 2022-08-08 RX ORDER — ACETAMINOPHEN 325 MG/1
650 TABLET ORAL EVERY 6 HOURS PRN
Status: DISCONTINUED | OUTPATIENT
Start: 2022-08-09 | End: 2022-08-11 | Stop reason: HOSPADM

## 2022-08-08 RX ORDER — METRONIDAZOLE 500 MG/1
500 TABLET ORAL 3 TIMES DAILY
Status: DISCONTINUED | OUTPATIENT
Start: 2022-08-09 | End: 2022-08-09

## 2022-08-08 RX ORDER — HEPARIN SODIUM 5000 [USP'U]/ML
5000 INJECTION, SOLUTION INTRAVENOUS; SUBCUTANEOUS EVERY 8 HOURS
Status: DISCONTINUED | OUTPATIENT
Start: 2022-08-09 | End: 2022-08-11 | Stop reason: HOSPADM

## 2022-08-08 RX ADMIN — SODIUM CHLORIDE 1000 ML: 0.9 INJECTION, SOLUTION INTRAVENOUS at 07:08

## 2022-08-08 NOTE — ED TRIAGE NOTES
Pt A&O x4. Even and unlabored respirations. Pt using wheelchair. Pt c/o generalized weakness x3 days. Pt reports a fall 3 days ago. Denies LOC and blood thinners. Denies CP, SOB and FLS. Pt cannot bear weight and is a 2 assist. Weakness noted to legs bilaterally. GCS 15. VSS.

## 2022-08-08 NOTE — TELEPHONE ENCOUNTER
Patient called stated she is weak and didn't know if she needed 2nd opinion advised patient I would have Dr. Bojorquez call her that he is in surgery.

## 2022-08-09 PROBLEM — K57.20 COLONIC DIVERTICULAR ABSCESS: Status: ACTIVE | Noted: 2022-08-09

## 2022-08-09 PROBLEM — R11.2 NON-INTRACTABLE VOMITING WITH NAUSEA: Status: ACTIVE | Noted: 2022-08-09

## 2022-08-09 LAB
ANION GAP SERPL CALC-SCNC: 9 MMOL/L (ref 8–16)
BASOPHILS # BLD AUTO: 0.07 K/UL (ref 0–0.2)
BASOPHILS NFR BLD: 1.3 % (ref 0–1.9)
BUN SERPL-MCNC: 14 MG/DL (ref 8–23)
CALCIUM SERPL-MCNC: 7.6 MG/DL (ref 8.7–10.5)
CHLORIDE SERPL-SCNC: 114 MMOL/L (ref 95–110)
CO2 SERPL-SCNC: 19 MMOL/L (ref 23–29)
CREAT SERPL-MCNC: 0.6 MG/DL (ref 0.5–1.4)
DIFFERENTIAL METHOD: ABNORMAL
EOSINOPHIL # BLD AUTO: 0.1 K/UL (ref 0–0.5)
EOSINOPHIL NFR BLD: 1.8 % (ref 0–8)
ERYTHROCYTE [DISTWIDTH] IN BLOOD BY AUTOMATED COUNT: 14.7 % (ref 11.5–14.5)
EST. GFR  (NO RACE VARIABLE): >60 ML/MIN/1.73 M^2
GLUCOSE SERPL-MCNC: 90 MG/DL (ref 70–110)
HCT VFR BLD AUTO: 36.6 % (ref 37–48.5)
HGB BLD-MCNC: 11.9 G/DL (ref 12–16)
IMM GRANULOCYTES # BLD AUTO: 0.03 K/UL (ref 0–0.04)
IMM GRANULOCYTES NFR BLD AUTO: 0.5 % (ref 0–0.5)
LYMPHOCYTES # BLD AUTO: 2 K/UL (ref 1–4.8)
LYMPHOCYTES NFR BLD: 36.3 % (ref 18–48)
MAGNESIUM SERPL-MCNC: 1.9 MG/DL (ref 1.6–2.6)
MCH RBC QN AUTO: 31.3 PG (ref 27–31)
MCHC RBC AUTO-ENTMCNC: 32.5 G/DL (ref 32–36)
MCV RBC AUTO: 96 FL (ref 82–98)
MONOCYTES # BLD AUTO: 0.5 K/UL (ref 0.3–1)
MONOCYTES NFR BLD: 9.5 % (ref 4–15)
NEUTROPHILS # BLD AUTO: 2.8 K/UL (ref 1.8–7.7)
NEUTROPHILS NFR BLD: 50.6 % (ref 38–73)
NRBC BLD-RTO: 0 /100 WBC
PLATELET # BLD AUTO: 249 K/UL (ref 150–450)
PMV BLD AUTO: 9.3 FL (ref 9.2–12.9)
POTASSIUM SERPL-SCNC: 4 MMOL/L (ref 3.5–5.1)
RBC # BLD AUTO: 3.8 M/UL (ref 4–5.4)
SODIUM SERPL-SCNC: 142 MMOL/L (ref 136–145)
WBC # BLD AUTO: 5.59 K/UL (ref 3.9–12.7)

## 2022-08-09 PROCEDURE — 96361 HYDRATE IV INFUSION ADD-ON: CPT

## 2022-08-09 PROCEDURE — A4216 STERILE WATER/SALINE, 10 ML: HCPCS | Performed by: PHYSICIAN ASSISTANT

## 2022-08-09 PROCEDURE — 85025 COMPLETE CBC W/AUTO DIFF WBC: CPT | Performed by: PHYSICIAN ASSISTANT

## 2022-08-09 PROCEDURE — 80048 BASIC METABOLIC PNL TOTAL CA: CPT | Performed by: PHYSICIAN ASSISTANT

## 2022-08-09 PROCEDURE — 63600175 PHARM REV CODE 636 W HCPCS: Performed by: PHYSICIAN ASSISTANT

## 2022-08-09 PROCEDURE — G0378 HOSPITAL OBSERVATION PER HR: HCPCS

## 2022-08-09 PROCEDURE — 99226 PR SUBSEQUENT OBSERVATION CARE,LEVEL III: ICD-10-PCS | Mod: ,,, | Performed by: HOSPITALIST

## 2022-08-09 PROCEDURE — 94761 N-INVAS EAR/PLS OXIMETRY MLT: CPT

## 2022-08-09 PROCEDURE — 97161 PT EVAL LOW COMPLEX 20 MIN: CPT

## 2022-08-09 PROCEDURE — 96372 THER/PROPH/DIAG INJ SC/IM: CPT | Performed by: PHYSICIAN ASSISTANT

## 2022-08-09 PROCEDURE — 99226 PR SUBSEQUENT OBSERVATION CARE,LEVEL III: CPT | Mod: ,,, | Performed by: HOSPITALIST

## 2022-08-09 PROCEDURE — 99214 OFFICE O/P EST MOD 30 MIN: CPT | Mod: ,,, | Performed by: SPECIALIST

## 2022-08-09 PROCEDURE — 25000003 PHARM REV CODE 250: Performed by: PHYSICIAN ASSISTANT

## 2022-08-09 PROCEDURE — 83735 ASSAY OF MAGNESIUM: CPT | Performed by: PHYSICIAN ASSISTANT

## 2022-08-09 PROCEDURE — 36415 COLL VENOUS BLD VENIPUNCTURE: CPT | Performed by: PHYSICIAN ASSISTANT

## 2022-08-09 PROCEDURE — 97166 OT EVAL MOD COMPLEX 45 MIN: CPT

## 2022-08-09 PROCEDURE — 99214 PR OFFICE/OUTPT VISIT, EST, LEVL IV, 30-39 MIN: ICD-10-PCS | Mod: ,,, | Performed by: SPECIALIST

## 2022-08-09 RX ORDER — LOPERAMIDE HYDROCHLORIDE 2 MG/1
2 CAPSULE ORAL 4 TIMES DAILY PRN
Status: DISCONTINUED | OUTPATIENT
Start: 2022-08-09 | End: 2022-08-11 | Stop reason: HOSPADM

## 2022-08-09 RX ADMIN — MELATONIN TAB 3 MG 6 MG: 3 TAB at 10:08

## 2022-08-09 RX ADMIN — ZIPRASIDONE HCL 20 MG: 20 CAPSULE ORAL at 06:08

## 2022-08-09 RX ADMIN — Medication 10 ML: at 03:08

## 2022-08-09 RX ADMIN — HEPARIN SODIUM 5000 UNITS: 5000 INJECTION INTRAVENOUS; SUBCUTANEOUS at 10:08

## 2022-08-09 RX ADMIN — CIPROFLOXACIN 500 MG: 250 TABLET, COATED ORAL at 08:08

## 2022-08-09 RX ADMIN — HEPARIN SODIUM 5000 UNITS: 5000 INJECTION INTRAVENOUS; SUBCUTANEOUS at 06:08

## 2022-08-09 RX ADMIN — HEPARIN SODIUM 5000 UNITS: 5000 INJECTION INTRAVENOUS; SUBCUTANEOUS at 03:08

## 2022-08-09 RX ADMIN — ATORVASTATIN CALCIUM 10 MG: 10 TABLET, FILM COATED ORAL at 08:08

## 2022-08-09 RX ADMIN — METRONIDAZOLE 500 MG: 500 TABLET ORAL at 08:08

## 2022-08-09 RX ADMIN — SODIUM CHLORIDE: 0.9 INJECTION, SOLUTION INTRAVENOUS at 12:08

## 2022-08-09 RX ADMIN — MULTIVIT AND MINERALS-FERROUS GLUCONATE 9 MG IRON/15 ML ORAL LIQUID 15 ML: at 08:08

## 2022-08-09 NOTE — PLAN OF CARE
08/09/22 0926   Discharge Assessment   Assessment Type Discharge Planning Assessment   Confirmed/corrected address, phone number and insurance Yes   Confirmed Demographics Correct on Facesheet   Source of Information patient   When was your last doctors appointment?   (< 3 months)   Does patient/caregiver understand observation status Yes   Communicated PAULA with patient/caregiver Date not available/Unable to determine   Reason For Admission Weakness   Lives With alone   Do you expect to return to your current living situation? Yes   Do you have help at home or someone to help you manage your care at home? No   Who are your caregiver(s) and their phone number(s)? Andre Carlisle (Son)   [phone] and his wife help as needed, but do not live with her.   Prior to hospitilization cognitive status: Alert/Oriented   Current cognitive status: Alert/Oriented   Walking or Climbing Stairs Difficulty other (see comments)  (Patient was Independent prior. She can't walk safely at this time.)   Dressing/Bathing Difficulty other (see comments)  (She was not getting help prior she stated. Currently she would need assistance.)   Home Accessibility wheelchair accessible   Equipment Currently Used at Home none   Readmission within 30 days? Yes   Patient currently being followed by outpatient case management? No   Do you currently have service(s) that help you manage your care at home? No   Do you take prescription medications? Yes   Do you have prescription coverage? Yes   Coverage MEDICARE - MEDICARE PART A & B   Do you have any problems affording any of your prescribed medications? No   Is the patient taking medications as prescribed? yes   Who is going to help you get home at discharge? Andre Carlisle (Son)   [phone]  and his wife, Monica.   How do you get to doctors appointments? car, drives self   Are you on dialysis? No   Do you take coumadin? No   Discharge Plan A Home   Discharge Plan B Home Health;Home   DME  Needed Upon Discharge  other (see comments)  (TBD; Patient has asked about a Rollator.)   Discharge Plan discussed with: Patient   Discharge Barriers Identified None   [facility] ([facility])  Initial Discharge Assessment       Primary Care Provider: Mary Cortes MD    Admission Diagnosis: Weakness [R53.1]  Generalized weakness [R53.1]    Admission Date: 8/8/2022  Expected Discharge Date:     Discharge Barriers Identified: (P) None    Payor: MEDICARE / Plan: MEDICARE PART A & B / Product Type: Government /     Extended Emergency Contact Information  Primary Emergency Contact: Andre Carlisle   [facility]  Mobile Phone: [phone]  Relation: Son    Discharge Plan A: (P) Home  Discharge Plan B: (P) Home Health, Home      [facility] [address] [place] [address]  [address] [place] [id number]  Phone: [phone] Fax: [phone]    [id number] - [place], [place] - [address] AT [address] [place] [address]  Phone: [phone] Fax: [phone]      Initial Assessment (most recent)       Adult Discharge Assessment - 08/09/22 0926          Discharge Assessment    Assessment Type Discharge Planning Assessment     Confirmed/corrected address, phone number and insurance Yes     Confirmed Demographics Correct on Facesheet     Source of Information patient     When was your last doctors appointment? -- (P)    < 3 months    Does patient/caregiver understand observation status Yes     Communicated PAULA with patient/caregiver Date not available/Unable to determine     Reason For Admission Weakness (P)      Lives With alone (P)      Do you expect to return to your current living situation? Yes (P)      Do you have help at home or someone to help you manage your care at home? No (P)      Who are your caregiver(s) and their phone number(s)? Andre Carlisle (Son)   [phone] and his wife  help as needed, but do not live with her. (P)      Prior to hospitilization cognitive status: Alert/Oriented (P)      Current cognitive status: Alert/Oriented (P)      Walking or Climbing Stairs Difficulty other (see comments) (P)    Patient was Independent prior. She can't walk safely at this time.    Dressing/Bathing Difficulty other (see comments) (P)    She was not getting help prior she stated. Currently she would need assistance.    Home Accessibility wheelchair accessible (P)      Equipment Currently Used at Home none (P)      Readmission within 30 days? Yes (P)      Patient currently being followed by outpatient case management? No (P)      Do you currently have service(s) that help you manage your care at home? No (P)      Do you take prescription medications? Yes (P)      Do you have prescription coverage? Yes (P)      Coverage MEDICARE - MEDICARE PART A & B (P)      Do you have any problems affording any of your prescribed medications? No (P)      Is the patient taking medications as prescribed? yes (P)      Who is going to help you get home at discharge? Andre Carlisle (Son)   [phone]  and his wife, Monica. (P)      How do you get to doctors appointments? car, drives self (P)      Are you on dialysis? No (P)      Do you take coumadin? No (P)      Discharge Plan A Home (P)      Discharge Plan B Home Health;Home (P)      DME Needed Upon Discharge  other (see comments) (P)    TBD; Patient has asked about a Rollator.    Discharge Plan discussed with: Patient (P)      Discharge Barriers Identified None (P)

## 2022-08-09 NOTE — PROGRESS NOTES
[facility]  Wound Care    Patient Name:  Cheyenne Garner   MRN:  [id number]  Date: 8/9/2022  Diagnosis: Generalized weakness    History:     Past Medical History:   Diagnosis Date    Cataract     Hyperlipidemia     Inflammatory bowel disease     Sarcoidosis with granulomatous hepatitis     UTI (urinary tract infection) 7/17/2013       Social History     Socioeconomic History    Marital status:    Tobacco Use    Smoking status: Former Smoker    Smokeless tobacco: Never Used   Substance and Sexual Activity    Alcohol use: Yes     Alcohol/week: 3.0 standard drinks     Types: 1 Glasses of wine, 1 Cans of beer, 1 Shots of liquor per week    Drug use: No    Sexual activity: Not Currently     Social Determinants of Health     Financial Resource Strain: Low Risk     Difficulty of Paying Living Expenses: Not very hard   Food Insecurity: No Food Insecurity    Worried About Running Out of Food in the Last Year: Never true    Ran Out of Food in the Last Year: Never true   Transportation Needs: No Transportation Needs    Lack of Transportation (Medical): No    Lack of Transportation (Non-Medical): No   Physical Activity: Inactive    Days of Exercise per Week: 0 days    Minutes of Exercise per Session: 0 min   Stress: No Stress Concern Present    Feeling of Stress : Not at all   Social Connections: Moderately Integrated    Frequency of Communication with Friends and Family: Once a week    Frequency of Social Gatherings with Friends and Family: Twice a week    Attends Restorationism Services: 1 to 4 times per year    Active Member of Clubs or Organizations: No    Attends Club or Organization Meetings: Never    Marital Status:    Housing Stability: Low Risk     Unable to Pay for Housing in the Last Year: No    Number of Places Lived in the Last Year: 1    Unstable Housing in the Last Year: No       Precautions:     Allergies as of 08/08/2022    (No Known Allergies)       [facility]  Assessment Details/Treatment     Patient identified as being at increased risk for pressure related skin breakdown. Ismael 15. Pressure injury prevention interventions ordered.       08/09/2022

## 2022-08-09 NOTE — ED PROVIDER NOTES
Encounter Date: 8/8/2022    SCRIBE #1 NOTE: I, Joi Bal, am scribing for, and in the presence of, Moncho Burroughs II, MD .       History     Chief Complaint   Patient presents with    Weakness     Weakness and nausea x 2 days.      This is a 86 y.o. female who presents with complaint of generalized weakness x 3 days. Pt states that she has been unable to get out of bed. She notes associated nausea, vomiting, diarrhea, and lightheadedness. Pt states that she vomited once last night. She denies appetite change, fever, cough, or SOB. She adds that she is vaccinated for COVID-19. This is the extent of the patient's complaints at this time.    The history is provided by the patient.     Review of patient's allergies indicates:  No Known Allergies  Past Medical History:   Diagnosis Date    Cataract     Hyperlipidemia     Inflammatory bowel disease     Sarcoidosis with granulomatous hepatitis     UTI (urinary tract infection) 7/17/2013     Past Surgical History:   Procedure Laterality Date    BLADDER SURGERY      BREAST SURGERY      EYE SURGERY      HIP SURGERY      HYSTERECTOMY      JOINT REPLACEMENT      SINUS SURGERY       Family History   Problem Relation Age of Onset    Cancer Mother     Cancer Sister      Social History     Tobacco Use    Smoking status: Former Smoker    Smokeless tobacco: Never Used   Substance Use Topics    Alcohol use: Yes     Alcohol/week: 3.0 standard drinks     Types: 1 Glasses of wine, 1 Cans of beer, 1 Shots of liquor per week    Drug use: No     Review of Systems   Constitutional: Negative for appetite change and fever.   HENT: Negative for sore throat.    Respiratory: Negative for cough and shortness of breath.    Cardiovascular: Negative for chest pain.   Gastrointestinal: Positive for diarrhea, nausea and vomiting.   Genitourinary: Negative for dysuria.   Musculoskeletal: Negative for back pain.   Skin: Negative for rash.   Neurological: Positive for weakness  and light-headedness.   Hematological: Does not bruise/bleed easily.       Physical Exam     Initial Vitals [08/08/22 1733]   BP Pulse Resp Temp SpO2   125/72 84 16 98 °F (36.7 °C) 98 %      MAP       --         Physical Exam    Nursing note and vitals reviewed.  Constitutional: No distress.   Tired appearing.   HENT:   Head: Normocephalic and atraumatic.   Dry mucous membranes.   Eyes: EOM are normal. Pupils are equal, round, and reactive to light.   No pallor or icterus.   Neck: Neck supple.   Normal range of motion.  Cardiovascular: Normal rate, regular rhythm and normal heart sounds. Exam reveals no gallop and no friction rub.    No murmur heard.  Pulmonary/Chest: Breath sounds normal. No respiratory distress. She has no wheezes. She has no rhonchi. She has no rales.   Abdominal: Abdomen is soft. There is no abdominal tenderness.   Musculoskeletal:         General: No tenderness or edema. Normal range of motion.      Cervical back: Normal range of motion and neck supple.     Lymphadenopathy:     She has no cervical adenopathy.   Neurological: She is alert and oriented to person, place, and time.   Skin: Skin is warm and dry.   Psychiatric: She has a normal mood and affect. Her behavior is normal. Judgment and thought content normal.         ED Course   Procedures  Labs Reviewed   CBC W/ AUTO DIFFERENTIAL - Abnormal; Notable for the following components:       Result Value    RDW 14.6 (*)     MPV 9.0 (*)     All other components within normal limits   COMPREHENSIVE METABOLIC PANEL - Abnormal; Notable for the following components:    CO2 20 (*)     Calcium 8.6 (*)     Albumin 3.3 (*)     Alkaline Phosphatase 42 (*)     All other components within normal limits   URINALYSIS, REFLEX TO URINE CULTURE - Abnormal; Notable for the following components:    Specific Gravity, UA >=1.030 (*)     Nitrite, UA Positive (*)     All other components within normal limits    Narrative:     Specimen Source->Urine   URINALYSIS  MICROSCOPIC    Narrative:     Specimen Source->Urine   SARS-COV-2 RDRP GENE    Narrative:     This test utilizes isothermal nucleic acid amplification   technology to detect the SARS-CoV-2 RdRp nucleic acid segment.   The analytical sensitivity (limit of detection) is 125 genome   equivalents/mL.   A POSITIVE result implies infection with the SARS-CoV-2 virus;   the patient is presumed to be contagious.     A NEGATIVE result means that SARS-CoV-2 nucleic acids are not   present above the limit of detection. A NEGATIVE result should be   treated as presumptive. It does not rule out the possibility of   COVID-19 and should not be the sole basis for treatment decisions.   If COVID-19 is strongly suspected based on clinical and exposure   history, re-testing using an alternate molecular assay should be   considered.   This test is only for use under the Food and Drug   Administration s Emergency Use Authorization (EUA).   Commercial kits are provided by [employer].   Performance characteristics of the EUA have been independently   verified by [facility] Department of   Pathology and Laboratory Medicine.   _________________________________________________________________   The authorized Fact Sheet for Healthcare Providers and the authorized Fact   Sheet for Patients of the ID NOW COVID-19 are available on the FDA   website:     https://www.fda.gov/media/983597/download  https://www.fda.gov/media/404809/download             EKG Readings: (Independently Interpreted)   Sinus rhythm. Rate of 80. No ST or T wave changes. No acute ischemia or arrhythmia.        Imaging Results          X-Ray Chest AP Portable (Final result)  Result time 08/08/22 19:31:17    Final result by Jannie Will MD (08/08/22 19:31:17)                 Impression:      No acute intrathoracic abnormality detected.      Electronically signed by: Jannie Will  Date:    08/08/2022  Time:    19:31             Narrative:     EXAMINATION:  AP PORTABLE CHEST    CLINICAL HISTORY:  Chest Pain;    TECHNIQUE:  AP portable chest radiograph was submitted.    COMPARISON:  10/29/2019    FINDINGS:  AP portable chest radiograph demonstrates a cardiac silhouette within normal limits.  Vascular calcification is seen at the aortic knob.  There is no focal consolidation, pneumothorax, or pleural effusion.  Degenerative changes of the shoulders and the spine are seen.  The bones are osteopenic.                              X-Rays:   Independently Interpreted Readings:   Chest X-Ray: No focal infiltrate or effusion. No pneumothorax.      Medications   sodium chloride 0.9% bolus 1,000 mL (1,000 mLs Intravenous New Bag 8/8/22 1920)     Medical Decision Making:   History:   Old Medical Records: I decided to obtain old medical records.  Independently Interpreted Test(s):   I have ordered and independently interpreted X-rays - see prior notes.  I have ordered and independently interpreted EKG Reading(s) - see prior notes  Clinical Tests:   Lab Tests: Ordered and Reviewed  Radiological Study: Ordered and Reviewed  Medical Tests: Ordered and Reviewed  Patient with recent admission for diverticulitis with intra-abdominal abscess, currently treated home on oral antibiotics the past few weeks, now presents with ongoing diarrhea, generalized weakness, to the point where she reports she has difficulty ambulating around the house.  On exam, afebrile, stable vital signs, benign abdomen.  Laboratory studies show normal white count.  Normal electrolytes and renal function.  Chest x-ray and COVID are negative.  Patient was given IV fluids, however is not feeling better.  Patient reports she does not have anyone at home, and given her weakness, difficulty ambulating, therefore feel that she will require admission for observation, evaluation by PT/OT in the morning, and safe discharge planning.  Case discussed with hospitalist service.        Jurgenibe Attestation:   Dorian  #1: I performed the above scribed service and the documentation accurately describes the services I performed. I attest to the accuracy of the note.                 Physician Attestation for Scribe: I, TL, reviewed documentation as scribed in my presence, which is both accurate and complete.    Clinical Impression:   Final diagnoses:  [R53.1] Weakness  [R53.1] Generalized weakness (Primary)          ED Disposition Condition    Observation               Moncho Burroughs II, MD  08/08/22 2053

## 2022-08-09 NOTE — HPI
"From H&P by Letty LOBO:  "Ms. Cheyenne Garner is a 86 y.o. female, with PMH of IBD, sarcoidosis, HLD, OA, Depression, who presented to [facility] on 8/8/22 due to generalized weakness and nausea x 2 days. She states she has felt unable to get out of bed due to her weakness. Associated symptoms include N/V/D, and light headedness. Her last/only episode of emesis was last night. She has been on PO antibiotics at home for treatment of diverticuliits after a recent admission. She denied appetite changes, fever, cough, shortness of breath. She was evaluated in the ED, where labs showed a normal CBC. A metabolic panel showed low ALP of 42. A UA was concentrated with nitrites, but showed no UTI. A CXR was without acute cardiopulmonary abnormalities. As she lives alone, and feels weak, she was placed on OBS."  "

## 2022-08-09 NOTE — PT/OT/SLP EVAL
Physical Therapy Evaluation    Patient Name:  Cheyenne Garner   MRN:  [id number]    Recommendations:     Discharge Recommendations:  home health PT   Discharge Equipment Recommendations:  (TBD)   Barriers to discharge: Decreased caregiver support    Assessment:     Cheyenne Garenr is a 86 y.o. female admitted with a medical diagnosis of Generalized weakness. She has a past medical history that includes but is not limited to HLD, UTI, hematuria, depression, sarcoidosis, OA of the hip, insomnia, osteoporosis, and colitis. She presents with the following impairments/functional limitations:  weakness, impaired endurance, impaired self care skills, gait instability, impaired functional mobility, impaired balance, decreased lower extremity function, decreased safety awareness.    PT orders received. PT evaluation completed and goals/POC established. Pt tolerated evaluation well with no adverse reactions. Pt performed in-room ambulation to and from commode with RW and assistance. PT will continue to follow and progress goals as tolerated. Recommend discharge to home with HHPT, pending progress with therapy.     Rehab Prognosis: Good; patient would benefit from acute skilled PT services to address these deficits and reach maximum level of function.    Recent Surgery: * No surgery found *      Plan:     During this hospitalization, patient to be seen 4 x/week to address the identified rehab impairments via gait training, therapeutic exercises, therapeutic activities, neuromuscular re-education and progress toward the following goals:    · Plan of Care Expires:  09/08/22    Subjective     Chief Complaint: weakness  Patient/Family Comments/goals: Pt concerned about her weakness impacting her functional mobility.   Pain/Comfort:  · Pain Rating 1: 0/10    Patients cultural, spiritual, [employer]ism conflicts given the current situation: no    Living Environment:  Pt lives alone in an apartment with elevator access. Prior to  three days ago, the pt was independent and ambulatory, driving. She has access to a walk-in shower in the apartment. She has felt weaker in the last three days, causing a fall while trying to sit on the commode; she has started ambulating with a RW since the fall.  Equipment used at home: walker, rolling (for last 3 days).  DME owned (not currently used): none.  Upon discharge, patient will have limited assistance.    Objective:     Communicated with MECCA Beard prior to session.  Patient found HOB elevated with telemetry, peripheral IV, bed alarm  upon PT entry to room.    General Precautions: Standard, fall   Orthopedic Precautions:N/A   Braces: N/A  Respiratory Status: Room air    Exams:  · Cognition:   · Patient is oriented to self, place, time and situation.  · Pt follows approximately 100% of simple commands.    · Mood: Pleasant and cooperative.   · Safety Awareness: intact  · Musculoskeletal:  · Posture:  Rounded shoulders, forward head  · LE ROM/Strength:   · R ROM: WFL  · L ROM: WFL  · R Strength:   · Hip flexion: 5/5  · Knee extension: 5/5  · Dorsiflexion: 5/5   · L Strength:   · Hip flexion: 5/5  · Knee extension: 5/5  · Dorsiflexion: 5/5   · Neuromuscular:  · Sensation: Intact to light touch bilateral LEs.   · Tone/Reflexes: No impairments identified with functional mobility. No formal testing performed.   · Balance:   · Static sitting: SPV  · Dynamic sitting: SBA  · Static standing: CGA  · Dynamic standing: CGA  · Visual-vestibular: No impairments identified with functional mobility. No formal testing performed.  · Integument: Visible skin intact  · Cardiopulmonary:  · Edema: none noted     Functional Mobility:  · Bed Mobility:     · Supine to Sit: minimum assistance  · Sit to Supine: stand by assistance  · Transfers:     · Sit to Stand:  contact guard assistance with rolling walker  · Gait: x2 trials of ~12 feet with contact guard assistance with rolling walker. Pt with decreased speed, federica, and  bilateral step length. Decreased stability, however, no LOB noted. Pt required verbal cues for RW mgmt and safe transitions.     Therapeutic Activities and Exercises:  Bed mobility, transfer, and gait training as described above.    PT educated patient re:   · PT plan of care/role of PT  · Use of RW  · Fall and safety precautions  · Gait deviations  · Use of call light (don't get up without assistance)  Pt verbalized understanding     The patient is safe to transfer with RN assist.  Patient encouraged to sit up in chair throughout the day to prevent deconditioning.     AM-PAC 6 CLICK MOBILITY  Total Score:17     Patient left HOB elevated with all lines intact, call button in reach, bed alarm on and RN notified.    GOALS:   Multidisciplinary Problems     Physical Therapy Goals        Problem: Physical Therapy    Goal Priority Disciplines Outcome Goal Variances Interventions   Physical Therapy Goal     PT, PT/OT Ongoing, Progressing     Description: Goals to be met by: 9/8/22    Patient will perform the following to increase strength, improve mobility, and return to prior level of function:    1. Supine <> sit with SPV.  2. Sit<>stand with SPV with least restrictive assistive device.  3. Gait x 100 feet with SPV with least restrictive assistive device.                       History:     Past Medical History:   Diagnosis Date    Cataract     Hyperlipidemia     Inflammatory bowel disease     Sarcoidosis with granulomatous hepatitis     UTI (urinary tract infection) 7/17/2013       Past Surgical History:   Procedure Laterality Date    BLADDER SURGERY      BREAST SURGERY      EYE SURGERY      HIP SURGERY      HYSTERECTOMY      JOINT REPLACEMENT      SINUS SURGERY         Time Tracking:     PT Received On: 08/09/22  PT Start Time: 0853     PT Stop Time: 0907  PT Total Time (min): 14 min     Billable Minutes: Evaluation 14      08/09/2022

## 2022-08-09 NOTE — PLAN OF CARE
Pt arrived to unit at 2230, AAOx4. Assessment per documentation. VSS on room air. Orhtostatic BP as charted. Pt denies pain. Home medications reconciled with PA/patient over phone. IVF's started as ordered. Purewick applied. No injuries, falls, or trauma occurred during shift. Purposeful rounding completed. Bed low and locked with side rails up x 3 and call light within reach. Will continue to monitor.

## 2022-08-09 NOTE — PLAN OF CARE
Problem: Physical Therapy  Goal: Physical Therapy Goal  Description: Goals to be met by: 9/8/22    Patient will perform the following to increase strength, improve mobility, and return to prior level of function:    1. Supine <> sit with SPV.  2. Sit<>stand with SPV with least restrictive assistive device.  3. Gait x 100 feet with SPV with least restrictive assistive device.      Outcome: Ongoing, Progressing     PT orders received. PT evaluation completed and goals/POC established. Pt tolerated evaluation well with no adverse reactions. Pt performed in-room ambulation to and from commode with RW and assistance. PT will continue to follow and progress goals as tolerated. Recommend discharge to home with HHPT, pending progress with therapy.

## 2022-08-09 NOTE — HOSPITAL COURSE
Patient was admitted and surgery was consulted to re-evaluate the diverticular abscess.  She was nauseated and weak, and as she had been on antibiotics for a very prolonged period they were held and CT of the pelvis was ordered.  Diverticular abscess had decreased in size since the previous CT done on August 1st, but was still present.  The Infectious Diseases service was consulted for further recommendations regarding antibiotics given her intolerance of the long-term Cipro/Flagyl combination.  They felt that this could be a sterile fluid collection and she was afebrile and had a normal white blood cell count it would be safe to observe her without antibiotics for a while.  Patient's symptoms improved markedly off antibiotics and although she was not back to her baseline on discharge she was feeling better.  She participated in PT and OT, and resumption of home health was recommended.

## 2022-08-09 NOTE — SUBJECTIVE & OBJECTIVE
Interval History:  Patient is known to me from her hospital stay here in June for treatment of the diverticular abscess.  Seen and examined at bedside along with her PCP, Dr. Cortes.  Patient says she is weak and has been nauseated.  She has diarrhea that is chronic, sometimes takes cholestyramine and at other times takes Imodium.  Her PCP has told her to stop taking her antibiotics, but the patient thinks she is still taking them.    Review of Systems   Constitutional:  Negative for chills and fever.   Respiratory:  Negative for cough and shortness of breath.    Cardiovascular:  Negative for chest pain and palpitations.   Gastrointestinal:  Positive for diarrhea, nausea and vomiting.   Neurological:  Positive for weakness.   Objective:     Vital Signs (Most Recent):  Temp: 97.4 °F (36.3 °C) (08/09/22 1106)  Pulse: 77 (08/09/22 1106)  Resp: 12 (08/09/22 1106)  BP: (!) 105/55 (08/09/22 1106)  SpO2: 95 % (08/09/22 1106)   Vital Signs (24h Range):  Temp:  [96.6 °F (35.9 °C)-98 °F (36.7 °C)] 97.4 °F (36.3 °C)  Pulse:  [70-92] 77  Resp:  [12-22] 12  SpO2:  [94 %-99 %] 95 %  BP: (105-159)/(55-81) 105/55     Weight: 57.6 kg (127 lb)  Body mass index is 22.5 kg/m².  No intake or output data in the 24 hours ending 08/09/22 1341   Physical Exam  Constitutional:       Appearance: She is normal weight.      Comments: Frail, elderly woman in no apparent distress.   Cardiovascular:      Rate and Rhythm: Normal rate and regular rhythm.      Heart sounds: Normal heart sounds. No murmur heard.    No gallop.   Pulmonary:      Effort: Pulmonary effort is normal.      Breath sounds: Normal breath sounds.   Abdominal:      General: Bowel sounds are normal.      Palpations: Abdomen is soft.   Skin:     General: Skin is warm and dry.   Neurological:      General: No focal deficit present.      Mental Status: She is alert.   Psychiatric:         Mood and Affect: Mood normal.         Behavior: Behavior normal.       Significant Labs: All  pertinent labs within the past 24 hours have been reviewed.    Significant Imaging: I have reviewed all pertinent imaging results/findings within the past 24 hours.

## 2022-08-09 NOTE — ASSESSMENT & PLAN NOTE
- Ms. Cheyenne Garner presents with generalized weakness   - labs and imaging showed no obvious etiology   - she was recently treated with PO antibiotics for diverticulitis w/ perforated abscess in June 2022   - she was discharged to home on 3 weeks of Cipro & Flagyl (last dose should have been on 7/722)   - the patient states these were prescribed again and she continues to take both   - has Remeron and Geodon listed on her home meds   - has h/o B12 deficiency   - poor PO intake 2/2 weakness & decreased ambulation / ADL completion   - hydrate overnight   - orthostatics qshift  - monitor labs in AM   - PT/OT & Social work consulted

## 2022-08-09 NOTE — PROGRESS NOTES
LC notes that HH Pt and OT have been recommended by the patient's therapists for after d/c. LC met with the patient to discuss this. The patient selected [facility]. She signed the Choices Form.

## 2022-08-09 NOTE — ASSESSMENT & PLAN NOTE
Colonic diverticular abscess  Nausea with vomiting.  - Patient with prior history of diverticular abscess presented with weakness and nausea.  - Diverticulitis with abscess treated last hospital stay with antibiotics in the hope of avoiding surgery.  - She has been on Cipro/Flagyl for a prolonged course with serial CT to evaluate the size of the abscess.  Now seems to be having side effects of the antibiotics.  - Discussed with Dr. Bojorquez, suggests repeating CT to see if abscess is diminishing in size.  - PT/OT consulted.  - Continue to hold antibiotics for now.

## 2022-08-09 NOTE — PLAN OF CARE
Problem: Occupational Therapy  Goal: Occupational Therapy Goal  Description: Goals to be met by: 8/23/22     Patient will increase functional independence with ADLs by performing:    UE Dressing with [place].  LE Dressing with [place].  Grooming while standing at sink with [place].  Toileting from toilet with [place] for hygiene and clothing management.   Toilet transfer to toilet with Modified [place].    Outcome: Ongoing, Progressing     OT evaluation complete. CGA for transfers and functional ambulation using RW. SBA for ADLs at EOB.     DC: Home with HH OT/PT pending progress  DME: [facility]

## 2022-08-09 NOTE — PROGRESS NOTES
"[facility] Medicine  Progress Note    Patient Name: Cheyenne Garner  MRN: [id number]  Patient Class: OP- Observation   Admission Date: 8/8/2022  Length of Stay: 0 days  Attending Physician: Adali Hale MD  Primary Care Provider: Mary Cortes MD        Subjective:     Principal Problem:Generalized weakness        HPI:  From H&P by Letty Batista PA:  "Ms. Cheyenne Garner is a 86 y.o. female, with PMH of IBD, sarcoidosis, HLD, OA, Depression, who [place] to [facility] [place]8/22 due to generalized weakness and nausea x 2 days. She states she has felt unable to get out of bed due to her weakness. Associated symptoms include N/V/D, and light headedness. Her last/only episode of emesis was last night. She has been on PO antibiotics at home for treatment of diverticuliits after a recent admission. She denied appetite changes, fever, cough, shortness of breath. She was evaluated in the ED, where labs showed a normal CBC. A metabolic panel showed low ALP of 42. A UA was concentrated with nitrites, but showed no UTI. A CXR was without acute cardiopulmonary abnormalities. As she lives alone, and feels weak, she was placed on OBS."      Overview/Hospital Course:  Patient was admitted and surgery was consulted to re-evaluate the diverticular abscess.  She was nauseated and weak, and as she had been on antibiotics for a very prolonged period they were held and CT of the pelvis was ordered.       Interval History:  Patient is known to me from her hospital stay here in June for treatment of the diverticular abscess.  Seen and examined at bedside along with her PCP, Dr. Cortes.  Patient says she is weak and has been nauseated.  She has diarrhea that is chronic, sometimes takes cholestyramine and at other times takes Imodium.  Her PCP has told her to stop taking her antibiotics, but the patient thinks she is still taking them.    Review of Systems   Constitutional:  Negative for chills " and fever.   Respiratory:  Negative for cough and shortness of breath.    Cardiovascular:  Negative for chest pain and palpitations.   Gastrointestinal:  Positive for diarrhea, nausea and vomiting.   Neurological:  Positive for weakness.   Objective:     Vital Signs (Most Recent):  Temp: 97.4 °F (36.3 °C) (08/09/22 1106)  Pulse: 77 (08/09/22 1106)  Resp: 12 (08/09/22 1106)  BP: (!) 105/55 (08/09/22 1106)  SpO2: 95 % (08/09/22 1106)   Vital Signs (24h Range):  Temp:  [96.6 °F (35.9 °C)-98 °F (36.7 °C)] 97.4 °F (36.3 °C)  Pulse:  [70-92] 77  Resp:  [12-22] 12  SpO2:  [94 %-99 %] 95 %  BP: (105-159)/(55-81) 105/55     Weight: 57.6 kg (127 lb)  Body mass index is 22.5 kg/m².  No intake or output data in the 24 hours ending 08/09/22 1341   Physical Exam  Constitutional:       Appearance: She is normal weight.      Comments: Frail, elderly woman in no apparent distress.   Cardiovascular:      Rate and Rhythm: Normal rate and regular rhythm.      Heart sounds: Normal heart sounds. No murmur heard.    No gallop.   Pulmonary:      Effort: Pulmonary effort is normal.      Breath sounds: Normal breath sounds.   Abdominal:      General: Bowel sounds are normal.      Palpations: Abdomen is soft.   Skin:     General: Skin is warm and dry.   Neurological:      General: No focal deficit present.      Mental Status: She is alert.   Psychiatric:         Mood and Affect: Mood normal.         Behavior: Behavior normal.       Significant Labs: All pertinent labs within the past 24 hours have been reviewed.    Significant Imaging: I have reviewed all pertinent imaging results/findings within the past 24 hours.      Assessment/Plan:      * Generalized weakness  Colonic diverticular abscess  Nausea with vomiting.  - Patient with prior history of diverticular abscess presented with weakness and nausea.  - Diverticulitis with abscess treated last hospital stay with antibiotics in the hope of avoiding surgery.  - She has been on Cipro/Flagyl  "for a prolonged course with serial CT to evaluate the size of the abscess.  Now seems to be having side effects of the antibiotics.  - Discussed with Dr. Bojorquez, suggests repeating CT to see if abscess is diminishing in size.  - PT/OT consulted.  - Continue to hold antibiotics for now.    Colonic diverticular abscess  See "generalized weakness"      Non-intractable vomiting with nausea  See "generalized weakness"      Hyperlipidemia  - continue simvastatin 10 mg QD or formulary equivalent         VTE Risk Mitigation (From admission, onward)         Ordered     heparin (porcine) injection 5,000 Units  Every 8 hours         08/08/22 2314     IP VTE HIGH RISK PATIENT  Once         08/08/22 2314     Place sequential compression device  Until discontinued         08/08/22 2314                  Adali Deshpande MD  Department of Hospital Medicine   [facility] ([place])  "

## 2022-08-09 NOTE — PLAN OF CARE
Pt arrived to unit via stretcher with wife per staff. POC reviewed with patient. AAOx4. VS stable on room air. Assessment per documentation. Pt c/o pain at abdomen and asked for tramadol as ordered, no relief after an hour and requested dilaudid as ordered, relief achieved. Pt c/o of feeling bloated, given simethicone 80 mg and instructed to lie on alternate sides to move gas and decompress. Pt c/o nausea and received zofran as ordered but no relief. Then given phenergan as ordered and pt had relief. Received IV antibiotics according to MAR. Maintenance fluids infusing as ordered. Started 20g in L hand. Urinal at bedside. No injuries, falls, or trauma occurred during shift. Purposeful rounding completed. Bed low and locked with side rails up x 3 and call light within reach. Will continue to monitor.

## 2022-08-09 NOTE — SUBJECTIVE & OBJECTIVE
Past Medical History:   Diagnosis Date    Cataract     Hyperlipidemia     Inflammatory bowel disease     Sarcoidosis with granulomatous hepatitis     UTI (urinary tract infection) 7/17/2013       Past Surgical History:   Procedure Laterality Date    BLADDER SURGERY      BREAST SURGERY      EYE SURGERY      HIP SURGERY      HYSTERECTOMY      JOINT REPLACEMENT      SINUS SURGERY         Review of patient's allergies indicates:  No Known Allergies    No current facility-administered medications on file prior to encounter.     Current Outpatient Medications on File Prior to Encounter   Medication Sig    ciprofloxacin HCl (CIPRO) 500 MG tablet Take 1 tablet (500 mg total) by mouth every 12 (twelve) hours.    metroNIDAZOLE (FLAGYL) 500 MG tablet Take 1 tablet (500 mg total) by mouth 3 (three) times daily.    ALIGN 4 mg capsule Take 1 capsule by mouth once daily.    cholestyramine, with sugar, 4 gram Powd Take 1 application by mouth daily as needed (Diarrhea). Hold until you see the surgeon or gastroenterologist.    clotrimazole-betamethasone 1-0.05% (LOTRISONE) cream Apply topically 2 (two) times daily.    dextroamphetamine-amphetamine 10 mg Tab Take by mouth once daily.    ergocalciferol (ERGOCALCIFEROL) 50,000 unit Cap Take 1 capsule (50,000 Units total) by mouth twice a week.    hydroCHLOROthiazide (HYDRODIURIL) 12.5 MG Tab Take 1 tablet (12.5 mg total) by mouth daily as needed (le edema).    hydrocortisone (ANUSOL-HC) 2.5 % rectal cream Place rectally 2 (two) times daily.    meloxicam (MOBIC) 15 MG tablet Take 1 tablet (15 mg total) by mouth once daily for arthritis    mirtazapine (REMERON) 7.5 MG Tab Take 1 tablet by mouth Daily.    simvastatin (ZOCOR) 10 MG tablet Take 1 tablet (10 mg total) by mouth every evening.    valACYclovir (VALTREX) 1000 MG tablet TAKE ONE TABLET BY MOUTH TWICE DAILY FOR 5 DAYS FOR cold sores    ziprasidone (GEODON) 20 MG Cap      Family History       Problem Relation (Age of Onset)     Cancer Mother, Sister          Tobacco Use    Smoking status: Former Smoker    Smokeless tobacco: Never Used   Substance and Sexual Activity    Alcohol use: Yes     Alcohol/week: 3.0 standard drinks     Types: 1 Glasses of wine, 1 Cans of beer, 1 Shots of liquor per week    Drug use: No    Sexual activity: Not Currently     Review of Systems   Constitutional:  Positive for activity change (decreased). Negative for appetite change, chills, diaphoresis, fatigue and fever.   Respiratory:  Negative for cough, shortness of breath and wheezing.    Cardiovascular:  Negative for chest pain and palpitations.   Gastrointestinal:  Negative for abdominal pain, constipation, diarrhea, nausea and vomiting.   Genitourinary:  Negative for decreased urine volume, difficulty urinating, dysuria, flank pain, frequency, hematuria and urgency.   Musculoskeletal:  Positive for myalgias. Negative for arthralgias, back pain, gait problem, joint swelling, neck pain and neck stiffness.   Skin:  Negative for color change, pallor, rash and wound.   Neurological:  Positive for weakness (generalized). Negative for dizziness, syncope, light-headedness and headaches.   Psychiatric/Behavioral:  Negative for agitation and confusion.    Objective:     Vital Signs (Most Recent):  Temp: 97.9 °F (36.6 °C) (08/09/22 0431)  Pulse: 85 (08/09/22 0600)  Resp: 18 (08/09/22 0431)  BP: 139/67 (08/09/22 0431)  SpO2: (!) 94 % (08/09/22 0431)   Vital Signs (24h Range):  Temp:  [96.6 °F (35.9 °C)-98 °F (36.7 °C)] 97.9 °F (36.6 °C)  Pulse:  [79-92] 85  Resp:  [16-22] 18  SpO2:  [94 %-99 %] 94 %  BP: (114-159)/(61-81) 139/67     Weight: 57.6 kg (127 lb)  Body mass index is 22.5 kg/m².    Physical Exam  Vitals and nursing note reviewed.   Constitutional:       General: She is not in acute distress.     Appearance: She is well-developed and normal weight. She is not ill-appearing, toxic-appearing or diaphoretic.      Comments: Elderly appearing female.    HENT:       Head: Normocephalic and atraumatic.   Eyes:      General: No scleral icterus.        Right eye: No discharge.         Left eye: No discharge.      Conjunctiva/sclera: Conjunctivae normal.   Neck:      Trachea: No tracheal deviation.   Cardiovascular:      Rate and Rhythm: Normal rate and regular rhythm.      Heart sounds: Normal heart sounds. No murmur heard.    No gallop.   Pulmonary:      Effort: Pulmonary effort is normal. No respiratory distress.      Breath sounds: Normal breath sounds. No stridor. No wheezing or rales.   Abdominal:      General: Bowel sounds are normal. There is no distension.      Palpations: Abdomen is soft. There is no mass.      Tenderness: There is no abdominal tenderness. There is no guarding.   Musculoskeletal:         General: No deformity. Normal range of motion.      Cervical back: Normal range of motion and neck supple.   Skin:     General: Skin is warm and dry.      Coloration: Skin is not pale.      Findings: No erythema or rash.   Neurological:      General: No focal deficit present.      Mental Status: She is alert and oriented to person, place, and time.      Cranial Nerves: No cranial nerve deficit.      Motor: No abnormal muscle tone.   Psychiatric:         Mood and Affect: Mood normal.         Behavior: Behavior normal.         Thought Content: Thought content normal.         Judgment: Judgment normal.           Significant Labs: All pertinent labs within the past 24 hours have been reviewed.  BMP:   Recent Labs   Lab 08/08/22 1919   GLU 98      K 5.1      CO2 20*   BUN 20   CREATININE 0.7   CALCIUM 8.6*     CBC:   Recent Labs   Lab 08/08/22 1919   WBC 7.18   HGB 13.5   HCT 41.8        CMP:   Recent Labs   Lab 08/08/22 1919      K 5.1      CO2 20*   GLU 98   BUN 20   CREATININE 0.7   CALCIUM 8.6*   PROT 7.0   ALBUMIN 3.3*   BILITOT 0.2   ALKPHOS 42*   AST 36   ALT 22   ANIONGAP 13     Urine Culture: No results for input(s): LABURIN in the  last 48 hours.  Urine Studies:   Recent Labs   Lab 08/08/22 2012   COLORU Yellow   APPEARANCEUA Clear   PHUR 6.0   SPECGRAV >=1.030*   PROTEINUA Negative   GLUCUA Negative   KETONESU Negative   BILIRUBINUA Negative   OCCULTUA Negative   NITRITE Positive*   UROBILINOGEN Negative   LEUKOCYTESUR Negative   RBCUA 1   WBCUA 2   BACTERIA Rare       Significant Imaging: I have reviewed all pertinent imaging results/findings within the past 24 hours.  Imaging Results              X-Ray Chest AP Portable (Final result)  Result time 08/08/22 19:31:17      Final result by Jannie Will MD (08/08/22 19:31:17)                   Impression:      No acute intrathoracic abnormality detected.      Electronically signed by: Jannie Will  Date:    08/08/2022  Time:    19:31               Narrative:    EXAMINATION:  AP PORTABLE CHEST    CLINICAL HISTORY:  Chest Pain;    TECHNIQUE:  AP portable chest radiograph was submitted.    COMPARISON:  10/29/2019    FINDINGS:  AP portable chest radiograph demonstrates a cardiac silhouette within normal limits.  Vascular calcification is seen at the aortic knob.  There is no focal consolidation, pneumothorax, or pleural effusion.  Degenerative changes of the shoulders and the spine are seen.  The bones are osteopenic.

## 2022-08-09 NOTE — H&P
[facility] Medicine  History & Physical    Patient Name: Cheyenne Garner  MRN: [id number]  Patient Class: OP- Observation  Admission Date: 8/8/2022  Attending Physician: Adali Hale MD   Primary Care Provider: Mary Cortes MD         Patient information was obtained from patient, past medical records and ER records.     Subjective:     Principal Problem:Generalized weakness    Chief Complaint:   Chief Complaint   Patient presents with    Weakness     Weakness and nausea x 2 days.         HPI: Ms. Cheyenne Garner is a 86 y.o. female, with PMH of IBD, sarcoidosis, HLD, OA, Depression, who presented to [facility] ED on 8/8/22 due to generalized weakness and nausea x 2 days. She states she has felt unable to get out of bed due to her weakness. Associated symptoms include N/V/D, and light headedness. Her last/only episode of emesis was last night. She has been on PO antibiotics at home for treatment of diverticuliits after a recent admission. She denied appetite changes, fever, cough, shortness of breath. She was evaluated in the ED, where labs showed a normal CBC. A metabolic panel showed low ALP of 42. A UA was concentrated with nitrites, but showed no UTI. A CXR was without acute cardiopulmonary abnormalities. As she lives alone, and feels weak, she was placed on OBS.       Past Medical History:   Diagnosis Date    Cataract     Hyperlipidemia     Inflammatory bowel disease     Sarcoidosis with granulomatous hepatitis     UTI (urinary tract infection) 7/17/2013       Past Surgical History:   Procedure Laterality Date    BLADDER SURGERY      BREAST SURGERY      EYE SURGERY      HIP SURGERY      HYSTERECTOMY      JOINT REPLACEMENT      SINUS SURGERY         Review of patient's allergies indicates:  No Known Allergies    No current facility-administered medications on file prior to encounter.     Current Outpatient Medications on File Prior to Encounter   Medication Sig     ciprofloxacin HCl (CIPRO) 500 MG tablet Take 1 tablet (500 mg total) by mouth every 12 (twelve) hours.    metroNIDAZOLE (FLAGYL) 500 MG tablet Take 1 tablet (500 mg total) by mouth 3 (three) times daily.    ALIGN 4 mg capsule Take 1 capsule by mouth once daily.    cholestyramine, with sugar, 4 gram Powd Take 1 application by mouth daily as needed (Diarrhea). Hold until you see the surgeon or gastroenterologist.    clotrimazole-betamethasone 1-0.05% (LOTRISONE) cream Apply topically 2 (two) times daily.    dextroamphetamine-amphetamine 10 mg Tab Take by mouth once daily.    ergocalciferol (ERGOCALCIFEROL) 50,000 unit Cap Take 1 capsule (50,000 Units total) by mouth twice a week.    hydroCHLOROthiazide (HYDRODIURIL) 12.5 MG Tab Take 1 tablet (12.5 mg total) by mouth daily as needed (le edema).    hydrocortisone (ANUSOL-HC) 2.5 % rectal cream Place rectally 2 (two) times daily.    meloxicam (MOBIC) 15 MG tablet Take 1 tablet (15 mg total) by mouth once daily for arthritis    mirtazapine (REMERON) 7.5 MG Tab Take 1 tablet by mouth Daily.    simvastatin (ZOCOR) 10 MG tablet Take 1 tablet (10 mg total) by mouth every evening.    valACYclovir (VALTREX) 1000 MG tablet TAKE ONE TABLET BY MOUTH TWICE DAILY FOR 5 DAYS FOR cold sores    ziprasidone (GEODON) 20 MG Cap      Family History       Problem Relation (Age of Onset)    Cancer Mother, Sister          Tobacco Use    Smoking status: Former Smoker    Smokeless tobacco: Never Used   Substance and Sexual Activity    Alcohol use: Yes     Alcohol/week: 3.0 standard drinks     Types: 1 Glasses of wine, 1 Cans of beer, 1 Shots of liquor per week    Drug use: No    Sexual activity: Not Currently     Review of Systems   Constitutional:  Positive for activity change (decreased). Negative for appetite change, chills, diaphoresis, fatigue and fever.   Respiratory:  Negative for cough, shortness of breath and wheezing.    Cardiovascular:  Negative for chest pain  and palpitations.   Gastrointestinal:  Negative for abdominal pain, constipation, diarrhea, nausea and vomiting.   Genitourinary:  Negative for decreased urine volume, difficulty urinating, dysuria, flank pain, frequency, hematuria and urgency.   Musculoskeletal:  Positive for myalgias. Negative for arthralgias, back pain, gait problem, joint swelling, neck pain and neck stiffness.   Skin:  Negative for color change, pallor, rash and wound.   Neurological:  Positive for weakness (generalized). Negative for dizziness, syncope, light-headedness and headaches.   Psychiatric/Behavioral:  Negative for agitation and confusion.    Objective:     Vital Signs (Most Recent):  Temp: 97.9 °F (36.6 °C) (08/09/22 0431)  Pulse: 85 (08/09/22 0600)  Resp: 18 (08/09/22 0431)  BP: 139/67 (08/09/22 0431)  SpO2: (!) 94 % (08/09/22 0431)   Vital Signs (24h Range):  Temp:  [96.6 °F (35.9 °C)-98 °F (36.7 °C)] 97.9 °F (36.6 °C)  Pulse:  [79-92] 85  Resp:  [16-22] 18  SpO2:  [94 %-99 %] 94 %  BP: (114-159)/(61-81) 139/67     Weight: 57.6 kg (127 lb)  Body mass index is 22.5 kg/m².    Physical Exam  Vitals and nursing note reviewed.   Constitutional:       General: She is not in acute distress.     Appearance: She is well-developed and normal weight. She is not ill-appearing, toxic-appearing or diaphoretic.      Comments: Elderly appearing female.    HENT:      Head: Normocephalic and atraumatic.   Eyes:      General: No scleral icterus.        Right eye: No discharge.         Left eye: No discharge.      Conjunctiva/sclera: Conjunctivae normal.   Neck:      Trachea: No tracheal deviation.   Cardiovascular:      Rate and Rhythm: Normal rate and regular rhythm.      Heart sounds: Normal heart sounds. No murmur heard.    No gallop.   Pulmonary:      Effort: Pulmonary effort is normal. No respiratory distress.      Breath sounds: Normal breath sounds. No stridor. No wheezing or rales.   Abdominal:      General: Bowel sounds are normal. There is  no distension.      Palpations: Abdomen is soft. There is no mass.      Tenderness: There is no abdominal tenderness. There is no guarding.   Musculoskeletal:         General: No deformity. Normal range of motion.      Cervical back: Normal range of motion and neck supple.   Skin:     General: Skin is warm and dry.      Coloration: Skin is not pale.      Findings: No erythema or rash.   Neurological:      General: No focal deficit present.      Mental Status: She is alert and oriented to person, place, and time.      Cranial Nerves: No cranial nerve deficit.      Motor: No abnormal muscle tone.   Psychiatric:         Mood and Affect: Mood normal.         Behavior: Behavior normal.         Thought Content: Thought content normal.         Judgment: Judgment normal.           Significant Labs: All pertinent labs within the past 24 hours have been reviewed.  BMP:   Recent Labs   Lab 08/08/22 1919   GLU 98      K 5.1      CO2 20*   BUN 20   CREATININE 0.7   CALCIUM 8.6*     CBC:   Recent Labs   Lab 08/08/22 1919   WBC 7.18   HGB 13.5   HCT 41.8        CMP:   Recent Labs   Lab 08/08/22 1919      K 5.1      CO2 20*   GLU 98   BUN 20   CREATININE 0.7   CALCIUM 8.6*   PROT 7.0   ALBUMIN 3.3*   BILITOT 0.2   ALKPHOS 42*   AST 36   ALT 22   ANIONGAP 13     Urine Culture: No results for input(s): LABURIN in the last 48 hours.  Urine Studies:   Recent Labs   Lab 08/08/22 2012   COLORU Yellow   APPEARANCEUA Clear   PHUR 6.0   SPECGRAV >=1.030*   PROTEINUA Negative   GLUCUA Negative   KETONESU Negative   BILIRUBINUA Negative   OCCULTUA Negative   NITRITE Positive*   UROBILINOGEN Negative   LEUKOCYTESUR Negative   RBCUA 1   WBCUA 2   BACTERIA Rare       Significant Imaging: I have reviewed all pertinent imaging results/findings within the past 24 hours.  Imaging Results              X-Ray Chest AP Portable (Final result)  Result time 08/08/22 19:31:17      Final result by Jannie Will MD  (08/08/22 19:31:17)                   Impression:      No acute intrathoracic abnormality detected.      Electronically signed by: Jannie Will  Date:    08/08/2022  Time:    19:31               Narrative:    EXAMINATION:  AP PORTABLE CHEST    CLINICAL HISTORY:  Chest Pain;    TECHNIQUE:  AP portable chest radiograph was submitted.    COMPARISON:  10/29/2019    FINDINGS:  AP portable chest radiograph demonstrates a cardiac silhouette within normal limits.  Vascular calcification is seen at the aortic knob.  There is no focal consolidation, pneumothorax, or pleural effusion.  Degenerative changes of the shoulders and the spine are seen.  The bones are osteopenic.                                         Assessment/Plan:     * Generalized weakness  - Ms. Cheyenne Garner presents with generalized weakness   - labs and imaging showed no obvious etiology   - she was recently treated with PO antibiotics for diverticulitis w/ perforated abscess in June 2022   - she was discharged to home on 3 weeks of Cipro & Flagyl (last dose should have been on 7/722)   - the patient states these were prescribed again and she continues to take both   - has Remeron and Geodon listed on her home meds   - has h/o B12 deficiency   - poor PO intake 2/2 weakness & decreased ambulation / ADL completion   - hydrate overnight   - orthostatics qshift  - monitor labs in AM   - PT/OT & Social work consulted       Hyperlipidemia  - continue simvastatin 10 mg QD or formulary equivalent       VTE Risk Mitigation (From admission, onward)         Ordered     heparin (porcine) injection 5,000 Units  Every 8 hours         08/08/22 2314     IP VTE HIGH RISK PATIENT  Once         08/08/22 2314     Place sequential compression device  Until discontinued         08/08/22 2314                   Letty Batista PA-C  Department of Hospital Medicine   [facility])

## 2022-08-10 PROBLEM — Z71.89 ADVANCE CARE PLANNING: Status: ACTIVE | Noted: 2022-08-10

## 2022-08-10 LAB
ANION GAP SERPL CALC-SCNC: 9 MMOL/L (ref 8–16)
BASOPHILS # BLD AUTO: 0.06 K/UL (ref 0–0.2)
BASOPHILS NFR BLD: 1.3 % (ref 0–1.9)
BUN SERPL-MCNC: 12 MG/DL (ref 8–23)
CALCIUM SERPL-MCNC: 7.1 MG/DL (ref 8.7–10.5)
CHLORIDE SERPL-SCNC: 115 MMOL/L (ref 95–110)
CO2 SERPL-SCNC: 19 MMOL/L (ref 23–29)
CREAT SERPL-MCNC: 0.6 MG/DL (ref 0.5–1.4)
DIFFERENTIAL METHOD: ABNORMAL
EOSINOPHIL # BLD AUTO: 0.1 K/UL (ref 0–0.5)
EOSINOPHIL NFR BLD: 2.7 % (ref 0–8)
ERYTHROCYTE [DISTWIDTH] IN BLOOD BY AUTOMATED COUNT: 14.9 % (ref 11.5–14.5)
EST. GFR  (NO RACE VARIABLE): >60 ML/MIN/1.73 M^2
GLUCOSE SERPL-MCNC: 82 MG/DL (ref 70–110)
HCT VFR BLD AUTO: 32.9 % (ref 37–48.5)
HGB BLD-MCNC: 10.6 G/DL (ref 12–16)
IMM GRANULOCYTES # BLD AUTO: 0.01 K/UL (ref 0–0.04)
IMM GRANULOCYTES NFR BLD AUTO: 0.2 % (ref 0–0.5)
LYMPHOCYTES # BLD AUTO: 2.4 K/UL (ref 1–4.8)
LYMPHOCYTES NFR BLD: 50.2 % (ref 18–48)
MAGNESIUM SERPL-MCNC: 1.7 MG/DL (ref 1.6–2.6)
MCH RBC QN AUTO: 31.4 PG (ref 27–31)
MCHC RBC AUTO-ENTMCNC: 32.2 G/DL (ref 32–36)
MCV RBC AUTO: 97 FL (ref 82–98)
MONOCYTES # BLD AUTO: 0.5 K/UL (ref 0.3–1)
MONOCYTES NFR BLD: 9.9 % (ref 4–15)
NEUTROPHILS # BLD AUTO: 1.7 K/UL (ref 1.8–7.7)
NEUTROPHILS NFR BLD: 35.7 % (ref 38–73)
NRBC BLD-RTO: 0 /100 WBC
PLATELET # BLD AUTO: 229 K/UL (ref 150–450)
PMV BLD AUTO: 9.7 FL (ref 9.2–12.9)
POTASSIUM SERPL-SCNC: 3.8 MMOL/L (ref 3.5–5.1)
RBC # BLD AUTO: 3.38 M/UL (ref 4–5.4)
SODIUM SERPL-SCNC: 143 MMOL/L (ref 136–145)
WBC # BLD AUTO: 4.76 K/UL (ref 3.9–12.7)

## 2022-08-10 PROCEDURE — 83735 ASSAY OF MAGNESIUM: CPT | Performed by: PHYSICIAN ASSISTANT

## 2022-08-10 PROCEDURE — 99204 OFFICE O/P NEW MOD 45 MIN: CPT | Mod: ,,, | Performed by: INTERNAL MEDICINE

## 2022-08-10 PROCEDURE — 99226 PR SUBSEQUENT OBSERVATION CARE,LEVEL III: CPT | Mod: ,,, | Performed by: HOSPITALIST

## 2022-08-10 PROCEDURE — 96361 HYDRATE IV INFUSION ADD-ON: CPT

## 2022-08-10 PROCEDURE — 25000003 PHARM REV CODE 250: Performed by: PHYSICIAN ASSISTANT

## 2022-08-10 PROCEDURE — 99226 PR SUBSEQUENT OBSERVATION CARE,LEVEL III: ICD-10-PCS | Mod: ,,, | Performed by: HOSPITALIST

## 2022-08-10 PROCEDURE — A4216 STERILE WATER/SALINE, 10 ML: HCPCS | Performed by: PHYSICIAN ASSISTANT

## 2022-08-10 PROCEDURE — 99213 OFFICE O/P EST LOW 20 MIN: CPT | Mod: ,,, | Performed by: SPECIALIST

## 2022-08-10 PROCEDURE — 97535 SELF CARE MNGMENT TRAINING: CPT

## 2022-08-10 PROCEDURE — 99213 PR OFFICE/OUTPT VISIT, EST, LEVL III, 20-29 MIN: ICD-10-PCS | Mod: ,,, | Performed by: SPECIALIST

## 2022-08-10 PROCEDURE — 94761 N-INVAS EAR/PLS OXIMETRY MLT: CPT

## 2022-08-10 PROCEDURE — 63600175 PHARM REV CODE 636 W HCPCS: Performed by: PHYSICIAN ASSISTANT

## 2022-08-10 PROCEDURE — 97116 GAIT TRAINING THERAPY: CPT | Mod: CQ

## 2022-08-10 PROCEDURE — 99205 OFFICE O/P NEW HI 60 MIN: CPT | Mod: ,,, | Performed by: INTERNAL MEDICINE

## 2022-08-10 PROCEDURE — 99204 PR OFFICE/OUTPT VISIT, NEW, LEVL IV, 45-59 MIN: ICD-10-PCS | Mod: ,,, | Performed by: INTERNAL MEDICINE

## 2022-08-10 PROCEDURE — 36415 COLL VENOUS BLD VENIPUNCTURE: CPT | Performed by: PHYSICIAN ASSISTANT

## 2022-08-10 PROCEDURE — 96372 THER/PROPH/DIAG INJ SC/IM: CPT | Performed by: PHYSICIAN ASSISTANT

## 2022-08-10 PROCEDURE — 87798 DETECT AGENT NOS DNA AMP: CPT | Performed by: HOSPITALIST

## 2022-08-10 PROCEDURE — 80048 BASIC METABOLIC PNL TOTAL CA: CPT | Performed by: PHYSICIAN ASSISTANT

## 2022-08-10 PROCEDURE — 85025 COMPLETE CBC W/AUTO DIFF WBC: CPT | Performed by: PHYSICIAN ASSISTANT

## 2022-08-10 PROCEDURE — G0378 HOSPITAL OBSERVATION PER HR: HCPCS

## 2022-08-10 PROCEDURE — 99205 PR OFFICE/OUTPT VISIT, NEW, LEVL V, 60-74 MIN: ICD-10-PCS | Mod: ,,, | Performed by: INTERNAL MEDICINE

## 2022-08-10 RX ADMIN — HEPARIN SODIUM 5000 UNITS: 5000 INJECTION INTRAVENOUS; SUBCUTANEOUS at 02:08

## 2022-08-10 RX ADMIN — Medication 10 ML: at 10:08

## 2022-08-10 RX ADMIN — ZIPRASIDONE HCL 20 MG: 20 CAPSULE ORAL at 05:08

## 2022-08-10 RX ADMIN — Medication 10 ML: at 02:08

## 2022-08-10 RX ADMIN — MULTIVIT AND MINERALS-FERROUS GLUCONATE 9 MG IRON/15 ML ORAL LIQUID 15 ML: at 09:08

## 2022-08-10 RX ADMIN — HEPARIN SODIUM 5000 UNITS: 5000 INJECTION INTRAVENOUS; SUBCUTANEOUS at 05:08

## 2022-08-10 RX ADMIN — ATORVASTATIN CALCIUM 10 MG: 10 TABLET, FILM COATED ORAL at 09:08

## 2022-08-10 RX ADMIN — HEPARIN SODIUM 5000 UNITS: 5000 INJECTION INTRAVENOUS; SUBCUTANEOUS at 10:08

## 2022-08-10 NOTE — PT/OT/SLP PROGRESS
Physical Therapy Treatment    Patient Name:  Cheyenne Garner   MRN:  [id number]    Recommendations:     Discharge Recommendations:  home health PT   Discharge Equipment Recommendations: none (pt has RW)   Barriers to discharge: Decreased caregiver support    Assessment:     Cheyenne Garner is a 86 y.o. female admitted with a medical diagnosis of Generalized weakness.  She presents with the following impairments/functional limitations:  weakness, impaired endurance, impaired functional mobility, impaired self care skills.    Sit>stand with RW and CGA  Amb 60' with RW and SBA, no LoB, pt with decreased gait speed, federica and B step length  Static stand x 3 mins with RW and SBA for conversation with Dr. Hale  Pt with good mobility but increased weakness and limited endurance  Rec HHPT pending progress with therapy    Rehab Prognosis: Good; patient would benefit from acute skilled PT services to address these deficits and reach maximum level of function.    Recent Surgery: * No surgery found *      Plan:     During this hospitalization, patient to be seen 4 x/week to address the identified rehab impairments via gait training, therapeutic activities, therapeutic exercises, neuromuscular re-education and progress toward the following goals:    · Plan of Care Expires:  09/08/22    Subjective     Chief Complaint: weak  Patient/Family Comments/goals: I hat feeling like this  Pain/Comfort:  · Pain Rating 1: 0/10  · Pain Rating Post-Intervention 1: 0/10      Objective:     Communicated with nurse Quinn prior to session.  Patient found up in chair with telemetry, peripheral IV upon PT entry to room.     General Precautions: Standard, fall   Orthopedic Precautions:N/A   Braces:    Respiratory Status: Room air     Functional Mobility:  · Transfers:     · Sit to Stand:  contact guard assistance with rolling walker  · Gait: 60' with RW and SBA, no LoB, decreased gait speed, federica and B step length      AM-PAC 6 CLICK  MOBILITY  Turning over in bed (including adjusting bedclothes, sheets and blankets)?: 3  Sitting down on and standing up from a chair with arms (e.g., wheelchair, bedside commode, etc.): 3  Moving from lying on back to sitting on the side of the bed?: 3  Moving to and from a bed to a chair (including a wheelchair)?: 3  Need to walk in hospital room?: 3  Climbing 3-5 steps with a railing?: 2  Basic Mobility Total Score: 17       Therapeutic Activities and Exercises:   Seated therex: heel raises, LAQ, hip flexion x 15    Patient left up in chair with all lines intact and call button in reach..    GOALS:   Multidisciplinary Problems     Physical Therapy Goals        Problem: Physical Therapy    Goal Priority Disciplines Outcome Goal Variances Interventions   Physical Therapy Goal     PT, PT/OT Ongoing, Progressing     Description: Goals to be met by: 9/8/22    Patient will perform the following to increase strength, improve mobility, and return to prior level of function:    1. Supine <> sit with SPV.  2. Sit<>stand with SPV with least restrictive assistive device.  3. Gait x 100 feet with SPV with least restrictive assistive device.                       Time Tracking:     PT Received On: 08/10/22  PT Start Time: 1049     PT Stop Time: 1105  PT Total Time (min): 16 min     Billable Minutes: Gait Training 16    Treatment Type: Treatment  PT/PTA: PTA     PTA Visit Number: 1     08/10/2022

## 2022-08-10 NOTE — HPI
"(From H&P) "Ms. Cheyenne Garner is a 86 y.o. female, with PMH of IBD, sarcoidosis, HLD, OA, Depression, who presented to [facility] ED on 8/8/22 due to generalized weakness and nausea x 2 days. She states she has felt unable to get out of bed due to her weakness. Associated symptoms include N/V/D, and light headedness. Her last/only episode of emesis was last night. She has been on PO antibiotics at home for treatment of diverticuliits after a recent admission. She denied appetite changes, fever, cough, shortness of breath. She was evaluated in the ED, where labs showed a normal CBC. A metabolic panel showed low ALP of 42. A UA was concentrated with nitrites, but showed no UTI. A CXR was without acute cardiopulmonary abnormalities. As she lives alone, and feels weak, she was placed on OBS."     Palliative medicine is consulted for advance care planning, given pt's elderly age, multiple chronic conditions, and need for hospital stay. Chart reviewed; discussed pt with primary team. Met with pt at bedside; for details of discussion, see advance care planning section of plan.          "

## 2022-08-10 NOTE — PT/OT/SLP PROGRESS
Occupational Therapy   Treatment    Name: Cheyenne Garner  MRN: [id number]  Admitting Diagnosis:  Generalized weakness       Recommendations:     Discharge Recommendations: home health PT, home health OT  Discharge Equipment Recommendations:  walker, rolling  Barriers to discharge:  Decreased caregiver support    Assessment:     Cheyenne Garner is a 86 y.o. female with a medical diagnosis of Generalized weakness.  She presents with weakness, impaired endurance, impaired self care skills, impaired functional mobility.     Rehab Prognosis:  Good; patient would benefit from acute skilled OT services to address these deficits and reach maximum level of function.       Plan:     Patient to be seen 4 x/week to address the above listed problems via self-care/home management, therapeutic activities, therapeutic exercises  · Plan of Care Expires: 09/08/22  · Plan of Care Reviewed with: patient    Subjective     Pain/Comfort:  · Pain Rating 1: 0/10    Objective:     Communicated with: RN prior to session.  Patient found HOB elevated with telemetry, peripheral IV upon OT entry to room.    General Precautions: Standard, fall   Orthopedic Precautions:N/A   Braces: N/A  Respiratory Status: Room air     Occupational Performance:     Bed Mobility:    · Supine to sitting: SBA with cues for use of bed rails     Functional Mobility/Transfers:  · Sit <> stand: CGA with RW and cues for proper hand placement  · Toilet: SBA with cues for use of grab bars   · Functional Mobility: Pt required CGA-SBA and RW for functional ambulation from bed to toilet, sink, chair, then recliner. Pt performed approx 15 feet total of functional ambulation using RW. Pt required cues for proper use of RW throughout.   · Functional ambulation performed to improve activity tolerance for increased endurance and independence with ADLs as well as functional balance retraining for fall prevention.      Activities of Daily Living:  · Toileting: CGA for clothing  management and perineal hygiene  · Grooming: Supervision for hand hygiene and oral care in standing at the sink. Pt continued grooming once seated in chair with supervision and setup.      [facility] 6 Click ADL: 19    Treatment & Education:  OT role, plan of care, progression of goals, importance of continued OOB activity, fall prevention, safety precautions, call don't fall, ADL/functional mobility/transfer retraining    Patient left up in chair with all lines intact, call button in reach and PTA presentEducation:      GOALS:   Multidisciplinary Problems     Occupational Therapy Goals        Problem: Occupational Therapy    Goal Priority Disciplines Outcome Interventions   Occupational Therapy Goal     OT, PT/OT Ongoing, Progressing    Description: Goals to be met by: 8/23/22     Patient will increase functional independence with ADLs by performing:    UE Dressing with [place].  LE Dressing with [place].  Grooming while standing at sink with [place].  Toileting from toilet with [place] for hygiene and clothing management.   Toilet transfer to toilet with Modified [place].                     Time Tracking:     OT Date of Treatment: 08/10/22  OT Start Time: 1026  OT Stop Time: 1051  OT Total Time (min): 25 min    Billable Minutes:Self Care/Home Management 25 minutes    OT/EDMOND: OT          8/10/2022

## 2022-08-10 NOTE — SUBJECTIVE & OBJECTIVE
Past Medical History:   Diagnosis Date    Cataract     Hyperlipidemia     Inflammatory bowel disease     Sarcoidosis with granulomatous hepatitis     UTI (urinary tract infection) 7/17/2013       Past Surgical History:   Procedure Laterality Date    BLADDER SURGERY      BREAST SURGERY      EYE SURGERY      HIP SURGERY      HYSTERECTOMY      JOINT REPLACEMENT      SINUS SURGERY         Review of patient's allergies indicates:  No Known Allergies    Medications:  Medications Prior to Admission   Medication Sig    ciprofloxacin HCl (CIPRO) 500 MG tablet Take 1 tablet (500 mg total) by mouth every 12 (twelve) hours.    metroNIDAZOLE (FLAGYL) 500 MG tablet Take 1 tablet (500 mg total) by mouth 3 (three) times daily.    ALIGN 4 mg capsule Take 1 capsule by mouth once daily.    cholestyramine, with sugar, 4 gram Powd Take 1 application by mouth daily as needed (Diarrhea). Hold until you see the surgeon or gastroenterologist.    clotrimazole-betamethasone 1-0.05% (LOTRISONE) cream Apply topically 2 (two) times daily.    dextroamphetamine-amphetamine 10 mg Tab Take by mouth once daily.    ergocalciferol (ERGOCALCIFEROL) 50,000 unit Cap Take 1 capsule (50,000 Units total) by mouth twice a week.    hydroCHLOROthiazide (HYDRODIURIL) 12.5 MG Tab Take 1 tablet (12.5 mg total) by mouth daily as needed (le edema).    hydrocortisone (ANUSOL-HC) 2.5 % rectal cream Place rectally 2 (two) times daily.    meloxicam (MOBIC) 15 MG tablet Take 1 tablet (15 mg total) by mouth once daily for arthritis    mirtazapine (REMERON) 7.5 MG Tab Take 1 tablet by mouth Daily.    simvastatin (ZOCOR) 10 MG tablet Take 1 tablet (10 mg total) by mouth every evening.    valACYclovir (VALTREX) 1000 MG tablet TAKE ONE TABLET BY MOUTH TWICE DAILY FOR 5 DAYS FOR cold sores    ziprasidone (GEODON) 20 MG Cap      Antibiotics (From admission, onward)                None          Antifungals (From admission, onward)                None           Antivirals (From admission, onward)      None             Immunization History   Administered Date(s) Administered    COVID-19, MRNA, LN-S, PF (MODERNA FULL 0.5 ML DOSE) 09/10/2021    COVID-19, MRNA, LN-S, PF (Pfizer) (Purple Cap) 01/07/2021, 01/29/2021    Influenza - High Dose - PF (65 years and older) 10/03/2014, 09/03/2016, 09/13/2017, 10/08/2021    Influenza - Quadrivalent 11/03/2015, 09/25/2018    Influenza - Quadrivalent - High Dose - PF (65 years and older) 10/08/2021    Influenza - Quadrivalent - MDCK 11/07/2019, 09/22/2020    Influenza - Trivalent (ADULT) 09/04/2010    Influenza Split 09/04/2010    Pneumococcal Conjugate - 13 Valent 09/15/2016    Pneumococcal Polysaccharide - 23 Valent 03/07/2022    Zoster 10/08/2013       Family History       Problem Relation (Age of Onset)    Cancer Mother, Sister          Social History     Socioeconomic History    Marital status:    Tobacco Use    Smoking status: Former Smoker    Smokeless tobacco: Never Used   Substance and Sexual Activity    Alcohol use: Yes     Alcohol/week: 3.0 standard drinks     Types: 1 Glasses of wine, 1 Cans of beer, 1 Shots of liquor per week    Drug use: No    Sexual activity: Not Currently     Social Determinants of Health     Financial Resource Strain: Low Risk     Difficulty of Paying Living Expenses: Not very hard   Food Insecurity: No Food Insecurity    Worried About Running Out of Food in the Last Year: Never true    Ran Out of Food in the Last Year: Never true   Transportation Needs: No Transportation Needs    Lack of Transportation (Medical): No    Lack of Transportation (Non-Medical): No   Physical Activity: Inactive    Days of Exercise per Week: 0 days    Minutes of Exercise per Session: 0 min   Stress: No Stress Concern Present    Feeling of Stress : Not at all   Social Connections: Moderately Integrated    Frequency of Communication with Friends and Family: Once a week    Frequency of  Social Gatherings with Friends and Family: Twice a week    Attends Bahai Services: 1 to 4 times per year    Active Member of Clubs or Organizations: No    Attends Club or Organization Meetings: Never    Marital Status:    Housing Stability: Low Risk     Unable to Pay for Housing in the Last Year: No    Number of Places Lived in the Last Year: 1    Unstable Housing in the Last Year: No     Review of Systems   Constitutional:  Negative for appetite change, fatigue and fever.   Cardiovascular:  Negative for leg swelling.   Gastrointestinal:  Positive for diarrhea and nausea. Negative for abdominal pain, anal bleeding, blood in stool and rectal pain.   Genitourinary:  Negative for difficulty urinating, dysuria and vaginal discharge.   Musculoskeletal:  Negative for back pain.   All other systems reviewed and are negative.  Objective:     Vital Signs (Most Recent):  Temp: 97.7 °F (36.5 °C) (08/10/22 1120)  Pulse: 76 (08/10/22 1120)  Resp: 12 (08/10/22 1120)  BP: (!) 119/55 (08/10/22 1120)  SpO2: 97 % (08/10/22 1120)   Vital Signs (24h Range):  Temp:  [97.7 °F (36.5 °C)-98.5 °F (36.9 °C)] 97.7 °F (36.5 °C)  Pulse:  [69-88] 76  Resp:  [12-18] 12  SpO2:  [94 %-97 %] 97 %  BP: (108-136)/(55-83) 119/55     Weight: 57.6 kg (127 lb)  Body mass index is 22.5 kg/m².    Estimated Creatinine Clearance: 57.7 mL/min (based on SCr of 0.6 mg/dL).    Physical Exam  Vitals and nursing note reviewed.   Constitutional:       Appearance: Normal appearance.   HENT:      Head: Normocephalic and atraumatic.   Eyes:      Extraocular Movements: Extraocular movements intact.      Conjunctiva/sclera: Conjunctivae normal.      Pupils: Pupils are equal, round, and reactive to light.   Cardiovascular:      Rate and Rhythm: Normal rate and regular rhythm.      Pulses: Normal pulses.      Heart sounds: Normal heart sounds.   Pulmonary:      Effort: Pulmonary effort is normal.      Breath sounds: Normal breath sounds.   Abdominal:       General: Abdomen is flat. Bowel sounds are normal.      Palpations: Abdomen is soft.      Tenderness: There is no abdominal tenderness.   Musculoskeletal:         General: No swelling.   Skin:     General: Skin is warm and dry.   Neurological:      General: No focal deficit present.      Mental Status: She is alert.       Significant Labs: CBC:   Recent Labs   Lab 08/08/22 1919 08/09/22  0638 08/10/22  0424   WBC 7.18 5.59 4.76   HGB 13.5 11.9* 10.6*   HCT 41.8 36.6* 32.9*    249 229     CMP:   Recent Labs   Lab 08/08/22 1919 08/09/22  0638 08/10/22  0424    142 143   K 5.1 4.0 3.8    114* 115*   CO2 20* 19* 19*   GLU 98 90 82   BUN 20 14 12   CREATININE 0.7 0.6 0.6   CALCIUM 8.6* 7.6* 7.1*   PROT 7.0  --   --    ALBUMIN 3.3*  --   --    BILITOT 0.2  --   --    ALKPHOS 42*  --   --    AST 36  --   --    ALT 22  --   --    ANIONGAP 13 9 9       Significant Imaging: CT: I have reviewed all pertinent results/findings within the past 24 hours:  Sigmoid diverticular abscess measures 3.2 x 1.8 cm, previously 3.3 x 2.4 cm.  There is sigmoid diverticulosis.

## 2022-08-10 NOTE — CONSULTS
[facility], [place], operated by [facility] ([place])  Consult Note      Date of Consultation:8/9/2022    Reason for Consult:  Evaluation of diverticular abscess, nausea, vomiting, weakness  SUBJECTIVE:     History of Present Illness:  The patient is an 86-year-old female who was admitted to [facility] on 06/10/2022 for treatment of acute diverticulitis with pelvic abscess.  CT scan of the abdomen showed a 5.3 x 4.4 cm abscess which was not amenable to percutaneous drainage.  The patient was treated with IV antibiotics with resolution of fever and abdominal pain.  Subsequent CT scan showed improvement in size of abscess but no resolution.  Patient was discharged on oral antibiotics and followed with clinical exam and CT scan.  Patient remained afebrile tolerating a regular diet with a normal white count.  Patient states that antibiotics made her nauseated and she was suffering from diarrhea.  Stool specimen as outpatient sent to [facility] showed no evidence of C difficile.  Patient followed by her primary physician Dr. Mary Cortes and myself.  Recommendation was to continue Cipro and Flagyl at abscess appeared to be improving.  Patient allergic to penicillin and appeared to be intolerant of Cipro Flagyl regimen.  Past Medical History:   Diagnosis Date    Cataract     Hyperlipidemia     Inflammatory bowel disease     Sarcoidosis with granulomatous hepatitis     UTI (urinary tract infection) 7/17/2013     Past Surgical History:   Procedure Laterality Date    BLADDER SURGERY      BREAST SURGERY      EYE SURGERY      HIP SURGERY      HYSTERECTOMY      JOINT REPLACEMENT      SINUS SURGERY       Family History   Problem Relation Age of Onset    Cancer Mother     Cancer Sister      Social History     Tobacco Use    Smoking status: Former Smoker    Smokeless tobacco: Never Used   Substance Use Topics    Alcohol use: Yes     Alcohol/week: 3.0 standard drinks     Types: 1 Glasses of wine, 1 Cans of beer, 1 Shots of liquor  per week    Drug use: No       Current Facility-Administered Medications:     0.9%  NaCl infusion, , Intravenous, Continuous, Letty Batista PA-C, Last Rate: 125 mL/hr at 08/09/22 0049, New Bag at 08/09/22 0049    acetaminophen tablet 650 mg, 650 mg, Oral, Q6H PRN, Letty Batista PA-C    atorvastatin tablet 10 mg, 10 mg, Oral, Daily, LASHELL Crump-C, 10 mg at 08/09/22 0839    heparin (porcine) injection 5,000 Units, 5,000 Units, Subcutaneous, Q8H, LASHELL Crump-C, 5,000 Units at 08/09/22 1552    loperamide capsule 2 mg, 2 mg, Oral, QID PRN, Adali Hale MD    melatonin tablet 6 mg, 6 mg, Oral, Nightly PRN, LASHELL Crump-C    multivit-min-ferrous gluconate 9 mg iron/15 mL oral liquid 15 mL, 15 mL, Oral, Daily, LASHELL Crump-C, 15 mL at 08/09/22 0839    naloxone 0.4 mg/mL injection 0.02 mg, 0.02 mg, Intravenous, PRN, Letyt Batista PA-C    sodium chloride 0.9% flush 10 mL, 10 mL, Intravenous, Q8H, LASHELL Crump-C, 10 mL at 08/09/22 1553    ziprasidone capsule 20 mg, 20 mg, Oral, Daily with dinner, LASHELL Crump-NO, 20 mg at 08/09/22 1829     Review of patient's allergies indicates:  No Known Allergies    Review of Systems:  See HPI    Current Facility-Administered Medications   Medication    0.9%  NaCl infusion    acetaminophen tablet 650 mg    atorvastatin tablet 10 mg    heparin (porcine) injection 5,000 Units    loperamide capsule 2 mg    melatonin tablet 6 mg    multivit-min-ferrous gluconate 9 mg iron/15 mL oral liquid 15 mL    naloxone 0.4 mg/mL injection 0.02 mg    sodium chloride 0.9% flush 10 mL    ziprasidone capsule 20 mg       OBJECTIVE:     Physical Exam  General-well-developed well-nourished female in no acute distress, awake and alert  Chest-clear  Heart-regular rate and rhythm  Abdomen-soft, nontender, nondistended, no guarding, no rebound, no masses  Extremities-no  tenderness      Laboratory:  Recent Labs   Lab 08/09/22  0638   WBC 5.59   RBC 3.80*   HGB 11.9*   HCT 36.6*      MCV 96   MCH 31.3*   MCHC 32.5     BMP:   Recent Labs   Lab 08/09/22  0638   GLU 90      K 4.0   *   CO2 19*   BUN 14   CREATININE 0.6   CALCIUM 7.6*   MG 1.9     Lab Results   Component Value Date    CALCIUM 7.6 (L) 08/09/2022    PHOS 2.8 06/14/2022     BNP  No results for input(s): BNP, BNPTRIAGEBLO in the last 168 hours.No results found for: URICACID  Lab Results   Component Value Date    FERRITIN 122 04/21/2022     Lab Results   Component Value Date    CALCIUM 7.6 (L) 08/09/2022    PHOS 2.8 06/14/2022       Diagnostic Results:  CT Pelvis Without Contrast  Narrative: EXAMINATION:  CT PELVIS WITHOUT CONTRAST    CLINICAL HISTORY:  Diverticular abscess, evaluate for resolution;    FINDINGS:  CT pelvis without contrast comparison is 08/01/2022.    Sigmoid diverticular abscess measures 3.2 x 1.8 cm, previously 3.3 x 2.4 cm.  There is sigmoid diverticulosis.  There are bilateral hip implants.  There is DJD.  No ascites is seen.  There is diverticulosis.  There is minimal acute inflammation.  Remaining pelvic organs are unremarkable.  There is aortic plaque.  There is DJD.  Impression: Stable diverticular abscess as above with mild inflammation no real change.    Diverticulosis, and DJD.    Electronically signed by: Antolin Pemberton MD  Date:    08/09/2022  Time:    12:13      IMPRESSION:  Diverticulitis with abscess  Antibiotic associated nausea, diarrhea and weakness    Plan:  Recommend ID consultation.  It may be reasonable to discontinue antibiotics with trial of watchful waiting.  Patient has been resistant to surgical intervention.  Will follow    Thank you for the opportunity of seeing Cheyenne Veelister in consultation

## 2022-08-10 NOTE — PLAN OF CARE
POC reviewed. No significant events this shift. No complaints of pain. Pt voids and shifts in bed independently. Bed low and locked, side rails up x3, call light within reach.    Problem: Adult Inpatient Plan of Care  Goal: Plan of Care Review  Outcome: Ongoing, Progressing  Goal: Patient-Specific Goal (Individualized)  Outcome: Ongoing, Progressing  Goal: Absence of Hospital-Acquired Illness or Injury  Outcome: Ongoing, Progressing  Goal: Optimal Comfort and Wellbeing  Outcome: Ongoing, Progressing  Goal: Readiness for Transition of Care  Outcome: Ongoing, Progressing     Problem: Skin Injury Risk Increased  Goal: Skin Health and Integrity  Outcome: Ongoing, Progressing

## 2022-08-10 NOTE — SUBJECTIVE & OBJECTIVE
Past Medical History:   Diagnosis Date    Cataract     Hyperlipidemia     Inflammatory bowel disease     Sarcoidosis with granulomatous hepatitis     UTI (urinary tract infection) 7/17/2013       Past Surgical History:   Procedure Laterality Date    BLADDER SURGERY      BREAST SURGERY      EYE SURGERY      HIP SURGERY      HYSTERECTOMY      JOINT REPLACEMENT      SINUS SURGERY         Review of patient's allergies indicates:  No Known Allergies    Medications:  Continuous Infusions:   sodium chloride 0.9% 125 mL/hr at 08/09/22 0049     Scheduled Meds:   atorvastatin  10 mg Oral Daily    heparin (porcine)  5,000 Units Subcutaneous Q8H    multivit-min-ferrous gluconate  15 mL Oral Daily    sodium chloride 0.9%  10 mL Intravenous Q8H    ziprasidone  20 mg Oral Daily with dinner     PRN Meds:acetaminophen, loperamide, melatonin, naloxone    Family History       Problem Relation (Age of Onset)    Cancer Mother, Sister          Tobacco Use    Smoking status: Former Smoker    Smokeless tobacco: Never Used   Substance and Sexual Activity    Alcohol use: Yes     Alcohol/week: 3.0 standard drinks     Types: 1 Glasses of wine, 1 Cans of beer, 1 Shots of liquor per week    Drug use: No    Sexual activity: Not Currently       Review of Systems   Constitutional:  Positive for fatigue. Negative for unexpected weight change.   HENT:  Negative for congestion.    Respiratory:  Negative for shortness of breath.    Cardiovascular:  Negative for chest pain.   Gastrointestinal:  Negative for constipation.   Genitourinary:  Negative for difficulty urinating.   Musculoskeletal:  Negative for arthralgias.   Skin:  Negative for wound.   Neurological:  Positive for weakness.   Psychiatric/Behavioral:  Negative for confusion.    Objective:     Vital Signs (Most Recent):  Temp: 97.7 °F (36.5 °C) (08/10/22 1120)  Pulse: 76 (08/10/22 1120)  Resp: 12 (08/10/22 1120)  BP: (!) 119/55 (08/10/22 1120)  SpO2: 97 % (08/10/22 1120)   Vital Signs (24h  Range):  Temp:  [97.7 °F (36.5 °C)-98.5 °F (36.9 °C)] 97.7 °F (36.5 °C)  Pulse:  [69-88] 76  Resp:  [12-18] 12  SpO2:  [94 %-97 %] 97 %  BP: (108-136)/(55-83) 119/55     Weight: 57.6 kg (127 lb)  Body mass index is 22.5 kg/m².    Physical Exam  Constitutional:       Comments: Sitting up in bed, eating a cookie, in no distress, looks great for her age    HENT:      Head: Normocephalic.      Nose: Nose normal.      Mouth/Throat:      Mouth: Mucous membranes are moist.   Eyes:      Extraocular Movements: Extraocular movements intact.   Cardiovascular:      Rate and Rhythm: Normal rate.   Pulmonary:      Effort: Pulmonary effort is normal.   Skin:     General: Skin is warm.   Neurological:      General: No focal deficit present.      Mental Status: She is oriented to person, place, and time.   Psychiatric:         Mood and Affect: Mood normal.         Behavior: Behavior normal.       Significant Labs: All pertinent labs within the past 24 hours have been reviewed.  CBC:   Recent Labs   Lab 08/10/22  0424   WBC 4.76   HGB 10.6*   HCT 32.9*   MCV 97        BMP:  Recent Labs   Lab 08/10/22  0424   GLU 82      K 3.8   *   CO2 19*   BUN 12   CREATININE 0.6   CALCIUM 7.1*   MG 1.7     LFT:  Lab Results   Component Value Date    AST 36 08/08/2022    ALKPHOS 42 (L) 08/08/2022    BILITOT 0.2 08/08/2022     Albumin:   Albumin   Date Value Ref Range Status   08/08/2022 3.3 (L) 3.5 - 5.2 g/dL Final     Protein:   Total Protein   Date Value Ref Range Status   08/08/2022 7.0 6.0 - 8.4 g/dL Final     Lactic acid:   Lab Results   Component Value Date    LACTATE 1.5 06/10/2022       Significant Imaging: I have reviewed all pertinent imaging results/findings within the past 24 hours.

## 2022-08-10 NOTE — ASSESSMENT & PLAN NOTE
- General surgery and ID consulted; likely will be monitored off of antibiotics given that she is afebrile and without leukocytosis

## 2022-08-10 NOTE — SUBJECTIVE & OBJECTIVE
Interval History:  Patient seen today during PT.  She is doing well, weakness much improved, not nauseated and tolerating a diet.  She has been seen by Dr. Morales and Dr. Ortega for recommendations.    Review of Systems   Constitutional:  Negative for chills and fever.   Respiratory:  Negative for cough and shortness of breath.    Cardiovascular:  Negative for chest pain and palpitations.   Gastrointestinal:  Negative for nausea and vomiting.   Objective:     Vital Signs (Most Recent):  Temp: 97.7 °F (36.5 °C) (08/10/22 1120)  Pulse: 76 (08/10/22 1120)  Resp: 12 (08/10/22 1120)  BP: (!) 119/55 (08/10/22 1120)  SpO2: 97 % (08/10/22 1120)   Vital Signs (24h Range):  Temp:  [97.7 °F (36.5 °C)-98.5 °F (36.9 °C)] 97.7 °F (36.5 °C)  Pulse:  [69-88] 76  Resp:  [12-18] 12  SpO2:  [94 %-97 %] 97 %  BP: (108-136)/(55-83) 119/55     Weight: 57.6 kg (127 lb)  Body mass index is 22.5 kg/m².    Intake/Output Summary (Last 24 hours) at 8/10/2022 1610  Last data filed at 8/10/2022 0000  Gross per 24 hour   Intake 620 ml   Output --   Net 620 ml      Physical Exam  Constitutional:       Appearance: She is normal weight.      Comments: Frail, elderly woman in no apparent distress.   Cardiovascular:      Rate and Rhythm: Normal rate and regular rhythm.      Heart sounds: Normal heart sounds. No murmur heard.    No gallop.   Pulmonary:      Effort: Pulmonary effort is normal.      Breath sounds: Normal breath sounds.   Abdominal:      General: Bowel sounds are normal.      Palpations: Abdomen is soft.   Skin:     General: Skin is warm and dry.   Neurological:      General: No focal deficit present.      Mental Status: She is alert.   Psychiatric:         Mood and Affect: Mood normal.         Behavior: Behavior normal.       Significant Labs: All pertinent labs within the past 24 hours have been reviewed.    Significant Imaging: I have reviewed all pertinent imaging results/findings within the past 24 hours.

## 2022-08-10 NOTE — ASSESSMENT & PLAN NOTE
87 yo female who had treatment for diverticulitis in June, now with fluid collection on CT. She is afebrile, normal WBC and has no symptoms of abdominal pain or tenderness. She has chronic diarrhea but is C.diff negative.    Recommend:   Stop all antibiotics   Observe off antibiotics - this may be a sterile fluid collection.   Discharge home if afebrile and no recurrence of symptoms.    Will check blood for T.whipplei PCR and fecal WBCs. Continue diarrhea management.

## 2022-08-10 NOTE — HPI
"87 yo female with chronic diarrhea who presents with weakness and possible diverticular abscess. She was admitted to [facility] from [facility] to 18[address] and treated with Zosyn. She was discharged on cipro and flagyl. She did not see Infectious Diseases. SHe has remained afebrile.     CT on 8/1 showed "The diverticular abscess measures 3.3 by 2.4 cm.  Previously 3.8 by 3.4 cm." She presented to the ED with generalized weakness and nausea for 2 days. Her diarrhea is unchanged, and there is no blood. CT showed the abscess unchanged. She had a normal WBC, no fever, and no abdominal pain. I am consulted for evaluation and possible treatment.   "

## 2022-08-10 NOTE — PLAN OF CARE
Problem: Adult Inpatient Plan of Care  Goal: Plan of Care Review  Outcome: Ongoing, Progressing  Goal: Patient-Specific Goal (Individualized)  Outcome: Ongoing, Progressing  Goal: Absence of Hospital-Acquired Illness or Injury  Outcome: Ongoing, Progressing  Goal: Optimal Comfort and Wellbeing  Outcome: Ongoing, Progressing  Goal: Readiness for Transition of Care  Outcome: Ongoing, Progressing     Problem: Skin Injury Risk Increased  Goal: Skin Health and Integrity  Outcome: Ongoing, Progressing     Problem: Impaired Wound Healing  Goal: Optimal Wound Healing  Outcome: Ongoing, Progressing     Problem: Coping Ineffective  Goal: Effective Coping  Outcome: Ongoing, Progressing

## 2022-08-10 NOTE — CONSULTS
[facility] ([place])  Palliative Medicine  Consult Note    Patient Name: Cheyenne Garner  MRN: [id number]  Admission Date: 8/8/2022  Hospital Length of Stay: 0 days  Code Status: DNR   Attending Provider: Adali Hale MD  Consulting Provider: Kerrie Morrell MD  Primary Care Physician: Mary Cortes MD  Principal Problem:Generalized weakness    Patient information was obtained from patient, past medical records, ER records and primary team.      Inpatient consult to Palliative Care  Consult performed by: Kerrie Morrell MD  Consult ordered by: Adali Hale MD  Reason for consult: Advance Care Planning         Assessment/Plan:     Advance Care Planning        8/10/22  - Consult for advance care planning in elderly pt with underlying sarcoidosis, diverticular disease currently in observation for weakness and vomiting. History is significant for colonic diverticular abscess, for which she is on a prolonged course of abx. ID and general surgery consulted; plan will be to monitor her off of abx. Chart reviewed in depth; discussed pt in person with primary team.    - Met with pt at bedside; introduced role of palliative medicine, and learned more about pt outside of hospital. During visit, patient was awake, pleasant, sitting up in bed, and able to have a full conversation. Has capacity for complex medical decision-making at this time, and overall looks great for her age.   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   - She is , and has 1 living son (Andre); he checks on her routinely at her residence, though notes that her daughter-in-law Monica is very  "active in her care. Legal MPOA defaults to her son Andre (which she is in agreement with), though she would also like input from Monica if she is unable to make her own medical decisions.   - Prior to developing recent weakness, pt was able to drive, grocery shop, and fully care for herself. She said it was depressing to her to realize that she is not functioning at her prior level of independence, and she is hopeful to regain as much strength as possible.  She is motivated to continue working with PT/OT.   - She would consider going to SNF if recommended, though otherwise would be returning home with PT/OT and private sitter (arranged for her already by Monica); she is nervous about going home at this time.   - While agreeable to continuing current level of care, she is DNR/DNI in the event of cardiac or respiratory arrest. Will need LaPOST specifying DNR/DNI/selective care prior to hospital discharge.   - Our team will follow up with pt both this hospital stay and any future hospital stays; updated primary team       Generalized weakness  - PT/OT following in hospital     Colonic diverticular abscess  - General surgery and ID consulted; likely will be monitored off of antibiotics given that she is afebrile and without leukocytosis     Non-intractable vomiting with nausea  - Potentially due to prolonged abx course; evaluation and management per primary team     Thank you for your consult. I will follow-up with patient. Please contact us if you have any additional questions.     FANNIE Smart  Palliative Medicine Staff   [phone]    > 50% of 70 min visit spent in chart review, face to face discussion of symptom assessment, coordination of care with other specialists, and discharge planning.    Subjective:     HPI:   (From H&P) "Ms. Cheyenne Garner is a 86 y.o. female, with PMH of IBD, sarcoidosis, HLD, OA, Depression, who presented to [facility] ED on 8/8/22 due to generalized weakness and nausea x 2 days. " "She states she has felt unable to get out of bed due to her weakness. Associated symptoms include N/V/D, and light headedness. Her last/only episode of emesis was last night. She has been on PO antibiotics at home for treatment of diverticuliits after a recent admission. She denied appetite changes, fever, cough, shortness of breath. She was evaluated in the ED, where labs showed a normal CBC. A metabolic panel showed low ALP of 42. A UA was concentrated with nitrites, but showed no UTI. A CXR was without acute cardiopulmonary abnormalities. As she lives alone, and feels weak, she was placed on OBS."     Palliative medicine is consulted for advance care planning, given pt's elderly age, multiple chronic conditions, and need for hospital stay. Chart reviewed; discussed pt with primary team. Met with pt at bedside; for details of discussion, see advance care planning section of plan.              Past Medical History:   Diagnosis Date    Cataract     Hyperlipidemia     Inflammatory bowel disease     Sarcoidosis with granulomatous hepatitis     UTI (urinary tract infection) 7/17/2013       Past Surgical History:   Procedure Laterality Date    BLADDER SURGERY      BREAST SURGERY      EYE SURGERY      HIP SURGERY      HYSTERECTOMY      JOINT REPLACEMENT      SINUS SURGERY         Review of patient's allergies indicates:  No Known Allergies    Medications:  Continuous Infusions:   sodium chloride 0.9% 125 mL/hr at 08/09/22 0049     Scheduled Meds:   atorvastatin  10 mg Oral Daily    heparin (porcine)  5,000 Units Subcutaneous Q8H    multivit-min-ferrous gluconate  15 mL Oral Daily    sodium chloride 0.9%  10 mL Intravenous Q8H    ziprasidone  20 mg Oral Daily with dinner     PRN Meds:acetaminophen, loperamide, melatonin, naloxone    Family History       Problem Relation (Age of Onset)    Cancer Mother, Sister          Tobacco Use    Smoking status: Former Smoker    Smokeless tobacco: Never Used "   Substance and Sexual Activity    Alcohol use: Yes     Alcohol/week: 3.0 standard drinks     Types: 1 Glasses of wine, 1 Cans of beer, 1 Shots of liquor per week    Drug use: No    Sexual activity: Not Currently       Review of Systems   Constitutional:  Positive for fatigue. Negative for unexpected weight change.   HENT:  Negative for congestion.    Respiratory:  Negative for shortness of breath.    Cardiovascular:  Negative for chest pain.   Gastrointestinal:  Negative for constipation.   Genitourinary:  Negative for difficulty urinating.   Musculoskeletal:  Negative for arthralgias.   Skin:  Negative for wound.   Neurological:  Positive for weakness.   Psychiatric/Behavioral:  Negative for confusion.    Objective:     Vital Signs (Most Recent):  Temp: 97.7 °F (36.5 °C) (08/10/22 1120)  Pulse: 76 (08/10/22 1120)  Resp: 12 (08/10/22 1120)  BP: (!) 119/55 (08/10/22 1120)  SpO2: 97 % (08/10/22 1120)   Vital Signs (24h Range):  Temp:  [97.7 °F (36.5 °C)-98.5 °F (36.9 °C)] 97.7 °F (36.5 °C)  Pulse:  [69-88] 76  Resp:  [12-18] 12  SpO2:  [94 %-97 %] 97 %  BP: (108-136)/(55-83) 119/55     Weight: 57.6 kg (127 lb)  Body mass index is 22.5 kg/m².    Physical Exam  Constitutional:       Comments: Sitting up in bed, eating a cookie, in no distress, looks great for her age    HENT:      Head: Normocephalic.      Nose: Nose normal.      Mouth/Throat:      Mouth: Mucous membranes are moist.   Eyes:      Extraocular Movements: Extraocular movements intact.   Cardiovascular:      Rate and Rhythm: Normal rate.   Pulmonary:      Effort: Pulmonary effort is normal.   Skin:     General: Skin is warm.   Neurological:      General: No focal deficit present.      Mental Status: She is oriented to person, place, and time.   Psychiatric:         Mood and Affect: Mood normal.         Behavior: Behavior normal.       Significant Labs: All pertinent labs within the past 24 hours have been reviewed.  CBC:   Recent Labs   Lab  08/10/22  0424   WBC 4.76   HGB 10.6*   HCT 32.9*   MCV 97        BMP:  Recent Labs   Lab 08/10/22  0424   GLU 82      K 3.8   *   CO2 19*   BUN 12   CREATININE 0.6   CALCIUM 7.1*   MG 1.7     LFT:  Lab Results   Component Value Date    AST 36 08/08/2022    ALKPHOS 42 (L) 08/08/2022    BILITOT 0.2 08/08/2022     Albumin:   Albumin   Date Value Ref Range Status   08/08/2022 3.3 (L) 3.5 - 5.2 g/dL Final     Protein:   Total Protein   Date Value Ref Range Status   08/08/2022 7.0 6.0 - 8.4 g/dL Final     Lactic acid:   Lab Results   Component Value Date    LACTATE 1.5 06/10/2022       Significant Imaging: I have reviewed all pertinent imaging results/findings within the past 24 hours.

## 2022-08-10 NOTE — PLAN OF CARE
POC reviewed with patient. AAOx4. VS stable on room air. No complaints verbalized during shift. IVF's infusing as ordered. Pt used bsc with assist urine, incontinent stool, No injuries, falls, or trauma occurred during shift. Purposeful rounding completed. Bed low and locked with side rails up x 3 and call light within reach. Will continue to monitor.

## 2022-08-10 NOTE — ASSESSMENT & PLAN NOTE
Colonic diverticular abscess  Nausea with vomiting.  - Patient with history of diverticular abscess presented with weakness and nausea.  - Diverticulitis with abscess treated last hospital stay with antibiotics in the hope of avoiding surgery.  - She has been on Cipro/Flagyl for a prolonged course with serial CT to evaluate the size of the abscess.  Now seems to be having side effects of the antibiotics.  - Discussed with Dr. Bojorquez, suggests repeating CT to see if abscess is diminishing in size.  - PT/OT consulted, recommend home health be resumed.  - CT done yesterday showed abscess decreasing in size.  ID consulted, feel this likely represents sterile fluid collection given her lack of fever and elevated white blood cell count.  Okay to hold antibiotics.

## 2022-08-10 NOTE — PROGRESS NOTES
LC called in a LOCET and Faxed a PASSR to the State in case the patient and family change their minds about a temporary SNF stay.

## 2022-08-10 NOTE — CONSULTS
"[facility] ([place])  Infectious Disease  Consult Note    Patient Name: Cheyenne Garner  MRN: [id number]  Admission Date: 8/8/2022  Hospital Length of Stay: 0 days  Attending Physician: Adali Hale MD  Primary Care Provider: Mary Cortes MD     Isolation Status: No active isolations    Patient information was obtained from patient, past medical records and ER records.      Inpatient consult to Infectious Diseases  Consult performed by: Tyrese Ortega MD  Consult ordered by: Adali Hale MD        Assessment/Plan:     Colonic diverticular abscess  87 yo female who had treatment for diverticulitis in June, now with fluid collection on CT. She is afebrile, normal WBC and has no symptoms of abdominal pain or tenderness. She has chronic diarrhea but is C.diff negative.    Recommend:   Stop all antibiotics   Observe off antibiotics - this may be a sterile fluid collection.   Discharge home if afebrile and no recurrence of symptoms.    Will check blood for T.whipplei PCR and fecal WBCs. Continue diarrhea management.            Thank you for your consult. I will follow-up with patient. Please contact us if you have any additional questions.    Tyrese Ortega MD  Infectious Disease  [facility] ([place])    Subjective:     Principal Problem: Generalized weakness    HPI: 87 yo female with chronic diarrhea who presents with weakness and possible diverticular abscess. She was admitted to [facility] from Terri 10 to 18th and treated with Zosyn. She was discharged on cipro and flagyl. She did not see Infectious Diseases. SHe has remained afebrile.     CT on 8/1 showed "The diverticular abscess measures 3.3 by 2.4 cm.  Previously 3.8 by 3.4 cm." She presented to the ED with generalized weakness and nausea for 2 days. Her diarrhea is unchanged, and there is no blood. CT showed the abscess unchanged. She had a normal WBC, no fever, and no abdominal pain. I am consulted for evaluation and " possible treatment.       Past Medical History:   Diagnosis Date    Cataract     Hyperlipidemia     Inflammatory bowel disease     Sarcoidosis with granulomatous hepatitis     UTI (urinary tract infection) 7/17/2013       Past Surgical History:   Procedure Laterality Date    BLADDER SURGERY      BREAST SURGERY      EYE SURGERY      HIP SURGERY      HYSTERECTOMY      JOINT REPLACEMENT      SINUS SURGERY         Review of patient's allergies indicates:  No Known Allergies    Medications:  Medications Prior to Admission   Medication Sig    ciprofloxacin HCl (CIPRO) 500 MG tablet Take 1 tablet (500 mg total) by mouth every 12 (twelve) hours.    metroNIDAZOLE (FLAGYL) 500 MG tablet Take 1 tablet (500 mg total) by mouth 3 (three) times daily.    ALIGN 4 mg capsule Take 1 capsule by mouth once daily.    cholestyramine, with sugar, 4 gram Powd Take 1 application by mouth daily as needed (Diarrhea). Hold until you see the surgeon or gastroenterologist.    clotrimazole-betamethasone 1-0.05% (LOTRISONE) cream Apply topically 2 (two) times daily.    dextroamphetamine-amphetamine 10 mg Tab Take by mouth once daily.    ergocalciferol (ERGOCALCIFEROL) 50,000 unit Cap Take 1 capsule (50,000 Units total) by mouth twice a week.    hydroCHLOROthiazide (HYDRODIURIL) 12.5 MG Tab Take 1 tablet (12.5 mg total) by mouth daily as needed (le edema).    hydrocortisone (ANUSOL-HC) 2.5 % rectal cream Place rectally 2 (two) times daily.    meloxicam (MOBIC) 15 MG tablet Take 1 tablet (15 mg total) by mouth once daily for arthritis    mirtazapine (REMERON) 7.5 MG Tab Take 1 tablet by mouth Daily.    simvastatin (ZOCOR) 10 MG tablet Take 1 tablet (10 mg total) by mouth every evening.    valACYclovir (VALTREX) 1000 MG tablet TAKE ONE TABLET BY MOUTH TWICE DAILY FOR 5 DAYS FOR cold sores    ziprasidone (GEODON) 20 MG Cap      Antibiotics (From admission, onward)                None          Antifungals (From admission,  onward)                None          Antivirals (From admission, onward)      None             Immunization History   Administered Date(s) Administered    COVID-19, MRNA, LN-S, PF (MODERNA FULL 0.5 ML DOSE) 09/10/2021    COVID-19, MRNA, LN-S, PF (Pfizer) (Purple Cap) 01/07/2021, 01/29/2021    Influenza - High Dose - PF (65 years and older) 10/03/2014, 09/03/2016, 09/13/2017, 10/08/2021    Influenza - Quadrivalent 11/03/2015, 09/25/2018    Influenza - Quadrivalent - High Dose - PF (65 years and older) 10/08/2021    Influenza - Quadrivalent - MDCK 11/07/2019, 09/22/2020    Influenza - Trivalent (ADULT) 09/04/2010    Influenza Split 09/04/2010    Pneumococcal Conjugate - 13 Valent 09/15/2016    Pneumococcal Polysaccharide - 23 Valent 03/07/2022    Zoster 10/08/2013       Family History       Problem Relation (Age of Onset)    Cancer Mother, Sister          Social History     Socioeconomic History    Marital status:    Tobacco Use    Smoking status: Former Smoker    Smokeless tobacco: Never Used   Substance and Sexual Activity    Alcohol use: Yes     Alcohol/week: 3.0 standard drinks     Types: 1 Glasses of wine, 1 Cans of beer, 1 Shots of liquor per week    Drug use: No    Sexual activity: Not Currently     Social Determinants of Health     Financial Resource Strain: Low Risk     Difficulty of Paying Living Expenses: Not very hard   Food Insecurity: No Food Insecurity    Worried About Running Out of Food in the Last Year: Never true    Ran Out of Food in the Last Year: Never true   Transportation Needs: No Transportation Needs    Lack of Transportation (Medical): No    Lack of Transportation (Non-Medical): No   Physical Activity: Inactive    Days of Exercise per Week: 0 days    Minutes of Exercise per Session: 0 min   Stress: No Stress Concern Present    Feeling of Stress : Not at all   Social Connections: Moderately Integrated    Frequency of Communication with Friends and Family: Once  a week    Frequency of Social Gatherings with Friends and Family: Twice a week    Attends Jain Services: 1 to 4 times per year    Active Member of Clubs or Organizations: No    Attends Club or Organization Meetings: Never    Marital Status:    Housing Stability: Low Risk     Unable to Pay for Housing in the Last Year: No    Number of Places Lived in the Last Year: 1    Unstable Housing in the Last Year: No     Review of Systems   Constitutional:  Negative for appetite change, fatigue and fever.   Cardiovascular:  Negative for leg swelling.   Gastrointestinal:  Positive for diarrhea and nausea. Negative for abdominal pain, anal bleeding, blood in stool and rectal pain.   Genitourinary:  Negative for difficulty urinating, dysuria and vaginal discharge.   Musculoskeletal:  Negative for back pain.   All other systems reviewed and are negative.  Objective:     Vital Signs (Most Recent):  Temp: 97.7 °F (36.5 °C) (08/10/22 1120)  Pulse: 76 (08/10/22 1120)  Resp: 12 (08/10/22 1120)  BP: (!) 119/55 (08/10/22 1120)  SpO2: 97 % (08/10/22 1120)   Vital Signs (24h Range):  Temp:  [97.7 °F (36.5 °C)-98.5 °F (36.9 °C)] 97.7 °F (36.5 °C)  Pulse:  [69-88] 76  Resp:  [12-18] 12  SpO2:  [94 %-97 %] 97 %  BP: (108-136)/(55-83) 119/55     Weight: 57.6 kg (127 lb)  Body mass index is 22.5 kg/m².    Estimated Creatinine Clearance: 57.7 mL/min (based on SCr of 0.6 mg/dL).    Physical Exam  Vitals and nursing note reviewed.   Constitutional:       Appearance: Normal appearance.   HENT:      Head: Normocephalic and atraumatic.   Eyes:      Extraocular Movements: Extraocular movements intact.      Conjunctiva/sclera: Conjunctivae normal.      Pupils: Pupils are equal, round, and reactive to light.   Cardiovascular:      Rate and Rhythm: Normal rate and regular rhythm.      Pulses: Normal pulses.      Heart sounds: Normal heart sounds.   Pulmonary:      Effort: Pulmonary effort is normal.      Breath sounds: Normal breath  sounds.   Abdominal:      General: Abdomen is flat. Bowel sounds are normal.      Palpations: Abdomen is soft.      Tenderness: There is no abdominal tenderness.   Musculoskeletal:         General: No swelling.   Skin:     General: Skin is warm and dry.   Neurological:      General: No focal deficit present.      Mental Status: She is alert.       Significant Labs: CBC:   Recent Labs   Lab 08/08/22 1919 08/09/22  0638 08/10/22  0424   WBC 7.18 5.59 4.76   HGB 13.5 11.9* 10.6*   HCT 41.8 36.6* 32.9*    249 229     CMP:   Recent Labs   Lab 08/08/22 1919 08/09/22  0638 08/10/22  0424    142 143   K 5.1 4.0 3.8    114* 115*   CO2 20* 19* 19*   GLU 98 90 82   BUN 20 14 12   CREATININE 0.7 0.6 0.6   CALCIUM 8.6* 7.6* 7.1*   PROT 7.0  --   --    ALBUMIN 3.3*  --   --    BILITOT 0.2  --   --    ALKPHOS 42*  --   --    AST 36  --   --    ALT 22  --   --    ANIONGAP 13 9 9       Significant Imaging: CT: I have reviewed all pertinent results/findings within the past 24 hours:  Sigmoid diverticular abscess measures 3.2 x 1.8 cm, previously 3.3 x 2.4 cm.  There is sigmoid diverticulosis.

## 2022-08-10 NOTE — ASSESSMENT & PLAN NOTE
- Consult for advance care planning in elderly pt with underlying sarcoidosis, diverticular disease currently in observation for weakness and vomiting. History is significant for colonic diverticular abscess, for which she is on a prolonged course of abx. ID and general surgery consulted; plan will be to monitor her off of abx. Chart reviewed in depth; discussed pt in person with primary team.    - Met with pt at bedside; introduced role of palliative medicine, and learned more about pt outside of hospital. During visit, patient was awake, pleasant, sitting up in bed, and able to have a full conversation. Has capacity for complex medical decision-making at this time, and overall looks great for her age.   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   - She is , and has 1 living son (Andre); he checks on her routinely at her residence, though notes that her daughter-in-law Monica is very active in her care. Legal MPOA defaults to her son Andre (which she is in agreement with), though she would also like input from Monica if she is unable to make her own medical decisions.   - Prior to developing recent weakness, pt was able to drive, grocery shop, and fully care for herself. She said it was depressing to her to realize that she is not functioning at her prior level of independence, and she is hopeful to regain as much strength as possible.  She is motivated to continue working with PT/OT.   - She would consider going to SNF if recommended, though otherwise would be returning home with PT/OT and private sitter (arranged for her already by Monica); she is nervous about going home  at this time.   - While agreeable to continuing current level of care, she is DNR/DNI in the event of cardiac or respiratory arrest. Will need LaPOST specifying DNR/DNI/selective care prior to hospital discharge.   - Our team will follow up with pt both this hospital stay and any future hospital stays; updated primary team

## 2022-08-10 NOTE — PROGRESS NOTES
"[facility] Medicine  Progress Note    Patient Name: Cheyenne Garner  MRN: [id number]  Patient Class: OP- Observation   Admission Date: 8/8/2022  Length of Stay: 0 days  Attending Physician: Adali Hlae MD  Primary Care Provider: Mary Cortes MD        Subjective:     Principal Problem:Generalized weakness        HPI:  From H&P by Letty Batista PA:  "Ms. Cheyenne Garner is a 86 y.o. female, with PMH of IBD, sarcoidosis, HLD, OA, Depression, who [place] [facility] [place] due to generalized weakness and nausea x 2 days. She states she has felt unable to get out of bed due to her weakness. Associated symptoms include N/V/D, and light headedness. Her last/only episode of emesis was last night. She has been on PO antibiotics at home for treatment of diverticuliits after a recent admission. She denied appetite changes, fever, cough, shortness of breath. She was evaluated in the ED, where labs showed a normal CBC. A metabolic panel showed low ALP of 42. A UA was concentrated with nitrites, but showed no UTI. A CXR was without acute cardiopulmonary abnormalities. As she lives alone, and feels weak, she was placed on OBS."      Overview/Hospital Course:  Patient was admitted and surgery was consulted to re-evaluate the diverticular abscess.  She was nauseated and weak, and as she had been on antibiotics for a very prolonged period they were held and CT of the pelvis was ordered.  Diverticular abscess had decreased in size since the previous CT done on August 1st, but was still present.  The Infectious Diseases service was consulted for further recommendations regarding antibiotics given her intolerance of the long-term Cipro/Flagyl combination.  They felt that this could be a sterile fluid collection and she was afebrile and had a normal white blood cell count it would be safe to observe her without antibiotics for a while.  Patient's symptoms improved markedly off " antibiotics.  She participated in PT and OT, and resumption of home health was recommended.      Interval History:  Patient seen today during PT.  She is doing well, weakness much improved, not nauseated and tolerating a diet.  She has been seen by Dr. Morales and Dr. Ortega for recommendations.    Review of Systems   Constitutional:  Negative for chills and fever.   Respiratory:  Negative for cough and shortness of breath.    Cardiovascular:  Negative for chest pain and palpitations.   Gastrointestinal:  Negative for nausea and vomiting.   Objective:     Vital Signs (Most Recent):  Temp: 97.7 °F (36.5 °C) (08/10/22 1120)  Pulse: 76 (08/10/22 1120)  Resp: 12 (08/10/22 1120)  BP: (!) 119/55 (08/10/22 1120)  SpO2: 97 % (08/10/22 1120)   Vital Signs (24h Range):  Temp:  [97.7 °F (36.5 °C)-98.5 °F (36.9 °C)] 97.7 °F (36.5 °C)  Pulse:  [69-88] 76  Resp:  [12-18] 12  SpO2:  [94 %-97 %] 97 %  BP: (108-136)/(55-83) 119/55     Weight: 57.6 kg (127 lb)  Body mass index is 22.5 kg/m².    Intake/Output Summary (Last 24 hours) at 8/10/2022 1610  Last data filed at 8/10/2022 0000  Gross per 24 hour   Intake 620 ml   Output --   Net 620 ml      Physical Exam  Constitutional:       Appearance: She is normal weight.      Comments: Frail, elderly woman in no apparent distress.   Cardiovascular:      Rate and Rhythm: Normal rate and regular rhythm.      Heart sounds: Normal heart sounds. No murmur heard.    No gallop.   Pulmonary:      Effort: Pulmonary effort is normal.      Breath sounds: Normal breath sounds.   Abdominal:      General: Bowel sounds are normal.      Palpations: Abdomen is soft.   Skin:     General: Skin is warm and dry.   Neurological:      General: No focal deficit present.      Mental Status: She is alert.   Psychiatric:         Mood and Affect: Mood normal.         Behavior: Behavior normal.       Significant Labs: All pertinent labs within the past 24 hours have been reviewed.    Significant Imaging: I have  "reviewed all pertinent imaging results/findings within the past 24 hours.      Assessment/Plan:      * Generalized weakness  Colonic diverticular abscess  Nausea with vomiting.  - Patient with history of diverticular abscess presented with weakness and nausea.  - Diverticulitis with abscess treated last hospital stay with antibiotics in the hope of avoiding surgery.  - She has been on Cipro/Flagyl for a prolonged course with serial CT to evaluate the size of the abscess.  Now seems to be having side effects of the antibiotics.  - Discussed with Dr. Bojorquez, suggests repeating CT to see if abscess is diminishing in size.  - PT/OT consulted, recommend home health be resumed.  - CT done yesterday showed abscess decreasing in size.  ID consulted, feel this likely represents sterile fluid collection given her lack of fever and elevated white blood cell count.  Okay to hold antibiotics.    Colonic diverticular abscess  See "generalized weakness"      Non-intractable vomiting with nausea  See "generalized weakness"      Hyperlipidemia  - continue simvastatin 10 mg QD or formulary equivalent         VTE Risk Mitigation (From admission, onward)         Ordered     heparin (porcine) injection 5,000 Units  Every 8 hours         08/08/22 2314     IP VTE HIGH RISK PATIENT  Once         08/08/22 2314     Place sequential compression device  Until discontinued         08/08/22 2314                      Adali Deshpande MD  Department of Hospital Medicine   [facility]  "

## 2022-08-11 VITALS
HEART RATE: 70 BPM | DIASTOLIC BLOOD PRESSURE: 62 MMHG | OXYGEN SATURATION: 99 % | TEMPERATURE: 98 F | WEIGHT: 128 LBS | RESPIRATION RATE: 16 BRPM | HEIGHT: 63 IN | SYSTOLIC BLOOD PRESSURE: 136 MMHG | BODY MASS INDEX: 22.68 KG/M2

## 2022-08-11 LAB
ANION GAP SERPL CALC-SCNC: 7 MMOL/L (ref 8–16)
BASOPHILS # BLD AUTO: 0.05 K/UL (ref 0–0.2)
BASOPHILS NFR BLD: 0.9 % (ref 0–1.9)
BUN SERPL-MCNC: 8 MG/DL (ref 8–23)
CALCIUM SERPL-MCNC: 7.1 MG/DL (ref 8.7–10.5)
CHLORIDE SERPL-SCNC: 115 MMOL/L (ref 95–110)
CO2 SERPL-SCNC: 20 MMOL/L (ref 23–29)
CREAT SERPL-MCNC: 0.6 MG/DL (ref 0.5–1.4)
DIFFERENTIAL METHOD: ABNORMAL
EOSINOPHIL # BLD AUTO: 0.1 K/UL (ref 0–0.5)
EOSINOPHIL NFR BLD: 2.6 % (ref 0–8)
ERYTHROCYTE [DISTWIDTH] IN BLOOD BY AUTOMATED COUNT: 14.9 % (ref 11.5–14.5)
EST. GFR  (NO RACE VARIABLE): >60 ML/MIN/1.73 M^2
GLUCOSE SERPL-MCNC: 81 MG/DL (ref 70–110)
HCT VFR BLD AUTO: 34.8 % (ref 37–48.5)
HGB BLD-MCNC: 11.3 G/DL (ref 12–16)
IMM GRANULOCYTES # BLD AUTO: 0.02 K/UL (ref 0–0.04)
IMM GRANULOCYTES NFR BLD AUTO: 0.4 % (ref 0–0.5)
LYMPHOCYTES # BLD AUTO: 2.5 K/UL (ref 1–4.8)
LYMPHOCYTES NFR BLD: 47.7 % (ref 18–48)
MAGNESIUM SERPL-MCNC: 1.7 MG/DL (ref 1.6–2.6)
MCH RBC QN AUTO: 31.2 PG (ref 27–31)
MCHC RBC AUTO-ENTMCNC: 32.5 G/DL (ref 32–36)
MCV RBC AUTO: 96 FL (ref 82–98)
MONOCYTES # BLD AUTO: 0.6 K/UL (ref 0.3–1)
MONOCYTES NFR BLD: 10.9 % (ref 4–15)
NEUTROPHILS # BLD AUTO: 2 K/UL (ref 1.8–7.7)
NEUTROPHILS NFR BLD: 37.5 % (ref 38–73)
NRBC BLD-RTO: 0 /100 WBC
PLATELET # BLD AUTO: 240 K/UL (ref 150–450)
PMV BLD AUTO: 10 FL (ref 9.2–12.9)
POTASSIUM SERPL-SCNC: 3.7 MMOL/L (ref 3.5–5.1)
RBC # BLD AUTO: 3.62 M/UL (ref 4–5.4)
SODIUM SERPL-SCNC: 142 MMOL/L (ref 136–145)
WBC # BLD AUTO: 5.33 K/UL (ref 3.9–12.7)

## 2022-08-11 PROCEDURE — 96372 THER/PROPH/DIAG INJ SC/IM: CPT | Performed by: PHYSICIAN ASSISTANT

## 2022-08-11 PROCEDURE — 83735 ASSAY OF MAGNESIUM: CPT | Performed by: PHYSICIAN ASSISTANT

## 2022-08-11 PROCEDURE — 63600175 PHARM REV CODE 636 W HCPCS: Performed by: PHYSICIAN ASSISTANT

## 2022-08-11 PROCEDURE — 36415 COLL VENOUS BLD VENIPUNCTURE: CPT | Performed by: HOSPITALIST

## 2022-08-11 PROCEDURE — 80048 BASIC METABOLIC PNL TOTAL CA: CPT | Performed by: PHYSICIAN ASSISTANT

## 2022-08-11 PROCEDURE — 25000003 PHARM REV CODE 250: Performed by: PHYSICIAN ASSISTANT

## 2022-08-11 PROCEDURE — 99217 PR OBSERVATION CARE DISCHARGE: ICD-10-PCS | Mod: ,,, | Performed by: HOSPITALIST

## 2022-08-11 PROCEDURE — A4216 STERILE WATER/SALINE, 10 ML: HCPCS | Performed by: PHYSICIAN ASSISTANT

## 2022-08-11 PROCEDURE — 99217 PR OBSERVATION CARE DISCHARGE: CPT | Mod: ,,, | Performed by: HOSPITALIST

## 2022-08-11 PROCEDURE — 85025 COMPLETE CBC W/AUTO DIFF WBC: CPT | Performed by: PHYSICIAN ASSISTANT

## 2022-08-11 PROCEDURE — 97116 GAIT TRAINING THERAPY: CPT | Mod: CQ

## 2022-08-11 PROCEDURE — 36415 COLL VENOUS BLD VENIPUNCTURE: CPT | Performed by: PHYSICIAN ASSISTANT

## 2022-08-11 PROCEDURE — G0378 HOSPITAL OBSERVATION PER HR: HCPCS

## 2022-08-11 RX ORDER — ZIPRASIDONE HYDROCHLORIDE 20 MG/1
20 CAPSULE ORAL NIGHTLY
Start: 2022-08-11 | End: 2023-02-07

## 2022-08-11 RX ORDER — LOPERAMIDE HYDROCHLORIDE 2 MG/1
2 CAPSULE ORAL 4 TIMES DAILY PRN
Refills: 0 | COMMUNITY
Start: 2022-08-11 | End: 2022-08-21

## 2022-08-11 RX ADMIN — HEPARIN SODIUM 5000 UNITS: 5000 INJECTION INTRAVENOUS; SUBCUTANEOUS at 05:08

## 2022-08-11 RX ADMIN — Medication 10 ML: at 05:08

## 2022-08-11 RX ADMIN — ATORVASTATIN CALCIUM 10 MG: 10 TABLET, FILM COATED ORAL at 08:08

## 2022-08-11 RX ADMIN — MULTIVIT AND MINERALS-FERROUS GLUCONATE 9 MG IRON/15 ML ORAL LIQUID 15 ML: at 08:08

## 2022-08-11 NOTE — PT/OT/SLP PROGRESS
Physical Therapy Treatment    Patient Name:  Cheyenne Garner   MRN:  [id number]    Recommendations:     Discharge Recommendations:  home health PT   Discharge Equipment Recommendations: none (pt has RW)   Barriers to discharge: Decreased caregiver support    Assessment:     Cheyenne Garner is a 86 y.o. female admitted with a medical diagnosis of Generalized weakness.  She presents with the following impairments/functional limitations:  weakness, impaired endurance, impaired functional mobility, impaired self care skills.    Supine<>sit with SBA, use of logroll technique  Sit>stand with RW and SBA  Amb 140' with RW and CGA, no LoB  Scoot toward HOB with cues and SBA  Pt demos good mobility, still feeling weak but improving  Rec HHPT    Rehab Prognosis: Good; patient would benefit from acute skilled PT services to address these deficits and reach maximum level of function.    Recent Surgery: * No surgery found *      Plan:     During this hospitalization, patient to be seen 4 x/week to address the identified rehab impairments via gait training, therapeutic activities, therapeutic exercises, neuromuscular re-education and progress toward the following goals:    · Plan of Care Expires:  09/08/22    Subjective     Chief Complaint: weakness  Patient/Family Comments/goals: But I am feeling like I'm getting better  Pain/Comfort:  · Pain Rating 1: 0/10  · Pain Rating Post-Intervention 1: 0/10      Objective:     Communicated with nurse Quinn prior to session.  Patient found HOB elevated with telemetry, peripheral IV upon PT entry to room.     General Precautions: Standard, fall   Orthopedic Precautions:N/A   Braces:    Respiratory Status: Room air     Functional Mobility:  · Bed Mobility:     · Bridging: stand by assistance  · Supine to Sit: stand by assistance  · Sit to Supine: stand by assistance  · Transfers:     · Sit to Stand:  stand by assistance with rolling walker  · Gait: 140' with RW and CGA      AM-PAC 6 CLICK  MOBILITY  Turning over in bed (including adjusting bedclothes, sheets and blankets)?: 3  Sitting down on and standing up from a chair with arms (e.g., wheelchair, bedside commode, etc.): 3  Moving from lying on back to sitting on the side of the bed?: 3  Moving to and from a bed to a chair (including a wheelchair)?: 3  Need to walk in hospital room?: 3  Climbing 3-5 steps with a railing?: 2  Basic Mobility Total Score: 17       Therapeutic Activities and Exercises:   Gait training and focus on bed mobililty as noted    Patient left HOB elevated with all lines intact and call button in reach..    GOALS:   Multidisciplinary Problems     Physical Therapy Goals        Problem: Physical Therapy    Goal Priority Disciplines Outcome Goal Variances Interventions   Physical Therapy Goal     PT, PT/OT Ongoing, Progressing     Description: Goals to be met by: 9/8/22    Patient will perform the following to increase strength, improve mobility, and return to prior level of function:    1. Supine <> sit with SPV.  2. Sit<>stand with SPV with least restrictive assistive device.  3. Gait x 100 feet with SPV with least restrictive assistive device.                       Time Tracking:     PT Received On: 08/11/22  PT Start Time: 1004     PT Stop Time: 1018  PT Total Time (min): 14 min     Billable Minutes: Gait Training 14    Treatment Type: Treatment  PT/PTA: PTA     PTA Visit Number: 2     08/11/2022

## 2022-08-11 NOTE — PLAN OF CARE
[facility])      HOME HEALTH ORDERS  FACE TO FACE ENCOUNTER    Patient Name: Cheyenne Garner  YOB: 1935    PCP: Mary Cortes MD   [place] [address] [place] [address] / [facility] [address]  PCP Phone Number: [phone]  PCP Fax: [phone]    Encounter Date:  8/11/2022    Admit to Home Health    Diagnoses:  Active Hospital Problems    Diagnosis  POA    *Generalized weakness [R53.1]  Yes    Advance care planning [Z71.89]  Not Applicable    Non-intractable vomiting with nausea [R11.2]  Yes    Colonic diverticular abscess [K57.20]  Yes    Hyperlipidemia [E78.5]  Yes      Resolved Hospital Problems   No resolved problems to display.       Follow Up Appointments:  Future Appointments   Date Time Provider Department Center   9/26/2022  1:00 PM INJECTION, [facility]       Allergies:Review of patient's allergies indicates:  No Known Allergies    Medications: Review discharge medications with patient and family and provide education.      Current Discharge Medication List      START taking these medications    Details   loperamide (IMODIUM) 2 mg capsule Take 1 capsule (2 mg total) by mouth 4 (four) times daily as needed for Diarrhea.  Refills: 0         CONTINUE these medications which have CHANGED    Details   ziprasidone (GEODON) 20 MG Cap Take 1 capsule (20 mg total) by mouth nightly.         CONTINUE these medications which have NOT CHANGED    Details   ALIGN 4 mg capsule Take 1 capsule by mouth once daily.      cholestyramine, with sugar, 4 gram Powd Take 1 application by mouth daily as needed (Diarrhea). Hold until you see the surgeon or gastroenterologist.  Qty: 348.6 g, Refills: 11      clotrimazole-betamethasone 1-0.05% (LOTRISONE) cream Apply topically 2 (two) times daily.  Qty: 45 g, Refills: 1      dextroamphetamine-amphetamine 10 mg Tab Take by mouth once daily.      ergocalciferol (ERGOCALCIFEROL) 50,000 unit Cap Take 1 capsule  (50,000 Units total) by mouth twice a week.  Qty: 20 capsule, Refills: 2      hydrocortisone (ANUSOL-HC) 2.5 % rectal cream Place rectally 2 (two) times daily.  Qty: 1 each, Refills: 1      meloxicam (MOBIC) 15 MG tablet Take 1 tablet (15 mg total) by mouth once daily for arthritis      mirtazapine (REMERON) 7.5 MG Tab Take 1 tablet by mouth Daily.      simvastatin (ZOCOR) 10 MG tablet Take 1 tablet (10 mg total) by mouth every evening.  Qty: 90 tablet, Refills: 3    Comments: pt requesting refill      valACYclovir (VALTREX) 1000 MG tablet TAKE ONE TABLET BY MOUTH TWICE DAILY FOR 5 DAYS FOR cold sores  Qty: 10 tablet, Refills: 3    Comments: pt requesting refill         STOP taking these medications       ciprofloxacin HCl (CIPRO) 500 MG tablet Comments:   Reason for Stopping:         metroNIDAZOLE (FLAGYL) 500 MG tablet Comments:   Reason for Stopping:         hydroCHLOROthiazide (HYDRODIURIL) 12.5 MG Tab Comments:   Reason for Stopping:                 I have seen and examined this patient within the last 30 days. My clinical findings that support the need for the home health skilled services and home bound status are the following:no   Weakness/numbness causing balance and gait disturbance due to Weakness/Debility making it taxing to leave home.     Diet:   regular diet    Referrals/ Consults  Physical Therapy to evaluate and treat. Evaluate for home safety and equipment needs; Establish/upgrade home exercise program. Perform / instruct on therapeutic exercises, gait training, transfer training, and Range of Motion.  Occupational Therapy to evaluate and treat. Evaluate home environment for safety and equipment needs. Perform/Instruct on transfers, ADL training, ROM, and therapeutic exercises.    Activities:   activity as tolerated    Nursing:   Agency to admit patient within 24 hours of hospital discharge unless specified on physician order or at patient request    SN to complete comprehensive assessment  including routine vital signs. Instruct on disease process and s/s of complications to report to MD. Review/verify medication list sent home with the patient at time of discharge  and instruct patient/caregiver as needed. Frequency may be adjusted depending on start of care date.     Skilled nurse to perform up to 3 visits PRN for symptoms related to diagnosis    Notify MD if SBP > 160 or < 90; DBP > 90 or < 50; HR > 120 or < 50; Temp > 101; O2 < 88%    Ok to schedule additional visits based on staff availability and patient request on consecutive days within the home health episode.    When multiple disciplines ordered:    Start of Care occurs on Sunday - Wednesday schedule remaining discipline evaluations as ordered on separate consecutive days following the start of care.    Thursday SOC -schedule subsequent evaluations Friday and Monday the following week.     Friday - Saturday SOC - schedule subsequent discipline evaluations on consecutive days starting Monday of the following week.    For all post-discharge communication and subsequent orders please contact patient's primary care physician.    I certify that this patient is confined to her home and needs physical therapy and occupational therapy.

## 2022-08-11 NOTE — DISCHARGE SUMMARY
"[facility] Medicine  Discharge Summary      Patient Name: Cheyenne Garner  MRN: [id number]  Patient Class: OP- Observation  Admission Date: 8/8/2022  Hospital Length of Stay: 0 days  Discharge Date and Time:  08/11/2022 3:21 PM  Attending Physician: Adali Hale MD   Discharging Provider: Adali Hale MD  Primary Care Provider: Mary Cortes MD      HPI:   From H&P by Letty Batista PA:  "Ms. Cheyenne Garner is a 86 y.o. female, with PMH of IBD, sarcoidosis, HLD, OA, Depression, who [place] [facility] [place] due to generalized weakness and nausea x 2 days. She states she has felt unable to get out of bed due to her weakness. Associated symptoms include N/V/D, and light headedness. Her last/only episode of emesis was last night. She has been on PO antibiotics at home for treatment of diverticuliits after a recent admission. She denied appetite changes, fever, cough, shortness of breath. She was evaluated in the ED, where labs showed a normal CBC. A metabolic panel showed low ALP of 42. A UA was concentrated with nitrites, but showed no UTI. A CXR was without acute cardiopulmonary abnormalities. As she lives alone, and feels weak, she was placed on OBS."            Hospital Course:   Patient was admitted and surgery was consulted to re-evaluate the diverticular abscess.  She was nauseated and weak, and as she had been on antibiotics for a very prolonged period they were held and CT of the pelvis was ordered.  Diverticular abscess had decreased in size since the previous CT done on August 1st, but was still present.  The Infectious Diseases service was consulted for further recommendations regarding antibiotics given her intolerance of the long-term Cipro/Flagyl combination.  They felt that this could be a sterile fluid collection and she was afebrile and had a normal white blood cell count it would be safe to observe her without antibiotics for a while.  Patient's " symptoms improved markedly off antibiotics and although she was not back to her baseline on discharge she was feeling better.  She participated in PT and OT, and resumption of home health was recommended.         Goals of Care Treatment Preferences:  Code Status: DNR      Consults:   Consults (From admission, onward)        Status Ordering Provider     Inpatient consult to Palliative Care  Once        Provider:  Kerrie Morrell MD    Completed LUCAS MENDIETA     Inpatient consult to Infectious Diseases  Once        Provider:  Tyrese Ortega MD    Completed LUCAS MENDIETA     Inpatient consult to General Surgery  Once        Provider:  Jeremias Bojorquez Jr., MD    Acknowledged LUCAS MENDIETA     Inpatient consult to Social Work  Once        Provider:  (Not yet assigned)    Completed USMAN CRAWLEY            Final Active Diagnoses:    Diagnosis Date Noted POA    PRINCIPAL PROBLEM:  Generalized weakness [R53.1] 08/08/2022 Yes    Advance care planning [Z71.89] 08/10/2022 Not Applicable    Non-intractable vomiting with nausea [R11.2] 08/09/2022 Yes    Colonic diverticular abscess [K57.20] 08/09/2022 Yes    Hyperlipidemia [E78.5] 10/06/2016 Yes      Problems Resolved During this Admission:       Discharged Condition: stable    [place] [facility]    Follow Up:   Follow-up Information     Filippo Morales MD. Go on 9/9/2022.    Specialty: Gastroenterology  Why: Follow up scheduled for 2:45pm.  Contact information:  [address] [id number] [phone]                       Patient Instructions:      Diet Adult Regular     Activity as tolerated       Medications:  Reconciled Home Medications:      Medication List      START taking these medications    loperamide 2 mg capsule  Commonly known as: IMODIUM  Take 1 capsule (2 mg total) by mouth 4 (four) times daily as needed for Diarrhea.        CHANGE how you take these medications    ziprasidone 20 MG  Cap  Commonly known as: GEODON  Take 1 capsule (20 mg total) by mouth nightly.  What changed:   · how much to take  · how to take this  · when to take this        CONTINUE taking these medications    ALIGN 4 mg Cap  Generic drug: Bifidobacterium infantis  Take 1 capsule by mouth once daily.     cholestyramine (with sugar) 4 gram Powd  Take 1 application by mouth daily as needed (Diarrhea). Hold until you see the surgeon or gastroenterologist.     clotrimazole-betamethasone 1-0.05% cream  Commonly known as: LOTRISONE  Apply topically 2 (two) times daily.     dextroamphetamine-amphetamine 10 mg Tab  Take by mouth once daily.     ergocalciferol 50,000 unit Cap  Commonly known as: ERGOCALCIFEROL  Take 1 capsule (50,000 Units total) by mouth twice a week.     hydrocortisone 2.5 % rectal cream  Commonly known as: ANUSOL-HC  Place rectally 2 (two) times daily.     meloxicam 15 MG tablet  Commonly known as: MOBIC  Take 1 tablet (15 mg total) by mouth once daily for arthritis     mirtazapine 7.5 MG Tab  Commonly known as: REMERON  Take 1 tablet by mouth Daily.     simvastatin 10 MG tablet  Commonly known as: ZOCOR  Take 1 tablet (10 mg total) by mouth every evening.     valACYclovir 1000 MG tablet  Commonly known as: VALTREX  TAKE ONE TABLET BY MOUTH TWICE DAILY FOR 5 DAYS FOR cold sores        STOP taking these medications    ciprofloxacin HCl 500 MG tablet  Commonly known as: CIPRO     hydroCHLOROthiazide 12.5 MG Tab  Commonly known as: HYDRODIURIL     metroNIDAZOLE 500 MG tablet  Commonly known as: FLAGYL            Time spent on the discharge of patient: <30 minutes         Adali Deshpande MD  Department of Hospital Medicine  [facility])

## 2022-08-11 NOTE — PT/OT/SLP PROGRESS
Occupational Therapy      Patient Name:  Cheyenne Garner   MRN:  [id number]    Attempted to see pt at 12:39.  Upon arrival to room pt politely declined participation in therapy stating she was discharging this date. Will follow-up if pt remains in hospital.    FRERDICK Marcus  8/11/2022

## 2022-08-11 NOTE — NURSING
End of Shift note:    VSS. No PRNS given with relief noted. Pt rested throughout the shift. Will continue to monitor until oncoming nurse arrives.    Vitals:    08/11/22 0502   BP: (!) 156/74   Pulse: 74   Resp: 16   Temp: 98.2 °F (36.8 °C)        Intake/Output Summary (Last 24 hours) at 8/11/2022 0618  Last data filed at 8/10/2022 1700  Gross per 24 hour   Intake 1625 ml   Output --   Net 1625 ml

## 2022-08-11 NOTE — PROGRESS NOTES
Diagnosis-history of pelvic abscess, weakness, fatigue, chronic diarrhea    Subjective  Feeling better with hydration  Tolerating solid diet  Denies abdominal pain, fever, chills, diaphoresis    PE  General awake and alert, no acute distress  Abdomen-soft, nontender, nondistended,    Impression/plan  Patient has had long course of antibiotics which are likely contributing to her fatigue and weakness.  Concerned about neuromuscular affects of long-term ciprofloxacin.  Although abscess still present on CT scan is decidedly smaller and clinically asymptomatic.  Multiple white blood cell counts have been within normal limits.  Sed rate also within normal limits.  Discussed with GI Medicine, Dr. Morales.  Feel that it would be reasonable to give patient trial off antibiotics and follow as outpatient.  Discussed with patient who agrees with plan

## 2022-08-15 LAB
SPECIMEN SOURCE: NORMAL
T WHIPPLEI DNA BLD QL NAA+PROBE: NEGATIVE

## 2022-08-25 ENCOUNTER — OFFICE VISIT (OUTPATIENT)
Dept: SURGERY | Facility: CLINIC | Age: 87
End: 2022-08-25
Attending: SPECIALIST
Payer: MEDICARE

## 2022-08-25 ENCOUNTER — HOSPITAL ENCOUNTER (OUTPATIENT)
Dept: RADIOLOGY | Facility: OTHER | Age: 87
Discharge: HOME | End: 2022-08-25
Attending: INTERNAL MEDICINE
Payer: MEDICARE

## 2022-08-25 VITALS — SYSTOLIC BLOOD PRESSURE: 170 MMHG | HEART RATE: 76 BPM | OXYGEN SATURATION: 100 % | DIASTOLIC BLOOD PRESSURE: 76 MMHG

## 2022-08-25 DIAGNOSIS — N73.9 PELVIC ABSCESS IN FEMALE: Primary | ICD-10-CM

## 2022-08-25 DIAGNOSIS — R20.9 COLD EXTREMITIES: ICD-10-CM

## 2022-08-25 PROCEDURE — 99213 OFFICE O/P EST LOW 20 MIN: CPT | Mod: PBBFAC,25 | Performed by: SPECIALIST

## 2022-08-25 PROCEDURE — 99212 PR OFFICE/OUTPT VISIT, EST, LEVL II, 10-19 MIN: ICD-10-PCS | Mod: S$PBB,,, | Performed by: SPECIALIST

## 2022-08-25 PROCEDURE — 99999 PR PBB SHADOW E&M-EST. PATIENT-LVL III: ICD-10-PCS | Mod: PBBFAC,,, | Performed by: SPECIALIST

## 2022-08-25 PROCEDURE — 93925 LOWER EXTREMITY STUDY: CPT | Mod: TC

## 2022-08-25 PROCEDURE — 93925 US LOWER EXTREMITY ARTERIES BILATERAL: ICD-10-PCS | Mod: 26,,, | Performed by: RADIOLOGY

## 2022-08-25 PROCEDURE — 99212 OFFICE O/P EST SF 10 MIN: CPT | Mod: S$PBB,,, | Performed by: SPECIALIST

## 2022-08-25 PROCEDURE — 99999 PR PBB SHADOW E&M-EST. PATIENT-LVL III: CPT | Mod: PBBFAC,,, | Performed by: SPECIALIST

## 2022-08-25 PROCEDURE — 93925 LOWER EXTREMITY STUDY: CPT | Mod: 26,,, | Performed by: RADIOLOGY

## 2022-08-25 NOTE — PROGRESS NOTES
86-year-old female admitted 08/08 2022 with diverticular abscess of pelvis.  CT scan reviewed by invasive Radiology who felt there was not a window for percutaneous drainage.  Patient treated with course of IV and then oral antibiotics.  Past medical history of sarcoid colitis.    Subjective   Patient complain of fatigue   No fever, chills, abdominal pain   No hematuria pneumaturia, dysuria    PE  General-well-developed well-nourished female in no acute distress   Abdomen-soft, nontender, no masses, no guarding, no rebound    Imaging  CT scan of abdomen and pelvis earlier this month show decreased size of abscess    Lab   Recent sed rate and white blood cell count within normal limits    Impression/plan  Discussed with Dr. Cortes and Dr. Morales  Will continue watchful waiting  Signs and symptoms of recrudescence of abscess discussed  RTC p.r.n.

## 2022-09-02 ENCOUNTER — EXTERNAL HOME HEALTH (OUTPATIENT)
Dept: HOME HEALTH SERVICES | Facility: HOSPITAL | Age: 87
End: 2022-09-02
Payer: COMMERCIAL

## 2022-09-09 ENCOUNTER — LAB VISIT (OUTPATIENT)
Dept: LAB | Facility: OTHER | Age: 87
End: 2022-09-09
Attending: INTERNAL MEDICINE
Payer: MEDICARE

## 2022-09-09 DIAGNOSIS — K52.9 INFLAMMATORY BOWEL DISEASE: ICD-10-CM

## 2022-09-09 DIAGNOSIS — G62.9 NEUROPATHY: Primary | ICD-10-CM

## 2022-09-09 DIAGNOSIS — K57.32 DIVERTICULITIS OF LARGE INTESTINE: ICD-10-CM

## 2022-09-09 LAB
25(OH)D3+25(OH)D2 SERPL-MCNC: 38 NG/ML (ref 30–96)
ALBUMIN SERPL BCP-MCNC: 3.7 G/DL (ref 3.5–5.2)
ALP SERPL-CCNC: 93 U/L (ref 55–135)
ALT SERPL W/O P-5'-P-CCNC: 29 U/L (ref 10–44)
ANION GAP SERPL CALC-SCNC: 7 MMOL/L (ref 8–16)
AST SERPL-CCNC: 28 U/L (ref 10–40)
BASOPHILS # BLD AUTO: 0.08 K/UL (ref 0–0.2)
BASOPHILS NFR BLD: 1.3 % (ref 0–1.9)
BILIRUB SERPL-MCNC: 0.3 MG/DL (ref 0.1–1)
BUN SERPL-MCNC: 19 MG/DL (ref 8–23)
CALCIUM SERPL-MCNC: 9.3 MG/DL (ref 8.7–10.5)
CHLORIDE SERPL-SCNC: 106 MMOL/L (ref 95–110)
CO2 SERPL-SCNC: 26 MMOL/L (ref 23–29)
CREAT SERPL-MCNC: 0.8 MG/DL (ref 0.5–1.4)
DIFFERENTIAL METHOD: ABNORMAL
EOSINOPHIL # BLD AUTO: 0.2 K/UL (ref 0–0.5)
EOSINOPHIL NFR BLD: 3 % (ref 0–8)
ERYTHROCYTE [DISTWIDTH] IN BLOOD BY AUTOMATED COUNT: 14 % (ref 11.5–14.5)
ERYTHROCYTE [SEDIMENTATION RATE] IN BLOOD: 16 MM/HR (ref 0–20)
EST. GFR  (NO RACE VARIABLE): >60 ML/MIN/1.73 M^2
FERRITIN SERPL-MCNC: 171 NG/ML (ref 20–300)
FOLATE SERPL-MCNC: 8.4 NG/ML (ref 4–24)
GLUCOSE SERPL-MCNC: 80 MG/DL (ref 70–110)
HCT VFR BLD AUTO: 42.3 % (ref 37–48.5)
HGB BLD-MCNC: 13.7 G/DL (ref 12–16)
IMM GRANULOCYTES # BLD AUTO: 0.02 K/UL (ref 0–0.04)
IMM GRANULOCYTES NFR BLD AUTO: 0.3 % (ref 0–0.5)
IRON SERPL-MCNC: 81 UG/DL (ref 30–160)
LYMPHOCYTES # BLD AUTO: 2.4 K/UL (ref 1–4.8)
LYMPHOCYTES NFR BLD: 37.3 % (ref 18–48)
MAGNESIUM SERPL-MCNC: 2.1 MG/DL (ref 1.6–2.6)
MCH RBC QN AUTO: 31.5 PG (ref 27–31)
MCHC RBC AUTO-ENTMCNC: 32.4 G/DL (ref 32–36)
MCV RBC AUTO: 97 FL (ref 82–98)
MONOCYTES # BLD AUTO: 0.6 K/UL (ref 0.3–1)
MONOCYTES NFR BLD: 10 % (ref 4–15)
NEUTROPHILS # BLD AUTO: 3.1 K/UL (ref 1.8–7.7)
NEUTROPHILS NFR BLD: 48.1 % (ref 38–73)
NRBC BLD-RTO: 0 /100 WBC
PLATELET # BLD AUTO: 305 K/UL (ref 150–450)
PMV BLD AUTO: 9.9 FL (ref 9.2–12.9)
POTASSIUM SERPL-SCNC: 4.2 MMOL/L (ref 3.5–5.1)
PROT SERPL-MCNC: 7.2 G/DL (ref 6–8.4)
RBC # BLD AUTO: 4.35 M/UL (ref 4–5.4)
SATURATED IRON: 30 % (ref 20–50)
SODIUM SERPL-SCNC: 139 MMOL/L (ref 136–145)
TOTAL IRON BINDING CAPACITY: 271 UG/DL (ref 250–450)
TRANSFERRIN SERPL-MCNC: 183 MG/DL (ref 200–375)
TSH SERPL DL<=0.005 MIU/L-ACNC: 1.01 UIU/ML (ref 0.4–4)
VIT B12 SERPL-MCNC: >2000 PG/ML (ref 210–950)
WBC # BLD AUTO: 6.38 K/UL (ref 3.9–12.7)

## 2022-09-09 PROCEDURE — 86038 ANTINUCLEAR ANTIBODIES: CPT | Performed by: INTERNAL MEDICINE

## 2022-09-09 PROCEDURE — 85651 RBC SED RATE NONAUTOMATED: CPT | Performed by: INTERNAL MEDICINE

## 2022-09-09 PROCEDURE — 82607 VITAMIN B-12: CPT | Performed by: INTERNAL MEDICINE

## 2022-09-09 PROCEDURE — 80053 COMPREHEN METABOLIC PANEL: CPT | Performed by: INTERNAL MEDICINE

## 2022-09-09 PROCEDURE — 82728 ASSAY OF FERRITIN: CPT | Performed by: INTERNAL MEDICINE

## 2022-09-09 PROCEDURE — 82746 ASSAY OF FOLIC ACID SERUM: CPT | Performed by: INTERNAL MEDICINE

## 2022-09-09 PROCEDURE — 82306 VITAMIN D 25 HYDROXY: CPT | Performed by: INTERNAL MEDICINE

## 2022-09-09 PROCEDURE — 83735 ASSAY OF MAGNESIUM: CPT | Performed by: INTERNAL MEDICINE

## 2022-09-09 PROCEDURE — 36415 COLL VENOUS BLD VENIPUNCTURE: CPT | Performed by: INTERNAL MEDICINE

## 2022-09-09 PROCEDURE — 84443 ASSAY THYROID STIM HORMONE: CPT | Performed by: INTERNAL MEDICINE

## 2022-09-09 PROCEDURE — 84425 ASSAY OF VITAMIN B-1: CPT | Performed by: INTERNAL MEDICINE

## 2022-09-09 PROCEDURE — 84466 ASSAY OF TRANSFERRIN: CPT | Performed by: INTERNAL MEDICINE

## 2022-09-09 PROCEDURE — 84207 ASSAY OF VITAMIN B-6: CPT | Performed by: INTERNAL MEDICINE

## 2022-09-09 PROCEDURE — 85025 COMPLETE CBC W/AUTO DIFF WBC: CPT | Performed by: INTERNAL MEDICINE

## 2022-09-12 ENCOUNTER — OFFICE VISIT (OUTPATIENT)
Dept: NEUROLOGY | Facility: CLINIC | Age: 87
End: 2022-09-12
Payer: MEDICARE

## 2022-09-12 VITALS
HEIGHT: 64 IN | WEIGHT: 129.88 LBS | DIASTOLIC BLOOD PRESSURE: 68 MMHG | HEART RATE: 89 BPM | BODY MASS INDEX: 22.17 KG/M2 | SYSTOLIC BLOOD PRESSURE: 130 MMHG

## 2022-09-12 DIAGNOSIS — G62.9 NEUROPATHY: Primary | ICD-10-CM

## 2022-09-12 LAB — ANA SER QL IF: NORMAL

## 2022-09-12 PROCEDURE — 99203 OFFICE O/P NEW LOW 30 MIN: CPT | Mod: S$PBB,,,

## 2022-09-12 PROCEDURE — 99214 OFFICE O/P EST MOD 30 MIN: CPT | Mod: PBBFAC

## 2022-09-12 PROCEDURE — 99203 PR OFFICE/OUTPT VISIT, NEW, LEVL III, 30-44 MIN: ICD-10-PCS | Mod: S$PBB,,,

## 2022-09-12 PROCEDURE — 99999 PR PBB SHADOW E&M-EST. PATIENT-LVL IV: CPT | Mod: PBBFAC,,,

## 2022-09-12 PROCEDURE — 99999 PR PBB SHADOW E&M-EST. PATIENT-LVL IV: ICD-10-PCS | Mod: PBBFAC,,,

## 2022-09-12 RX ORDER — GABAPENTIN 300 MG/1
300 CAPSULE ORAL 2 TIMES DAILY
Qty: 60 CAPSULE | Refills: 2 | Status: SHIPPED | OUTPATIENT
Start: 2022-09-12 | End: 2022-11-09

## 2022-09-12 NOTE — PROGRESS NOTES
"Subjective:       Patient ID: Cheyenne Garner is a 86 y.o. female.    Chief Complaint:  Numbness      History of Present Illness  86 year old female who presents for evaluation of peripheral neuropathy. Past medical history below. She states she began experiencing numbness, burning sensation in both feet from ankles down to her toes for a month now. She states her feet feel cold like it is sitting in a block of ice. "Feels debilitating". She noticed the symptoms started after taking Flagyl and Cipro for pelvic abscess due to sigmoid diverticulitis. Further reports weakness and fall that occurred also after starting antibiotics. Was recently hospitalized for weakness. Currently in PT at home. U/S of lower extremities were normal. History of low B12. Recently received B12 injection. Denies any numbness or tingling prior to antibiotic use.     Past Medical History:   Diagnosis Date    Cataract     Hyperlipidemia     Inflammatory bowel disease     Sarcoidosis with granulomatous hepatitis     UTI (urinary tract infection) 7/17/2013       Past Surgical History:   Procedure Laterality Date    BLADDER SURGERY      BREAST SURGERY      EYE SURGERY      HIP SURGERY      HYSTERECTOMY      JOINT REPLACEMENT      SINUS SURGERY         Family History   Problem Relation Age of Onset    Cancer Mother     Cancer Sister        Social History     Socioeconomic History    Marital status:    Tobacco Use    Smoking status: Former    Smokeless tobacco: Never   Substance and Sexual Activity    Alcohol use: Yes     Alcohol/week: 3.0 standard drinks     Types: 1 Glasses of wine, 1 Cans of beer, 1 Shots of liquor per week    Drug use: No    Sexual activity: Not Currently     Social Determinants of Health     Financial Resource Strain: Low Risk     Difficulty of Paying Living Expenses: Not very hard   Food Insecurity: No Food Insecurity    Worried About Running Out of Food in the Last Year: Never true    Ran " Out of Food in the Last Year: Never true   Transportation Needs: No Transportation Needs    Lack of Transportation (Medical): No    Lack of Transportation (Non-Medical): No   Physical Activity: Inactive    Days of Exercise per Week: 0 days    Minutes of Exercise per Session: 0 min   Stress: No Stress Concern Present    Feeling of Stress : Not at all   Social Connections: Moderately Integrated    Frequency of Communication with Friends and Family: Once a week    Frequency of Social Gatherings with Friends and Family: Twice a week    Attends [employer] Services: 1 to 4 times per year    Active Member of Clubs or Organizations: No    Attends Club or Organization Meetings: Never    Marital Status:    Housing Stability: Low Risk     Unable to Pay for Housing in the Last Year: No    Number of Places Lived in the Last Year: 1    Unstable Housing in the Last Year: No       Current Outpatient Medications   Medication Sig Dispense Refill    ALIGN 4 mg capsule Take 1 capsule by mouth once daily.      cholestyramine, with sugar, 4 gram Powd Take 1 application by mouth daily as needed (Diarrhea). Hold until you see the surgeon or gastroenterologist. 348.6 g 11    clotrimazole-betamethasone 1-0.05% (LOTRISONE) cream Apply topically 2 (two) times daily. 45 g 1    dextroamphetamine-amphetamine 10 mg Tab Take by mouth once daily.      ergocalciferol (ERGOCALCIFEROL) 50,000 unit Cap Take 1 capsule (50,000 Units total) by mouth twice a week. 20 capsule 2    fluorouraciL (EFUDEX) 5 % cream APPLY TO AFFECTED AREA TWICE DAILY FOR 8-weeks      hydrocortisone (ANUSOL-HC) 2.5 % rectal cream Place rectally 2 (two) times daily. 1 each 1    mirtazapine (REMERON) 7.5 MG Tab Take 1 tablet by mouth Daily.      simvastatin (ZOCOR) 10 MG tablet Take 1 tablet (10 mg total) by mouth every evening. 90 tablet 3    valACYclovir (VALTREX) 1000 MG tablet TAKE ONE TABLET BY MOUTH TWICE DAILY FOR 5 DAYS FOR cold sores 10 tablet  3    ziprasidone (GEODON) 20 MG Cap Take 1 capsule (20 mg total) by mouth nightly.      gabapentin (NEURONTIN) 300 MG capsule Take 1 capsule (300 mg total) by mouth 2 (two) times daily. 60 capsule 2     No current facility-administered medications for this visit.       Review of patient's allergies indicates:  No Known Allergies     Review of Systems  Review of Systems   Constitutional: Negative.    HENT: Negative.     Eyes: Negative.    Respiratory: Negative.     Cardiovascular: Negative.    Gastrointestinal: Negative.    Endocrine: Negative.    Genitourinary: Negative.    Musculoskeletal: Negative.    Skin: Negative.    Neurological:  Positive for numbness.   Psychiatric/Behavioral: Negative.       Objective:      Neurologic Exam     Mental Status   Oriented to person, place, and time.   Registration: recalls 3 of 3 objects.   Attention: normal. Concentration: normal.   Speech: speech is normal   Level of consciousness: alert  Knowledge: good.     Cranial Nerves   Cranial nerves II through XII intact.     CN II   Visual fields full to confrontation.     CN III, IV, VI   Pupils are equal, round, and reactive to light.  Extraocular motions are normal.   CN III: no CN III palsy  CN VI: no CN VI palsy  Nystagmus: none   Diplopia: none  Ophthalmoparesis: none    CN V   Facial sensation intact.     CN VII   Facial expression full, symmetric.     CN VIII   CN VIII normal.     CN IX, X   CN IX normal.   CN X normal.     CN XI   CN XI normal.     CN XII   CN XII normal.     Motor Exam   Muscle bulk: normal  Overall muscle tone: normal  Right arm pronator drift: absent  Left arm pronator drift: absent    Strength   Right biceps: 5/5  Left biceps: 5/5  Right triceps: 5/5  Left triceps: 5/5  Right quadriceps: 5/5  Left quadriceps: 5/5  Right anterior tibial: 5/5  Left anterior tibial: 5/5  Right posterior tibial: 5/5  Left posterior tibial: 5/5    Sensory Exam   Right arm light touch: normal  Left arm light touch:  normal  Right leg light touch: decreased from ankle  Left leg light touch: decreased from ankle  Right arm vibration: normal  Left arm vibration: normal  Right leg vibration: decreased from ankle  Left leg vibration: decreased from ankle  Right arm proprioception: normal  Left arm proprioception: normal  Right leg proprioception: decreased from ankle  Left leg proprioception: decreased from ankle  Right arm pinprick: normal  Left arm pinprick: normal  Right leg pinprick: decreased from ankle  Left leg pinprick: decreased from ankle    Gait, Coordination, and Reflexes     Gait  Gait: normal    Coordination   Romberg: negative    Tremor   Resting tremor: absent  Intention tremor: absent  Action tremor: absent    Reflexes   Right brachioradialis: 2+  Left brachioradialis: 2+  Right biceps: 2+  Left biceps: 2+  Right triceps: 2+  Left triceps: 2+  Right patellar: 1+  Left patellar: 1+  Right achilles: 1+  Left achilles: 1+  Right : 2+  Left : 2+    Physical Exam  HENT:      Head: Normocephalic and atraumatic.   Eyes:      Extraocular Movements: EOM normal.      Pupils: Pupils are equal, round, and reactive to light.   Cardiovascular:      Rate and Rhythm: Normal rate.   Pulmonary:      Effort: Pulmonary effort is normal.   Skin:     General: Skin is warm and dry.   Neurological:      Mental Status: She is alert and oriented to person, place, and time.      Cranial Nerves: Cranial nerves 2-12 are intact.      Sensory: Sensory deficit present.      Motor: Motor function is intact.      Coordination: Coordination is intact. Romberg Test normal.      Gait: Gait is intact.      Deep Tendon Reflexes:      Reflex Scores:       Tricep reflexes are 2+ on the right side and 2+ on the left side.       Bicep reflexes are 2+ on the right side and 2+ on the left side.       Brachioradialis reflexes are 2+ on the right side and 2+ on the left side.       Patellar reflexes are 1+ on the right side and 1+ on the left side.        Achilles reflexes are 1+ on the right side and 1+ on the left side.  Psychiatric:         Mood and Affect: Mood normal.         Speech: Speech normal.         Behavior: Behavior is cooperative.         Assessment and Plan:       1. Neuropathy  - gabapentin (NEURONTIN) 300 MG capsule; Take 1 capsule (300 mg total) by mouth 2 (two) times daily.  Dispense: 60 capsule; Refill: 2  -possibly related to use of Cipro and flagyl  Follow up in 3 months

## 2022-09-13 LAB — VIT B1 BLD-MCNC: 71 UG/L (ref 38–122)

## 2022-09-14 LAB — PYRIDOXAL SERPL-MCNC: 5 UG/L (ref 5–50)

## 2022-09-26 ENCOUNTER — INFUSION (OUTPATIENT)
Dept: INFUSION THERAPY | Facility: OTHER | Age: 87
End: 2022-09-26
Attending: STUDENT IN AN ORGANIZED HEALTH CARE EDUCATION/TRAINING PROGRAM
Payer: MEDICARE

## 2022-09-26 VITALS
SYSTOLIC BLOOD PRESSURE: 128 MMHG | OXYGEN SATURATION: 100 % | DIASTOLIC BLOOD PRESSURE: 76 MMHG | RESPIRATION RATE: 16 BRPM | HEART RATE: 90 BPM | TEMPERATURE: 98 F

## 2022-09-26 DIAGNOSIS — M81.0 OSTEOPOROSIS WITHOUT CURRENT PATHOLOGICAL FRACTURE, UNSPECIFIED OSTEOPOROSIS TYPE: Primary | ICD-10-CM

## 2022-09-26 PROCEDURE — 63600175 PHARM REV CODE 636 W HCPCS: Mod: JG | Performed by: INTERNAL MEDICINE

## 2022-09-26 PROCEDURE — 96372 THER/PROPH/DIAG INJ SC/IM: CPT

## 2022-09-26 RX ADMIN — DENOSUMAB 60 MG: 60 INJECTION SUBCUTANEOUS at 01:09

## 2022-09-28 PROBLEM — G62.0 DRUG-INDUCED POLYNEUROPATHY: Status: ACTIVE | Noted: 2022-09-28

## 2022-11-09 ENCOUNTER — TELEPHONE (OUTPATIENT)
Dept: NEUROLOGY | Facility: CLINIC | Age: 87
End: 2022-11-09
Payer: MEDICARE

## 2022-11-09 DIAGNOSIS — G62.9 NEUROPATHY: ICD-10-CM

## 2022-11-09 RX ORDER — GABAPENTIN 300 MG/1
300 CAPSULE ORAL 3 TIMES DAILY
Qty: 90 CAPSULE | Refills: 11 | Status: SHIPPED | OUTPATIENT
Start: 2022-11-09 | End: 2022-12-02

## 2022-11-09 NOTE — TELEPHONE ENCOUNTER
I was request by PCP to follow up with patient on her ongoing neuropathy. She states she continues to have debilitating symptoms where her feet feel cold and numb despite taking gabapentin twice a day. Feels she is tolerating the medication well without any side effects. Will order EMG for further evaluation and increase Gabapentin to 300 mg TID

## 2022-11-18 ENCOUNTER — HOSPITAL ENCOUNTER (OUTPATIENT)
Dept: RADIOLOGY | Facility: OTHER | Age: 87
Discharge: HOME | End: 2022-11-18
Attending: INTERNAL MEDICINE
Payer: MEDICARE

## 2022-11-18 ENCOUNTER — TELEPHONE (OUTPATIENT)
Dept: NEUROLOGY | Facility: CLINIC | Age: 87
End: 2022-11-18
Payer: MEDICARE

## 2022-11-18 DIAGNOSIS — Z12.31 SCREENING MAMMOGRAM FOR BREAST CANCER: ICD-10-CM

## 2022-11-18 PROCEDURE — 77063 MAMMO DIGITAL SCREENING BILAT WITH TOMO: ICD-10-PCS | Mod: 26,,, | Performed by: RADIOLOGY

## 2022-11-18 PROCEDURE — 77067 SCR MAMMO BI INCL CAD: CPT | Mod: 26,,, | Performed by: RADIOLOGY

## 2022-11-18 PROCEDURE — 77063 BREAST TOMOSYNTHESIS BI: CPT | Mod: TC

## 2022-11-18 PROCEDURE — 77063 BREAST TOMOSYNTHESIS BI: CPT | Mod: 26,,, | Performed by: RADIOLOGY

## 2022-11-18 PROCEDURE — 77067 MAMMO DIGITAL SCREENING BILAT WITH TOMO: ICD-10-PCS | Mod: 26,,, | Performed by: RADIOLOGY

## 2022-11-18 NOTE — TELEPHONE ENCOUNTER
Called & spoke w/ pt. Made her aware soonest available is 12/2. R/s appt to 12/2 @ 1:30pm and added to waitlist in case of any cancellations.     ----- Message from Kassi Swan sent at 11/18/2022  2:54 PM CST -----  Regarding: Appt  Contact: pt [phone]  Dr Cortes's office is calling to see if patient can be seen sooner, pt is having numbness in her feet, please call pt

## 2022-11-28 ENCOUNTER — HOSPITAL ENCOUNTER (OUTPATIENT)
Dept: RADIOLOGY | Facility: OTHER | Age: 87
Discharge: HOME | End: 2022-11-28
Attending: INTERNAL MEDICINE
Payer: MEDICARE

## 2022-11-28 DIAGNOSIS — R10.9 ABDOMINAL PAIN, UNSPECIFIED ABDOMINAL LOCATION: ICD-10-CM

## 2022-11-28 PROCEDURE — 25500020 PHARM REV CODE 255: Performed by: INTERNAL MEDICINE

## 2022-11-28 PROCEDURE — 74177 CT ABD & PELVIS W/CONTRAST: CPT | Mod: TC

## 2022-11-28 PROCEDURE — 74177 CT ABDOMEN PELVIS WITH CONTRAST: ICD-10-PCS | Mod: 26,,, | Performed by: RADIOLOGY

## 2022-11-28 PROCEDURE — 74177 CT ABD & PELVIS W/CONTRAST: CPT | Mod: 26,,, | Performed by: RADIOLOGY

## 2022-11-28 RX ADMIN — IOHEXOL 75 ML: 350 INJECTION, SOLUTION INTRAVENOUS at 12:11

## 2022-12-02 ENCOUNTER — OFFICE VISIT (OUTPATIENT)
Dept: NEUROLOGY | Facility: CLINIC | Age: 87
End: 2022-12-02
Payer: MEDICARE

## 2022-12-02 VITALS
BODY MASS INDEX: 23.6 KG/M2 | WEIGHT: 133.19 LBS | SYSTOLIC BLOOD PRESSURE: 116 MMHG | HEIGHT: 63 IN | OXYGEN SATURATION: 99 % | HEART RATE: 82 BPM | DIASTOLIC BLOOD PRESSURE: 58 MMHG

## 2022-12-02 DIAGNOSIS — G62.9 NEUROPATHY: ICD-10-CM

## 2022-12-02 PROCEDURE — 99999 PR PBB SHADOW E&M-EST. PATIENT-LVL III: ICD-10-PCS | Mod: PBBFAC,,,

## 2022-12-02 PROCEDURE — 99999 PR PBB SHADOW E&M-EST. PATIENT-LVL III: CPT | Mod: PBBFAC,,,

## 2022-12-02 PROCEDURE — 99214 OFFICE O/P EST MOD 30 MIN: CPT | Mod: S$PBB,,,

## 2022-12-02 PROCEDURE — 99213 OFFICE O/P EST LOW 20 MIN: CPT | Mod: PBBFAC

## 2022-12-02 PROCEDURE — 99214 PR OFFICE/OUTPT VISIT, EST, LEVL IV, 30-39 MIN: ICD-10-PCS | Mod: S$PBB,,,

## 2022-12-02 RX ORDER — GABAPENTIN 400 MG/1
400 CAPSULE ORAL 3 TIMES DAILY
Qty: 90 CAPSULE | Refills: 11 | Status: ON HOLD | OUTPATIENT
Start: 2022-12-02 | End: 2023-01-24 | Stop reason: SDUPTHER

## 2022-12-05 ENCOUNTER — TELEPHONE (OUTPATIENT)
Dept: NEUROLOGY | Facility: CLINIC | Age: 87
End: 2022-12-05
Payer: MEDICARE

## 2022-12-05 NOTE — TELEPHONE ENCOUNTER
I called the patient back and let her know that Dr. Miller doesn't have availability in the foreseeable future. I recommended that she continue care with Delores Nixon, but she only wants to be seen by a physician. Therefore, I requested she reach out to other [facility] locations with sooner availability, as well as contact her PCP.     ----- Message from June Woods sent at 12/5/2022  1:25 PM CST -----  .Type:  Running Late    Who Called:pt  Would the patient rather a call back or a response via [facility]? call  Best Call Back Number:[phone]  Additional Information:     Pt arrived for appt but was unable to find office  Appt is scheduled for 1

## 2022-12-07 NOTE — PROGRESS NOTES
"Subjective:       Patient ID: Cheyenne Garner is a 87 y.o. female.      History of Present Illness  Initial Visit 9/12/22  86 year old female who presents for evaluation of peripheral neuropathy. Past medical history below. She states she began experiencing numbness, burning sensation in both feet from ankles down to her toes for a month now. She states her feet feel cold like it is sitting in a block of ice. "Feels debilitating". She noticed the symptoms started after taking Flagyl and Cipro for pelvic abscess due to sigmoid diverticulitis. Further reports weakness and fall that occurred also after starting antibiotics. Was recently hospitalized for weakness. Currently in PT at home. U/S of lower extremities were normal. History of low B12. Recently received B12 injection. Denies any numbness or tingling prior to antibiotic use.     12/2/22  She states she continues to struggle with significant numbness she describes as coldness where she feel her feet is submerged in ice. Uses a warming blanket to keep her feet from feeling cold. Current taking 300 mg of Gabapentin TID. Tolerating this medication well. Denies any side effects. States she is "crippled". No issues with walking. Does not use any assistive devices. Dissatisfied that she has not seen a physician for this issue.     Past Medical History:   Diagnosis Date    Cataract     Hyperlipidemia     Inflammatory bowel disease     Sarcoidosis with granulomatous hepatitis     UTI (urinary tract infection) 7/17/2013       Past Surgical History:   Procedure Laterality Date    BLADDER SURGERY      BREAST SURGERY      EYE SURGERY      HIP SURGERY      HYSTERECTOMY      JOINT REPLACEMENT      SINUS SURGERY      TOTAL REDUCTION MAMMOPLASTY         Family History   Problem Relation Age of Onset    Cancer Mother     Breast cancer Sister     Cancer Sister        Social History     Socioeconomic History    Marital status:    Tobacco Use    Smoking status: Former    "  Types: Cigarettes    Smokeless tobacco: Never   Substance and Sexual Activity    Alcohol use: Yes     Alcohol/week: 3.0 standard drinks     Types: 1 Glasses of wine, 1 Cans of beer, 1 Shots of liquor per week    Drug use: No    Sexual activity: Not Currently     Social Determinants of Health     Financial Resource Strain: Low Risk     Difficulty of Paying Living Expenses: Not very hard   Food Insecurity: No Food Insecurity    Worried About Running Out of Food in the Last Year: Never true    Ran Out of Food in the Last Year: Never true   Transportation Needs: No Transportation Needs    Lack of Transportation (Medical): No    Lack of Transportation (Non-Medical): No   Physical Activity: Inactive    Days of Exercise per Week: 0 days    Minutes of Exercise per Session: 0 min   Stress: No Stress Concern Present    Feeling of Stress : Not at all   Social Connections: Moderately Integrated    Frequency of Communication with Friends and Family: Once a week    Frequency of Social Gatherings with Friends and Family: Twice a week    Attends L[employer] Services: 1 to 4 times per year    Active Member of Clubs or Organizations: No    Attends Club or Organization Meetings: Never    Marital Status:    Housing Stability: Low Risk     Unable to Pay for Housing in the Last Year: No    Number of Places Lived in the Last Year: 1    Unstable Housing in the Last Year: No       Current Outpatient Medications   Medication Sig Dispense Refill    ALIGN 4 mg capsule Take 1 capsule by mouth once daily.      cholestyramine, with sugar, 4 gram Powd Take 1 application by mouth daily as needed (Diarrhea). Hold until you see the surgeon or gastroenterologist. 348.6 g 11    clotrimazole-betamethasone 1-0.05% (LOTRISONE) cream Apply topically 2 (two) times daily. 45 g 1    dextroamphetamine-amphetamine 10 mg Tab Take by mouth once daily.      ergocalciferol (ERGOCALCIFEROL) 50,000 unit Cap Take 1 capsule (50,000 Units total) by mouth twice a  week. 20 capsule 2    fluorouraciL (EFUDEX) 5 % cream APPLY TO AFFECTED AREA TWICE DAILY FOR 8-weeks      hydrocortisone (ANUSOL-HC) 2.5 % rectal cream Place rectally 2 (two) times daily. 1 each 1    mirtazapine (REMERON) 7.5 MG Tab Take 1 tablet by mouth Daily.      simvastatin (ZOCOR) 10 MG tablet Take 1 tablet (10 mg total) by mouth every evening to control cholesterol 90 tablet 3    traZODone (DESYREL) 50 MG tablet Take 50 mg by mouth every evening.      valACYclovir (VALTREX) 1000 MG tablet TAKE ONE TABLET BY MOUTH TWICE DAILY FOR 5 DAYS FOR cold sores 10 tablet 3    ziprasidone (GEODON) 20 MG Cap Take 1 capsule (20 mg total) by mouth nightly.      gabapentin (NEURONTIN) 400 MG capsule Take 1 capsule (400 mg total) by mouth 3 (three) times daily. 90 capsule 11     No current facility-administered medications for this visit.       Review of patient's allergies indicates:   Allergen Reactions    Ciprofloxacin (bulk) Other (See Comments)     Made feet numb and cold        Review of Systems  Review of Systems   Constitutional: Negative.    HENT: Negative.     Eyes: Negative.    Respiratory: Negative.     Cardiovascular: Negative.    Gastrointestinal: Negative.    Endocrine: Negative.    Genitourinary: Negative.    Musculoskeletal: Negative.    Skin: Negative.    Neurological:  Positive for numbness.   Psychiatric/Behavioral: Negative.       Objective:      Neurologic Exam     Mental Status   Oriented to person, place, and time.   Registration: recalls 3 of 3 objects.   Attention: normal. Concentration: normal.   Speech: speech is normal   Level of consciousness: alert  Knowledge: good.     Cranial Nerves   Cranial nerves II through XII intact.     CN II   Visual fields full to confrontation.     CN III, IV, VI   Pupils are equal, round, and reactive to light.  Extraocular motions are normal.   CN III: no CN III palsy  CN VI: no CN VI palsy  Nystagmus: none   Diplopia: none  Ophthalmoparesis: none    CN V   Facial  sensation intact.     CN VII   Facial expression full, symmetric.     CN VIII   CN VIII normal.     CN IX, X   CN IX normal.   CN X normal.     CN XI   CN XI normal.     CN XII   CN XII normal.     Motor Exam   Muscle bulk: normal  Overall muscle tone: normal  Right arm pronator drift: absent  Left arm pronator drift: absent    Strength   Right biceps: 5/5  Left biceps: 5/5  Right triceps: 5/5  Left triceps: 5/5  Right quadriceps: 5/5  Left quadriceps: 5/5  Right anterior tibial: 5/5  Left anterior tibial: 5/5  Right posterior tibial: 5/5  Left posterior tibial: 5/5    Sensory Exam   Right arm light touch: normal  Left arm light touch: normal  Right leg light touch: decreased from ankle  Left leg light touch: decreased from ankle  Right arm vibration: normal  Left arm vibration: normal  Right leg vibration: decreased from ankle  Left leg vibration: decreased from ankle  Right arm proprioception: normal  Left arm proprioception: normal  Right leg proprioception: decreased from ankle  Left leg proprioception: decreased from ankle  Right arm pinprick: normal  Left arm pinprick: normal  Right leg pinprick: decreased from ankle  Left leg pinprick: decreased from ankle    Gait, Coordination, and Reflexes     Gait  Gait: normal    Coordination   Romberg: negative    Tremor   Resting tremor: absent  Intention tremor: absent  Action tremor: absent    Reflexes   Right brachioradialis: 2+  Left brachioradialis: 2+  Right biceps: 2+  Left biceps: 2+  Right triceps: 2+  Left triceps: 2+  Right patellar: 1+  Left patellar: 1+  Right achilles: 1+  Left achilles: 1+  Right : 2+  Left : 2+    Physical Exam  HENT:      Head: Normocephalic and atraumatic.   Eyes:      Extraocular Movements: EOM normal.      Pupils: Pupils are equal, round, and reactive to light.   Cardiovascular:      Rate and Rhythm: Normal rate.   Pulmonary:      Effort: Pulmonary effort is normal.   Skin:     General: Skin is warm and dry.   Neurological:       Mental Status: She is alert and oriented to person, place, and time.      Cranial Nerves: Cranial nerves 2-12 are intact.      Sensory: Sensory deficit present.      Motor: Motor function is intact.      Coordination: Coordination is intact. Romberg Test normal.      Gait: Gait is intact.      Deep Tendon Reflexes:      Reflex Scores:       Tricep reflexes are 2+ on the right side and 2+ on the left side.       Bicep reflexes are 2+ on the right side and 2+ on the left side.       Brachioradialis reflexes are 2+ on the right side and 2+ on the left side.       Patellar reflexes are 1+ on the right side and 1+ on the left side.       Achilles reflexes are 1+ on the right side and 1+ on the left side.  Psychiatric:         Mood and Affect: Mood normal.         Speech: Speech normal.         Behavior: Behavior is cooperative.         Assessment and Plan:     1. Neuropathy  - gabapentin (NEURONTIN) 400 MG capsule; Take 1 capsule (400 mg total) by mouth 3 (three) times daily.  Dispense: 90 capsule; Refill: 11   -will f/u to schedule EMG

## 2023-01-16 ENCOUNTER — HOSPITAL ENCOUNTER (INPATIENT)
Facility: OTHER | Age: 88
LOS: 9 days | Discharge: HOME HEALTH | DRG: 329 | End: 2023-01-25
Attending: EMERGENCY MEDICINE | Admitting: HOSPITALIST
Payer: MEDICARE

## 2023-01-16 DIAGNOSIS — N30.00 ACUTE CYSTITIS WITHOUT HEMATURIA: ICD-10-CM

## 2023-01-16 DIAGNOSIS — K63.1 BOWEL PERFORATION: Primary | ICD-10-CM

## 2023-01-16 DIAGNOSIS — F51.01 PRIMARY INSOMNIA: ICD-10-CM

## 2023-01-16 DIAGNOSIS — K57.20 DIVERTICULITIS OF LARGE INTESTINE WITH PERFORATION AND ABSCESS WITHOUT BLEEDING: ICD-10-CM

## 2023-01-16 DIAGNOSIS — G62.0 DRUG-INDUCED POLYNEUROPATHY: ICD-10-CM

## 2023-01-16 DIAGNOSIS — K57.92 ACUTE DIVERTICULITIS: ICD-10-CM

## 2023-01-16 DIAGNOSIS — K22.4 ESOPHAGEAL DYSMOTILITY: ICD-10-CM

## 2023-01-16 DIAGNOSIS — G62.9 NEUROPATHY: ICD-10-CM

## 2023-01-16 DIAGNOSIS — R07.9 CHEST PAIN: ICD-10-CM

## 2023-01-16 LAB
ALBUMIN SERPL BCP-MCNC: 3.6 G/DL (ref 3.5–5.2)
ALP SERPL-CCNC: 74 U/L (ref 55–135)
ALT SERPL W/O P-5'-P-CCNC: 29 U/L (ref 10–44)
ANION GAP SERPL CALC-SCNC: 11 MMOL/L (ref 8–16)
AST SERPL-CCNC: 34 U/L (ref 10–40)
BACTERIA #/AREA URNS HPF: ABNORMAL /HPF
BASOPHILS # BLD AUTO: 0.02 K/UL (ref 0–0.2)
BASOPHILS NFR BLD: 0.3 % (ref 0–1.9)
BILIRUB SERPL-MCNC: 0.9 MG/DL (ref 0.1–1)
BILIRUB UR QL STRIP: NEGATIVE
BUN SERPL-MCNC: 14 MG/DL (ref 8–23)
CALCIUM SERPL-MCNC: 8.4 MG/DL (ref 8.7–10.5)
CHLORIDE SERPL-SCNC: 107 MMOL/L (ref 95–110)
CLARITY UR: CLEAR
CO2 SERPL-SCNC: 19 MMOL/L (ref 23–29)
COLOR UR: YELLOW
CREAT SERPL-MCNC: 0.8 MG/DL (ref 0.5–1.4)
DIFFERENTIAL METHOD: ABNORMAL
EOSINOPHIL # BLD AUTO: 0 K/UL (ref 0–0.5)
EOSINOPHIL NFR BLD: 0 % (ref 0–8)
ERYTHROCYTE [DISTWIDTH] IN BLOOD BY AUTOMATED COUNT: 14.1 % (ref 11.5–14.5)
EST. GFR  (NO RACE VARIABLE): >60 ML/MIN/1.73 M^2
GLUCOSE SERPL-MCNC: 96 MG/DL (ref 70–110)
GLUCOSE UR QL STRIP: NEGATIVE
HCT VFR BLD AUTO: 46.5 % (ref 37–48.5)
HGB BLD-MCNC: 15.4 G/DL (ref 12–16)
HGB UR QL STRIP: ABNORMAL
IMM GRANULOCYTES # BLD AUTO: 0.05 K/UL (ref 0–0.04)
IMM GRANULOCYTES NFR BLD AUTO: 0.7 % (ref 0–0.5)
KETONES UR QL STRIP: ABNORMAL
LACTATE SERPL-SCNC: 1 MMOL/L (ref 0.5–2.2)
LEUKOCYTE ESTERASE UR QL STRIP: ABNORMAL
LIPASE SERPL-CCNC: 9 U/L (ref 4–60)
LYMPHOCYTES # BLD AUTO: 0.6 K/UL (ref 1–4.8)
LYMPHOCYTES NFR BLD: 7.6 % (ref 18–48)
MCH RBC QN AUTO: 31.8 PG (ref 27–31)
MCHC RBC AUTO-ENTMCNC: 33.1 G/DL (ref 32–36)
MCV RBC AUTO: 96 FL (ref 82–98)
MICROSCOPIC COMMENT: ABNORMAL
MONOCYTES # BLD AUTO: 0.3 K/UL (ref 0.3–1)
MONOCYTES NFR BLD: 4.4 % (ref 4–15)
NEUTROPHILS # BLD AUTO: 6.7 K/UL (ref 1.8–7.7)
NEUTROPHILS NFR BLD: 87 % (ref 38–73)
NITRITE UR QL STRIP: POSITIVE
NRBC BLD-RTO: 0 /100 WBC
PH UR STRIP: 6 [PH] (ref 5–8)
PLATELET # BLD AUTO: 190 K/UL (ref 150–450)
PMV BLD AUTO: 9.7 FL (ref 9.2–12.9)
POCT GLUCOSE: 123 MG/DL (ref 70–110)
POCT GLUCOSE: 97 MG/DL (ref 70–110)
POTASSIUM SERPL-SCNC: 4.8 MMOL/L (ref 3.5–5.1)
PROT SERPL-MCNC: 6.9 G/DL (ref 6–8.4)
PROT UR QL STRIP: NEGATIVE
RBC # BLD AUTO: 4.85 M/UL (ref 4–5.4)
RBC #/AREA URNS HPF: 5 /HPF (ref 0–4)
SODIUM SERPL-SCNC: 137 MMOL/L (ref 136–145)
SP GR UR STRIP: 1.01 (ref 1–1.03)
SQUAMOUS #/AREA URNS HPF: 0 /HPF
URN SPEC COLLECT METH UR: ABNORMAL
UROBILINOGEN UR STRIP-ACNC: NEGATIVE EU/DL
WBC # BLD AUTO: 7.68 K/UL (ref 3.9–12.7)
WBC #/AREA URNS HPF: 3 /HPF (ref 0–5)

## 2023-01-16 PROCEDURE — 87086 URINE CULTURE/COLONY COUNT: CPT | Performed by: NURSE PRACTITIONER

## 2023-01-16 PROCEDURE — 81000 URINALYSIS NONAUTO W/SCOPE: CPT

## 2023-01-16 PROCEDURE — 63600175 PHARM REV CODE 636 W HCPCS

## 2023-01-16 PROCEDURE — 85025 COMPLETE CBC W/AUTO DIFF WBC: CPT

## 2023-01-16 PROCEDURE — 87088 URINE BACTERIA CULTURE: CPT | Performed by: NURSE PRACTITIONER

## 2023-01-16 PROCEDURE — 99223 PR INITIAL HOSPITAL CARE,LEVL III: ICD-10-PCS | Mod: ,,, | Performed by: STUDENT IN AN ORGANIZED HEALTH CARE EDUCATION/TRAINING PROGRAM

## 2023-01-16 PROCEDURE — 83690 ASSAY OF LIPASE: CPT

## 2023-01-16 PROCEDURE — 25000003 PHARM REV CODE 250

## 2023-01-16 PROCEDURE — 87077 CULTURE AEROBIC IDENTIFY: CPT | Performed by: NURSE PRACTITIONER

## 2023-01-16 PROCEDURE — 80053 COMPREHEN METABOLIC PANEL: CPT

## 2023-01-16 PROCEDURE — 99223 1ST HOSP IP/OBS HIGH 75: CPT | Mod: ,,, | Performed by: STUDENT IN AN ORGANIZED HEALTH CARE EDUCATION/TRAINING PROGRAM

## 2023-01-16 PROCEDURE — 96374 THER/PROPH/DIAG INJ IV PUSH: CPT

## 2023-01-16 PROCEDURE — 99285 EMERGENCY DEPT VISIT HI MDM: CPT | Mod: 25

## 2023-01-16 PROCEDURE — 87186 SC STD MICRODIL/AGAR DIL: CPT | Performed by: NURSE PRACTITIONER

## 2023-01-16 PROCEDURE — 82962 GLUCOSE BLOOD TEST: CPT

## 2023-01-16 PROCEDURE — 99223 1ST HOSP IP/OBS HIGH 75: CPT | Mod: ,,, | Performed by: NURSE PRACTITIONER

## 2023-01-16 PROCEDURE — 83605 ASSAY OF LACTIC ACID: CPT

## 2023-01-16 PROCEDURE — 25500020 PHARM REV CODE 255

## 2023-01-16 PROCEDURE — 99223 PR INITIAL HOSPITAL CARE,LEVL III: ICD-10-PCS | Mod: ,,, | Performed by: NURSE PRACTITIONER

## 2023-01-16 PROCEDURE — 87040 BLOOD CULTURE FOR BACTERIA: CPT

## 2023-01-16 PROCEDURE — 25000003 PHARM REV CODE 250: Performed by: NURSE PRACTITIONER

## 2023-01-16 PROCEDURE — 11000001 HC ACUTE MED/SURG PRIVATE ROOM

## 2023-01-16 RX ORDER — IBUPROFEN 200 MG
16 TABLET ORAL
Status: DISCONTINUED | OUTPATIENT
Start: 2023-01-16 | End: 2023-01-25 | Stop reason: HOSPADM

## 2023-01-16 RX ORDER — IBUPROFEN 200 MG
24 TABLET ORAL
Status: DISCONTINUED | OUTPATIENT
Start: 2023-01-16 | End: 2023-01-25 | Stop reason: HOSPADM

## 2023-01-16 RX ORDER — TALC
6 POWDER (GRAM) TOPICAL NIGHTLY PRN
Status: DISCONTINUED | OUTPATIENT
Start: 2023-01-16 | End: 2023-01-25 | Stop reason: HOSPADM

## 2023-01-16 RX ORDER — KETOROLAC TROMETHAMINE 30 MG/ML
15 INJECTION, SOLUTION INTRAMUSCULAR; INTRAVENOUS
Status: COMPLETED | OUTPATIENT
Start: 2023-01-16 | End: 2023-01-16

## 2023-01-16 RX ORDER — ONDANSETRON 2 MG/ML
4 INJECTION INTRAMUSCULAR; INTRAVENOUS EVERY 6 HOURS PRN
Status: DISCONTINUED | OUTPATIENT
Start: 2023-01-16 | End: 2023-01-25 | Stop reason: HOSPADM

## 2023-01-16 RX ORDER — HYDROCODONE BITARTRATE AND ACETAMINOPHEN 5; 325 MG/1; MG/1
1 TABLET ORAL EVERY 6 HOURS PRN
Status: DISCONTINUED | OUTPATIENT
Start: 2023-01-16 | End: 2023-01-17

## 2023-01-16 RX ORDER — SULFAMETHOXAZOLE AND TRIMETHOPRIM 800; 160 MG/1; MG/1
1 TABLET ORAL
Status: COMPLETED | OUTPATIENT
Start: 2023-01-16 | End: 2023-01-16

## 2023-01-16 RX ORDER — SODIUM CHLORIDE 9 MG/ML
INJECTION, SOLUTION INTRAVENOUS CONTINUOUS
Status: DISCONTINUED | OUTPATIENT
Start: 2023-01-16 | End: 2023-01-16

## 2023-01-16 RX ORDER — SODIUM CHLORIDE 9 MG/ML
INJECTION, SOLUTION INTRAVENOUS CONTINUOUS
Status: DISCONTINUED | OUTPATIENT
Start: 2023-01-16 | End: 2023-01-19

## 2023-01-16 RX ORDER — GLUCAGON 1 MG
1 KIT INJECTION
Status: DISCONTINUED | OUTPATIENT
Start: 2023-01-16 | End: 2023-01-25 | Stop reason: HOSPADM

## 2023-01-16 RX ORDER — HYDROMORPHONE HYDROCHLORIDE 1 MG/ML
0.5 INJECTION, SOLUTION INTRAMUSCULAR; INTRAVENOUS; SUBCUTANEOUS EVERY 4 HOURS PRN
Status: DISCONTINUED | OUTPATIENT
Start: 2023-01-16 | End: 2023-01-16

## 2023-01-16 RX ORDER — ACETAMINOPHEN 325 MG/1
650 TABLET ORAL EVERY 4 HOURS PRN
Status: DISCONTINUED | OUTPATIENT
Start: 2023-01-16 | End: 2023-01-17

## 2023-01-16 RX ORDER — NALOXONE HCL 0.4 MG/ML
0.02 VIAL (ML) INJECTION
Status: DISCONTINUED | OUTPATIENT
Start: 2023-01-16 | End: 2023-01-25 | Stop reason: HOSPADM

## 2023-01-16 RX ORDER — SULFAMETHOXAZOLE AND TRIMETHOPRIM 800; 160 MG/1; MG/1
1 TABLET ORAL 2 TIMES DAILY
Qty: 13 TABLET | Refills: 0 | Status: SHIPPED | OUTPATIENT
Start: 2023-01-16 | End: 2023-01-16

## 2023-01-16 RX ORDER — HYDROMORPHONE HYDROCHLORIDE 1 MG/ML
0.5 INJECTION, SOLUTION INTRAMUSCULAR; INTRAVENOUS; SUBCUTANEOUS EVERY 4 HOURS PRN
Status: DISCONTINUED | OUTPATIENT
Start: 2023-01-16 | End: 2023-01-20

## 2023-01-16 RX ORDER — GABAPENTIN 100 MG/1
200 CAPSULE ORAL 3 TIMES DAILY PRN
Status: DISCONTINUED | OUTPATIENT
Start: 2023-01-16 | End: 2023-01-25 | Stop reason: HOSPADM

## 2023-01-16 RX ADMIN — SODIUM CHLORIDE 1000 ML: 0.9 INJECTION, SOLUTION INTRAVENOUS at 04:01

## 2023-01-16 RX ADMIN — IOHEXOL 100 ML: 350 INJECTION, SOLUTION INTRAVENOUS at 02:01

## 2023-01-16 RX ADMIN — SODIUM CHLORIDE: 9 INJECTION, SOLUTION INTRAVENOUS at 08:01

## 2023-01-16 RX ADMIN — PIPERACILLIN AND TAZOBACTAM 4.5 G: 4; .5 INJECTION, POWDER, LYOPHILIZED, FOR SOLUTION INTRAVENOUS; PARENTERAL at 04:01

## 2023-01-16 RX ADMIN — PIPERACILLIN AND TAZOBACTAM 4.5 G: 4; .5 INJECTION, POWDER, LYOPHILIZED, FOR SOLUTION INTRAVENOUS; PARENTERAL at 11:01

## 2023-01-16 RX ADMIN — ACETAMINOPHEN 650 MG: 325 TABLET, FILM COATED ORAL at 08:01

## 2023-01-16 RX ADMIN — SULFAMETHOXAZOLE AND TRIMETHOPRIM 1 TABLET: 800; 160 TABLET ORAL at 03:01

## 2023-01-16 RX ADMIN — KETOROLAC TROMETHAMINE 15 MG: 30 INJECTION, SOLUTION INTRAMUSCULAR; INTRAVENOUS at 03:01

## 2023-01-16 NOTE — ED TRIAGE NOTES
Pt presents to the ED w/ c/o generalized abdominal pain that started this AM. Pt reporting hx of gastric ulcers. Pt denies N/V/D.

## 2023-01-16 NOTE — HPI
87 y.o. woman with a history of cataracts, former tobacco use, inflammatory bowel disease, hyperlipidemia, diverticulosis with abscess treated with cipro/flagyl and development of peripheral neuropathy, bladder surgery, hysterectomy, abdominoplasty and depression presenting with weakness and abdominal pain.  She reports she felt very weak today and is experiencing diffuse abdominal pain.  She reports a prior episode of diverticulitis with an abscess treated nonoperatively with antibiotics.  The last CT scan (11/2022) done prior to this admission demonstrated near complete resolution of the previous pericolonic abscess from June/August 2022 with pericolonic inflammation and stranding.  On this admission the CT scan shows diverticulitis with pneumoperitoneum.  The patient denies fevers, chills and reports she always has diarrhea, denies melena or blood per rectum.  She denies urinary symptoms.

## 2023-01-16 NOTE — ACP (ADVANCE CARE PLANNING)
Advance Care Planning  Code Status  In light of the patients advanced age and illness, we reviewed what the patients preferences for care would be at the very end of life.  The patient wishes to have a natural, peaceful death.  Along those lines, the patient does not wish to have CPR or other invasive treatments performed when her heart and/or breathing stops.  I communicated to the patient that a DNR order would be placed in her medical record to reflect this preference.

## 2023-01-17 ENCOUNTER — ANESTHESIA (OUTPATIENT)
Dept: SURGERY | Facility: OTHER | Age: 88
DRG: 329 | End: 2023-01-17
Payer: MEDICARE

## 2023-01-17 ENCOUNTER — PATIENT OUTREACH (OUTPATIENT)
Dept: ADMINISTRATIVE | Facility: OTHER | Age: 88
End: 2023-01-17
Payer: MEDICARE

## 2023-01-17 ENCOUNTER — ANESTHESIA EVENT (OUTPATIENT)
Dept: SURGERY | Facility: OTHER | Age: 88
DRG: 329 | End: 2023-01-17
Payer: MEDICARE

## 2023-01-17 PROBLEM — Z71.89 ADVANCED CARE PLANNING/COUNSELING DISCUSSION: Status: ACTIVE | Noted: 2023-01-17

## 2023-01-17 LAB
ANION GAP SERPL CALC-SCNC: 8 MMOL/L (ref 8–16)
BASOPHILS # BLD AUTO: 0.04 K/UL (ref 0–0.2)
BASOPHILS NFR BLD: 0.3 % (ref 0–1.9)
BNP SERPL-MCNC: 199 PG/ML (ref 0–99)
BUN SERPL-MCNC: 14 MG/DL (ref 8–23)
CALCIUM SERPL-MCNC: 7.9 MG/DL (ref 8.7–10.5)
CHLORIDE SERPL-SCNC: 106 MMOL/L (ref 95–110)
CO2 SERPL-SCNC: 23 MMOL/L (ref 23–29)
CREAT SERPL-MCNC: 0.8 MG/DL (ref 0.5–1.4)
DIFFERENTIAL METHOD: ABNORMAL
EOSINOPHIL # BLD AUTO: 0 K/UL (ref 0–0.5)
EOSINOPHIL NFR BLD: 0.2 % (ref 0–8)
ERYTHROCYTE [DISTWIDTH] IN BLOOD BY AUTOMATED COUNT: 14.1 % (ref 11.5–14.5)
EST. GFR  (NO RACE VARIABLE): >60 ML/MIN/1.73 M^2
GLUCOSE SERPL-MCNC: 89 MG/DL (ref 70–110)
HCT VFR BLD AUTO: 40.3 % (ref 37–48.5)
HGB BLD-MCNC: 13.3 G/DL (ref 12–16)
IMM GRANULOCYTES # BLD AUTO: 0.05 K/UL (ref 0–0.04)
IMM GRANULOCYTES NFR BLD AUTO: 0.4 % (ref 0–0.5)
LYMPHOCYTES # BLD AUTO: 0.7 K/UL (ref 1–4.8)
LYMPHOCYTES NFR BLD: 6.4 % (ref 18–48)
MCH RBC QN AUTO: 31.1 PG (ref 27–31)
MCHC RBC AUTO-ENTMCNC: 33 G/DL (ref 32–36)
MCV RBC AUTO: 94 FL (ref 82–98)
MONOCYTES # BLD AUTO: 0.6 K/UL (ref 0.3–1)
MONOCYTES NFR BLD: 4.9 % (ref 4–15)
NEUTROPHILS # BLD AUTO: 10.1 K/UL (ref 1.8–7.7)
NEUTROPHILS NFR BLD: 87.8 % (ref 38–73)
NRBC BLD-RTO: 0 /100 WBC
PLATELET # BLD AUTO: 192 K/UL (ref 150–450)
PMV BLD AUTO: 10.1 FL (ref 9.2–12.9)
POCT GLUCOSE: 110 MG/DL (ref 70–110)
POCT GLUCOSE: 99 MG/DL (ref 70–110)
POTASSIUM SERPL-SCNC: 3.7 MMOL/L (ref 3.5–5.1)
RBC # BLD AUTO: 4.28 M/UL (ref 4–5.4)
SODIUM SERPL-SCNC: 137 MMOL/L (ref 136–145)
WBC # BLD AUTO: 11.49 K/UL (ref 3.9–12.7)

## 2023-01-17 PROCEDURE — 11000001 HC ACUTE MED/SURG PRIVATE ROOM

## 2023-01-17 PROCEDURE — 63600175 PHARM REV CODE 636 W HCPCS

## 2023-01-17 PROCEDURE — P9045 ALBUMIN (HUMAN), 5%, 250 ML: HCPCS | Mod: JG | Performed by: STUDENT IN AN ORGANIZED HEALTH CARE EDUCATION/TRAINING PROGRAM

## 2023-01-17 PROCEDURE — 37000009 HC ANESTHESIA EA ADD 15 MINS: Performed by: SURGERY

## 2023-01-17 PROCEDURE — 44140 PR PART REMOVAL COLON W ANASTOMOSIS: ICD-10-PCS | Mod: ,,, | Performed by: SURGERY

## 2023-01-17 PROCEDURE — 37000008 HC ANESTHESIA 1ST 15 MINUTES: Performed by: SURGERY

## 2023-01-17 PROCEDURE — 99231 SBSQ HOSP IP/OBS SF/LOW 25: CPT | Mod: ,,, | Performed by: SURGERY

## 2023-01-17 PROCEDURE — 36000709 HC OR TIME LEV III EA ADD 15 MIN: Performed by: SURGERY

## 2023-01-17 PROCEDURE — 25000003 PHARM REV CODE 250

## 2023-01-17 PROCEDURE — 63600175 PHARM REV CODE 636 W HCPCS: Performed by: ANESTHESIOLOGY

## 2023-01-17 PROCEDURE — 25000003 PHARM REV CODE 250: Performed by: STUDENT IN AN ORGANIZED HEALTH CARE EDUCATION/TRAINING PROGRAM

## 2023-01-17 PROCEDURE — 99232 PR SUBSEQUENT HOSPITAL CARE,LEVL II: ICD-10-PCS | Mod: ,,, | Performed by: STUDENT IN AN ORGANIZED HEALTH CARE EDUCATION/TRAINING PROGRAM

## 2023-01-17 PROCEDURE — 88307 PR  SURG PATH,LEVEL V: ICD-10-PCS | Mod: 26,,, | Performed by: PATHOLOGY

## 2023-01-17 PROCEDURE — 63600175 PHARM REV CODE 636 W HCPCS: Performed by: NURSE PRACTITIONER

## 2023-01-17 PROCEDURE — 36000708 HC OR TIME LEV III 1ST 15 MIN: Performed by: SURGERY

## 2023-01-17 PROCEDURE — 85025 COMPLETE CBC W/AUTO DIFF WBC: CPT | Performed by: NURSE PRACTITIONER

## 2023-01-17 PROCEDURE — 63600175 PHARM REV CODE 636 W HCPCS: Performed by: STUDENT IN AN ORGANIZED HEALTH CARE EDUCATION/TRAINING PROGRAM

## 2023-01-17 PROCEDURE — 44139 PR MOBILIZE SPLENIC FLEX: ICD-10-PCS | Mod: ,,, | Performed by: SURGERY

## 2023-01-17 PROCEDURE — 83880 ASSAY OF NATRIURETIC PEPTIDE: CPT | Performed by: NURSE PRACTITIONER

## 2023-01-17 PROCEDURE — 88307 TISSUE EXAM BY PATHOLOGIST: CPT | Mod: 26,,, | Performed by: PATHOLOGY

## 2023-01-17 PROCEDURE — 44139 MOBILIZATION OF COLON: CPT | Mod: ,,, | Performed by: SURGERY

## 2023-01-17 PROCEDURE — 80048 BASIC METABOLIC PNL TOTAL CA: CPT | Performed by: NURSE PRACTITIONER

## 2023-01-17 PROCEDURE — 27201423 OPTIME MED/SURG SUP & DEVICES STERILE SUPPLY: Performed by: SURGERY

## 2023-01-17 PROCEDURE — 25000003 PHARM REV CODE 250: Performed by: NURSE PRACTITIONER

## 2023-01-17 PROCEDURE — 63600175 PHARM REV CODE 636 W HCPCS: Performed by: SURGERY

## 2023-01-17 PROCEDURE — 99223 1ST HOSP IP/OBS HIGH 75: CPT | Mod: ,,, | Performed by: STUDENT IN AN ORGANIZED HEALTH CARE EDUCATION/TRAINING PROGRAM

## 2023-01-17 PROCEDURE — 64488 TAP BLOCK BI INJECTION: CPT | Performed by: ANESTHESIOLOGY

## 2023-01-17 PROCEDURE — 36415 COLL VENOUS BLD VENIPUNCTURE: CPT | Performed by: NURSE PRACTITIONER

## 2023-01-17 PROCEDURE — 71000033 HC RECOVERY, INTIAL HOUR: Performed by: SURGERY

## 2023-01-17 PROCEDURE — 99232 SBSQ HOSP IP/OBS MODERATE 35: CPT | Mod: ,,, | Performed by: STUDENT IN AN ORGANIZED HEALTH CARE EDUCATION/TRAINING PROGRAM

## 2023-01-17 PROCEDURE — 88307 TISSUE EXAM BY PATHOLOGIST: CPT | Performed by: PATHOLOGY

## 2023-01-17 PROCEDURE — 25000003 PHARM REV CODE 250: Performed by: ANESTHESIOLOGY

## 2023-01-17 PROCEDURE — 99223 PR INITIAL HOSPITAL CARE,LEVL III: ICD-10-PCS | Mod: ,,, | Performed by: STUDENT IN AN ORGANIZED HEALTH CARE EDUCATION/TRAINING PROGRAM

## 2023-01-17 PROCEDURE — 99231 PR SUBSEQUENT HOSPITAL CARE,LEVL I: ICD-10-PCS | Mod: ,,, | Performed by: SURGERY

## 2023-01-17 PROCEDURE — C9290 INJ, BUPIVACAINE LIPOSOME: HCPCS | Performed by: ANESTHESIOLOGY

## 2023-01-17 PROCEDURE — 44140 PARTIAL REMOVAL OF COLON: CPT | Mod: ,,, | Performed by: SURGERY

## 2023-01-17 PROCEDURE — 71000039 HC RECOVERY, EACH ADD'L HOUR: Performed by: SURGERY

## 2023-01-17 RX ORDER — OXYCODONE HYDROCHLORIDE 5 MG/1
10 TABLET ORAL EVERY 4 HOURS PRN
Status: DISCONTINUED | OUTPATIENT
Start: 2023-01-17 | End: 2023-01-20

## 2023-01-17 RX ORDER — PROPOFOL 10 MG/ML
VIAL (ML) INTRAVENOUS
Status: DISCONTINUED | OUTPATIENT
Start: 2023-01-17 | End: 2023-01-17

## 2023-01-17 RX ORDER — OXYCODONE HYDROCHLORIDE 5 MG/1
5 TABLET ORAL EVERY 4 HOURS PRN
Status: DISCONTINUED | OUTPATIENT
Start: 2023-01-17 | End: 2023-01-20

## 2023-01-17 RX ORDER — DEXAMETHASONE SODIUM PHOSPHATE 4 MG/ML
INJECTION, SOLUTION INTRA-ARTICULAR; INTRALESIONAL; INTRAMUSCULAR; INTRAVENOUS; SOFT TISSUE
Status: DISCONTINUED | OUTPATIENT
Start: 2023-01-17 | End: 2023-01-17

## 2023-01-17 RX ORDER — LABETALOL HYDROCHLORIDE 5 MG/ML
INJECTION, SOLUTION INTRAVENOUS
Status: DISCONTINUED | OUTPATIENT
Start: 2023-01-17 | End: 2023-01-17

## 2023-01-17 RX ORDER — IBUPROFEN 400 MG/1
400 TABLET ORAL EVERY 6 HOURS
Status: DISCONTINUED | OUTPATIENT
Start: 2023-01-17 | End: 2023-01-25 | Stop reason: HOSPADM

## 2023-01-17 RX ORDER — LIDOCAINE HYDROCHLORIDE 20 MG/ML
INJECTION INTRAVENOUS
Status: DISCONTINUED | OUTPATIENT
Start: 2023-01-17 | End: 2023-01-17

## 2023-01-17 RX ORDER — KETOROLAC TROMETHAMINE 30 MG/ML
INJECTION, SOLUTION INTRAMUSCULAR; INTRAVENOUS
Status: DISCONTINUED | OUTPATIENT
Start: 2023-01-17 | End: 2023-01-17

## 2023-01-17 RX ORDER — PROCHLORPERAZINE EDISYLATE 5 MG/ML
5 INJECTION INTRAMUSCULAR; INTRAVENOUS EVERY 30 MIN PRN
Status: DISCONTINUED | OUTPATIENT
Start: 2023-01-17 | End: 2023-01-25 | Stop reason: HOSPADM

## 2023-01-17 RX ORDER — ALBUMIN HUMAN 50 G/1000ML
SOLUTION INTRAVENOUS CONTINUOUS PRN
Status: DISCONTINUED | OUTPATIENT
Start: 2023-01-17 | End: 2023-01-17

## 2023-01-17 RX ORDER — MEPERIDINE HYDROCHLORIDE 25 MG/ML
12.5 INJECTION INTRAMUSCULAR; INTRAVENOUS; SUBCUTANEOUS ONCE AS NEEDED
Status: ACTIVE | OUTPATIENT
Start: 2023-01-17 | End: 2023-01-18

## 2023-01-17 RX ORDER — OXYCODONE HYDROCHLORIDE 5 MG/1
5 TABLET ORAL
Status: DISCONTINUED | OUTPATIENT
Start: 2023-01-17 | End: 2023-01-19

## 2023-01-17 RX ORDER — ONDANSETRON 2 MG/ML
INJECTION INTRAMUSCULAR; INTRAVENOUS
Status: DISCONTINUED | OUTPATIENT
Start: 2023-01-17 | End: 2023-01-17

## 2023-01-17 RX ORDER — KETAMINE HCL IN 0.9 % NACL 50 MG/5 ML
SYRINGE (ML) INTRAVENOUS
Status: DISCONTINUED | OUTPATIENT
Start: 2023-01-17 | End: 2023-01-17

## 2023-01-17 RX ORDER — HYDROMORPHONE HYDROCHLORIDE 2 MG/ML
INJECTION, SOLUTION INTRAMUSCULAR; INTRAVENOUS; SUBCUTANEOUS
Status: DISCONTINUED | OUTPATIENT
Start: 2023-01-17 | End: 2023-01-17

## 2023-01-17 RX ORDER — HYDROMORPHONE HYDROCHLORIDE 2 MG/ML
0.4 INJECTION, SOLUTION INTRAMUSCULAR; INTRAVENOUS; SUBCUTANEOUS EVERY 5 MIN PRN
Status: DISCONTINUED | OUTPATIENT
Start: 2023-01-17 | End: 2023-01-17

## 2023-01-17 RX ORDER — SODIUM CHLORIDE 0.9 % (FLUSH) 0.9 %
3 SYRINGE (ML) INJECTION
Status: DISCONTINUED | OUTPATIENT
Start: 2023-01-17 | End: 2023-01-25 | Stop reason: HOSPADM

## 2023-01-17 RX ORDER — FENTANYL CITRATE 50 UG/ML
INJECTION, SOLUTION INTRAMUSCULAR; INTRAVENOUS
Status: DISCONTINUED | OUTPATIENT
Start: 2023-01-17 | End: 2023-01-17

## 2023-01-17 RX ORDER — ROCURONIUM BROMIDE 10 MG/ML
INJECTION, SOLUTION INTRAVENOUS
Status: DISCONTINUED | OUTPATIENT
Start: 2023-01-17 | End: 2023-01-17

## 2023-01-17 RX ORDER — BUPIVACAINE HYDROCHLORIDE 2.5 MG/ML
INJECTION, SOLUTION EPIDURAL; INFILTRATION; INTRACAUDAL
Status: COMPLETED | OUTPATIENT
Start: 2023-01-17 | End: 2023-01-17

## 2023-01-17 RX ORDER — PHENYLEPHRINE HYDROCHLORIDE 10 MG/ML
INJECTION INTRAVENOUS
Status: DISCONTINUED | OUTPATIENT
Start: 2023-01-17 | End: 2023-01-17

## 2023-01-17 RX ORDER — ACETAMINOPHEN 325 MG/1
650 TABLET ORAL EVERY 6 HOURS
Status: DISCONTINUED | OUTPATIENT
Start: 2023-01-17 | End: 2023-01-25 | Stop reason: HOSPADM

## 2023-01-17 RX ORDER — ENOXAPARIN SODIUM 100 MG/ML
40 INJECTION SUBCUTANEOUS EVERY 24 HOURS
Status: DISCONTINUED | OUTPATIENT
Start: 2023-01-17 | End: 2023-01-25 | Stop reason: HOSPADM

## 2023-01-17 RX ADMIN — HYDROMORPHONE HYDROCHLORIDE 0.5 MG: 1 INJECTION, SOLUTION INTRAMUSCULAR; INTRAVENOUS; SUBCUTANEOUS at 09:01

## 2023-01-17 RX ADMIN — PIPERACILLIN AND TAZOBACTAM 4.5 G: 4; .5 INJECTION, POWDER, LYOPHILIZED, FOR SOLUTION INTRAVENOUS; PARENTERAL at 08:01

## 2023-01-17 RX ADMIN — LIDOCAINE HYDROCHLORIDE 50 MG: 20 INJECTION, SOLUTION INTRAVENOUS at 01:01

## 2023-01-17 RX ADMIN — GLYCOPYRROLATE 0.2 MG: 0.2 INJECTION, SOLUTION INTRAMUSCULAR; INTRAVITREAL at 01:01

## 2023-01-17 RX ADMIN — ONDANSETRON HYDROCHLORIDE 4 MG: 2 INJECTION INTRAMUSCULAR; INTRAVENOUS at 03:01

## 2023-01-17 RX ADMIN — SODIUM CHLORIDE, SODIUM LACTATE, POTASSIUM CHLORIDE, AND CALCIUM CHLORIDE: .6; .31; .03; .02 INJECTION, SOLUTION INTRAVENOUS at 02:01

## 2023-01-17 RX ADMIN — SUGAMMADEX 200 MG: 100 INJECTION, SOLUTION INTRAVENOUS at 03:01

## 2023-01-17 RX ADMIN — Medication 10 MG: at 02:01

## 2023-01-17 RX ADMIN — ALBUMIN (HUMAN): 12.5 SOLUTION INTRAVENOUS at 01:01

## 2023-01-17 RX ADMIN — SODIUM CHLORIDE, SODIUM LACTATE, POTASSIUM CHLORIDE, AND CALCIUM CHLORIDE: .6; .31; .03; .02 INJECTION, SOLUTION INTRAVENOUS at 12:01

## 2023-01-17 RX ADMIN — HYDROMORPHONE HYDROCHLORIDE 0.5 MG: 2 INJECTION INTRAMUSCULAR; INTRAVENOUS; SUBCUTANEOUS at 01:01

## 2023-01-17 RX ADMIN — ROCURONIUM BROMIDE 15 MG: 10 SOLUTION INTRAVENOUS at 02:01

## 2023-01-17 RX ADMIN — DEXAMETHASONE SODIUM PHOSPHATE 4 MG: 4 INJECTION, SOLUTION INTRAMUSCULAR; INTRAVENOUS at 01:01

## 2023-01-17 RX ADMIN — BUPIVACAINE 20 ML: 13.3 INJECTION, SUSPENSION, LIPOSOMAL INFILTRATION at 01:01

## 2023-01-17 RX ADMIN — PHENYLEPHRINE HYDROCHLORIDE 100 MCG: 10 INJECTION INTRAVENOUS at 01:01

## 2023-01-17 RX ADMIN — FENTANYL CITRATE 50 MCG: 50 INJECTION, SOLUTION INTRAMUSCULAR; INTRAVENOUS at 01:01

## 2023-01-17 RX ADMIN — KETOROLAC TROMETHAMINE 15 MG: 30 INJECTION, SOLUTION INTRAMUSCULAR; INTRAVENOUS at 03:01

## 2023-01-17 RX ADMIN — PROPOFOL 150 MG: 10 INJECTION, EMULSION INTRAVENOUS at 01:01

## 2023-01-17 RX ADMIN — ENOXAPARIN SODIUM 40 MG: 40 INJECTION SUBCUTANEOUS at 05:01

## 2023-01-17 RX ADMIN — HYDROMORPHONE HYDROCHLORIDE 0.5 MG: 2 INJECTION INTRAMUSCULAR; INTRAVENOUS; SUBCUTANEOUS at 02:01

## 2023-01-17 RX ADMIN — FENTANYL CITRATE 100 MCG: 50 INJECTION, SOLUTION INTRAMUSCULAR; INTRAVENOUS at 12:01

## 2023-01-17 RX ADMIN — ONDANSETRON 4 MG: 2 INJECTION INTRAMUSCULAR; INTRAVENOUS at 05:01

## 2023-01-17 RX ADMIN — Medication 30 MG: at 01:01

## 2023-01-17 RX ADMIN — HYDROMORPHONE HYDROCHLORIDE 0.5 MG: 1 INJECTION, SOLUTION INTRAMUSCULAR; INTRAVENOUS; SUBCUTANEOUS at 08:01

## 2023-01-17 RX ADMIN — CARBOXYMETHYLCELLULOSE SODIUM 2 DROP: 2.5 SOLUTION/ DROPS OPHTHALMIC at 01:01

## 2023-01-17 RX ADMIN — LABETALOL HYDROCHLORIDE 10 MG: 5 INJECTION INTRAVENOUS at 02:01

## 2023-01-17 RX ADMIN — BUPIVACAINE HYDROCHLORIDE 40 ML: 2.5 INJECTION, SOLUTION EPIDURAL; INFILTRATION; INTRACAUDAL; PERINEURAL at 01:01

## 2023-01-17 RX ADMIN — ROCURONIUM BROMIDE 50 MG: 10 SOLUTION INTRAVENOUS at 01:01

## 2023-01-17 RX ADMIN — PIPERACILLIN AND TAZOBACTAM 4.5 G: 4; .5 INJECTION, POWDER, LYOPHILIZED, FOR SOLUTION INTRAVENOUS; PARENTERAL at 05:01

## 2023-01-17 RX ADMIN — SODIUM CHLORIDE: 9 INJECTION, SOLUTION INTRAVENOUS at 08:01

## 2023-01-17 RX ADMIN — ROCURONIUM BROMIDE 20 MG: 10 SOLUTION INTRAVENOUS at 01:01

## 2023-01-17 NOTE — BRIEF OP NOTE
01/17/2023    Pre-operative diagnosis: perforated diverticulitis    Post-operative diagnosis: same     Procedure performed  Sigmoid colectomy  Mobilization of splenic flexure   Flexible sigmoidoscopy     Findings:   Severely thickened and inflamed sigmoid colon   Negative leak test   Intact anastomotic rings     Surgeon: Claudia Kearns MD    Anesthesia: GETA /regional     EBL: 300 cc    Complications: none    Drains: none     Specimen: none     Claudia Kearns MD  Staff Surgeon   Colon & Rectal Surgery

## 2023-01-17 NOTE — SUBJECTIVE & OBJECTIVE
No current facility-administered medications on file prior to encounter.     Current Outpatient Medications on File Prior to Encounter   Medication Sig    dextroamphetamine-amphetamine 10 mg Tab Take by mouth once daily.    gabapentin (NEURONTIN) 400 MG capsule Take 1 capsule (400 mg total) by mouth 3 (three) times daily.    ergocalciferol (ERGOCALCIFEROL) 50,000 unit Cap Take 1 capsule (50,000 Units total) by mouth twice a week.    hydrocortisone (ANUSOL-HC) 2.5 % rectal cream Place rectally 2 (two) times daily.    mirtazapine (REMERON) 7.5 MG Tab Take 1 tablet by mouth Daily.    simvastatin (ZOCOR) 10 MG tablet Take 1 tablet (10 mg total) by mouth every evening to control cholesterol    traZODone (DESYREL) 50 MG tablet Take 50 mg by mouth every evening.    ziprasidone (GEODON) 20 MG Cap Take 1 capsule (20 mg total) by mouth nightly.    [DISCONTINUED] ALIGN 4 mg capsule Take 1 capsule by mouth once daily.    [DISCONTINUED] cholestyramine, with sugar, 4 gram Powd Take 1 application by mouth daily as needed (Diarrhea). Hold until you see the surgeon or gastroenterologist.    [DISCONTINUED] clotrimazole-betamethasone 1-0.05% (LOTRISONE) cream Apply topically 2 (two) times daily.    [DISCONTINUED] fluorouraciL (EFUDEX) 5 % cream APPLY TO AFFECTED AREA TWICE DAILY FOR 8-weeks    [DISCONTINUED] hydroCHLOROthiazide (HYDRODIURIL) 12.5 MG Tab Take 1 tablet (12.5 mg total) by mouth daily as needed (le edema).    [DISCONTINUED] valACYclovir (VALTREX) 1000 MG tablet TAKE ONE TABLET BY MOUTH TWICE DAILY FOR 5 DAYS FOR cold sores       Review of patient's allergies indicates:   Allergen Reactions    Ciprofloxacin (bulk) Other (See Comments)     Made feet numb and cold    Flagyl [metronidazole] Other (See Comments)     Loss of feeling in feet       Past Medical History:   Diagnosis Date    Cataract     Hyperlipidemia     Inflammatory bowel disease     Sarcoidosis with granulomatous hepatitis     UTI (urinary tract infection)  7/17/2013     Past Surgical History:   Procedure Laterality Date    BLADDER SURGERY      BREAST SURGERY      EYE SURGERY      HIP SURGERY      HYSTERECTOMY      JOINT REPLACEMENT      SINUS SURGERY      TOTAL REDUCTION MAMMOPLASTY       Family History       Problem Relation (Age of Onset)    Breast cancer Sister    Cancer Mother, Sister          Tobacco Use    Smoking status: Former     Types: Cigarettes    Smokeless tobacco: Never   Substance and Sexual Activity    Alcohol use: Yes     Alcohol/week: 3.0 standard drinks     Types: 1 Glasses of wine, 1 Cans of beer, 1 Shots of liquor per week    Drug use: No    Sexual activity: Not Currently     Review of Systems   Constitutional:  Negative for chills and fever.   Cardiovascular:  Negative for chest pain.   Gastrointestinal:  Positive for abdominal pain and diarrhea. Negative for blood in stool, nausea and vomiting.   Genitourinary:  Negative for dysuria.   All other systems reviewed and are negative.  Objective:     Vital Signs (Most Recent):  Temp: 99.6 °F (37.6 °C) (01/16/23 1928)  Pulse: 83 (01/16/23 1928)  Resp: 16 (01/16/23 1928)  BP: (!) 169/70 (01/16/23 1928)  SpO2: 95 % (01/16/23 1928)   Vital Signs (24h Range):  Temp:  [98.6 °F (37 °C)-99.8 °F (37.7 °C)] 99.6 °F (37.6 °C)  Pulse:  [81-97] 83  Resp:  [16-18] 16  SpO2:  [94 %-97 %] 95 %  BP: (135-192)/(63-81) 169/70     Weight: 64.5 kg (142 lb 3.2 oz)  Body mass index is 24.41 kg/m².    Physical Exam  Constitutional:       General: She is not in acute distress.     Appearance: She is not toxic-appearing.      Comments: uncomfortable   HENT:      Nose: No rhinorrhea.      Mouth/Throat:      Mouth: Mucous membranes are moist.   Eyes:      General: No scleral icterus.  Cardiovascular:      Rate and Rhythm: Normal rate.   Pulmonary:      Effort: Pulmonary effort is normal. No respiratory distress.   Abdominal:      General: Abdomen is flat.      Palpations: Abdomen is soft.      Tenderness: There is abdominal  tenderness. There is guarding (focal peritonitis along left abdomen).      Comments: Well healed panniculectomy/abdominoplasty scar and oblique periumbilical scar   Skin:     General: Skin is warm and dry.   Neurological:      Mental Status: She is alert.   Psychiatric:         Mood and Affect: Mood normal.         Behavior: Behavior normal.       Significant Labs:  I have reviewed all pertinent lab results within the past 24 hours.  CBC:   Recent Labs   Lab 01/16/23  1206   WBC 7.68   RBC 4.85   HGB 15.4   HCT 46.5      MCV 96   MCH 31.8*   MCHC 33.1     CMP:   Recent Labs   Lab 01/16/23  1206   GLU 96   CALCIUM 8.4*   ALBUMIN 3.6   PROT 6.9      K 4.8   CO2 19*      BUN 14   CREATININE 0.8   ALKPHOS 74   ALT 29   AST 34   BILITOT 0.9     Coagulation: No results for input(s): LABPROT, INR, APTT in the last 168 hours.  Recent Labs   Lab 01/16/23  1230   COLORU Yellow   SPECGRAV 1.015   PHUR 6.0   PROTEINUA Negative   BACTERIA Rare   NITRITE Positive*   LEUKOCYTESUR 2+*   UROBILINOGEN Negative       Significant Diagnostics:  I have reviewed all pertinent imaging results/findings within the past 24 hours.  I have reviewed and interpreted all pertinent imaging results/findings within the past 24 hours.  CT: I have reviewed all pertinent results/findings within the past 24 hours and my personal findings are:  diverticular disease along the sigmoid colon with an area of inflammation and induration with phlegmon/abscess in the pelvis and, pneumoperitoneum  in the pelvis, left pericolic gutter, left upper quadrant and along the anterior abdominal wall

## 2023-01-17 NOTE — PROGRESS NOTES
[facility] Medicine  Progress Note    Patient Name: Cheyenne Garner  MRN: [id number]  Patient Class: IP- Inpatient   Admission Date: 1/16/2023  Length of Stay: 1 days  Attending Physician: Jose Lee MD  Primary Care Provider: Mary Cortes MD        Subjective:     Principal Problem:Diverticulitis of large intestine with perforation and abscess without bleeding        HPI:  Mrs. 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  She came to the emergency department today for abdominal pain that began yesterday.  The pain is located to the bilateral lower quadrants of her abdomen.  Patient denies nausea and vomiting.  She reports she frequently has diarrhea.  Patient reports that her pain has been controlled with pain medication given in the emergency department.  Patient's CT of her abdomen and pelvis with contrast that demonstrates acute diverticulitis with bowel per for duration and early abscess formation.  Patient was started on empiric Zosyn.  General surgery was consulted-appreciate recommendations.  Patient spoke with her GI physician Dr. Morales over the telephone.  Surgical intervention is recommended.  Dr. Kearns with colon and Rectal surgery will further evaluate patient tomorrow.  Patient admitted to hospital medicine for further management.      Overview/Hospital Course:  Perforated diverticulitis anticipating operative management 1/17.      Interval History:  Patient still has abdominal pain.  Anticipating operative management later.    Review of Systems   Constitutional:  Negative for chills and fatigue.   HENT:  Negative for congestion and drooling.    Respiratory:  Negative for cough and shortness of breath.    Cardiovascular:  Negative for chest pain and leg swelling.   Gastrointestinal:  Positive for abdominal  pain. Negative for rectal pain and vomiting.   Musculoskeletal:  Negative for back pain.   Skin:  Negative for pallor and rash.   Psychiatric/Behavioral:  Negative for agitation.    Objective:     Vital Signs (Most Recent):  Temp: 99.1 °F (37.3 °C) (01/17/23 1109)  Pulse: 82 (01/17/23 1109)  Resp: 18 (01/17/23 1109)  BP: (!) 154/67 (01/17/23 1109)  SpO2: 95 % (01/17/23 1109)   Vital Signs (24h Range):  Temp:  [98.5 °F (36.9 °C)-99.6 °F (37.6 °C)] 99.1 °F (37.3 °C)  Pulse:  [81-97] 82  Resp:  [16-21] 18  SpO2:  [94 %-96 %] 95 %  BP: (131-169)/(63-72) 154/67     Weight: 64.5 kg (142 lb 3.2 oz)  Body mass index is 24.41 kg/m².    Intake/Output Summary (Last 24 hours) at 1/17/2023 1530  Last data filed at 1/17/2023 1525  Gross per 24 hour   Intake 3064.63 ml   Output 405 ml   Net 2659.63 ml      Physical Exam  Vitals reviewed.   Constitutional:       General: She is not in acute distress.  HENT:      Head: Normocephalic.   Eyes:      General:         Right eye: No discharge.         Left eye: No discharge.      Pupils: Pupils are equal, round, and reactive to light.   Cardiovascular:      Rate and Rhythm: Normal rate and regular rhythm.   Pulmonary:      Effort: No respiratory distress.      Breath sounds: No wheezing.   Abdominal:      Tenderness: There is abdominal tenderness.   Musculoskeletal:      Cervical back: Neck supple. No rigidity.      Right lower leg: No edema.      Left lower leg: No edema.   Skin:     General: Skin is warm.      Capillary Refill: Capillary refill takes less than 2 seconds.   Neurological:      General: No focal deficit present.      Mental Status: She is alert and oriented to person, place, and time.       Significant Labs: All pertinent labs within the past 24 hours have been reviewed.  CBC:   Recent Labs   Lab 01/16/23  1206 01/17/23  0505   WBC 7.68 11.49   HGB 15.4 13.3   HCT 46.5 40.3    192     CMP:   Recent Labs   Lab 01/16/23  1206 01/17/23  0505    137   K 4.8 3.7     106   CO2 19* 23   GLU 96 89   BUN 14 14   CREATININE 0.8 0.8   CALCIUM 8.4* 7.9*   PROT 6.9  --    ALBUMIN 3.6  --    BILITOT 0.9  --    ALKPHOS 74  --    AST 34  --    ALT 29  --    ANIONGAP 11 8       Significant Imaging: I have reviewed all pertinent imaging results/findings within the past 24 hours.      Assessment/Plan:      * Diverticulitis of large intestine with perforation and abscess without bleeding  -patient started on broad-spectrum antibiotics and will be NPO for surgical intervention tomorrow with the colorectal surgeon Dr. Kearns  -plan discussed with general surgeon Dr. Jasmine  -anticipating operative management with colorectal surgery today        Advanced care planning/counseling discussion  Palliative Care is following      UTI (urinary tract infection)  -Culture pending  -Patient on broad-spectrum antibiotics for her diverticulitis        VTE Risk Mitigation (From admission, onward)         Ordered     Reason for No Pharmacological VTE Prophylaxis  Once        Question:  Reasons:  Answer:  Physician Provided (leave comment)  Comment:  OR    01/16/23 1645     IP VTE HIGH RISK PATIENT  Once         01/16/23 1645     Place sequential compression device  Until discontinued         01/16/23 1645                Discharge Planning   PAULA:      Code Status: DNR   Is the patient medically ready for discharge?:     Reason for patient still in hospital (select all that apply): Treatment and Consult recommendations                     Jose Lee MD  Department of Hospital Medicine   [facility] ([place])

## 2023-01-17 NOTE — ASSESSMENT & PLAN NOTE
-patient started on broad-spectrum antibiotics and will be NPO for surgical intervention tomorrow with the colorectal surgeon Dr. Kearns  -plan discussed with general surgeon Dr. Jasmine  -ED physician and Dr Jasmine discussed case with Dr Wise, pt's gastroenterologist

## 2023-01-17 NOTE — HPI
"Per H&P: "Mrs. 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  She came to the emergency department today for abdominal pain that began yesterday.  The pain is located to the bilateral lower quadrants of her abdomen.  Patient denies nausea and vomiting.  She reports she frequently has diarrhea.  Patient reports that her pain has been controlled with pain medication given in the emergency department.  Patient's CT of her abdomen and pelvis with contrast that demonstrates acute diverticulitis with bowel per for duration and early abscess formation.  Patient was started on empiric Zosyn.  General surgery was consulted-appreciate recommendations.  Patient spoke with her GI physician Dr. Morales over the telephone.  Surgical intervention is recommended.  Dr. Kearns with colon and Rectal surgery will further evaluate patient tomorrow.  Patient admitted to hospital medicine for further management."     Palliative care consulted for support and ACP  "

## 2023-01-17 NOTE — SUBJECTIVE & OBJECTIVE
Interval History:  Patient still has abdominal pain.  Anticipating operative management later.    Review of Systems   Constitutional:  Negative for chills and fatigue.   HENT:  Negative for congestion and drooling.    Respiratory:  Negative for cough and shortness of breath.    Cardiovascular:  Negative for chest pain and leg swelling.   Gastrointestinal:  Positive for abdominal pain. Negative for rectal pain and vomiting.   Musculoskeletal:  Negative for back pain.   Skin:  Negative for pallor and rash.   Psychiatric/Behavioral:  Negative for agitation.    Objective:     Vital Signs (Most Recent):  Temp: 99.1 °F (37.3 °C) (01/17/23 1109)  Pulse: 82 (01/17/23 1109)  Resp: 18 (01/17/23 1109)  BP: (!) 154/67 (01/17/23 1109)  SpO2: 95 % (01/17/23 1109)   Vital Signs (24h Range):  Temp:  [98.5 °F (36.9 °C)-99.6 °F (37.6 °C)] 99.1 °F (37.3 °C)  Pulse:  [81-97] 82  Resp:  [16-21] 18  SpO2:  [94 %-96 %] 95 %  BP: (131-169)/(63-72) 154/67     Weight: 64.5 kg (142 lb 3.2 oz)  Body mass index is 24.41 kg/m².    Intake/Output Summary (Last 24 hours) at 1/17/2023 1530  Last data filed at 1/17/2023 1525  Gross per 24 hour   Intake 3064.63 ml   Output 405 ml   Net 2659.63 ml      Physical Exam  Vitals reviewed.   Constitutional:       General: She is not in acute distress.  HENT:      Head: Normocephalic.   Eyes:      General:         Right eye: No discharge.         Left eye: No discharge.      Pupils: Pupils are equal, round, and reactive to light.   Cardiovascular:      Rate and Rhythm: Normal rate and regular rhythm.   Pulmonary:      Effort: No respiratory distress.      Breath sounds: No wheezing.   Abdominal:      Tenderness: There is abdominal tenderness.   Musculoskeletal:      Cervical back: Neck supple. No rigidity.      Right lower leg: No edema.      Left lower leg: No edema.   Skin:     General: Skin is warm.      Capillary Refill: Capillary refill takes less than 2 seconds.   Neurological:      General: No focal  deficit present.      Mental Status: She is alert and oriented to person, place, and time.       Significant Labs: All pertinent labs within the past 24 hours have been reviewed.  CBC:   Recent Labs   Lab 01/16/23  1206 01/17/23  0505   WBC 7.68 11.49   HGB 15.4 13.3   HCT 46.5 40.3    192     CMP:   Recent Labs   Lab 01/16/23  1206 01/17/23  0505    137   K 4.8 3.7    106   CO2 19* 23   GLU 96 89   BUN 14 14   CREATININE 0.8 0.8   CALCIUM 8.4* 7.9*   PROT 6.9  --    ALBUMIN 3.6  --    BILITOT 0.9  --    ALKPHOS 74  --    AST 34  --    ALT 29  --    ANIONGAP 11 8       Significant Imaging: I have reviewed all pertinent imaging results/findings within the past 24 hours.

## 2023-01-17 NOTE — CONSULTS
[facility] ([place])  Palliative Medicine  Consult Note    Patient Name: Cheyenne Garner  MRN: [id number]  Admission Date: 1/16/2023  Hospital Length of Stay: 1 days  Code Status: DNR   Attending Provider: Jose Lee MD  Consulting Provider: Fernando Turner MD  Primary Care Physician: Mary Cortes MD  Principal Problem:Diverticulitis of large intestine with perforation and abscess without bleeding    Patient information was obtained from patient, past medical records and primary team.      Inpatient consult to Palliative Care  Consult performed by: Fernando Turner MD  Consult ordered by: Letty Jovel DNP  Reason for consult: ACP        Assessment/Plan:     * Diverticulitis of large intestine with perforation and abscess without bleeding  - surgery following  - plans for surgery today    Advanced care planning/counseling discussion  1/17/2023:  - seen at bedside with palliative RN Traah  - patient was being adjusted in bed by the nurse upon our arrival  - patient welcomed us in  - introduced palliative care and our role  - patient admits she is anxious/scared about the surgery she will be getting. Reassured her it is understandable for anyone to be nervous prior to such a surgery.   - she states her main reason for concern for a poor outcome given the fact that she is 87 years old  - we explored the fact that she is a very active, overall healthy, mobile and independent and she has a lot of joys in life with much to live for  - she was having a big party at home the day before the pain started and is very social  - openly shared some of the difficulties of her life given the loss of her eldest son 10 years ago and that she sees a psychiatrist for continued assistance with processing that tragedy.  - she understands the surgical procedure and that she will be left with the colostomy bag afterwards and that the surgery team will see down the line what can be done later. She is hoping for the  "best.    - she currently has pain and understands the surgery will hopefully alleviate that pain. Reassured her that all the teams will ensure her pain is well controlled during and after too. Made her aware she currently has pain medications available as needed too before the surgery as well.   - she is aware of the necessity for the surgery and wants to get it done  - she is maintaining a positive perspective for the surgery and hoping for the best. Encouraged her to keep that same positive perspective and use it as motivation for her recovery afterwards too  [] per notes, DNR to be reversed just for the surgery then reversed back afterwards  [] patient wishes to get the surgery done   [] we will continue to see after surgery too for support        Thank you for your consult. I will follow-up with patient. Please contact us if you have any additional questions.    Subjective:     HPI:   Per H&P: "Mrs. 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  She came to the emergency department today for abdominal pain that began yesterday.  The pain is located to the bilateral lower quadrants of her abdomen.  Patient denies nausea and vomiting.  She reports she frequently has diarrhea.  Patient reports that her pain has been controlled with pain medication given in the emergency department.  Patient's CT of her abdomen and pelvis with contrast that demonstrates acute diverticulitis with bowel per for duration and early abscess formation.  Patient was started on empiric Zosyn.  General surgery was consulted-appreciate recommendations.  Patient spoke with her GI physician Dr. Morales over the telephone.  Surgical intervention is recommended.  Dr. Kearns with colon and Rectal surgery will further evaluate patient tomorrow.  Patient admitted to hospital medicine for further " "management."     Palliative care consulted for support and ACP      Hospital Course:  No notes on file    Interval History/subjective:  - seen at bedside with Palliative RN Tarah  - patient has her  at bedside who is a nursing home resident  -  she is feeling okay currently, still some pain  - anxious for the surgery today    Past Medical History:   Diagnosis Date    Cataract     Hyperlipidemia     Inflammatory bowel disease     Sarcoidosis with granulomatous hepatitis     UTI (urinary tract infection) 7/17/2013       Past Surgical History:   Procedure Laterality Date    BLADDER SURGERY      BREAST SURGERY      EYE SURGERY      HIP SURGERY      HYSTERECTOMY      JOINT REPLACEMENT      SINUS SURGERY      TOTAL REDUCTION MAMMOPLASTY         Review of patient's allergies indicates:   Allergen Reactions    Ciprofloxacin (bulk) Other (See Comments)     Made feet numb and cold    Flagyl [metronidazole] Other (See Comments)     Loss of feeling in feet       Medications:  Continuous Infusions:   sodium chloride 0.9% 75 mL/hr at 01/17/23 0821     Scheduled Meds:   piperacillin-tazobactam (ZOSYN) IVPB  4.5 g Intravenous Q8H     PRN Meds:acetaminophen, dextrose 10%, dextrose 10%, gabapentin, glucagon (human recombinant), glucose, glucose, HYDROcodone-acetaminophen, HYDROmorphone, melatonin, naloxone, ondansetron    Family History       Problem Relation (Age of Onset)    Breast cancer Sister    Cancer Mother, Sister          Tobacco Use    Smoking status: Former     Types: Cigarettes    Smokeless tobacco: Never   Substance and Sexual Activity    Alcohol use: Yes     Alcohol/week: 3.0 standard drinks     Types: 1 Glasses of wine, 1 Cans of beer, 1 Shots of liquor per week    Drug use: No    Sexual activity: Not Currently     ROS: per hpi/subjective    Objective:     Vital Signs (Most Recent):  Temp: 99.1 °F (37.3 °C) (01/17/23 1109)  Pulse: 82 (01/17/23 1109)  Resp: 18 (01/17/23 1109)  BP: (!) 154/67 " "(01/17/23 1109)  SpO2: 95 % (01/17/23 1109)   Vital Signs (24h Range):  Temp:  [98.5 °F (36.9 °C)-99.6 °F (37.6 °C)] 99.1 °F (37.3 °C)  Pulse:  [81-97] 82  Resp:  [16-21] 18  SpO2:  [94 %-97 %] 95 %  BP: (131-184)/(63-81) 154/67     Weight: 64.5 kg (142 lb 3.2 oz)  Body mass index is 24.41 kg/m².    Physical Exam  Vitals and nursing note reviewed.   Constitutional:       Appearance: Normal appearance.   HENT:      Head: Normocephalic and atraumatic.   Eyes:      Extraocular Movements: Extraocular movements intact.   Pulmonary:      Effort: Pulmonary effort is normal.   Musculoskeletal:         General: Normal range of motion.   Skin:     General: Skin is warm and dry.   Neurological:      Mental Status: She is alert.      Comments: Awake, alert, conversant, oriented    Psychiatric:         Thought Content: Thought content normal.       Review of Symptoms        Living Arrangements:  Lives alone    Psychosocial/Cultural:   See Palliative Psychosocial Note: Yes  - 2 sons, oldest of whom passed away 10 years ago  - living son is Andre   - independent at home where she lives alone  - she drives, cooks for herself and takes care of herself  - she enjoys spending time with friends/family. Enjoys hosting big parties  - she is the follower of many faiths, but states she "falls short" in regards to her spirituality   **Primary  to Follow**  Palliative Care  Consult: No      Advance Care Planning   Advance Directives:     Decision Making:  Patient answered questions  Goals of Care: The patient endorses that what is most important right now is to focus on improvement in condition.     Accordingly, we have decided that the best plan to meet the patient's goals includes continuing with treatment       Significant Labs: Reviewed  CBC:   Recent Labs   Lab 01/17/23  0505   WBC 11.49   HGB 13.3   HCT 40.3   MCV 94        BMP:  Recent Labs   Lab 01/17/23  0505   GLU 89      K 3.7    "   CO2 23   BUN 14   CREATININE 0.8   CALCIUM 7.9*     LFT:  Lab Results   Component Value Date    AST 34 01/16/2023    ALKPHOS 74 01/16/2023    BILITOT 0.9 01/16/2023     Albumin:   Albumin   Date Value Ref Range Status   01/16/2023 3.6 3.5 - 5.2 g/dL Final     Protein:   Total Protein   Date Value Ref Range Status   01/16/2023 6.9 6.0 - 8.4 g/dL Final     Lactic acid:   Lab Results   Component Value Date    LACTATE 1.0 01/16/2023    LACTATE 1.5 06/10/2022       Significant Imaging: Reviewed        > 50% of 70 min visit spent in chart review, face to face discussion of goals of care,  symptom assessment, coordination of care and emotional support.    Fernando Turner MD  Palliative Medicine  [facility] [place] [facility] ([place])

## 2023-01-17 NOTE — SUBJECTIVE & OBJECTIVE
Past Medical History:   Diagnosis Date    Cataract     Hyperlipidemia     Inflammatory bowel disease     Sarcoidosis with granulomatous hepatitis     UTI (urinary tract infection) 7/17/2013       Past Surgical History:   Procedure Laterality Date    BLADDER SURGERY      BREAST SURGERY      EYE SURGERY      HIP SURGERY      HYSTERECTOMY      JOINT REPLACEMENT      SINUS SURGERY      TOTAL REDUCTION MAMMOPLASTY         Review of patient's allergies indicates:   Allergen Reactions    Ciprofloxacin (bulk) Other (See Comments)     Made feet numb and cold    Flagyl [metronidazole] Other (See Comments)     Loss of feeling in feet       No current facility-administered medications on file prior to encounter.     Current Outpatient Medications on File Prior to Encounter   Medication Sig    dextroamphetamine-amphetamine 10 mg Tab Take by mouth once daily.    gabapentin (NEURONTIN) 400 MG capsule Take 1 capsule (400 mg total) by mouth 3 (three) times daily.    ergocalciferol (ERGOCALCIFEROL) 50,000 unit Cap Take 1 capsule (50,000 Units total) by mouth twice a week.    hydrocortisone (ANUSOL-HC) 2.5 % rectal cream Place rectally 2 (two) times daily.    mirtazapine (REMERON) 7.5 MG Tab Take 1 tablet by mouth Daily.    simvastatin (ZOCOR) 10 MG tablet Take 1 tablet (10 mg total) by mouth every evening to control cholesterol    traZODone (DESYREL) 50 MG tablet Take 50 mg by mouth every evening.    ziprasidone (GEODON) 20 MG Cap Take 1 capsule (20 mg total) by mouth nightly.    [DISCONTINUED] hydroCHLOROthiazide (HYDRODIURIL) 12.5 MG Tab Take 1 tablet (12.5 mg total) by mouth daily as needed (le edema).     Family History       Problem Relation (Age of Onset)    Breast cancer Sister    Cancer Mother, Sister          Tobacco Use    Smoking status: Former     Types: Cigarettes    Smokeless tobacco: Never   Substance and Sexual Activity    Alcohol use: Yes     Alcohol/week: 3.0 standard drinks     Types: 1 Glasses of wine, 1 Cans of  beer, 1 Shots of liquor per week    Drug use: No    Sexual activity: Not Currently     Review of Systems   Constitutional:  Negative for activity change, chills and fever.   HENT:  Negative for congestion and trouble swallowing.    Eyes:  Negative for photophobia and visual disturbance.   Respiratory:  Negative for cough, shortness of breath and wheezing.    Cardiovascular:  Negative for chest pain, palpitations and leg swelling.   Gastrointestinal:  Positive for abdominal pain. Negative for diarrhea, nausea and vomiting.   Endocrine: Negative for polydipsia and polyuria.   Genitourinary:  Negative for dysuria and frequency.   Musculoskeletal:  Negative for arthralgias and gait problem.   Skin:  Negative for rash and wound.   Neurological:  Negative for dizziness, speech difficulty, weakness and headaches.   Psychiatric/Behavioral:  Negative for confusion and sleep disturbance.    Objective:     Vital Signs (Most Recent):  Temp: 98.5 °F (36.9 °C) (01/16/23 2353)  Pulse: 82 (01/16/23 2354)  Resp: (!) 21 (01/16/23 2353)  BP: 131/64 (01/16/23 2353)  SpO2: 96 % (01/16/23 2354)   Vital Signs (24h Range):  Temp:  [98.5 °F (36.9 °C)-99.8 °F (37.7 °C)] 98.5 °F (36.9 °C)  Pulse:  [81-97] 82  Resp:  [16-21] 21  SpO2:  [94 %-97 %] 96 %  BP: (131-192)/(63-81) 131/64     Weight: 64.5 kg (142 lb 3.2 oz)  Body mass index is 24.41 kg/m².    Physical Exam  Vitals reviewed.   Constitutional:       General: She is not in acute distress.     Appearance: She is not ill-appearing.   HENT:      Head: Normocephalic and atraumatic.      Nose: No congestion.      Mouth/Throat:      Mouth: Mucous membranes are moist.      Pharynx: Oropharynx is clear.   Eyes:      General:         Right eye: No discharge.         Left eye: No discharge.      Extraocular Movements: Extraocular movements intact.      Pupils: Pupils are equal, round, and reactive to light.   Cardiovascular:      Rate and Rhythm: Normal rate and regular rhythm.      Heart sounds:  Normal heart sounds. No murmur heard.  Pulmonary:      Effort: Pulmonary effort is normal. No respiratory distress.      Breath sounds: Normal breath sounds. No wheezing or rhonchi.   Abdominal:      General: Bowel sounds are normal. There is no distension.      Palpations: Abdomen is soft.      Tenderness: There is abdominal tenderness. There is no guarding.   Musculoskeletal:         General: No swelling or tenderness. Normal range of motion.      Cervical back: Normal range of motion. No rigidity or tenderness.   Skin:     General: Skin is warm.      Capillary Refill: Capillary refill takes 2 to 3 seconds.      Findings: No lesion or rash.   Neurological:      General: No focal deficit present.      Mental Status: She is alert and oriented to person, place, and time.      Sensory: No sensory deficit.      Gait: Gait normal.   Psychiatric:         Mood and Affect: Mood is anxious.         Behavior: Behavior is slowed. Behavior is cooperative.         Cognition and Memory: Cognition is impaired (mild).         CRANIAL NERVES     CN III, IV, VI   Pupils are equal, round, and reactive to light.     Significant Labs: All pertinent labs within the past 24 hours have been reviewed.  Blood Culture: No results for input(s): LABBLOO in the last 48 hours.  BMP:   Recent Labs   Lab 01/16/23  1206   GLU 96      K 4.8      CO2 19*   BUN 14   CREATININE 0.8   CALCIUM 8.4*     CBC:   Recent Labs   Lab 01/16/23  1206   WBC 7.68   HGB 15.4   HCT 46.5        Lactic Acid:   Recent Labs   Lab 01/16/23  1610   LACTATE 1.0     Urine Culture: No results for input(s): LABURIN in the last 48 hours.  Urine Studies:   Recent Labs   Lab 01/16/23  1230   COLORU Yellow   APPEARANCEUA Clear   PHUR 6.0   SPECGRAV 1.015   PROTEINUA Negative   GLUCUA Negative   KETONESU Trace*   BILIRUBINUA Negative   OCCULTUA 1+*   NITRITE Positive*   UROBILINOGEN Negative   LEUKOCYTESUR 2+*   RBCUA 5*   WBCUA 3   BACTERIA Rare   SQUAMEPITHEL 0        Significant Imaging: I have reviewed all pertinent imaging results/findings within the past 24 hours.  CT Abdomen Pelvis With Contrast  Narrative: EXAMINATION:  CT ABDOMEN PELVIS WITH CONTRAST    CLINICAL HISTORY:  Abdominal abscess/infection suspected;    TECHNIQUE:  Low dose axial images, sagittal and coronal reformations were obtained from the lung bases to the pubic symphysis following the IV administration of 100 mL of Omnipaque 350 .  Oral contrast was not administered.    COMPARISON:  11/28/2022    FINDINGS:  Abdomen:    - Lower thorax:    - Lung bases: No infiltrates and no nodules.    - Liver: No focal mass.  The liver is normal size with mild diffuse fatty infiltration.    - Gallbladder: No calcified gallstones.    - Bile Ducts: No evidence of intra or extra hepatic biliary ductal dilation.    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    - Kidneys: Simple right renal cyst posteriorly.  No stone, soft tissue mass or hydronephrosis bilaterally.    Small hiatal hernia.    - Adrenals: Unremarkable.    - Pancreas: No mass or peripancreatic fat stranding.    - Retroperitoneum:  No significant adenopathy.    - Vascular: No abdominal aortic aneurysm.    - Abdominal wall:  Unremarkable.    Pelvis:    Significant metal artifact obscures the lower pelvis.  Bilateral hip arthroplasties.    Bowel/Mesentery:    There is diverticulosis of the colon.  There is wall thickening inflammatory change in the sigmoid colon distally suggesting acute diverticulitis.  Visualization is significantly limited by the ample metallic artifact in this region.  Small 1.9 x 1 4 cm fluid collection in the pelvis slightly right of midline on axial 118.  Small adjacent 1.8 cm fluid collection on axial 109.    There are few foci of free air in the mesentery in the pelvis.  Small focus adjacent to the anterior pelvic wall on axial 109 also.  Mild free air is noted in the anterior  superior abdomen abutting the diaphragm on axial 33 also.    Findings are consistent with bowel perforation.  Surgical consultation and follow-up recommended    Probable secondary mild ileitis with minimal wall thickening.    Bones:  No acute fractures.    Moderate-severe central canal narrowing at L3-4.  Multilevel chronic degenerative changes with disc space narrowing and vacuum disc phenomena.  Impression: 1. Acute diverticulitis of the distal sigmoid colon with evidence of bowel perforation with mild free air in the pelvis and upper abdomen.  See above comments.  Minimal fluid collections in the pelvis could represent early abscess formation.  Recommend surgical consultation and follow-up.  2. See above comments also.  3.  This report was flagged in Epic as abnormal.    Electronically signed by: Misael Bragg  Date:    01/16/2023  Time:    15:27

## 2023-01-17 NOTE — ASSESSMENT & PLAN NOTE
1/17/2023:  - seen at bedside with palliative RN Tarah  - patient was being adjusted in bed by the nurse upon our arrival  - patient welcomed us in  - introduced palliative care and our role  - patient admits she is anxious/scared about the surgery she will be getting. Reassured her it is understandable for anyone to be nervous prior to such a surgery.   - she states her main reason for concern for a poor outcome given the fact that she is 87 years old  - we explored the fact that she is a very active, overall healthy, mobile and independent and she has a lot of joys in life with much to live for  - she was having a big party at home the day before the pain started and is very social  - openly shared some of the difficulties of her life given the loss of her eldest son 10 years ago and that she sees a psychiatrist for continued assistance with processing that tragedy.  - she understands the surgical procedure and that she will be left with the colostomy bag afterwards and that the surgery team will see down the line what can be done later. She is hoping for the best.    - she currently has pain and understands the surgery will hopefully alleviate that pain. Reassured her that all the teams will ensure her pain is well controlled during and after too. Made her aware she currently has pain medications available as needed too before the surgery as well.   - she is aware of the necessity for the surgery and wants to get it done  - she is maintaining a positive perspective for the surgery and hoping for the best. Encouraged her to keep that same positive perspective and use it as motivation for her recovery afterwards too  [] per notes, DNR to be reversed just for the surgery then reversed back afterwards  [] patient wishes to get the surgery done   [] we will continue to see after surgery too for support

## 2023-01-17 NOTE — CONSULTS
[facility] ([place])  Wound Care    Patient Name:  Cheyenne Garner   MRN:  [id number]  Date: 1/17/2023  Diagnosis: Diverticulitis of large intestine with perforation and abscess without bleeding    History:     Past Medical History:   Diagnosis Date    Cataract     Hyperlipidemia     Inflammatory bowel disease     Sarcoidosis with granulomatous hepatitis     UTI (urinary tract infection) 7/17/2013       Social History     Socioeconomic History    Marital status:    Tobacco Use    Smoking status: Former     Types: Cigarettes    Smokeless tobacco: Never   Substance and Sexual Activity    Alcohol use: Yes     Alcohol/week: 3.0 standard drinks     Types: 1 Glasses of wine, 1 Cans of beer, 1 Shots of liquor per week    Drug use: No    Sexual activity: Not Currently     Social Determinants of Health     Financial Resource Strain: Low Risk     Difficulty of Paying Living Expenses: Not very hard   Food Insecurity: No Food Insecurity    Worried About Running Out of Food in the Last Year: Never true    Ran Out of Food in the Last Year: Never true   Transportation Needs: No Transportation Needs    Lack of Transportation (Medical): No    Lack of Transportation (Non-Medical): No   Physical Activity: Inactive    Days of Exercise per Week: 0 days    Minutes of Exercise per Session: 0 min   Stress: No Stress Concern Present    Feeling of Stress : Not at all   Social Connections: Moderately Integrated    Frequency of Communication with Friends and Family: Once a week    Frequency of Social Gatherings with Friends and Family: Twice a week    Attends [employer] Services: 1 to 4 times per year    Active Member of Clubs or Organizations: No    Attends Club or Organization Meetings: Never    Marital Status:    Housing Stability: Low Risk     Unable to Pay for Housing in the Last Year: No    Number of Places Lived in the Last Year: 1    Unstable Housing in the Last Year: No       Precautions:     Allergies as of  01/16/2023 - Reviewed 01/16/2023   Allergen Reaction Noted    Ciprofloxacin (bulk) Other (See Comments) 09/27/2022    Flagyl [metronidazole] Other (See Comments) 01/16/2023       [facility] Assessment Details/Treatment     Ostomy marking consult received from MD for pre-op marking. Patient scheduled for surgery today at 12:30 p. Assessed patient lying and sitting down. Marked patient with sharpie marker to left and right lower quadrant. Placed tegaderm over marking. Patient tolerated assessment and marking well. Ostomy department to follow up patient post for ostomy training.       01/17/2023

## 2023-01-17 NOTE — ANESTHESIA POSTPROCEDURE EVALUATION
Anesthesia Post Evaluation    Patient: Cheyenne Garner    Procedure(s) Performed: Procedure(s) (LRB):  COLECTOMY, SIGMOID (N/A)  SIGMOIDOSCOPY, FLEXIBLE  MOBILIZATION, SPLENIC FLEXURE    Final Anesthesia Type: general      Patient location during evaluation: PACU  Patient participation: Yes- Able to Participate  Level of consciousness: awake and alert  Post-procedure vital signs: reviewed and stable  Pain management: adequate  Airway patency: patent  NEENA mitigation strategies: Extubation while patient is awake  PONV status at discharge: No PONV  Anesthetic complications: no      Cardiovascular status: hemodynamically stable  Respiratory status: unassisted  Hydration status: euvolemic  Follow-up not needed.          Vitals Value Taken Time   /64 01/17/23 1634   Temp 36.1 °C (96.9 °F) 01/17/23 1547   Pulse 77 01/17/23 1642   Resp 18 01/17/23 1620   SpO2 95 % 01/17/23 1642   Vitals shown include unvalidated device data.      No case tracking events are documented in the log.      Pain/Valentin Score: Pain Rating Prior to Med Admin: 9 (1/17/2023  8:42 AM)  Pain Rating Post Med Admin: 2 (1/17/2023  9:12 AM)  Valentin Score: 8 (1/17/2023  4:00 PM)

## 2023-01-17 NOTE — TRANSFER OF CARE
"Anesthesia Transfer of Care Note    Patient: Cheyenne Garner    Procedure(s) Performed: Procedure(s) (LRB):  COLECTOMY, SIGMOID (N/A)  SIGMOIDOSCOPY, FLEXIBLE  MOBILIZATION, SPLENIC FLEXURE    Patient location: PACU    Anesthesia Type: general    Transport from OR: Transported from OR on 2-3 L/min O2 by NC with adequate spontaneous ventilation    Post pain: adequate analgesia    Post assessment: no apparent anesthetic complications and tolerated procedure well    Post vital signs: stable    Level of consciousness: sedated    Nausea/Vomiting: no nausea/vomiting    Complications: none    Transfer of care protocol was followed      Last vitals:   Visit Vitals  BP (!) 152/66 (BP Location: Left arm, Patient Position: Lying)   Pulse 76   Temp 36.1 °C (96.9 °F) (Temporal)   Resp 16   Ht 5' 4" (1.626 m)   Wt 64.5 kg (142 lb 3.2 oz)   SpO2 98%   BMI 24.41 kg/m²     "

## 2023-01-17 NOTE — PROGRESS NOTES
IP Liaison - Initial Visit Note    Patient: Cheyenne Garner  MRN:  [id number]  Date of Service:  1/17/2023  Completed by:  CHARLEE Rudd    Reason for Visit   Patient presents with    IP Liaison Initial Visit       RSW met with patient at bedside in order to complete SDOH questionnaire and liaison assessment.  Pt has identified no barriers to care.    The following were addressed during this visit:  - Review SDOH Questions   - Complete patient assessment   - Review and discuss options for social groups or events   - Review and discuss options to become more physically active   - Complete initial visit with patient        Patient Summary     IP Liaison Patient Assessment    General  Level of Caregiver support: Member independent and does not need caregiver assistance  Have you had to make a decision between paying for any of the following in the last 2 months?: None  Transportation means: Family  Employment status: Retired and not working  Current symptoms: Sleep disturbances  Assessments  Was the PHQ Depression Screening completed this visit?: Yes  Was the GELACIO-7 Screening completed this visit?: No       CHARLEE Rudd

## 2023-01-17 NOTE — ASSESSMENT & PLAN NOTE
-patient started on broad-spectrum antibiotics and will be NPO for surgical intervention tomorrow with the colorectal surgeon Dr. Kearns  -plan discussed with general surgeon Dr. Jasmine  -anticipating operative management with colorectal surgery today

## 2023-01-17 NOTE — ED PROVIDER NOTES
Encounter Date: 1/16/2023       History     Chief Complaint   Patient presents with    Abdominal Pain     Abd pain x12 hours, Hx of GI issues.      This is an 87-year-old female with history of diverticulitis, colitis, colonic diverticular abscess who presents to the ED with complaints of abdominal pain that started abruptly this morning.  Patient is followed by Dr. Cortes who sent her to the ER for further evaluation of her pain given her history.  She describes the pain as sharp and shooting and located across her entire abdomen.  She has not taken anything for alleviation of symptoms.  Can not identify any aggravating or alleviating symptoms.  Reports her last bowel movement was yesterday.  States she has chronic diarrhea.  Denies nausea, vomiting, hematochezia, fever, chills, shortness of breath, chest pain.    The history is provided by the patient.   Review of patient's allergies indicates:   Allergen Reactions    Ciprofloxacin (bulk) Other (See Comments)     Made feet numb and cold    Flagyl [metronidazole] Other (See Comments)     Loss of feeling in feet     Past Medical History:   Diagnosis Date    Cataract     Hyperlipidemia     Inflammatory bowel disease     Sarcoidosis with granulomatous hepatitis     UTI (urinary tract infection) 7/17/2013     Past Surgical History:   Procedure Laterality Date    BLADDER SURGERY      BREAST SURGERY      EYE SURGERY      HIP SURGERY      HYSTERECTOMY      JOINT REPLACEMENT      SINUS SURGERY      TOTAL REDUCTION MAMMOPLASTY       Family History   Problem Relation Age of Onset    Cancer Mother     Breast cancer Sister     Cancer Sister      Social History     Tobacco Use    Smoking status: Former     Types: Cigarettes    Smokeless tobacco: Never   Substance Use Topics    Alcohol use: Yes     Alcohol/week: 3.0 standard drinks     Types: 1 Glasses of wine, 1 Cans of beer, 1 Shots of liquor per week    Drug use: No     Review of Systems   Constitutional:  Negative for  chills and fever.   HENT:  Negative for congestion, sinus pressure and sore throat.    Eyes:  Negative for pain.   Respiratory:  Negative for cough and shortness of breath.    Cardiovascular:  Negative for chest pain.   Gastrointestinal:  Positive for abdominal pain and diarrhea (Chronic). Negative for blood in stool, constipation, nausea and vomiting.   Genitourinary:  Negative for dysuria and hematuria.   Musculoskeletal:  Negative for arthralgias and myalgias.   Skin: Negative.    Neurological:  Negative for weakness, numbness and headaches.   Hematological: Negative.    Psychiatric/Behavioral:  Negative for agitation and confusion.      Physical Exam     Initial Vitals [01/16/23 1124]   BP Pulse Resp Temp SpO2   (!) 192/81 90 18 99.8 °F (37.7 °C) (!) 94 %      MAP       --         Physical Exam    Constitutional: She appears well-developed and well-nourished.  Non-toxic appearance. She does not appear ill. No distress.   Appears frail   HENT:   Head: Normocephalic and atraumatic.   Eyes: Conjunctivae are normal.   Neck: Neck supple.   Cardiovascular:  Normal rate and regular rhythm.           Pulmonary/Chest: Effort normal and breath sounds normal. No tachypnea. No respiratory distress.   Abdominal: Abdomen is soft and flat. She exhibits no distension and no mass. A surgical scar is present. There is generalized abdominal tenderness (Significant, diffuse).   Surgical scar present, patient states history of tummy tuck   No right CVA tenderness.  No left CVA tenderness. There is guarding (Voluntary). There is no rebound, no tenderness at McBurney's point and negative Che's sign.   Musculoskeletal:      Cervical back: Neck supple.     Neurological: She is alert and oriented to person, place, and time. GCS eye subscore is 4. GCS verbal subscore is 5. GCS motor subscore is 6.   Skin: Skin is warm, dry and intact.   Psychiatric: She has a normal mood and affect. Her speech is normal and behavior is normal.       ED  Course   Critical Care    Date/Time: 1/16/2023 3:59 PM  Performed by: Isa Kuo PA-C  Authorized by: Siva Palm MD   Direct patient critical care time: 10 minutes  Total critical care time (exclusive of procedural time) : 10 minutes  Critical care was necessary to treat or prevent imminent or life-threatening deterioration of the following conditions: sepsis.  Critical care was time spent personally by me on the following activities: discussions with consultants, ordering and review of radiographic studies and ordering and review of laboratory studies.      Labs Reviewed   CBC W/ AUTO DIFFERENTIAL - Abnormal; Notable for the following components:       Result Value    MCH 31.8 (*)     Immature Granulocytes 0.7 (*)     Immature Grans (Abs) 0.05 (*)     Lymph # 0.6 (*)     Gran % 87.0 (*)     Lymph % 7.6 (*)     All other components within normal limits   COMPREHENSIVE METABOLIC PANEL - Abnormal; Notable for the following components:    CO2 19 (*)     Calcium 8.4 (*)     All other components within normal limits   URINALYSIS, REFLEX TO URINE CULTURE - Abnormal; Notable for the following components:    Ketones, UA Trace (*)     Occult Blood UA 1+ (*)     Nitrite, UA Positive (*)     Leukocytes, UA 2+ (*)     All other components within normal limits    Narrative:     Specimen Source->Urine   URINALYSIS MICROSCOPIC - Abnormal; Notable for the following components:    RBC, UA 5 (*)     All other components within normal limits    Narrative:     Specimen Source->Urine   POCT GLUCOSE - Abnormal; Notable for the following components:    POCT Glucose 123 (*)     All other components within normal limits   LIPASE   LACTIC ACID, PLASMA          Imaging Results               CT Abdomen Pelvis With Contrast (Final result)  Result time 01/16/23 15:27:30      Final result by Misael Marie MD (01/16/23 15:27:30)                   Impression:      1. Acute diverticulitis of the distal sigmoid colon with evidence of  bowel perforation with mild free air in the pelvis and upper abdomen.  See above comments.  Minimal fluid collections in the pelvis could represent early abscess formation.  Recommend surgical consultation and follow-up.  2. See above comments also.  3.  This report was flagged in Epic as abnormal.      Electronically signed by: Misael Bragg  Date:    01/16/2023  Time:    15:27               Narrative:    EXAMINATION:  CT ABDOMEN PELVIS WITH CONTRAST    CLINICAL HISTORY:  Abdominal abscess/infection suspected;    TECHNIQUE:  Low dose axial images, sagittal and coronal reformations were obtained from the lung bases to the pubic symphysis following the IV administration of 100 mL of Omnipaque 350 .  Oral contrast was not administered.    COMPARISON:  11/28/2022    FINDINGS:  Abdomen:    - Lower thorax:    - Lung bases: No infiltrates and no nodules.    - Liver: No focal mass.  The liver is normal size with mild diffuse fatty infiltration.    - Gallbladder: No calcified gallstones.    - Bile Ducts: No evidence of intra or extra hepatic biliary ductal dilation.    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    - Kidneys: Simple right renal cyst posteriorly.  No stone, soft tissue mass or hydronephrosis bilaterally.    Small hiatal hernia.    - Adrenals: Unremarkable.    - Pancreas: No mass or peripancreatic fat stranding.    - Retroperitoneum:  No significant adenopathy.    - Vascular: No abdominal aortic aneurysm.    - Abdominal wall:  Unremarkable.    Pelvis:    Significant metal artifact obscures the lower pelvis.  Bilateral hip arthroplasties.    Bowel/Mesentery:    There is diverticulosis of the colon.  There is wall thickening inflammatory change in the sigmoid colon distally suggesting acute diverticulitis.  Visualization is significantly limited by the ample metallic artifact in this region.  Small 1.9 x 1 4 cm fluid collection in the pelvis slightly right of  midline on axial 118.  Small adjacent 1.8 cm fluid collection on axial 109.    There are few foci of free air in the mesentery in the pelvis.  Small focus adjacent to the anterior pelvic wall on axial 109 also.  Mild free air is noted in the anterior superior abdomen abutting the diaphragm on axial 33 also.    Findings are consistent with bowel perforation.  Surgical consultation and follow-up recommended    Probable secondary mild ileitis with minimal wall thickening.    Bones:  No acute fractures.    Moderate-severe central canal narrowing at L3-4.  Multilevel chronic degenerative changes with disc space narrowing and vacuum disc phenomena.                                       Medications   gabapentin capsule 200 mg (has no administration in time range)   ondansetron injection 4 mg (4 mg Intravenous Given 1/17/23 1733)   naloxone 0.4 mg/mL injection 0.02 mg (has no administration in time range)   glucose chewable tablet 16 g (has no administration in time range)   glucose chewable tablet 24 g (has no administration in time range)   dextrose 10% bolus 125 mL 125 mL (has no administration in time range)   dextrose 10% bolus 250 mL 250 mL (has no administration in time range)   glucagon (human recombinant) injection 1 mg (has no administration in time range)   melatonin tablet 6 mg (has no administration in time range)   piperacillin-tazobactam (ZOSYN) 4.5 g in dextrose 5 % in water (D5W) 5 % 100 mL IVPB (MB+) (4.5 g Intravenous New Bag 1/17/23 1732)   0.9%  NaCl infusion ( Intravenous Restarted 1/17/23 1720)   HYDROmorphone injection 0.5 mg (0.5 mg Intravenous Given 1/17/23 0842)   HYDROmorphone (PF) injection 0.4 mg (has no administration in time range)   oxyCODONE immediate release tablet 5 mg (has no administration in time range)   meperidine (PF) injection 12.5 mg (has no administration in time range)   prochlorperazine injection Soln 5 mg (has no administration in time range)   sodium chloride 0.9% flush 3 mL  (has no administration in time range)   enoxaparin injection 40 mg (40 mg Subcutaneous Given 1/17/23 1731)   acetaminophen tablet 650 mg (650 mg Oral Not Given 1/17/23 1800)   oxyCODONE immediate release tablet 5 mg (has no administration in time range)   oxyCODONE immediate release tablet 10 mg (has no administration in time range)   ibuprofen tablet 400 mg (400 mg Oral Not Given 1/17/23 1800)   ketorolac injection 15 mg (15 mg Intravenous Given 1/16/23 1541)   iohexoL (OMNIPAQUE 350) injection 100 mL (100 mLs Intravenous Given 1/16/23 1438)   sulfamethoxazole-trimethoprim 800-160mg per tablet 1 tablet (1 tablet Oral Given 1/16/23 1531)   piperacillin-tazobactam (ZOSYN) 4.5 g in dextrose 5 % in water (D5W) 5 % 100 mL IVPB (MB+) (0 g Intravenous Stopped 1/16/23 1703)   sodium chloride 0.9% bolus 1,000 mL 1,000 mL (0 mLs Intravenous Stopped 1/16/23 1744)     Medical Decision Making:   Initial Assessment:   Cheyenne Garner presents to the emergency department today complaining of abdominal pain.  Will go ahead and obtain labs and given the extent of pain obtain imaging.  Treat symptoms and reassess.   Differential Diagnosis:   Differential Diagnosis includes, but is not limited to:  AAA, aortic dissection, mesenteric ischemia, perforated viscous, MI/ACS, SBO/volvulus, incarcerated/strangulated hernia, intussusception, ileus, appendicitis, cholecystitis, cholangitis, diverticulitis, esophagitis, hepatitis, nephrolithiasis, pancreatitis, gastroenteritis, colitis, IBD/IBS, biliary colic, GERD, PUD, constipation, UTI/pyelonephritis,  disorder.             Attending Attestation:     Physician Attestation Statement for NP/PA:   I discussed this assessment and plan of this patient with the NP/PA, but I did not personally examine the patient. The face to face encounter was performed by the NP/PA.            ED Course as of 01/17/23 2015 Mon Jan 16, 2023   1332 CBC without evidence of leukocytosis, though slight left  shift.  No evidence of anemia.  CMP without evidence of electrolyte abnormality, or kidney dysfunction. [DINO]   1332 Leukocytes, UA(!): 2+ [DINO]   1332 NITRITE UA(!): Positive [DINO]   1400 After asking further questions, patient does report dysuria and frequency [DINO]   1814 CT abdomen pelvis shows acute diverticulitis of the distal sigmoid colon with evidence of bowel perforation, will consult General surgery [DINO]   1814 Discussed case with Dr. Jasmine with General surgery who states that she will come to the ER to evaluate the patient and requests admission to hospital medicine.  Discussed case with hospital medicine who has agreed to accept the patient to Dr. Deshpande's service.  I was present for the discussion between the patient and Dr. Jasmine and patient stated that she wanted to discuss her options with her gastroenterologist, Dr. Morales prior to committing to surgery because she did not want to have surgery or the resulting colostomy bag. I contacted Dr. Morales who happened to be on-call, he stated that his recommendation is to proceed with surgery ASAP and that he would contact the patient to reassure her that surgery was the appropriate decision.  Patient is stable and ready for admission to hospital floor.  Patient is NPO. [DINO]      ED Course User Index  [DINO] Isa Kuo PA-C                 Clinical Impression:   Final diagnoses:  [N30.00] Acute cystitis without hematuria  [K63.1] Bowel perforation (Primary)  [K57.92] Acute diverticulitis        ED Disposition Condition    Admit Stable                Isa Kuo PA-C  01/16/23 2209       Siva Palm MD  01/17/23 2015       Isa Kuo PA-C  01/31/23 1600

## 2023-01-17 NOTE — ANESTHESIA PREPROCEDURE EVALUATION
01/17/2023  Cheyenne Garner is a 87 y.o., female.      Pre-op Assessment    I have reviewed the Patient Summary Reports.     I have reviewed the Nursing Notes. I have reviewed the NPO Status.   I have reviewed the Medications.     Review of Systems  Anesthesia Hx:  No problems with previous Anesthesia  Denies Family Hx of Anesthesia complications.   Denies Personal Hx of Anesthesia complications.   Social:  Non-Smoker    Hematology/Oncology:  Hematology Normal   Oncology Normal     EENT/Dental:EENT/Dental Normal   Cardiovascular:  Cardiovascular Normal Exercise tolerance: good     Pulmonary:  Pulmonary Normal    Renal/:  Renal/ Normal     Hepatic/GI:   IBD   Musculoskeletal:  Musculoskeletal Normal    Neurological:  Neurology Normal    Endocrine:  Endocrine Normal sarcoid   Dermatological:  Skin Normal    Psych:   depression          Physical Exam  General: Well nourished, Cooperative, Oriented and Alert    Airway:  Mouth Opening: Normal  TM Distance: Normal  Neck ROM: Normal ROM    Dental:  Intact        Anesthesia Plan  Type of Anesthesia, risks & benefits discussed:    Anesthesia Type: Gen ETT  Intra-op Monitoring Plan: Standard ASA Monitors  Post Op Pain Control Plan: multimodal analgesia  Induction:  IV  Airway Plan: Video and Direct  Informed Consent: Informed consent signed with the Patient and all parties understand the risks and agree with anesthesia plan.  All questions answered.   ASA Score: 2  Day of Surgery Review of History & Physical: H&P Update referred to the surgeon/provider.    Ready For Surgery From Anesthesia Perspective.     .

## 2023-01-17 NOTE — ASSESSMENT & PLAN NOTE
CT scan demonstrates perforated diverticulitis with pneumoperitoneum and an adjacent phlegmon.  She has diffuse tenderness and focal peritonitis.  I have recommended a sigmoidectomy and end colostomy given the degree of free air and tenderness.  The patient declined surgical intervention initially but was agreeable later in th evening after speaking to Dr. Morales.  Discussed the case with Dr. Morales as well as Colon and Rectal Surgery, Hospital Medicine and the patient's family.  The patient is medically stable and it is reasonable to proceed with IV antibiotics and bowel rest and readdress the treatment plan with Colon and Rectal Surgery.  I recommend admission to Hospital Medicine with Palliative care consultation, goals of care discussion, NPO, IV fluid hydration, IV antibiotics and serial abdominal examinations.

## 2023-01-17 NOTE — PROGRESS NOTES
Surgery Inpatient Progress Note    Date: 01/17/2023    Overnight events: Pain unchanged.  Still reports diffuse abdominal tenderness    O:   Vitals:    01/17/23 0500   BP: (!) 156/68   Pulse: 86   Resp: (!) 21   Temp: 99 °F (37.2 °C)       Physical Exam   Constitutional:       General: She is not in acute distress.     Appearance: She is not toxic-appearing.      Comments: uncomfortable   HENT:      Nose: No rhinorrhea.      Mouth/Throat:      Mouth: Mucous membranes are moist.   Eyes:      General: No scleral icterus.  Cardiovascular:      Rate and Rhythm: Normal rate.   Pulmonary:      Effort: Pulmonary effort is normal. No respiratory distress.   Abdominal:      General: Abdomen is flat.      Palpations: Abdomen is soft.      Tenderness: diffusely tender to palpation      Comments: Well healed panniculectomy/abdominoplasty scar and oblique periumbilical scar   Skin:     General: Skin is warm and dry.   Neurological:      Mental Status: She is alert.   Psychiatric:         Mood and Affect: Mood normal.         Behavior: Behavior normal.     Assessment and plan:   Cheyenne Garner is a 87 y.o. female who presents with perforated, recurrent diverticulitis     - I had a long discussion with the patient regarding surgical intervention.  She has recurrent, perforated diverticulitis. Although she is hemodynamically stable, her pain has failed to improve with resuscitation and she has peritoneal signs.  She is otherwise functional and continues to drive.  We discussed the need for sigmoid colectomy with diverting ostomy.  WOCN consulted for pre-operative marking. We also discussed that her DNR would be revoked perioperatively and she is in agreement.        Claudia Kearns MD  Staff Surgeon   Colon & Rectal Surgery

## 2023-01-17 NOTE — PLAN OF CARE
Pt transported to unit from ED via stretcher. Pt AAOx4, NAD. VS stable. Pt remains afebrile. SPO2 wnl on room air. Pt reports abd pain. Pt denies n/v, SOB, dizziness or lightheadedness. Pt NPO as ordered. Purewick in place collecting clear  yellow  urine. Pt's  at the bedside. Pt remains free from falls or injury. Bed is lowered and locked. Call light is within reach. Pt has no known needs at this time.

## 2023-01-17 NOTE — CONSULTS
[facility] ([place])  General Surgery  Consult Note    Patient Name: Cheyenne Garner  MRN: [id number]  Code Status: DNR  Admission Date: 1/16/2023  Hospital Length of Stay: 0 days  Attending Physician: Jose Lee MD  Primary Care Provider: Mary Cortes MD    Patient information was obtained from patient, spouse/SO, past medical records, ER records and primary team.     Inpatient consult to General surgery  Consult performed by: Ketty Jasmine MD  Consult ordered by: Letty Jovel DNP  Reason for consult: perforated sigmoid diverticulitis        Subjective:     Principal Problem: Diverticulitis of large intestine with perforation and abscess without bleeding    History of Present Illness: 87 y.o. woman with a history of cataracts, former tobacco use, inflammatory bowel disease, hyperlipidemia, diverticulosis with abscess treated with cipro/flagyl and development of peripheral neuropathy, bladder surgery, hysterectomy, abdominoplasty and depression presenting with weakness and abdominal pain.  She reports she felt very weak today and is experiencing diffuse abdominal pain.  She reports a prior episode of diverticulitis with an abscess treated nonoperatively with antibiotics.  The last CT scan (11/2022) done prior to this admission demonstrated near complete resolution of the previous pericolonic abscess from June/August 2022 with pericolonic inflammation and stranding.  On this admission the CT scan shows diverticulitis with pneumoperitoneum.  The patient denies fevers, chills and reports she always has diarrhea, denies melena or blood per rectum.  She denies urinary symptoms.        No current facility-administered medications on file prior to encounter.     Current Outpatient Medications on File Prior to Encounter   Medication Sig    dextroamphetamine-amphetamine 10 mg Tab Take by mouth once daily.    gabapentin (NEURONTIN) 400 MG capsule Take 1 capsule (400 mg total) by mouth 3  (three) times daily.    ergocalciferol (ERGOCALCIFEROL) 50,000 unit Cap Take 1 capsule (50,000 Units total) by mouth twice a week.    hydrocortisone (ANUSOL-HC) 2.5 % rectal cream Place rectally 2 (two) times daily.    mirtazapine (REMERON) 7.5 MG Tab Take 1 tablet by mouth Daily.    simvastatin (ZOCOR) 10 MG tablet Take 1 tablet (10 mg total) by mouth every evening to control cholesterol    traZODone (DESYREL) 50 MG tablet Take 50 mg by mouth every evening.    ziprasidone (GEODON) 20 MG Cap Take 1 capsule (20 mg total) by mouth nightly.    [DISCONTINUED] ALIGN 4 mg capsule Take 1 capsule by mouth once daily.    [DISCONTINUED] cholestyramine, with sugar, 4 gram Powd Take 1 application by mouth daily as needed (Diarrhea). Hold until you see the surgeon or gastroenterologist.    [DISCONTINUED] clotrimazole-betamethasone 1-0.05% (LOTRISONE) cream Apply topically 2 (two) times daily.    [DISCONTINUED] fluorouraciL (EFUDEX) 5 % cream APPLY TO AFFECTED AREA TWICE DAILY FOR 8-weeks    [DISCONTINUED] hydroCHLOROthiazide (HYDRODIURIL) 12.5 MG Tab Take 1 tablet (12.5 mg total) by mouth daily as needed (le edema).    [DISCONTINUED] valACYclovir (VALTREX) 1000 MG tablet TAKE ONE TABLET BY MOUTH TWICE DAILY FOR 5 DAYS FOR cold sores       Review of patient's allergies indicates:   Allergen Reactions    Ciprofloxacin (bulk) Other (See Comments)     Made feet numb and cold    Flagyl [metronidazole] Other (See Comments)     Loss of feeling in feet       Past Medical History:   Diagnosis Date    Cataract     Hyperlipidemia     Inflammatory bowel disease     Sarcoidosis with granulomatous hepatitis     UTI (urinary tract infection) 7/17/2013     Past Surgical History:   Procedure Laterality Date    BLADDER SURGERY      BREAST SURGERY      EYE SURGERY      HIP SURGERY      HYSTERECTOMY      JOINT REPLACEMENT      SINUS SURGERY      TOTAL REDUCTION MAMMOPLASTY       Family History       Problem Relation (Age  of Onset)    Breast cancer Sister    Cancer Mother, Sister          Tobacco Use    Smoking status: Former     Types: Cigarettes    Smokeless tobacco: Never   Substance and Sexual Activity    Alcohol use: Yes     Alcohol/week: 3.0 standard drinks     Types: 1 Glasses of wine, 1 Cans of beer, 1 Shots of liquor per week    Drug use: No    Sexual activity: Not Currently     Review of Systems   Constitutional:  Negative for chills and fever.   Cardiovascular:  Negative for chest pain.   Gastrointestinal:  Positive for abdominal pain and diarrhea. Negative for blood in stool, nausea and vomiting.   Genitourinary:  Negative for dysuria.   All other systems reviewed and are negative.  Objective:     Vital Signs (Most Recent):  Temp: 99.6 °F (37.6 °C) (01/16/23 1928)  Pulse: 83 (01/16/23 1928)  Resp: 16 (01/16/23 1928)  BP: (!) 169/70 (01/16/23 1928)  SpO2: 95 % (01/16/23 1928)   Vital Signs (24h Range):  Temp:  [98.6 °F (37 °C)-99.8 °F (37.7 °C)] 99.6 °F (37.6 °C)  Pulse:  [81-97] 83  Resp:  [16-18] 16  SpO2:  [94 %-97 %] 95 %  BP: (135-192)/(63-81) 169/70     Weight: 64.5 kg (142 lb 3.2 oz)  Body mass index is 24.41 kg/m².    Physical Exam  Constitutional:       General: She is not in acute distress.     Appearance: She is not toxic-appearing.      Comments: uncomfortable   HENT:      Nose: No rhinorrhea.      Mouth/Throat:      Mouth: Mucous membranes are moist.   Eyes:      General: No scleral icterus.  Cardiovascular:      Rate and Rhythm: Normal rate.   Pulmonary:      Effort: Pulmonary effort is normal. No respiratory distress.   Abdominal:      General: Abdomen is flat.      Palpations: Abdomen is soft.      Tenderness: There is abdominal tenderness. There is guarding (focal peritonitis along left abdomen).      Comments: Well healed panniculectomy/abdominoplasty scar and oblique periumbilical scar   Skin:     General: Skin is warm and dry.   Neurological:      Mental Status: She is alert.   Psychiatric:          Mood and Affect: Mood normal.         Behavior: Behavior normal.       Significant Labs:  I have reviewed all pertinent lab results within the past 24 hours.  CBC:   Recent Labs   Lab 01/16/23  1206   WBC 7.68   RBC 4.85   HGB 15.4   HCT 46.5      MCV 96   MCH 31.8*   MCHC 33.1     CMP:   Recent Labs   Lab 01/16/23  1206   GLU 96   CALCIUM 8.4*   ALBUMIN 3.6   PROT 6.9      K 4.8   CO2 19*      BUN 14   CREATININE 0.8   ALKPHOS 74   ALT 29   AST 34   BILITOT 0.9     Coagulation: No results for input(s): LABPROT, INR, APTT in the last 168 hours.  Recent Labs   Lab 01/16/23  1230   COLORU Yellow   SPECGRAV 1.015   PHUR 6.0   PROTEINUA Negative   BACTERIA Rare   NITRITE Positive*   LEUKOCYTESUR 2+*   UROBILINOGEN Negative       Significant Diagnostics:  I have reviewed all pertinent imaging results/findings within the past 24 hours.  I have reviewed and interpreted all pertinent imaging results/findings within the past 24 hours.  CT: I have reviewed all pertinent results/findings within the past 24 hours and my personal findings are:  diverticular disease along the sigmoid colon with an area of inflammation and induration with phlegmon/abscess in the pelvis and, pneumoperitoneum  in the pelvis, left pericolic gutter, left upper quadrant and along the anterior abdominal wall      Assessment/Plan:     * Diverticulitis of large intestine with perforation and abscess without bleeding  CT scan demonstrates perforated diverticulitis with pneumoperitoneum and an adjacent phlegmon.  She has diffuse tenderness and focal peritonitis.  I have recommended a sigmoidectomy and end colostomy given the degree of free air and tenderness.  The patient declined surgical intervention initially but was agreeable later in th evening after speaking to Dr. Morales.  Discussed the case with Dr. Morales as well as Colon and Rectal Surgery, Hospital Medicine and the patient's family.  The patient is medically stable and it is  reasonable to proceed with IV antibiotics and bowel rest and readdress the treatment plan with Colon and Rectal Surgery.  I recommend admission to Hospital Medicine with Palliative care consultation, goals of care discussion, NPO, IV fluid hydration, IV antibiotics and serial abdominal examinations.      VTE Risk Mitigation (From admission, onward)         Ordered     Reason for No Pharmacological VTE Prophylaxis  Once        Question:  Reasons:  Answer:  Physician Provided (leave comment)  Comment:  OR    01/16/23 1645     IP VTE HIGH RISK PATIENT  Once         01/16/23 1645     Place sequential compression device  Until discontinued         01/16/23 1645                Thank you for your consult.      Ketty Jasmine MD  General Surgery  [facility] ([place])

## 2023-01-17 NOTE — OP NOTE
Date of surgery: 01/17/2023    Operative Report  Pre-operative diagnosis: perforated diverticulitis   Post-operative diagnosis: Same as above    Procedure:  Exploratory laparotomy   Descending colectomy  Mobilization of splenic flexure  Flexible sigmoidoscopy     Findings:  Thickened and inflamed sigmoid colon   Negative leak test  Intact anastomotic rings       Surgeon: Claudia Kearns MD   Assistant:  none    Indication: Ms. Garner is an 87 year old woman with a history of perforated diverticulitis with abscess who presented with worsening pain and was found to have pneumoperitoneum  who is scheduled to undergo sigmoid colectomy. The benefits, risks, and alternatives were discussed with the patient, they were given the opportunity to ask questions and they elected to proceed with operative intervention after signing written consent.    Procedure:  After pre-operative assessment and review of informed consent, the patient was taken to the operating room and received general anesthesia. A temple catheter was then placed under strict sterile precautions. Pre-operative antibiotics were administered if indicated and the patient was placed in lithotomy position. The abdomen was prepped and draped in the usual sterile fashion and a timeout was performed according to [facility] Quality and Safety guidelines.      A midline incision was made.  There were significant adhesions to the abdominal wall and in the pelvis that were sharply lysed.  The sigmoid colon was noted to be thickened and adherent to the left pelvic sidewall.  The mesentery was shortened and inflamed as well.  I mobilized the descending colon along the white line of toldt.  I carried this distally to the area of inflamed sigmoid.  This was carefully dissected away from the sidewall.  The left ureter was identified and preserved. Vermiculation was observed.  We continued mobilizing the sigmoid colon distally until I identified an area of healthy rectum.  A  window was made in the mesorectum and the rectum divided distally with a endo MARTIN 45 blue load.  I then identified an area of healthy descending colon.  This was fairly proximal.  A window was made in the mesentery and the colon again divided with an endo MARTIN 45 blue load.  The mesentery of the colon was then ligated and divided with the ligasure.  The colon was removed and sent for specimen.      At this point, the descending colon conduit was not able to reach the rectal stump.  I then mobilized the splenic flexure.  I began by removing the attachments of the omentum to the transverse colon.  I carried this distally and was able to enter the lesser sac.  I then continued dissection in the lesser sac and divided the gastrocolic and splenocolic attachments.  The descending colon was unable to reach the pelvis.  The patient had a redundant transverse colon, therefore the decision was made to resect the splenic flexure and perform a transverse colon to rectal anastomosis.  An additional window was made in the mesentery of the transverse colon.  The colon was divided with a blue staple load.  The remaining segment of colon mesentery was ligated and divided with the ligasure.  The additional segment of colon was removed and sent with the sigmoid colon for specimen.      I then secured the anvil of the 29 EEA stapler in the distal transverse colon conduit using a running 2-0 prolene suture.  I then went below and passed rectal sizers through the rectal stump.  The 29 EEA stapler was then passed through the anus into the rectum.  This was opened through the staple line of the rectum.  The ends were mated.  Prior to closing the stapler, we ensured that the colon conduit was properly oriented.  The stapler was then closed and fired.  The stapler was removed and the anastomotic donuts were inspected.  These were intact.  The pelvis was then filled with saline and the proximal colon occluded.  Flexible sigmoidoscopy was  performed.  The anastomosis was patent, without evidence of bleeding at the staple line and the leak test was negative.  The pelvis was then irrigated and hemostasis was appropriate.  The omentum was brought down into the pelvis and placed over the anastomosis.      The fascia was then reapproximated with a running PDS.  The subcutaneous tissue was irrigated with saline.  The skin was approximated with staples.  The incision was cleaned and dried and dressed with an island dressing.        Prior to closure and after final closure instrument, needle, and sponge counts were correct.    The procedure was completed without complication and was well-tolerated. The patient was then brought to the post-anesthesia care unit in stable condition. I was present for the entire operation.    Complications: None  Estimated blood loss: 300 mL  Disposition: PACU    Claudia Kearns MD  Staff Surgeon   Colon & Rectal Surgery

## 2023-01-17 NOTE — HOSPITAL COURSE
Patient was found to have perforated diverticulitis s/p sigmoid and descending colectomy.  She completed IV antibiotics.  She is tolerating diet and had a bowel movement.  PT/OT evaluated and recommended SNF, but she prefers to return to home where she has a caregiver.     During hospitalization, patient had 2 episodes of dysphagia with regurgitation of food.  Initial evaluation by speech pathology was unrevealing.  Esophagram performed showed esophageal dysmotility.  At the request of the patient's  physician, Gastroenterology evaluated the patient.  Bariatric soft diet was recommended to the patient.  She preferred to advance to a regular diet and is doing well on that.  Patient discharged home with home health and will follow up with Dr. Kearns for wound check and removal of staples this Friday.

## 2023-01-17 NOTE — SUBJECTIVE & OBJECTIVE
Interval History/subjective:  - seen at bedside with Palliative RN Tarah  - patient has her  at bedside who is a nursing home resident  -  she is feeling okay currently, still some pain  - anxious for the surgery today    Past Medical History:   Diagnosis Date    Cataract     Hyperlipidemia     Inflammatory bowel disease     Sarcoidosis with granulomatous hepatitis     UTI (urinary tract infection) 7/17/2013       Past Surgical History:   Procedure Laterality Date    BLADDER SURGERY      BREAST SURGERY      EYE SURGERY      HIP SURGERY      HYSTERECTOMY      JOINT REPLACEMENT      SINUS SURGERY      TOTAL REDUCTION MAMMOPLASTY         Review of patient's allergies indicates:   Allergen Reactions    Ciprofloxacin (bulk) Other (See Comments)     Made feet numb and cold    Flagyl [metronidazole] Other (See Comments)     Loss of feeling in feet       Medications:  Continuous Infusions:   sodium chloride 0.9% 75 mL/hr at 01/17/23 0821     Scheduled Meds:   piperacillin-tazobactam (ZOSYN) IVPB  4.5 g Intravenous Q8H     PRN Meds:acetaminophen, dextrose 10%, dextrose 10%, gabapentin, glucagon (human recombinant), glucose, glucose, HYDROcodone-acetaminophen, HYDROmorphone, melatonin, naloxone, ondansetron    Family History       Problem Relation (Age of Onset)    Breast cancer Sister    Cancer Mother, Sister          Tobacco Use    Smoking status: Former     Types: Cigarettes    Smokeless tobacco: Never   Substance and Sexual Activity    Alcohol use: Yes     Alcohol/week: 3.0 standard drinks     Types: 1 Glasses of wine, 1 Cans of beer, 1 Shots of liquor per week    Drug use: No    Sexual activity: Not Currently     ROS: per hpi/subjective    Objective:     Vital Signs (Most Recent):  Temp: 99.1 °F (37.3 °C) (01/17/23 1109)  Pulse: 82 (01/17/23 1109)  Resp: 18 (01/17/23 1109)  BP: (!) 154/67 (01/17/23 1109)  SpO2: 95 % (01/17/23 1109)   Vital Signs (24h Range):  Temp:  [98.5 °F (36.9 °C)-99.6 °F (37.6 °C)] 99.1 °F  "(37.3 °C)  Pulse:  [81-97] 82  Resp:  [16-21] 18  SpO2:  [94 %-97 %] 95 %  BP: (131-184)/(63-81) 154/67     Weight: 64.5 kg (142 lb 3.2 oz)  Body mass index is 24.41 kg/m².    Physical Exam  Vitals and nursing note reviewed.   Constitutional:       Appearance: Normal appearance.   HENT:      Head: Normocephalic and atraumatic.   Eyes:      Extraocular Movements: Extraocular movements intact.   Pulmonary:      Effort: Pulmonary effort is normal.   Musculoskeletal:         General: Normal range of motion.   Skin:     General: Skin is warm and dry.   Neurological:      Mental Status: She is alert.      Comments: Awake, alert, conversant, oriented    Psychiatric:         Thought Content: Thought content normal.       Review of Symptoms        Living Arrangements:  Lives alone    Psychosocial/Cultural:   See Palliative Psychosocial Note: Yes  - 2 sons, oldest of whom passed away 10 years ago  - living son is Andre   - independent at home where she lives alone  - she drives, cooks for herself and takes care of herself  - she enjoys spending time with friends/family. Enjoys hosting big parties  - she is the follower of many faiths, but states she "falls short" in regards to her spirituality   **Primary  to Follow**  Palliative Care  Consult: No      Advance Care Planning   Advance Directives:     Decision Making:  Patient answered questions  Goals of Care: The patient endorses that what is most important right now is to focus on improvement in condition.     Accordingly, we have decided that the best plan to meet the patient's goals includes continuing with treatment       Significant Labs: Reviewed  CBC:   Recent Labs   Lab 01/17/23  0505   WBC 11.49   HGB 13.3   HCT 40.3   MCV 94        BMP:  Recent Labs   Lab 01/17/23  0505   GLU 89      K 3.7      CO2 23   BUN 14   CREATININE 0.8   CALCIUM 7.9*     LFT:  Lab Results   Component Value Date    AST 34 01/16/2023    ALKPHOS " 74 01/16/2023    BILITOT 0.9 01/16/2023     Albumin:   Albumin   Date Value Ref Range Status   01/16/2023 3.6 3.5 - 5.2 g/dL Final     Protein:   Total Protein   Date Value Ref Range Status   01/16/2023 6.9 6.0 - 8.4 g/dL Final     Lactic acid:   Lab Results   Component Value Date    LACTATE 1.0 01/16/2023    LACTATE 1.5 06/10/2022       Significant Imaging: Reviewed

## 2023-01-17 NOTE — ANESTHESIA PROCEDURE NOTES
Peripheral Block    Patient location during procedure: pre-op   Block not for primary anesthetic.  Reason for block: at surgeon's request and post-op pain management   Post-op Pain Location: abdominal wall pain   Timeout: 1/17/2023 12:50 PM   End time: 1/17/2023 1:03 PM    Staffing  Authorizing Provider: Ryley Anne MD  Performing Provider: Ryley Anne MD    Preanesthetic Checklist  Completed: patient identified, IV checked, site marked, risks and benefits discussed, surgical consent, monitors and equipment checked, pre-op evaluation and timeout performed  Peripheral Block  Patient position: supine  Prep: ChloraPrep  Patient monitoring: cardiac monitor, heart rate, continuous pulse ox, continuous capnometry and frequent blood pressure checks  Block type: TAP  Laterality: bilateral  Injection technique: single shot  Needle  Needle type: Stimuplex   Needle gauge: 21 G  Needle length: 4 in  Needle localization: ultrasound guidance   -ultrasound image captured on disc.  Assessment  Injection assessment: negative aspiration, negative parasthesia and local visualized surrounding nerve  Paresthesia pain: none  Heart rate change: no  Slow fractionated injection: yes  Pain Tolerance: comfortable throughout block and no complaints  Medications:    Medications: bupivacaine (pf) (MARCAINE) injection 0.25% - Perineural   40 mL - 1/17/2023 1:03:00 PM    Additional Notes  VSS.  [facility] RN monitoring vitals throughout procedure.  Patient tolerated procedure well.

## 2023-01-17 NOTE — H&P
[facility]  History & Physical    Patient Name: Cheyenne Garner  MRN: [id number]  Patient Class: IP- Inpatient  Admission Date: 1/16/2023  Attending Physician: Jose Lee MD   Primary Care Provider: Mray Cortes MD         Patient information was obtained from patient, spouse/SO and ER records.     Subjective:     Principal Problem:Diverticulitis of large intestine with perforation and abscess without bleeding    Chief Complaint:   Chief Complaint   Patient presents with    Abdominal Pain     Abd pain x12 hours, Hx of GI issues.         HPI: Mrs. 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  She came to the emergency department today for abdominal pain that began yesterday.  The pain is located to the bilateral lower quadrants of her abdomen.  Patient denies nausea and vomiting.  She reports she frequently has diarrhea.  Patient reports that her pain has been controlled with pain medication given in the emergency department.  Patient's CT of her abdomen and pelvis with contrast that demonstrates acute diverticulitis with bowel per for duration and early abscess formation.  Patient was started on empiric Zosyn.  General surgery was consulted-appreciate recommendations.  Patient spoke with her GI physician Dr. Morales over the telephone.  Surgical intervention is recommended.  Dr. Kearns with colon and Rectal surgery will further evaluate patient tomorrow.  Patient admitted to hospital medicine for further management.      Past Medical History:   Diagnosis Date    Cataract     Hyperlipidemia     Inflammatory bowel disease     Sarcoidosis with granulomatous hepatitis     UTI (urinary tract infection) 7/17/2013       Past Surgical History:   Procedure Laterality Date    BLADDER SURGERY      BREAST SURGERY       EYE SURGERY      HIP SURGERY      HYSTERECTOMY      JOINT REPLACEMENT      SINUS SURGERY      TOTAL REDUCTION MAMMOPLASTY         Review of patient's allergies indicates:   Allergen Reactions    Ciprofloxacin (bulk) Other (See Comments)     Made feet numb and cold    Flagyl [metronidazole] Other (See Comments)     Loss of feeling in feet       No current facility-administered medications on file prior to encounter.     Current Outpatient Medications on File Prior to Encounter   Medication Sig    dextroamphetamine-amphetamine 10 mg Tab Take by mouth once daily.    gabapentin (NEURONTIN) 400 MG capsule Take 1 capsule (400 mg total) by mouth 3 (three) times daily.    ergocalciferol (ERGOCALCIFEROL) 50,000 unit Cap Take 1 capsule (50,000 Units total) by mouth twice a week.    hydrocortisone (ANUSOL-HC) 2.5 % rectal cream Place rectally 2 (two) times daily.    mirtazapine (REMERON) 7.5 MG Tab Take 1 tablet by mouth Daily.    simvastatin (ZOCOR) 10 MG tablet Take 1 tablet (10 mg total) by mouth every evening to control cholesterol    traZODone (DESYREL) 50 MG tablet Take 50 mg by mouth every evening.    ziprasidone (GEODON) 20 MG Cap Take 1 capsule (20 mg total) by mouth nightly.    [DISCONTINUED] hydroCHLOROthiazide (HYDRODIURIL) 12.5 MG Tab Take 1 tablet (12.5 mg total) by mouth daily as needed (le edema).     Family History       Problem Relation (Age of Onset)    Breast cancer Sister    Cancer Mother, Sister          Tobacco Use    Smoking status: Former     Types: Cigarettes    Smokeless tobacco: Never   Substance and Sexual Activity    Alcohol use: Yes     Alcohol/week: 3.0 standard drinks     Types: 1 Glasses of wine, 1 Cans of beer, 1 Shots of liquor per week    Drug use: No    Sexual activity: Not Currently     Review of Systems   Constitutional:  Negative for activity change, chills and fever.   HENT:  Negative for congestion and trouble swallowing.    Eyes:  Negative for photophobia and  visual disturbance.   Respiratory:  Negative for cough, shortness of breath and wheezing.    Cardiovascular:  Negative for chest pain, palpitations and leg swelling.   Gastrointestinal:  Positive for abdominal pain. Negative for diarrhea, nausea and vomiting.   Endocrine: Negative for polydipsia and polyuria.   Genitourinary:  Negative for dysuria and frequency.   Musculoskeletal:  Negative for arthralgias and gait problem.   Skin:  Negative for rash and wound.   Neurological:  Negative for dizziness, speech difficulty, weakness and headaches.   Psychiatric/Behavioral:  Negative for confusion and sleep disturbance.    Objective:     Vital Signs (Most Recent):  Temp: 98.5 °F (36.9 °C) (01/16/23 2353)  Pulse: 82 (01/16/23 2354)  Resp: (!) 21 (01/16/23 2353)  BP: 131/64 (01/16/23 2353)  SpO2: 96 % (01/16/23 2354)   Vital Signs (24h Range):  Temp:  [98.5 °F (36.9 °C)-99.8 °F (37.7 °C)] 98.5 °F (36.9 °C)  Pulse:  [81-97] 82  Resp:  [16-21] 21  SpO2:  [94 %-97 %] 96 %  BP: (131-192)/(63-81) 131/64     Weight: 64.5 kg (142 lb 3.2 oz)  Body mass index is 24.41 kg/m².    Physical Exam  Vitals reviewed.   Constitutional:       General: She is not in acute distress.     Appearance: She is not ill-appearing.   HENT:      Head: Normocephalic and atraumatic.      Nose: No congestion.      Mouth/Throat:      Mouth: Mucous membranes are moist.      Pharynx: Oropharynx is clear.   Eyes:      General:         Right eye: No discharge.         Left eye: No discharge.      Extraocular Movements: Extraocular movements intact.      Pupils: Pupils are equal, round, and reactive to light.   Cardiovascular:      Rate and Rhythm: Normal rate and regular rhythm.      Heart sounds: Normal heart sounds. No murmur heard.  Pulmonary:      Effort: Pulmonary effort is normal. No respiratory distress.      Breath sounds: Normal breath sounds. No wheezing or rhonchi.   Abdominal:      General: Bowel sounds are normal. There is no distension.       Palpations: Abdomen is soft.      Tenderness: There is abdominal tenderness. There is no guarding.   Musculoskeletal:         General: No swelling or tenderness. Normal range of motion.      Cervical back: Normal range of motion. No rigidity or tenderness.   Skin:     General: Skin is warm.      Capillary Refill: Capillary refill takes 2 to 3 seconds.      Findings: No lesion or rash.   Neurological:      General: No focal deficit present.      Mental Status: She is alert and oriented to person, place, and time.      Sensory: No sensory deficit.      Gait: Gait normal.   Psychiatric:         Mood and Affect: Mood is anxious.         Behavior: Behavior is slowed. Behavior is cooperative.         Cognition and Memory: Cognition is impaired (mild).         CRANIAL NERVES     CN III, IV, VI   Pupils are equal, round, and reactive to light.     Significant Labs: All pertinent labs within the past 24 hours have been reviewed.  Blood Culture: No results for input(s): LABBLOO in the last 48 hours.  BMP:   Recent Labs   Lab 01/16/23  1206   GLU 96      K 4.8      CO2 19*   BUN 14   CREATININE 0.8   CALCIUM 8.4*     CBC:   Recent Labs   Lab 01/16/23  1206   WBC 7.68   HGB 15.4   HCT 46.5        Lactic Acid:   Recent Labs   Lab 01/16/23  1610   LACTATE 1.0     Urine Culture: No results for input(s): LABURIN in the last 48 hours.  Urine Studies:   Recent Labs   Lab 01/16/23  1230   COLORU Yellow   APPEARANCEUA Clear   PHUR 6.0   SPECGRAV 1.015   PROTEINUA Negative   GLUCUA Negative   KETONESU Trace*   BILIRUBINUA Negative   OCCULTUA 1+*   NITRITE Positive*   UROBILINOGEN Negative   LEUKOCYTESUR 2+*   RBCUA 5*   WBCUA 3   BACTERIA Rare   SQUAMEPITHEL 0       Significant Imaging: I have reviewed all pertinent imaging results/findings within the past 24 hours.  CT Abdomen Pelvis With Contrast  Narrative: EXAMINATION:  CT ABDOMEN PELVIS WITH CONTRAST    CLINICAL HISTORY:  Abdominal abscess/infection  suspected;    TECHNIQUE:  Low dose axial images, sagittal and coronal reformations were obtained from the lung bases to the pubic symphysis following the IV administration of 100 mL of Omnipaque 350 .  Oral contrast was not administered.    COMPARISON:  11/28/2022    FINDINGS:  Abdomen:    - Lower thorax:    - Lung bases: No infiltrates and no nodules.    - Liver: No focal mass.  The liver is normal size with mild diffuse fatty infiltration.    - Gallbladder: No calcified gallstones.    - Bile Ducts: No evidence of intra or extra hepatic biliary ductal dilation.    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    - Kidneys: Simple right renal cyst posteriorly.  No stone, soft tissue mass or hydronephrosis bilaterally.    Small hiatal hernia.    - Adrenals: Unremarkable.    - Pancreas: No mass or peripancreatic fat stranding.    - Retroperitoneum:  No significant adenopathy.    - Vascular: No abdominal aortic aneurysm.    - Abdominal wall:  Unremarkable.    Pelvis:    Significant metal artifact obscures the lower pelvis.  Bilateral hip arthroplasties.    Bowel/Mesentery:    There is diverticulosis of the colon.  There is wall thickening inflammatory change in the sigmoid colon distally suggesting acute diverticulitis.  Visualization is significantly limited by the ample metallic artifact in this region.  Small 1.9 x 1 4 cm fluid collection in the pelvis slightly right of midline on axial 118.  Small adjacent 1.8 cm fluid collection on axial 109.    There are few foci of free air in the mesentery in the pelvis.  Small focus adjacent to the anterior pelvic wall on axial 109 also.  Mild free air is noted in the anterior superior abdomen abutting the diaphragm on axial 33 also.    Findings are consistent with bowel perforation.  Surgical consultation and follow-up recommended    Probable secondary mild ileitis with minimal wall thickening.    Bones:  No acute  fractures.    Moderate-severe central canal narrowing at L3-4.  Multilevel chronic degenerative changes with disc space narrowing and vacuum disc phenomena.  Impression: 1. Acute diverticulitis of the distal sigmoid colon with evidence of bowel perforation with mild free air in the pelvis and upper abdomen.  See above comments.  Minimal fluid collections in the pelvis could represent early abscess formation.  Recommend surgical consultation and follow-up.  2. See above comments also.  3.  This report was flagged in Epic as abnormal.    Electronically signed by: Misael Bragg  Date:    01/16/2023  Time:    15:27        Assessment/Plan:     * Diverticulitis of large intestine with perforation and abscess without bleeding  -patient started on broad-spectrum antibiotics and will be NPO for surgical intervention tomorrow with the colorectal surgeon Dr. Kearns  -plan discussed with general surgeon Dr. Jasmine  -ED physician and Dr Jasmine discussed case with Dr Wise, pt's gastroenterologist        UTI (urinary tract infection)  -Culture pending  -Patient on broad-spectrum antibiotics for her diverticulitis      VTE Risk Mitigation (From admission, onward)         Ordered     Reason for No Pharmacological VTE Prophylaxis  Once        Question:  Reasons:  Answer:  Physician Provided (leave comment)  Comment:  OR    01/16/23 1645     IP VTE HIGH RISK PATIENT  Once         01/16/23 1645     Place sequential compression device  Until discontinued         01/16/23 1645                   Letty Jovel DNP  Department of Hospital Medicine   [facility])

## 2023-01-18 ENCOUNTER — PATIENT OUTREACH (OUTPATIENT)
Dept: ADMINISTRATIVE | Facility: OTHER | Age: 88
End: 2023-01-18
Payer: MEDICARE

## 2023-01-18 DIAGNOSIS — R53.1 GENERALIZED WEAKNESS: Primary | ICD-10-CM

## 2023-01-18 LAB
ANION GAP SERPL CALC-SCNC: 7 MMOL/L (ref 8–16)
BACTERIA UR CULT: ABNORMAL
BASOPHILS # BLD AUTO: 0 K/UL (ref 0–0.2)
BASOPHILS NFR BLD: 0 % (ref 0–1.9)
BUN SERPL-MCNC: 12 MG/DL (ref 8–23)
CALCIUM SERPL-MCNC: 7.2 MG/DL (ref 8.7–10.5)
CHLORIDE SERPL-SCNC: 106 MMOL/L (ref 95–110)
CO2 SERPL-SCNC: 21 MMOL/L (ref 23–29)
CREAT SERPL-MCNC: 0.7 MG/DL (ref 0.5–1.4)
DIFFERENTIAL METHOD: ABNORMAL
EOSINOPHIL # BLD AUTO: 0 K/UL (ref 0–0.5)
EOSINOPHIL NFR BLD: 0 % (ref 0–8)
ERYTHROCYTE [DISTWIDTH] IN BLOOD BY AUTOMATED COUNT: 13.9 % (ref 11.5–14.5)
EST. GFR  (NO RACE VARIABLE): >60 ML/MIN/1.73 M^2
GLUCOSE SERPL-MCNC: 126 MG/DL (ref 70–110)
HCT VFR BLD AUTO: 30.5 % (ref 37–48.5)
HGB BLD-MCNC: 10.1 G/DL (ref 12–16)
IMM GRANULOCYTES # BLD AUTO: 0.02 K/UL (ref 0–0.04)
IMM GRANULOCYTES NFR BLD AUTO: 0.2 % (ref 0–0.5)
LYMPHOCYTES # BLD AUTO: 0.7 K/UL (ref 1–4.8)
LYMPHOCYTES NFR BLD: 7.8 % (ref 18–48)
MCH RBC QN AUTO: 31.3 PG (ref 27–31)
MCHC RBC AUTO-ENTMCNC: 33.1 G/DL (ref 32–36)
MCV RBC AUTO: 94 FL (ref 82–98)
MONOCYTES # BLD AUTO: 0.4 K/UL (ref 0.3–1)
MONOCYTES NFR BLD: 4.8 % (ref 4–15)
NEUTROPHILS # BLD AUTO: 7.4 K/UL (ref 1.8–7.7)
NEUTROPHILS NFR BLD: 87.2 % (ref 38–73)
NRBC BLD-RTO: 0 /100 WBC
PLATELET # BLD AUTO: 161 K/UL (ref 150–450)
PMV BLD AUTO: 10 FL (ref 9.2–12.9)
POTASSIUM SERPL-SCNC: 3.6 MMOL/L (ref 3.5–5.1)
RBC # BLD AUTO: 3.23 M/UL (ref 4–5.4)
SODIUM SERPL-SCNC: 134 MMOL/L (ref 136–145)
WBC # BLD AUTO: 8.51 K/UL (ref 3.9–12.7)

## 2023-01-18 PROCEDURE — 92610 EVALUATE SWALLOWING FUNCTION: CPT

## 2023-01-18 PROCEDURE — 80048 BASIC METABOLIC PNL TOTAL CA: CPT | Performed by: NURSE PRACTITIONER

## 2023-01-18 PROCEDURE — 85025 COMPLETE CBC W/AUTO DIFF WBC: CPT | Performed by: NURSE PRACTITIONER

## 2023-01-18 PROCEDURE — 99232 PR SUBSEQUENT HOSPITAL CARE,LEVL II: ICD-10-PCS | Mod: ,,, | Performed by: STUDENT IN AN ORGANIZED HEALTH CARE EDUCATION/TRAINING PROGRAM

## 2023-01-18 PROCEDURE — 99232 SBSQ HOSP IP/OBS MODERATE 35: CPT | Mod: ,,, | Performed by: STUDENT IN AN ORGANIZED HEALTH CARE EDUCATION/TRAINING PROGRAM

## 2023-01-18 PROCEDURE — 63600175 PHARM REV CODE 636 W HCPCS: Performed by: SURGERY

## 2023-01-18 PROCEDURE — 25000003 PHARM REV CODE 250: Performed by: SURGERY

## 2023-01-18 PROCEDURE — 25000003 PHARM REV CODE 250: Performed by: STUDENT IN AN ORGANIZED HEALTH CARE EDUCATION/TRAINING PROGRAM

## 2023-01-18 PROCEDURE — 36415 COLL VENOUS BLD VENIPUNCTURE: CPT | Performed by: NURSE PRACTITIONER

## 2023-01-18 PROCEDURE — 11000001 HC ACUTE MED/SURG PRIVATE ROOM

## 2023-01-18 PROCEDURE — 63600175 PHARM REV CODE 636 W HCPCS: Performed by: NURSE PRACTITIONER

## 2023-01-18 PROCEDURE — 25000003 PHARM REV CODE 250: Performed by: NURSE PRACTITIONER

## 2023-01-18 RX ORDER — MUPIROCIN 20 MG/G
OINTMENT TOPICAL 2 TIMES DAILY
Status: DISPENSED | OUTPATIENT
Start: 2023-01-18 | End: 2023-01-23

## 2023-01-18 RX ORDER — MIRTAZAPINE 7.5 MG/1
7.5 TABLET, FILM COATED ORAL DAILY
Status: DISCONTINUED | OUTPATIENT
Start: 2023-01-19 | End: 2023-01-25 | Stop reason: HOSPADM

## 2023-01-18 RX ORDER — ZIPRASIDONE HYDROCHLORIDE 20 MG/1
20 CAPSULE ORAL DAILY
Status: DISCONTINUED | OUTPATIENT
Start: 2023-01-18 | End: 2023-01-18

## 2023-01-18 RX ORDER — ZIPRASIDONE HYDROCHLORIDE 20 MG/1
20 CAPSULE ORAL NIGHTLY
Status: DISCONTINUED | OUTPATIENT
Start: 2023-01-18 | End: 2023-01-25 | Stop reason: HOSPADM

## 2023-01-18 RX ORDER — GUAIFENESIN 100 MG/5ML
200 SOLUTION ORAL EVERY 4 HOURS PRN
Status: DISCONTINUED | OUTPATIENT
Start: 2023-01-18 | End: 2023-01-25 | Stop reason: HOSPADM

## 2023-01-18 RX ORDER — ATORVASTATIN CALCIUM 10 MG/1
10 TABLET, FILM COATED ORAL DAILY
Status: DISCONTINUED | OUTPATIENT
Start: 2023-01-19 | End: 2023-01-25 | Stop reason: HOSPADM

## 2023-01-18 RX ORDER — BENZONATATE 100 MG/1
100 CAPSULE ORAL 3 TIMES DAILY PRN
Status: DISCONTINUED | OUTPATIENT
Start: 2023-01-18 | End: 2023-01-25 | Stop reason: HOSPADM

## 2023-01-18 RX ADMIN — ENOXAPARIN SODIUM 40 MG: 40 INJECTION SUBCUTANEOUS at 05:01

## 2023-01-18 RX ADMIN — IBUPROFEN 400 MG: 400 TABLET, FILM COATED ORAL at 11:01

## 2023-01-18 RX ADMIN — MUPIROCIN: 20 OINTMENT TOPICAL at 09:01

## 2023-01-18 RX ADMIN — PIPERACILLIN AND TAZOBACTAM 4.5 G: 4; .5 INJECTION, POWDER, LYOPHILIZED, FOR SOLUTION INTRAVENOUS; PARENTERAL at 06:01

## 2023-01-18 RX ADMIN — ACETAMINOPHEN 650 MG: 325 TABLET, FILM COATED ORAL at 06:01

## 2023-01-18 RX ADMIN — Medication 6 MG: at 11:01

## 2023-01-18 RX ADMIN — PIPERACILLIN AND TAZOBACTAM 4.5 G: 4; .5 INJECTION, POWDER, LYOPHILIZED, FOR SOLUTION INTRAVENOUS; PARENTERAL at 01:01

## 2023-01-18 RX ADMIN — IBUPROFEN 400 MG: 400 TABLET, FILM COATED ORAL at 01:01

## 2023-01-18 RX ADMIN — IBUPROFEN 400 MG: 400 TABLET, FILM COATED ORAL at 06:01

## 2023-01-18 RX ADMIN — GUAIFENESIN 200 MG: 100 SOLUTION ORAL at 08:01

## 2023-01-18 RX ADMIN — IBUPROFEN 400 MG: 400 TABLET, FILM COATED ORAL at 02:01

## 2023-01-18 RX ADMIN — MUPIROCIN: 20 OINTMENT TOPICAL at 12:01

## 2023-01-18 RX ADMIN — ZIPRASIDONE HCL 20 MG: 20 CAPSULE ORAL at 09:01

## 2023-01-18 RX ADMIN — ACETAMINOPHEN 650 MG: 325 TABLET, FILM COATED ORAL at 01:01

## 2023-01-18 RX ADMIN — PIPERACILLIN AND TAZOBACTAM 4.5 G: 4; .5 INJECTION, POWDER, LYOPHILIZED, FOR SOLUTION INTRAVENOUS; PARENTERAL at 10:01

## 2023-01-18 RX ADMIN — OXYCODONE HYDROCHLORIDE 10 MG: 5 TABLET ORAL at 11:01

## 2023-01-18 RX ADMIN — ACETAMINOPHEN 650 MG: 325 TABLET, FILM COATED ORAL at 02:01

## 2023-01-18 RX ADMIN — OXYCODONE HYDROCHLORIDE 5 MG: 5 TABLET ORAL at 10:01

## 2023-01-18 NOTE — PLAN OF CARE
Pt remained free of falls and injuries throughout shift. AAOx3-4, intermittently disoriented to time, reoriented as needed. Purposeful hourly rounding performed. Pt swallows meds whole. Pt received IV Zosyn as ordered, NS infusing @ 75 mL/hr. Pt has midline abd incision. Small, dried sanguineous drainage to surgical dsg, dry and intact. Managed c/o L sided HA and pain behind her L eye, managed with scheduled Tylenol and Ibuprofen. Pupils PERRLA, denies changes in vision. Patient ambulates with X1 assist, temple catheter in place to gravity. Titrated of O2, VSS on RA. Pt sleeping in bed, even rise and fall of chest. Bed low and locked, bed alarm on, call light in reach. Side rails up x2. Will continue to monitor.

## 2023-01-18 NOTE — ASSESSMENT & PLAN NOTE
S/p partial colectomy of signoid and descending colon with re-anastomosis.  Continue zosyn  Surgery following  Monitor for return of bowel function  Diet as tolerated

## 2023-01-18 NOTE — PROGRESS NOTES
HOLLYW met with patient and patient has no additional needs and declined any further resources at this time.    CHARLEE Rudd

## 2023-01-18 NOTE — ASSESSMENT & PLAN NOTE
Reported neuropathy symptoms after taking cipro per the patient. Sensory neuropathy, no motor findings on exam.  Continue mirtazipine  Patient declines gabapentin

## 2023-01-18 NOTE — PROGRESS NOTES
"Advance Care Planning  Met with Patient,  and sister in law at bedside. Patient in good spirits, reports pain is "manageable." Pt looking over menu and deciding what to order for dinner. Reports that it was a "miracle" that she did not require a colostomy bag. She expressed her thankfulness and the importance to "believe in miracles and acknowledge when they happen." Allowed time expression of emotions and support given. She reports she will continue to have a positive attitude moving forward to maximize her recovery. Will continue to follow along and support patient.  "

## 2023-01-18 NOTE — PLAN OF CARE
Problem: SLP  Goal: SLP Goal  Description: 1. Pt will consume a regular and thin liquid diet without overt s/s of aspiration or airway threat during 100% of po intake without assistance.   Outcome: Ongoing, Progressing    SLP to follow-up 1x for diet management/tolerance.

## 2023-01-18 NOTE — PROGRESS NOTES
Surgery Inpatient Progress Note    Date: 01/18/2023    Overnight events: Pain well controlled overnight.  No flatus or bowel movement.      O:   Vitals:    01/18/23 0811   BP: (!) 116/56   Pulse: 69   Resp: 18   Temp: 98.7 °F (37.1 °C)       Physical Exam   Gen: well developed female, NAD   HEENT: normocephalic, atraumatic, PERRL, EOMI   CV: RRR, no murmurs  Res: nonlabored, CTAB   Abd: soft, midline incision with dressing in place, appropriately tender  MSK: no gross deformities       Component Ref Range & Units 04:40   (1/18/23) 1 d ago   (1/17/23) 2 d ago   (1/16/23) 1 mo ago   (11/28/22) 4 mo ago   (9/9/22) 5 mo ago   (8/11/22) 5 mo ago   (8/10/22)   WBC 3.90 - 12.70 K/uL 8.51  11.49  7.68  5.76  6.38  5.33  4.76    RBC 4.00 - 5.40 M/uL 3.23 Low   4.28  4.85  4.53  4.35  3.62 Low   3.38 Low     Hemoglobin 12.0 - 16.0 g/dL 10.1 Low   13.3  15.4  14.2  13.7  11.3 Low   10.6 Low     Hematocrit 37.0 - 48.5 % 30.5 Low   40.3  46.5  43.1  42.3  34.8 Low   32.9 Low     MCV 82 - 98 fL 94  94  96  95  97  96  97    MCH 27.0 - 31.0 pg 31.3 High   31.1 High   31.8 High   31.3 High   31.5 High   31.2 High   31.4 High     MCHC 32.0 - 36.0 g/dL 33.1  33.0  33.1  32.9  32.4  32.5  32.2    RDW 11.5 - 14.5 % 13.9  14.1  14.1  13.0  14.0  14.9 High   14.9 High     Platelets 150 - 450 K/uL 161  192  190  311         Assessment and plan:   Cheyenne Garner is a 87 y.o. female who presented with recurrent, perforated diverticulitis, now s/p exploratory laparotomy and left hemicolectomy on 1/17     Perforated diverticulitis   - continue antibiotics for 4 days post op  - await return of bowel function, diet as tolerated  - recommend PT/OT for mobilization given patient's age  - okay to dc windy Kearns MD  Staff Surgeon   Colon & Rectal Surgery

## 2023-01-18 NOTE — PLAN OF CARE
01/18/23 1043   Discharge Assessment   Assessment Type Discharge Planning Assessment   Confirmed/corrected address, phone number and insurance Yes   Confirmed Demographics Correct on Facesheet   Source of Information patient   People in Home alone   Do you expect to return to your current living situation? Yes   Do you have help at home or someone to help you manage your care at home? No   Prior to hospitilization cognitive status: Alert/Oriented   Current cognitive status: Alert/Oriented   Walking or Climbing Stairs   (None)   Dressing/Bathing   (None)   Equipment Currently Used at Home none   Readmission within 30 days? No   Patient currently being followed by outpatient case management? No   Do you currently have service(s) that help you manage your care at home? No   Do you take prescription medications? Yes   Do you have prescription coverage? Yes   Do you have any problems affording any of your prescribed medications? No   Is the patient taking medications as prescribed? yes   Who is going to help you get home at discharge? Son   How do you get to doctors appointments? agency   Are you on dialysis? No   Do you take coumadin? No   Discharge Plan A Home Health   Discharge Plan B Home   DME Needed Upon Discharge  none   Discharge Plan discussed with: Patient   Discharge Barriers Identified None     Pt would like [facility] bedside delivery of meds at discharge.  Her son will pick her up at discharge.

## 2023-01-18 NOTE — PLAN OF CARE
Pt transported back to unit via stretcher post sigmoid colectomy. Pt AAOx4, NAD. Pt reported nausea which has been managed with Zofran via IV. Pt denies pain, SOB, no emesis noted. IV abx therapy in progress. No adverse reaction noted. Indwelling temple catheter in place collecting clear yellow urine. Pt remains free from falls or injury. Bed is lowered and locked. Call light is within reach. Pt has no known needs at this time.

## 2023-01-18 NOTE — PT/OT/SLP EVAL
"Speech Language Pathology Evaluation  Bedside Swallow    Patient Name:  Cheyenne Garner   MRN:  [id number]  Admitting Diagnosis: Diverticulitis of large intestine with perforation and abscess without bleeding    Recommendations:                 General Recommendations:    Speech pathology to follow-up 1x for ongoing assessment of swallow function and diet tolerance 2/2 reported instance of coughing during meal.     Diet recommendations:  Regular Diet - IDDSI Level 7, Thin liquids - IDDSI Level 0     Aspiration Precautions: 1 bite/sip at a time, Frequent oral care, HOB to 90 degrees, and Standard aspiration precautions     General Precautions: Standard,      Communication strategies:  n/a    History:     Past Medical History:   Diagnosis Date    Cataract     Hyperlipidemia     Inflammatory bowel disease     Sarcoidosis with granulomatous hepatitis     UTI (urinary tract infection) 7/17/2013       Past Surgical History:   Procedure Laterality Date    BLADDER SURGERY      BREAST SURGERY      COLECTOMY, SIGMOID N/A 1/17/2023    Procedure: COLECTOMY, SIGMOID;  Surgeon: Claudia Kearns MD;  Location: [facility];  Service: Colon and Rectal;  Laterality: N/A;    EYE SURGERY      FLEXIBLE SIGMOIDOSCOPY  1/17/2023    Procedure: SIGMOIDOSCOPY, FLEXIBLE;  Surgeon: Claudia Kearns MD;  Location: [facility] operated [facility] OR;  Service: Colon and Rectal;;    HIP SURGERY      HYSTERECTOMY      JOINT REPLACEMENT      MOBILIZATION OF SPLENIC FLEXURE  1/17/2023    Procedure: MOBILIZATION, SPLENIC FLEXURE;  Surgeon: Claudia Kearns MD;  Location: [facility] OR;  Service: Colon and Rectal;;    SINUS SURGERY      TOTAL REDUCTION MAMMOPLASTY         Per chart review:  "Cheyenne Garner is a 87 y.o. female who presented with recurrent, perforated diverticulitis, now s/p exploratory laparotomy and left hemicolectomy on 1/17"    Pt seen today for bedside swallow evaluation:  RN reports pt with coughing/ suspected aspiration event when eating lunch this date. RN " "characterized event by "coughing with wet/gurgly breathing". Pt previously on full liquid diet post-op, upgraded to regular/thin today. Pt reports "the green beans choked me up". Pt then coughed and expelled phlegm following event. Pt denies hx of dysphagia or globus sensation. On a regular/thin diet at baseline.     Subjective     Pt awake/alert, family at bedside.   RN at bedside for med administration     Respiratory Status: Room air    Objective:     Oral Musculature Evaluation  Oral Musculature: WFL  Dentition: present and adequate  Secretion Management: adequate  Mucosal Quality: adequate  Mandibular Strength and Mobility: WFL  Oral Labial Strength and Mobility: WFL, functional coordination, functional retraction, functional pursing  Lingual Strength and Mobility: functional strength, functional protrusion, functional anterior elevation, functional lateral movement  Velar Elevation: WFL  Buccal Strength and Mobility: WFL  Volitional Cough: WFL  Volitional Swallow: WFL  Voice Prior to PO Intake: Clear  Oral Musculature Comments: Normal oral motor examination; WFL for facial, labial, and lingual ROM and strength    Bedside Swallow Eval:   Consistencies Assessed:  Meds given whole with sips of water  Thin liquids 1/3tsp, 3oz via single and cyclical sips   Puree 1tsp boluses  Solids small and large pieces of dry solids    Oral Phase:   Complete labial seal without anterior spillage  Bolus prep/mastication complete and organized  Rotary chew achieved  Functional a-p transport  No oral residue observed    Pharyngeal Phase:   Adequate laryngeal elevation/excursion on subjective palpation  Trigger of the pharyngeal swallow appears to be WFL  No overt s/s of aspiration or airway threat during med administration (single whole pills with water), thin liquids, puree, or solids- no coughing, choking, changes in breath stream or vocal quality    Treatment: Normal oral and pharyngeal swallow function during bedside swallow " assessment this date. Reported aspiration event possibly related to fast rate of eating and/or post-op laryngeal irritation. SLP to follow-up x1 with a meal. Notified RN and MD of above. Discussed standard aspiration precautions with pt- sit upright at 90 degrees, slow rate of eating, small/single bites/sips. All questions and concerns addressed    Assessment:     Cheyenne Garner is a 87 y.o. female with an SLP diagnosis of normal oral and pharyngeal swallow function. SLP to follow-up for diet tolerance x1 given recent suspected aspiration event.     Goals:   Multidisciplinary Problems       SLP Goals          Problem: SLP    Goal Priority Disciplines Outcome   SLP Goal     SLP Ongoing, Progressing   Description: 1. Pt will consume a regular and thin liquid diet without overt s/s of aspiration or airway threat during 100% of po intake without assistance.                        Plan:     Patient to be seen:  1 x/week, 2 x/week   Plan of Care expires:  01/25/23  Plan of Care reviewed with:  patient, family   SLP Follow-Up:  Yes       Discharge recommendations:          Time Tracking:     SLP Treatment Date:   01/18/23  Speech Start Time:  1410  Speech Stop Time:  1426     Speech Total Time (min):  16 min    Billable Minutes: Eval Swallow and Oral Function 16 min    01/18/2023

## 2023-01-18 NOTE — SUBJECTIVE & OBJECTIVE
Interval History:  Patient recovering from partial colectomy with lysis of adhesions. No flatulence or stool yet. No abdominal pain or vomiting.     Review of Systems   Constitutional:  Negative for chills and fatigue.   HENT:  Negative for congestion and drooling.    Respiratory:  Negative for cough and shortness of breath.    Cardiovascular:  Negative for chest pain and leg swelling.   Gastrointestinal:  Negative for nausea, rectal pain and vomiting.   Musculoskeletal:  Negative for back pain.   Skin:  Negative for pallor and rash.   Psychiatric/Behavioral:  Negative for agitation.    Objective:     Vital Signs (Most Recent):  Temp: 98.1 °F (36.7 °C) (01/18/23 1622)  Pulse: 76 (01/18/23 1622)  Resp: 20 (01/18/23 1622)  BP: 136/65 (01/18/23 1622)  SpO2: (!) 94 % (01/18/23 1622)   Vital Signs (24h Range):  Temp:  [97.6 °F (36.4 °C)-98.9 °F (37.2 °C)] 98.1 °F (36.7 °C)  Pulse:  [69-83] 76  Resp:  [16-20] 20  SpO2:  [94 %-97 %] 94 %  BP: (110-151)/(56-82) 136/65     Weight: 64.5 kg (142 lb 3.2 oz)  Body mass index is 24.41 kg/m².    Intake/Output Summary (Last 24 hours) at 1/18/2023 1623  Last data filed at 1/18/2023 0624  Gross per 24 hour   Intake 1470.21 ml   Output 330 ml   Net 1140.21 ml        Physical Exam  Vitals reviewed.   Constitutional:       General: She is not in acute distress.  HENT:      Head: Normocephalic.   Eyes:      General:         Right eye: No discharge.         Left eye: No discharge.      Pupils: Pupils are equal, round, and reactive to light.   Cardiovascular:      Rate and Rhythm: Normal rate and regular rhythm.   Pulmonary:      Effort: No respiratory distress.      Breath sounds: No wheezing.   Abdominal:      Tenderness: There is abdominal tenderness.   Musculoskeletal:      Cervical back: Neck supple. No rigidity.      Right lower leg: No edema.      Left lower leg: No edema.   Skin:     General: Skin is warm.      Capillary Refill: Capillary refill takes less than 2 seconds.    Neurological:      General: No focal deficit present.      Mental Status: She is alert and oriented to person, place, and time.       Significant Labs: All pertinent labs within the past 24 hours have been reviewed.  CBC:   Recent Labs   Lab 01/17/23  0505 01/18/23  0440   WBC 11.49 8.51   HGB 13.3 10.1*   HCT 40.3 30.5*    161       CMP:   Recent Labs   Lab 01/17/23  0505 01/18/23  0439    134*   K 3.7 3.6    106   CO2 23 21*   GLU 89 126*   BUN 14 12   CREATININE 0.8 0.7   CALCIUM 7.9* 7.2*   ANIONGAP 8 7*         Significant Imaging: I have reviewed all pertinent imaging results/findings within the past 24 hours.

## 2023-01-18 NOTE — PROGRESS NOTES
[facility] Medicine  Progress Note    Patient Name: Cheyenne Garner  MRN: [id number]  Patient Class: IP- Inpatient   Admission Date: 1/16/2023  Length of Stay: 2 days  Attending Physician: Jose Lee MD  Primary Care Provider: Mary Cortes MD        Subjective:     Principal Problem:Diverticulitis of large intestine with perforation and abscess without bleeding        HPI:  Mrs. 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  She came to the emergency department today for abdominal pain that began yesterday.  The pain is located to the bilateral lower quadrants of her abdomen.  Patient denies nausea and vomiting.  She reports she frequently has diarrhea.  Patient reports that her pain has been controlled with pain medication given in the emergency department.  Patient's CT of her abdomen and pelvis with contrast that demonstrates acute diverticulitis with bowel per for duration and early abscess formation.  Patient was started on empiric Zosyn.  General surgery was consulted-appreciate recommendations.  Patient spoke with her GI physician Dr. Morales over the telephone.  Surgical intervention is recommended.  Dr. Kearns with colon and Rectal surgery will further evaluate patient tomorrow.  Patient admitted to hospital medicine for further management.      Overview/Hospital Course:  Perforated diverticulitis s/p sigmoid and descending colectomy.       Interval History:  Patient recovering from partial colectomy with lysis of adhesions. No flatulence or stool yet. No abdominal pain or vomiting.     Review of Systems   Constitutional:  Negative for chills and fatigue.   HENT:  Negative for congestion and drooling.    Respiratory:  Negative for cough and shortness of breath.    Cardiovascular:  Negative for chest pain and leg  swelling.   Gastrointestinal:  Negative for nausea, rectal pain and vomiting.   Musculoskeletal:  Negative for back pain.   Skin:  Negative for pallor and rash.   Psychiatric/Behavioral:  Negative for agitation.    Objective:     Vital Signs (Most Recent):  Temp: 98.1 °F (36.7 °C) (01/18/23 1622)  Pulse: 76 (01/18/23 1622)  Resp: 20 (01/18/23 1622)  BP: 136/65 (01/18/23 1622)  SpO2: (!) 94 % (01/18/23 1622)   Vital Signs (24h Range):  Temp:  [97.6 °F (36.4 °C)-98.9 °F (37.2 °C)] 98.1 °F (36.7 °C)  Pulse:  [69-83] 76  Resp:  [16-20] 20  SpO2:  [94 %-97 %] 94 %  BP: (110-151)/(56-82) 136/65     Weight: 64.5 kg (142 lb 3.2 oz)  Body mass index is 24.41 kg/m².    Intake/Output Summary (Last 24 hours) at 1/18/2023 1623  Last data filed at 1/18/2023 0624  Gross per 24 hour   Intake 1470.21 ml   Output 330 ml   Net 1140.21 ml        Physical Exam  Vitals reviewed.   Constitutional:       General: She is not in acute distress.  HENT:      Head: Normocephalic.   Eyes:      General:         Right eye: No discharge.         Left eye: No discharge.      Pupils: Pupils are equal, round, and reactive to light.   Cardiovascular:      Rate and Rhythm: Normal rate and regular rhythm.   Pulmonary:      Effort: No respiratory distress.      Breath sounds: No wheezing.   Abdominal:      Tenderness: There is abdominal tenderness.   Musculoskeletal:      Cervical back: Neck supple. No rigidity.      Right lower leg: No edema.      Left lower leg: No edema.   Skin:     General: Skin is warm.      Capillary Refill: Capillary refill takes less than 2 seconds.   Neurological:      General: No focal deficit present.      Mental Status: She is alert and oriented to person, place, and time.       Significant Labs: All pertinent labs within the past 24 hours have been reviewed.  CBC:   Recent Labs   Lab 01/17/23  0505 01/18/23  0440   WBC 11.49 8.51   HGB 13.3 10.1*   HCT 40.3 30.5*    161       CMP:   Recent Labs   Lab 01/17/23  2745  01/18/23  0439    134*   K 3.7 3.6    106   CO2 23 21*   GLU 89 126*   BUN 14 12   CREATININE 0.8 0.7   CALCIUM 7.9* 7.2*   ANIONGAP 8 7*         Significant Imaging: I have reviewed all pertinent imaging results/findings within the past 24 hours.      Assessment/Plan:      * Diverticulitis of large intestine with perforation and abscess without bleeding  S/p partial colectomy of signoid and descending colon with re-anastomosis.  Continue zosyn  Surgery following  Monitor for return of bowel function  Diet as tolerated        Advanced care planning/counseling discussion  Palliative Care is following      Drug-induced polyneuropathy  Reported neuropathy symptoms after taking cipro per the patient. Sensory neuropathy, no motor findings on exam.  Continue mirtazipine  Patient declines gabapentin      Primary insomnia  Continue mirtazazpine and geodon       Hyperlipidemia  Continue atorvastatin in place of simvastatin while inpatient      UTI (urinary tract infection)  -Culture pending  -Patient on broad-spectrum antibiotics for her diverticulitis        VTE Risk Mitigation (From admission, onward)         Ordered     enoxaparin injection 40 mg  Daily         01/17/23 1543     IP VTE HIGH RISK PATIENT  Once         01/16/23 1645     Place sequential compression device  Until discontinued         01/16/23 1645                Discharge Planning   PAULA:      Code Status: DNR   Is the patient medically ready for discharge?:     Reason for patient still in hospital (select all that apply): Treatment and Consult recommendations  Discharge Plan A: Home Health                  Jose Lee MD  Department of Hospital Medicine   [facility] - [facility] Surg ([place])

## 2023-01-19 ENCOUNTER — PATIENT OUTREACH (OUTPATIENT)
Dept: ADMINISTRATIVE | Facility: OTHER | Age: 88
End: 2023-01-19
Payer: MEDICARE

## 2023-01-19 LAB
ANION GAP SERPL CALC-SCNC: 7 MMOL/L (ref 8–16)
BASOPHILS # BLD AUTO: 0.02 K/UL (ref 0–0.2)
BASOPHILS NFR BLD: 0.3 % (ref 0–1.9)
BUN SERPL-MCNC: 16 MG/DL (ref 8–23)
CALCIUM SERPL-MCNC: 7.2 MG/DL (ref 8.7–10.5)
CHLORIDE SERPL-SCNC: 111 MMOL/L (ref 95–110)
CO2 SERPL-SCNC: 22 MMOL/L (ref 23–29)
CREAT SERPL-MCNC: 0.8 MG/DL (ref 0.5–1.4)
DIFFERENTIAL METHOD: ABNORMAL
EOSINOPHIL # BLD AUTO: 0.1 K/UL (ref 0–0.5)
EOSINOPHIL NFR BLD: 0.6 % (ref 0–8)
ERYTHROCYTE [DISTWIDTH] IN BLOOD BY AUTOMATED COUNT: 14.3 % (ref 11.5–14.5)
EST. GFR  (NO RACE VARIABLE): >60 ML/MIN/1.73 M^2
GLUCOSE SERPL-MCNC: 106 MG/DL (ref 70–110)
HCT VFR BLD AUTO: 30 % (ref 37–48.5)
HGB BLD-MCNC: 9.9 G/DL (ref 12–16)
IMM GRANULOCYTES # BLD AUTO: 0.04 K/UL (ref 0–0.04)
IMM GRANULOCYTES NFR BLD AUTO: 0.5 % (ref 0–0.5)
LYMPHOCYTES # BLD AUTO: 1.1 K/UL (ref 1–4.8)
LYMPHOCYTES NFR BLD: 13.4 % (ref 18–48)
MCH RBC QN AUTO: 31.4 PG (ref 27–31)
MCHC RBC AUTO-ENTMCNC: 33 G/DL (ref 32–36)
MCV RBC AUTO: 95 FL (ref 82–98)
MONOCYTES # BLD AUTO: 0.5 K/UL (ref 0.3–1)
MONOCYTES NFR BLD: 6.8 % (ref 4–15)
NEUTROPHILS # BLD AUTO: 6.2 K/UL (ref 1.8–7.7)
NEUTROPHILS NFR BLD: 78.4 % (ref 38–73)
NRBC BLD-RTO: 0 /100 WBC
PLATELET # BLD AUTO: 201 K/UL (ref 150–450)
PMV BLD AUTO: 10.1 FL (ref 9.2–12.9)
POTASSIUM SERPL-SCNC: 3.1 MMOL/L (ref 3.5–5.1)
RBC # BLD AUTO: 3.15 M/UL (ref 4–5.4)
SODIUM SERPL-SCNC: 140 MMOL/L (ref 136–145)
WBC # BLD AUTO: 7.9 K/UL (ref 3.9–12.7)

## 2023-01-19 PROCEDURE — 63600175 PHARM REV CODE 636 W HCPCS: Performed by: NURSE PRACTITIONER

## 2023-01-19 PROCEDURE — 80048 BASIC METABOLIC PNL TOTAL CA: CPT | Performed by: NURSE PRACTITIONER

## 2023-01-19 PROCEDURE — 25000003 PHARM REV CODE 250: Performed by: NURSE PRACTITIONER

## 2023-01-19 PROCEDURE — 92526 ORAL FUNCTION THERAPY: CPT

## 2023-01-19 PROCEDURE — 99232 SBSQ HOSP IP/OBS MODERATE 35: CPT | Mod: ,,, | Performed by: STUDENT IN AN ORGANIZED HEALTH CARE EDUCATION/TRAINING PROGRAM

## 2023-01-19 PROCEDURE — 63600175 PHARM REV CODE 636 W HCPCS: Performed by: SURGERY

## 2023-01-19 PROCEDURE — 99232 PR SUBSEQUENT HOSPITAL CARE,LEVL II: ICD-10-PCS | Mod: ,,, | Performed by: STUDENT IN AN ORGANIZED HEALTH CARE EDUCATION/TRAINING PROGRAM

## 2023-01-19 PROCEDURE — 25000003 PHARM REV CODE 250: Performed by: STUDENT IN AN ORGANIZED HEALTH CARE EDUCATION/TRAINING PROGRAM

## 2023-01-19 PROCEDURE — 25000003 PHARM REV CODE 250: Performed by: SURGERY

## 2023-01-19 PROCEDURE — 11000001 HC ACUTE MED/SURG PRIVATE ROOM

## 2023-01-19 PROCEDURE — 36415 COLL VENOUS BLD VENIPUNCTURE: CPT | Performed by: NURSE PRACTITIONER

## 2023-01-19 PROCEDURE — 85025 COMPLETE CBC W/AUTO DIFF WBC: CPT | Performed by: NURSE PRACTITIONER

## 2023-01-19 PROCEDURE — 94761 N-INVAS EAR/PLS OXIMETRY MLT: CPT

## 2023-01-19 RX ORDER — POTASSIUM CHLORIDE 20 MEQ/1
40 TABLET, EXTENDED RELEASE ORAL 2 TIMES DAILY
Status: DISCONTINUED | OUTPATIENT
Start: 2023-01-19 | End: 2023-01-20

## 2023-01-19 RX ADMIN — ATORVASTATIN CALCIUM 10 MG: 10 TABLET, FILM COATED ORAL at 09:01

## 2023-01-19 RX ADMIN — PIPERACILLIN AND TAZOBACTAM 4.5 G: 4; .5 INJECTION, POWDER, LYOPHILIZED, FOR SOLUTION INTRAVENOUS; PARENTERAL at 11:01

## 2023-01-19 RX ADMIN — Medication 6 MG: at 10:01

## 2023-01-19 RX ADMIN — IBUPROFEN 400 MG: 400 TABLET, FILM COATED ORAL at 05:01

## 2023-01-19 RX ADMIN — PIPERACILLIN AND TAZOBACTAM 4.5 G: 4; .5 INJECTION, POWDER, LYOPHILIZED, FOR SOLUTION INTRAVENOUS; PARENTERAL at 03:01

## 2023-01-19 RX ADMIN — ACETAMINOPHEN 650 MG: 325 TABLET, FILM COATED ORAL at 05:01

## 2023-01-19 RX ADMIN — OXYCODONE HYDROCHLORIDE 5 MG: 5 TABLET ORAL at 03:01

## 2023-01-19 RX ADMIN — POTASSIUM CHLORIDE 40 MEQ: 1500 TABLET, EXTENDED RELEASE ORAL at 09:01

## 2023-01-19 RX ADMIN — MIRTAZAPINE 7.5 MG: 7.5 TABLET ORAL at 09:01

## 2023-01-19 RX ADMIN — POTASSIUM CHLORIDE 40 MEQ: 1500 TABLET, EXTENDED RELEASE ORAL at 08:01

## 2023-01-19 RX ADMIN — MUPIROCIN: 20 OINTMENT TOPICAL at 09:01

## 2023-01-19 RX ADMIN — ENOXAPARIN SODIUM 40 MG: 40 INJECTION SUBCUTANEOUS at 05:01

## 2023-01-19 RX ADMIN — MUPIROCIN: 20 OINTMENT TOPICAL at 08:01

## 2023-01-19 RX ADMIN — ACETAMINOPHEN 650 MG: 325 TABLET, FILM COATED ORAL at 12:01

## 2023-01-19 RX ADMIN — IBUPROFEN 400 MG: 400 TABLET, FILM COATED ORAL at 12:01

## 2023-01-19 RX ADMIN — PIPERACILLIN AND TAZOBACTAM 4.5 G: 4; .5 INJECTION, POWDER, LYOPHILIZED, FOR SOLUTION INTRAVENOUS; PARENTERAL at 08:01

## 2023-01-19 RX ADMIN — ZIPRASIDONE HCL 20 MG: 20 CAPSULE ORAL at 08:01

## 2023-01-19 NOTE — PLAN OF CARE
POC reviewed w/ pt. AAOx4. VSS on RA. C/o pain treated w/ PRN pain medication per MAR. Turn Q2/frequent weight shift encouraged. Instructed to call for help ambulating. No injuries, falls, or trauma occurred during shift. Purposeful rounding completed. Bed alarm set, bed low and locked, side rails up x3, call light within reach.

## 2023-01-19 NOTE — PLAN OF CARE
POC reviewed w/ pt. AAOx4. VSS on RA. C/o pain treated w/ scheduled pain medication per MAR. Turn Q2/frequent weight shift encouraged. Instructed to call for help ambulating. No injuries, falls, or trauma occurred during shift. Purposeful rounding completed. Bed alarm set, bed low and locked, side rails up x3, call light within reach.

## 2023-01-19 NOTE — PT/OT/SLP PROGRESS
Speech Language Pathology Treatment    Patient Name:  Cheyenne Garner   MRN:  [id number]  Admitting Diagnosis: Diverticulitis of large intestine with perforation and abscess without bleeding    Recommendations:                 General Recommendations:    Normal oral and pharyngeal phase of the swallow. No further SLP needs at this time. Continue standard aspiration precautions.     Diet recommendations:  Regular Diet - IDDSI Level 7, Liquid Diet Level: Thin liquids - IDDSI Level 0     Aspiration Precautions: 1 bite/sip at a time, Frequent oral care, HOB to 90 degrees, Monitor for s/s of aspiration, and Standard aspiration precautions     General Precautions: Standard,      Communication strategies:  n/a    Subjective     RN reports no further s/s of airway threat with breakfast or morning meds this AM.  Pt awake/alert, agreeable to SLP follow-up    Respiratory Status: Room air    Objective:     Has the patient been evaluated by SLP for swallowing?   Yes  Keep patient NPO? No   Current Respiratory Status:    RA    SWALLOWING:  Pt denies any further s/s of airway threat with po intake. Stated she has been able to tolerate meal and meds without difficulty. Denies globus sensation or incr WOB with po intake. Winston Swallow Protocol completed this date. Provided 3oz of thin liquids via cyclical straw sips. Complete labial seal. Able to siphon liquids from straw. Adequate laryngeal elevation/excursion on subjective palpation. No overt s/s of aspiration or airway threat during cyclical sips of 3oz of water- no coughing, choking, changes in breath stream or vocal quality.    Education: reviewed standard aspiration precautions. Pt with no further s/s of airway threat with po intake. Suspect episode yesterday 2/2 prolonged NPO status followed by fast rate of eating. No further acute care SLP follow-up indicated at this time.     Assessment:     Cheyenne Garner is a 87 y.o. female with an SLP diagnosis of normal oral and  pharyngeal phase of the swallow.     Goals:   Multidisciplinary Problems       SLP Goals       Not on file              Multidisciplinary Problems (Resolved)          Problem: SLP    Goal Priority Disciplines Outcome   SLP Goal   (Resolved)     SLP Met   Description: 1. Pt will consume a regular and thin liquid diet without overt s/s of aspiration or airway threat during 100% of po intake without assistance. -Met 1/19                       Plan:     Patient to be seen:  No further follow-up required   Plan of Care expires:  01/25/23  Plan of Care reviewed with:  patient   SLP Follow-Up:  No       Discharge recommendations:          Time Tracking:     SLP Treatment Date:   01/19/23  Speech Start Time:  1015  Speech Stop Time:  1022     Speech Total Time (min):  7 min    Billable Minutes: Treatment Swallowing Dysfunction 7 min    01/19/2023

## 2023-01-19 NOTE — ASSESSMENT & PLAN NOTE
S/p partial colectomy of signoid and descending colon with re-anastomosis.  Continue zosyn until 1/21 (4 days postop)  Surgery following  ambulate  Monitor for return of bowel function  Diet as tolerated  Discharge after BM and IV antibiotics completed

## 2023-01-19 NOTE — SUBJECTIVE & OBJECTIVE
Interval History:  Patient passed bowel movement.  She passed voiding trial.  Wang was removed.  Patient will attempt ambulating today.  Plan to continue IV antibiotics for 4 days postop.    Review of Systems   Constitutional:  Negative for chills and fatigue.   HENT:  Negative for congestion and drooling.    Respiratory:  Negative for cough and shortness of breath.    Cardiovascular:  Negative for chest pain and leg swelling.   Gastrointestinal:  Negative for nausea, rectal pain and vomiting.   Musculoskeletal:  Negative for back pain.   Skin:  Negative for pallor and rash.   Psychiatric/Behavioral:  Negative for agitation.    Objective:     Vital Signs (Most Recent):  Temp: 97.8 °F (36.6 °C) (01/19/23 1149)  Pulse: 72 (01/19/23 1149)  Resp: 18 (01/19/23 1149)  BP: 135/60 (01/19/23 1149)  SpO2: 95 % (01/19/23 1149)   Vital Signs (24h Range):  Temp:  [97.5 °F (36.4 °C)-98.4 °F (36.9 °C)] 97.8 °F (36.6 °C)  Pulse:  [72-94] 72  Resp:  [17-20] 18  SpO2:  [92 %-96 %] 95 %  BP: (101-141)/(54-71) 135/60     Weight: 64.5 kg (142 lb 3.2 oz)  Body mass index is 24.41 kg/m².    Intake/Output Summary (Last 24 hours) at 1/19/2023 1241  Last data filed at 1/19/2023 0900  Gross per 24 hour   Intake 160 ml   Output 850 ml   Net -690 ml        Physical Exam  Vitals reviewed.   Constitutional:       General: She is not in acute distress.  HENT:      Head: Normocephalic.   Eyes:      General:         Right eye: No discharge.         Left eye: No discharge.      Pupils: Pupils are equal, round, and reactive to light.   Cardiovascular:      Rate and Rhythm: Normal rate and regular rhythm.   Pulmonary:      Effort: No respiratory distress.      Breath sounds: No wheezing.   Abdominal:      Tenderness: There is abdominal tenderness.   Musculoskeletal:      Cervical back: Neck supple. No rigidity.      Right lower leg: No edema.      Left lower leg: No edema.   Skin:     General: Skin is warm.      Capillary Refill: Capillary refill  takes less than 2 seconds.   Neurological:      General: No focal deficit present.      Mental Status: She is alert and oriented to person, place, and time.       Significant Labs: All pertinent labs within the past 24 hours have been reviewed.  CBC:   Recent Labs   Lab 01/18/23  0440 01/19/23  0440   WBC 8.51 7.90   HGB 10.1* 9.9*   HCT 30.5* 30.0*    201       CMP:   Recent Labs   Lab 01/18/23  0439 01/19/23  0440   * 140   K 3.6 3.1*    111*   CO2 21* 22*   * 106   BUN 12 16   CREATININE 0.7 0.8   CALCIUM 7.2* 7.2*   ANIONGAP 7* 7*         Significant Imaging: I have reviewed all pertinent imaging results/findings within the past 24 hours.

## 2023-01-19 NOTE — PROGRESS NOTES
Surgery Inpatient Progress Note    Date: 01/19/2023    Overnight events: Passing flatus, had a bowel movement.  Tolerating bland diet.  Has not ambulated.     O:   Vitals:    01/19/23 0808   BP: 117/71   Pulse: 76   Resp: 20   Temp: 97.6 °F (36.4 °C)       Physical Exam   Gen: well developed female, NAD   HEENT: normocephalic, atraumatic, PERRL, EOMI   CV: RRR, no murmurs  Res: nonlabored, CTAB   Abd: soft, midline incision approximated with staples, no erythema or drainage   MSK: no gross deformities     Labs reviewed. Stable.     Assessment and plan:   Cheyenne Garner is a 87 y.o. female who presents with recurrent, perforated diverticulitis, now s/p exploratory laparotomy and left hemicolectomy on 1/17      Perforated diverticulitis   - continue antibiotics for 4 days post op  - diet as tolerated  - PT/OT, OOB TID, ambulate in hallways         Claudia Kearns MD  Staff Surgeon   Colon & Rectal Surgery

## 2023-01-19 NOTE — NURSING
Pt aspirated on some chicken and green beans. Assisted pt to side of bed and pt spit up in emesis bag. Pt noted to be gurgling after aspirating. Placed pt upright in bed once pt was done spitting up. Notified MD. O2 sats 94% on RA. Cxray ordered.

## 2023-01-19 NOTE — PROGRESS NOTES
[facility], [place], operated by [facility] Medicine  Progress Note    Patient Name: Cheyenne Garner  MRN: [id number]  Patient Class: IP- Inpatient   Admission Date: 1/16/2023  Length of Stay: 3 days  Attending Physician: Jose Lee MD  Primary Care Provider: Mary Cortes MD        Subjective:     Principal Problem:Diverticulitis of large intestine with perforation and abscess without bleeding        HPI:  Mrs. 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  She came to the emergency department today for abdominal pain that began yesterday.  The pain is located to the bilateral lower quadrants of her abdomen.  Patient denies nausea and vomiting.  She reports she frequently has diarrhea.  Patient reports that her pain has been controlled with pain medication given in the emergency department.  Patient's CT of her abdomen and pelvis with contrast that demonstrates acute diverticulitis with bowel per for duration and early abscess formation.  Patient was started on empiric Zosyn.  General surgery was consulted-appreciate recommendations.  Patient spoke with her GI physician Dr. Morales over the telephone.  Surgical intervention is recommended.  Dr. Kearns with colon and Rectal surgery will further evaluate patient tomorrow.  Patient admitted to hospital medicine for further management.      Overview/Hospital Course:  Perforated diverticulitis s/p sigmoid and descending colectomy.  She is tolerating diet and had a BM. Wang removed.       Interval History:  Patient passed bowel movement.  She passed voiding trial.  Wang was removed.  Patient will attempt ambulating today.  Plan to continue IV antibiotics for 4 days postop.    Review of Systems   Constitutional:  Negative for chills and fatigue.   HENT:  Negative for congestion and drooling.    Respiratory:   Negative for cough and shortness of breath.    Cardiovascular:  Negative for chest pain and leg swelling.   Gastrointestinal:  Negative for nausea, rectal pain and vomiting.   Musculoskeletal:  Negative for back pain.   Skin:  Negative for pallor and rash.   Psychiatric/Behavioral:  Negative for agitation.    Objective:     Vital Signs (Most Recent):  Temp: 97.8 °F (36.6 °C) (01/19/23 1149)  Pulse: 72 (01/19/23 1149)  Resp: 18 (01/19/23 1149)  BP: 135/60 (01/19/23 1149)  SpO2: 95 % (01/19/23 1149)   Vital Signs (24h Range):  Temp:  [97.5 °F (36.4 °C)-98.4 °F (36.9 °C)] 97.8 °F (36.6 °C)  Pulse:  [72-94] 72  Resp:  [17-20] 18  SpO2:  [92 %-96 %] 95 %  BP: (101-141)/(54-71) 135/60     Weight: 64.5 kg (142 lb 3.2 oz)  Body mass index is 24.41 kg/m².    Intake/Output Summary (Last 24 hours) at 1/19/2023 1241  Last data filed at 1/19/2023 0900  Gross per 24 hour   Intake 160 ml   Output 850 ml   Net -690 ml        Physical Exam  Vitals reviewed.   Constitutional:       General: She is not in acute distress.  HENT:      Head: Normocephalic.   Eyes:      General:         Right eye: No discharge.         Left eye: No discharge.      Pupils: Pupils are equal, round, and reactive to light.   Cardiovascular:      Rate and Rhythm: Normal rate and regular rhythm.   Pulmonary:      Effort: No respiratory distress.      Breath sounds: No wheezing.   Abdominal:      Tenderness: There is abdominal tenderness.   Musculoskeletal:      Cervical back: Neck supple. No rigidity.      Right lower leg: No edema.      Left lower leg: No edema.   Skin:     General: Skin is warm.      Capillary Refill: Capillary refill takes less than 2 seconds.   Neurological:      General: No focal deficit present.      Mental Status: She is alert and oriented to person, place, and time.       Significant Labs: All pertinent labs within the past 24 hours have been reviewed.  CBC:   Recent Labs   Lab 01/18/23  0440 01/19/23  0440   WBC 8.51 7.90   HGB 10.1*  9.9*   HCT 30.5* 30.0*    201       CMP:   Recent Labs   Lab 01/18/23  0439 01/19/23  0440   * 140   K 3.6 3.1*    111*   CO2 21* 22*   * 106   BUN 12 16   CREATININE 0.7 0.8   CALCIUM 7.2* 7.2*   ANIONGAP 7* 7*         Significant Imaging: I have reviewed all pertinent imaging results/findings within the past 24 hours.      Assessment/Plan:      * Diverticulitis of large intestine with perforation and abscess without bleeding  S/p partial colectomy of signoid and descending colon with re-anastomosis.  Continue zosyn until 1/21 (4 days postop)  Surgery following  ambulate  Monitor for return of bowel function  Diet as tolerated  Discharge after BM and IV antibiotics completed        Advanced care planning/counseling discussion  Palliative Care is following      Drug-induced polyneuropathy  Reported neuropathy symptoms after taking cipro per the patient. Sensory neuropathy, no motor findings on exam.  Continue mirtazipine  Patient declines gabapentin      Primary insomnia  Continue mirtazazpine and geodon       Hyperlipidemia  Continue atorvastatin in place of simvastatin while inpatient      UTI (urinary tract infection)  -Culture pending  -Patient on broad-spectrum antibiotics for her diverticulitis        VTE Risk Mitigation (From admission, onward)         Ordered     enoxaparin injection 40 mg  Daily         01/17/23 1543     IP VTE HIGH RISK PATIENT  Once         01/16/23 1645     Place sequential compression device  Until discontinued         01/16/23 1645                Discharge Planning   PAULA:      Code Status: DNR   Is the patient medically ready for discharge?:     Reason for patient still in hospital (select all that apply): Treatment and Consult recommendations  Discharge Plan A: Home Health                  Jose Lee MD  Department of Hospital Medicine   [facility] Surg ([place])

## 2023-01-19 NOTE — PLAN OF CARE
Problem: SLP  Goal: SLP Goal  Description: 1. Pt will consume a regular and thin liquid diet without overt s/s of aspiration or airway threat during 100% of po intake without assistance. -Met 1/19  Outcome: Met     All goals met. Normal oral and pharyngeal swallow function. Tolerating regular/thin diet without difficulty. SLP to sign off.

## 2023-01-20 ENCOUNTER — TELEPHONE (OUTPATIENT)
Dept: SURGERY | Facility: CLINIC | Age: 88
End: 2023-01-20
Payer: MEDICARE

## 2023-01-20 PROBLEM — K22.4 ESOPHAGEAL DYSMOTILITY: Status: ACTIVE | Noted: 2023-01-20

## 2023-01-20 PROBLEM — R45.1 AGITATION: Status: ACTIVE | Noted: 2023-01-20

## 2023-01-20 PROBLEM — R03.0 ELEVATED BLOOD PRESSURE READING: Status: ACTIVE | Noted: 2023-01-20

## 2023-01-20 LAB
ALBUMIN SERPL BCP-MCNC: 2.4 G/DL (ref 3.5–5.2)
ANION GAP SERPL CALC-SCNC: 4 MMOL/L (ref 8–16)
BUN SERPL-MCNC: 14 MG/DL (ref 8–23)
CALCIUM SERPL-MCNC: 8.2 MG/DL (ref 8.7–10.5)
CHLORIDE SERPL-SCNC: 114 MMOL/L (ref 95–110)
CO2 SERPL-SCNC: 23 MMOL/L (ref 23–29)
CREAT SERPL-MCNC: 0.7 MG/DL (ref 0.5–1.4)
EST. GFR  (NO RACE VARIABLE): >60 ML/MIN/1.73 M^2
GLUCOSE SERPL-MCNC: 80 MG/DL (ref 70–110)
MAGNESIUM SERPL-MCNC: 1.8 MG/DL (ref 1.6–2.6)
PHOSPHATE SERPL-MCNC: 2.1 MG/DL (ref 2.7–4.5)
POTASSIUM SERPL-SCNC: 3.8 MMOL/L (ref 3.5–5.1)
SODIUM SERPL-SCNC: 141 MMOL/L (ref 136–145)

## 2023-01-20 PROCEDURE — 86580 TB INTRADERMAL TEST: CPT | Performed by: STUDENT IN AN ORGANIZED HEALTH CARE EDUCATION/TRAINING PROGRAM

## 2023-01-20 PROCEDURE — 97162 PT EVAL MOD COMPLEX 30 MIN: CPT

## 2023-01-20 PROCEDURE — 97535 SELF CARE MNGMENT TRAINING: CPT

## 2023-01-20 PROCEDURE — A9698 NON-RAD CONTRAST MATERIALNOC: HCPCS | Performed by: STUDENT IN AN ORGANIZED HEALTH CARE EDUCATION/TRAINING PROGRAM

## 2023-01-20 PROCEDURE — 97166 OT EVAL MOD COMPLEX 45 MIN: CPT

## 2023-01-20 PROCEDURE — 25500020 PHARM REV CODE 255: Performed by: STUDENT IN AN ORGANIZED HEALTH CARE EDUCATION/TRAINING PROGRAM

## 2023-01-20 PROCEDURE — 63600175 PHARM REV CODE 636 W HCPCS: Performed by: NURSE PRACTITIONER

## 2023-01-20 PROCEDURE — 30200315 PPD INTRADERMAL TEST REV CODE 302: Performed by: STUDENT IN AN ORGANIZED HEALTH CARE EDUCATION/TRAINING PROGRAM

## 2023-01-20 PROCEDURE — 25000003 PHARM REV CODE 250: Performed by: STUDENT IN AN ORGANIZED HEALTH CARE EDUCATION/TRAINING PROGRAM

## 2023-01-20 PROCEDURE — 83735 ASSAY OF MAGNESIUM: CPT | Performed by: STUDENT IN AN ORGANIZED HEALTH CARE EDUCATION/TRAINING PROGRAM

## 2023-01-20 PROCEDURE — 36415 COLL VENOUS BLD VENIPUNCTURE: CPT | Performed by: STUDENT IN AN ORGANIZED HEALTH CARE EDUCATION/TRAINING PROGRAM

## 2023-01-20 PROCEDURE — 25000003 PHARM REV CODE 250: Performed by: NURSE PRACTITIONER

## 2023-01-20 PROCEDURE — 25000003 PHARM REV CODE 250: Performed by: SURGERY

## 2023-01-20 PROCEDURE — 97530 THERAPEUTIC ACTIVITIES: CPT

## 2023-01-20 PROCEDURE — 99232 SBSQ HOSP IP/OBS MODERATE 35: CPT | Mod: ,,, | Performed by: STUDENT IN AN ORGANIZED HEALTH CARE EDUCATION/TRAINING PROGRAM

## 2023-01-20 PROCEDURE — 11000001 HC ACUTE MED/SURG PRIVATE ROOM

## 2023-01-20 PROCEDURE — 99232 PR SUBSEQUENT HOSPITAL CARE,LEVL II: ICD-10-PCS | Mod: ,,, | Performed by: STUDENT IN AN ORGANIZED HEALTH CARE EDUCATION/TRAINING PROGRAM

## 2023-01-20 PROCEDURE — 63600175 PHARM REV CODE 636 W HCPCS: Performed by: SURGERY

## 2023-01-20 PROCEDURE — 80069 RENAL FUNCTION PANEL: CPT | Performed by: STUDENT IN AN ORGANIZED HEALTH CARE EDUCATION/TRAINING PROGRAM

## 2023-01-20 PROCEDURE — 94761 N-INVAS EAR/PLS OXIMETRY MLT: CPT

## 2023-01-20 RX ORDER — AMLODIPINE BESYLATE 5 MG/1
5 TABLET ORAL DAILY
Status: DISCONTINUED | OUTPATIENT
Start: 2023-01-20 | End: 2023-01-21

## 2023-01-20 RX ORDER — HYDRALAZINE HYDROCHLORIDE 25 MG/1
25 TABLET, FILM COATED ORAL EVERY 8 HOURS PRN
Status: DISCONTINUED | OUTPATIENT
Start: 2023-01-20 | End: 2023-01-25 | Stop reason: HOSPADM

## 2023-01-20 RX ORDER — OXYCODONE HYDROCHLORIDE 5 MG/1
5 TABLET ORAL EVERY 8 HOURS PRN
Status: DISCONTINUED | OUTPATIENT
Start: 2023-01-20 | End: 2023-01-25 | Stop reason: HOSPADM

## 2023-01-20 RX ADMIN — IBUPROFEN 400 MG: 400 TABLET, FILM COATED ORAL at 03:01

## 2023-01-20 RX ADMIN — ACETAMINOPHEN 650 MG: 325 TABLET, FILM COATED ORAL at 05:01

## 2023-01-20 RX ADMIN — AMLODIPINE BESYLATE 5 MG: 5 TABLET ORAL at 01:01

## 2023-01-20 RX ADMIN — IBUPROFEN 400 MG: 400 TABLET, FILM COATED ORAL at 10:01

## 2023-01-20 RX ADMIN — IBUPROFEN 400 MG: 400 TABLET, FILM COATED ORAL at 11:01

## 2023-01-20 RX ADMIN — IBUPROFEN 400 MG: 400 TABLET, FILM COATED ORAL at 05:01

## 2023-01-20 RX ADMIN — ACETAMINOPHEN 650 MG: 325 TABLET, FILM COATED ORAL at 10:01

## 2023-01-20 RX ADMIN — PIPERACILLIN AND TAZOBACTAM 4.5 G: 4; .5 INJECTION, POWDER, LYOPHILIZED, FOR SOLUTION INTRAVENOUS; PARENTERAL at 03:01

## 2023-01-20 RX ADMIN — BARIUM SULFATE 150 ML: 0.6 SUSPENSION ORAL at 10:01

## 2023-01-20 RX ADMIN — ENOXAPARIN SODIUM 40 MG: 40 INJECTION SUBCUTANEOUS at 05:01

## 2023-01-20 RX ADMIN — TUBERCULIN PURIFIED PROTEIN DERIVATIVE 5 UNITS: 5 INJECTION, SOLUTION INTRADERMAL at 05:01

## 2023-01-20 RX ADMIN — MUPIROCIN: 20 OINTMENT TOPICAL at 09:01

## 2023-01-20 RX ADMIN — ACETAMINOPHEN 650 MG: 325 TABLET, FILM COATED ORAL at 03:01

## 2023-01-20 RX ADMIN — ZIPRASIDONE HCL 20 MG: 20 CAPSULE ORAL at 09:01

## 2023-01-20 RX ADMIN — OXYCODONE HYDROCHLORIDE 5 MG: 5 TABLET ORAL at 09:01

## 2023-01-20 RX ADMIN — PIPERACILLIN AND TAZOBACTAM 4.5 G: 4; .5 INJECTION, POWDER, LYOPHILIZED, FOR SOLUTION INTRAVENOUS; PARENTERAL at 07:01

## 2023-01-20 RX ADMIN — ATORVASTATIN CALCIUM 10 MG: 10 TABLET, FILM COATED ORAL at 09:01

## 2023-01-20 RX ADMIN — POTASSIUM PHOSPHATE, MONOBASIC AND POTASSIUM PHOSPHATE, DIBASIC 20 MMOL: 224; 236 INJECTION, SOLUTION, CONCENTRATE INTRAVENOUS at 09:01

## 2023-01-20 RX ADMIN — ACETAMINOPHEN 650 MG: 325 TABLET, FILM COATED ORAL at 11:01

## 2023-01-20 RX ADMIN — MIRTAZAPINE 7.5 MG: 7.5 TABLET ORAL at 05:01

## 2023-01-20 NOTE — PLAN OF CARE
POC reviewed. No significant events this shift. Purposeful rounding completed. VSS. Assessment per flowsheets. Received IV antibiotics according to MAR.  No injuries, falls, or trauma occurred during shift. Bed low and locked, side rails up x3, call light within reach.    Problem: Adult Inpatient Plan of Care  Goal: Plan of Care Review  Outcome: Ongoing, Progressing  Goal: Patient-Specific Goal (Individualized)  Outcome: Ongoing, Progressing  Goal: Absence of Hospital-Acquired Illness or Injury  Outcome: Ongoing, Progressing  Goal: Optimal Comfort and Wellbeing  Outcome: Ongoing, Progressing  Goal: Readiness for Transition of Care  Outcome: Ongoing, Progressing

## 2023-01-20 NOTE — PLAN OF CARE
Problem: Occupational Therapy  Goal: Occupational Therapy Goal  Description: Goals to be met by: 2/3/2023     Patient will increase functional independence with ADLs by performing:    UE Dressing with Stand-by Assistance.  LE Dressing with Minimal Assistance.  Grooming while standing at sink with Contact Guard Assistance.  Toileting from bedside commode with Minimal Assistance for hygiene and clothing management.   Toilet transfer to bedside commode with Stand-by Assistance.    Outcome: Ongoing, Progressing     Initial OT eval/treat complete.  Has SPC, built-in shower bench, grab bar in shower and near toilet; also has RW not in use PTA.  DME needs TBD pending progress.  Recommend post acute OT in SNF.  To benefit from continued acute care OT services to increase independence in self-care/functional transfers.  OT to follow.

## 2023-01-20 NOTE — ASSESSMENT & PLAN NOTE
Elevated blood pressure   Start amlodipine 5 milligrams p.o. daily   Hydralazine p.r.n. systolic BP >180mmHg

## 2023-01-20 NOTE — PT/OT/SLP EVAL
Physical Therapy Evaluation    Patient Name:  Cheyenne Garner   MRN:  [id number]    Recommendations:     Discharge Recommendations: nursing facility, skilled   Discharge Equipment Recommendations:  (TBD)   Barriers to discharge: Decreased caregiver support and functional mobility impairments    Assessment:     Cheyenne Garner is a 87 y.o. female admitted with a medical diagnosis of Diverticulitis of large intestine with perforation and abscess without bleeding; she is s/p sigmoid colectomy and sigmoidoscopy. She has a past medical history that includes but is not limited to UTI, HLD, ADHD, OA, polyneuropathy, hematuria, insomnia, and sarcoidosis.  She presents with the following impairments/functional limitations: weakness, impaired endurance, impaired self care skills, impaired functional mobility, gait instability, impaired balance, impaired cognition, decreased lower extremity function, decreased safety awareness, pain.    PT orders received. PT evaluation completed and goals/POC established. Pt tolerated evaluation fairly, however, impaired activity tolerance noted. Pt performed bedside transfers and side stepping ambulation with assistance. PT will continue to follow and progress goals as tolerated. Recommend discharge to SNF.     Rehab Prognosis: Good; patient would benefit from acute skilled PT services to address these deficits and reach maximum level of function.    Recent Surgery: Procedure(s) (LRB):  COLECTOMY, SIGMOID (N/A)  SIGMOIDOSCOPY, FLEXIBLE  MOBILIZATION, SPLENIC FLEXURE 3 Days Post-Op    Plan:     During this hospitalization, patient to be seen 5 x/week to address the identified rehab impairments via gait training, therapeutic activities, therapeutic exercises, neuromuscular re-education and progress toward the following goals:    Plan of Care Expires:  02/19/23    Subjective     Chief Complaint: weakness   Patient/Family Comments/goals: Pt agreeable to therapy session; noting she needed to  be cleaned prior to session.   Pain/Comfort:  Pain Rating 1: 5/10  Location - Side 1: Bilateral  Location - Orientation 1: lower  Location 1: abdomen  Pain Addressed 1: Cessation of Activity, Reposition, Distraction, Nurse notified    Patients cultural, spiritual, Christian conflicts given the current situation: no    Living Environment:  Pt lives alone in a condo with elevator access. She has access to a walk-in shower with a built-in seat. Prior to admission, patients level of function was modified independent with a SPC.  Equipment used at home: cane, straight (built in shower bench).  DME owned (not currently used): rolling walker.  Upon discharge, patient will have limited assistance per patient.     Objective:     Patient found supine with PureWick, peripheral IV, bed alarm  upon PT entry to room.    General Precautions: Standard, fall  Orthopedic Precautions:N/A   Braces: N/A  Respiratory Status: Room air    Exams:  Cognition:   Patient is oriented to self, place, time and situation.  Pt follows approximately 100% of simple commands.    Mood: Pleasant and cooperative.   Safety Awareness: impaired  Musculoskeletal:  Posture:  rounded shoulders, forward head  LE ROM/Strength:   R ROM: WFL  L ROM: WFL  R Strength:   WFL  L Strength:   WFL  Neuromuscular:  Sensation: Impaired to light touch bilateral LEs.   Tone/Reflexes: No impairments identified with functional mobility. No formal testing performed.   Balance:   Static sitting: SBA  Dynamic sitting: CGA  Static standing: Min A  Dynamic standing: Min A  Visual-vestibular: No impairments identified with functional mobility. No formal testing performed.  Integument:  lower abdominal incision with staples noted  Cardiopulmonary:  Edema: none observed    Functional Mobility:  Bed Mobility:     Rolling Left:  minimum assistance  Rolling Right: minimum assistance  Supine to Sit: moderate assistance  Sit to Supine: moderate assistance  Transfers:     Sit to Stand:   minimum assistance with hand-held assist (2 trials)  Gait: x3 side steps with minimum assistance with hand-held assist. Pt with decreased speed, federica, and bilateral step length and foot clearance. Decreased stability with forward-flexed posture, however, no LOB noted.     AM-PAC 6 CLICK MOBILITY  Total Score:13     Treatment & Education:  Bed mobility, transfer, and gait training as described above.  Pt performed bed mobility as described above in order to be cleaned after BM by PCT and PT.     PT educated patient re:   PT plan of care/role of PT  Fall and safety precautions  Gait deviations  Use of call light (don't get up without assistance)  Pt verbalized understanding, however, needs reinforcement.     Patient left HOB elevated with all lines intact, call button in reach, and bed alarm on.    GOALS:   Multidisciplinary Problems       Physical Therapy Goals          Problem: Physical Therapy    Goal Priority Disciplines Outcome Goal Variances Interventions   Physical Therapy Goal     PT, PT/OT Ongoing, Progressing     Description: Goals to be met by: 2/29/23    Patient will perform the following to increase strength, improve mobility, and return to prior level of function:    1. Supine <> sit with CGA.  2. Sit<>stand with SBA with least restrictive assistive device.  3. Gait x 50 feet with SBA with least restrictive assistive device.                           History:     Past Medical History:   Diagnosis Date    Cataract     Hyperlipidemia     Inflammatory bowel disease     Sarcoidosis with granulomatous hepatitis     UTI (urinary tract infection) 7/17/2013       Past Surgical History:   Procedure Laterality Date    BLADDER SURGERY      BREAST SURGERY      COLECTOMY, SIGMOID N/A 1/17/2023    Procedure: COLECTOMY, SIGMOID;  Surgeon: Claudia Kearns MD;  Location: [facility] OR;  Service: Colon and Rectal;  Laterality: N/A;    EYE SURGERY      FLEXIBLE SIGMOIDOSCOPY  1/17/2023    Procedure: SIGMOIDOSCOPY, FLEXIBLE;   Surgeon: Claudia Kearns MD;  Location: [facility];  Service: Colon and Rectal;;    HIP SURGERY      HYSTERECTOMY      JOINT REPLACEMENT      MOBILIZATION OF SPLENIC FLEXURE  1/17/2023    Procedure: MOBILIZATION, SPLENIC FLEXURE;  Surgeon: Claudia Kearns MD;  Location: [facility];  Service: Colon and Rectal;;    SINUS SURGERY      TOTAL REDUCTION MAMMOPLASTY         Time Tracking:     PT Received On: 01/20/23  PT Start Time: 0824     PT Stop Time: 0846  PT Total Time (min): 22 min     Billable Minutes: Evaluation 14 and Therapeutic Activity 8      01/20/2023

## 2023-01-20 NOTE — PROGRESS NOTES
"Advance Care Planning  Met with patient at bedside. She reports that she "did a lot" today and that she was tired. She mentioned she is eager to go home and the plan is to go home Sunday. No complaints of pain at this time. Sister in law called during visit and wanted to proceed with that conversation about her Parmjit gras plans.  Support given, will follow along.    "

## 2023-01-20 NOTE — PT/OT/SLP EVAL
Occupational Therapy   Evaluation and Treatment    Name: Cheyenne Garner  MRN: [id number]  Admitting Diagnosis: Diverticulitis of large intestine with perforation and abscess without bleeding  Recent Surgery: Procedure(s) (LRB):  COLECTOMY, SIGMOID (N/A)  SIGMOIDOSCOPY, FLEXIBLE  MOBILIZATION, SPLENIC FLEXURE 3 Days Post-Op    Recommendations:     Discharge Recommendations: nursing facility, skilled  Discharge Equipment Recommendations:   (Current needs TBD though will also defer to next level of care)  Barriers to discharge:   (current functional level)    Assessment:   Initial OT eval/treat complete.  Requiring increased encouragement and education on role of OT and importance of OOB activity with Pt. Expressing frustration with getting OOB.  Sit>supine with MOD A and increased cuing for log roll tech.  MIN A with HHA for functional transfers this day.  Able to clean front gabriele region while seated though requiring assist for thoroughly cleaning back gabriele region in stance with forward lean and BUE supported at BS.  Has SPC, built-in shower bench, grab bar in shower and near toilet; also has RW not in use PTA.  DME needs TBD pending progress.  Recommend post acute OT in SNF.  Lives alone though has a paid assistance for ADL and IADL tasks Monday through Friday in day time hours.  To benefit from continued acute care OT services to increase independence in self-care/functional transfers.  OT to follow.     Cheyenne Garner is a 87 y.o. female with a medical diagnosis of Diverticulitis of large intestine with perforation and abscess without bleeding.  She presents with below deficits decreasing independence in self-care/functional transfers. Performance deficits affecting function: weakness, impaired endurance, impaired self care skills, impaired functional mobility, gait instability, decreased lower extremity function, decreased upper extremity function, impaired balance, decreased safety awareness.      Rehab  Prognosis: Good; patient would benefit from acute skilled OT services to address these deficits and reach maximum level of function.       Plan:     Patient to be seen 4 x/week to address the above listed problems via self-care/home management, therapeutic activities, therapeutic exercises  Plan of Care Expires: 02/03/23  Plan of Care Reviewed with: patient    Subjective     Chief Complaint: With c/o peripheral IV site pain with RN made aware.   Patient/Family Comments/goals: No goals stated.     Occupational Profile:  Lives alone in [address] [place] [address] with elevator access.  Bathroom setup as walk-in shower with built-in shower bench, standard toilet, and grab bars in walk-in shower and near toilet.  Previously ambulating with SPC and sitter coming in Monday through Friday to assist with ADL tasks; Pt. And sitter report that Pt. Dresses herself though performs bathing with sitter in bathroom for safety.  Sitter runs needed errands.  Equipment Used at Home: cane, straight, grab bar (built-in shower bench; also has RW not in use PTA)  Assistance upon Discharge: Sitter able to assist at home.     Pain/Comfort:  Pain Rating 1: 6/10  Location - Side 1: Left  Location - Orientation 1:  (peripheral IV site)  Location 1:  (forearm)  Pain Addressed 1: Reposition, Distraction, Cessation of Activity, Nurse notified  Pain Rating Post-Intervention 1: 4/10    Patients cultural, spiritual, Hindu conflicts given the current situation:  (None stated.)    Objective:     Communicated with: Maddie Waller LPN prior to session.  Patient found HOB elevated with bed alarm, peripheral IV, PureWick upon OT entry to room.    General Precautions: Standard, fall, NPO  Orthopedic Precautions: N/A  Braces: N/A  Respiratory Status: Room air    Occupational Performance:    Bed Mobility:    Supine>sit with SBA, HOB elevated, and increased time; sit>supine with MOD A for BLE management with increased verbal cuing for log roll tech.      Functional  Mobility/Transfers:  Step transfer with MIN A and HHA bed<>BSC with increased verbal cuing for safe hand transitions during transfer.  Sit<>stand from BSC X 3 trials with CGA and MIN cuing for safe hand placement during transition.      Activities of Daily Living:  Voiding and having small soft BM while seated at [facility]; able to clean front gabriele region while seated though requiring assist with thorough back gabriele hygiene in stance with forward flexed trunk and BUE supported at [facility] armrests during task.  Requiring setup of needed items for bed level hand hygiene.      Cognitive/Visual Perceptual:  Cognitive/Psychosocial Skills:  -       Oriented to: Person, Place, Time, and Situation   -       Follows Commands/attention:Follows one-step commands  -       Communication: able to make basic needs known and answer questions appropriately though requiring cuing for redirection occasionally  -       Memory: No Deficits noted  -       Safety awareness/insight to disability: impaired   -       Mood/Affect/Coping skills/emotional control: Cooperative and Annoyed  Visual/Perceptual:  -grossly intact    Physical Exam:  Postural examination/scapula alignment: -       Rounded shoulders  -       Forward head  Skin integrity: midline abdominal incision well documented in EMR  Upper Extremity Range of Motion:  -       Right Upper Extremity: WFL  -       Left Upper Extremity: WFL  Upper Extremity Strength: -       Right Upper Extremity: 3+/5 to 4-/5 gross   -       Left Upper Extremity:  3+/5 to 4-/5 gross   Strength: -       Right Upper Extremity: fair plus  -       Left Upper Extremity: fair plus  Fine Motor Coordination: -       Intact  Left hand thumb/finger opposition skills and Right hand thumb/finger opposition skills    AMPAC 6 Click ADL:  AMPAC Total Score: 15    Treatment & Education:  Educated on role of OT and POC.   Requiring increased education on purpose of OT in acute care and encouragement to participate at this  "time; Pt. Stating, "I guess I don't have a choice," and expressing frustration with request to participate in OOB activity.  Supine>sit with SBA, HOB elevated, and increased time; sit>supine with MOD A for BLE management with increased verbal cuing for log roll tech.    Step transfer with MIN A and HHA bed<>BSC with increased verbal cuing for safe hand transitions during transfer.  Sit<>stand from BSC X 3 trials with CGA and MIN cuing for safe hand placement during transition.    Voiding and having small soft BM while seated at BSC; able to clean front gabriele region while seated though requiring assist with thorough back gabriele hygiene in stance with forward flexed trunk and BUE supported at BSC armrests during task.  Requiring setup of needed items for bed level hand hygiene.    Received review of call light and importance of calling for assist as needed.     Patient left HOB elevated with all lines intact, call button in reach, bed alarm on, nursing notified, and personal sitter present    GOALS:   Multidisciplinary Problems       Occupational Therapy Goals          Problem: Occupational Therapy    Goal Priority Disciplines Outcome Interventions   Occupational Therapy Goal     OT, PT/OT Ongoing, Progressing    Description: Goals to be met by: 2/3/2023     Patient will increase functional independence with ADLs by performing:    UE Dressing with Stand-by Assistance.  LE Dressing with Minimal Assistance.  Grooming while standing at sink with Contact Guard Assistance.  Toileting from bedside commode with Minimal Assistance for hygiene and clothing management.   Toilet transfer to bedside commode with Stand-by Assistance.                         History:     Past Medical History:   Diagnosis Date    Cataract     Hyperlipidemia     Inflammatory bowel disease     Sarcoidosis with granulomatous hepatitis     UTI (urinary tract infection) 7/17/2013         Past Surgical History:   Procedure Laterality Date    BLADDER SURGERY  "     BREAST SURGERY      COLECTOMY, SIGMOID N/A 1/17/2023    Procedure: COLECTOMY, SIGMOID;  Surgeon: Claudia Kearns MD;  Location: [facility] OR;  Service: Colon and Rectal;  Laterality: N/A;    EYE SURGERY      FLEXIBLE SIGMOIDOSCOPY  1/17/2023    Procedure: SIGMOIDOSCOPY, FLEXIBLE;  Surgeon: Claudia Kearns MD;  Location: [facility] OR;  Service: Colon and Rectal;;    HIP SURGERY      HYSTERECTOMY      JOINT REPLACEMENT      MOBILIZATION OF SPLENIC FLEXURE  1/17/2023    Procedure: MOBILIZATION, SPLENIC FLEXURE;  Surgeon: Claudia Kearns MD;  Location: [facility];  Service: Colon and Rectal;;    SINUS SURGERY      TOTAL REDUCTION MAMMOPLASTY         Time Tracking:     OT Date of Treatment: 01/20/23  OT Start Time: 1228  OT Stop Time: 1306  OT Total Time (min): 38 min (5-minutes for MD interjection)    Billable Minutes:Evaluation 10  Self Care/Home Management 23 1/20/2023

## 2023-01-20 NOTE — ASSESSMENT & PLAN NOTE
S/p partial colectomy of signoid and descending colon with re-anastomosis.  Continue zosyn until 1/21 (4 days postop)  Surgery following  ambulate  Monitor for return of bowel function  Diet as tolerated    PT and OT recommending SNF.   CM following

## 2023-01-20 NOTE — PLAN OF CARE
Problem: Physical Therapy  Goal: Physical Therapy Goal  Description: Goals to be met by: 2/29/23    Patient will perform the following to increase strength, improve mobility, and return to prior level of function:    1. Supine <> sit with CGA.  2. Sit<>stand with SBA with least restrictive assistive device.  3. Gait x 50 feet with SBA with least restrictive assistive device.      Outcome: Ongoing, Progressing       PT orders received. PT evaluation completed and goals/POC established. Pt tolerated evaluation fairly, however, impaired activity tolerance noted. Pt performed bedside transfers and side stepping ambulation with assistance. PT will continue to follow and progress goals as tolerated. Recommend discharge to SNF.

## 2023-01-20 NOTE — PLAN OF CARE
CM reports patient's preferred SNFs as [facility], [facility], &  [facility] - referrals forwarded via [facility] - order placed for PPD

## 2023-01-20 NOTE — PROGRESS NOTES
Surgery Inpatient Progress Note    Date: 01/20/2023    Overnight events: Reports regurgitation with aspiration event yesterday.  Otherwise states she is passing flatus and had a bowel movement.  No nausea.     O:   Vitals:    01/20/23 0448   BP: (!) 168/76   Pulse: 70   Resp: 18   Temp: 98.5 °F (36.9 °C)       Physical Exam   Gen: well developed female, NAD   HEENT: normocephalic, atraumatic, PERRL, EOMI   CV: RRR, no murmurs  Res: nonlabored, CTAB   Abd: soft, midline incision approximated with staples, no erythema or drainage, appropriately tender to palpation    MSK: no gross deformities      Labs reviewed and stable     Assessment and plan:   Cheyenne Garner is a 87 y.o. female who presents with recurrent, perforated diverticulitis, now s/p exploratory laparotomy and left hemicolectomy on 1/17      Perforated diverticulitis   - continue antibiotics for 4 days post op  - diet as tolerated  - PT/OT, OOB TID, ambulate in hallways   - office will contact to schedule follow up if patient is discharged over the weekend.   - patient may call [phone] for any post-op questions or concerns.       Claudia Kearns MD  Staff Surgeon   Colon & Rectal Surgery

## 2023-01-20 NOTE — PROGRESS NOTES
[facility] Medicine  Progress Note    Patient Name: Cheyenne Garner  MRN: [id number]  Patient Class: IP- Inpatient   Admission Date: 1/16/2023  Length of Stay: 4 days  Attending Physician: Jose Lee MD  Primary Care Provider: Mary Cortes MD        Subjective:     Principal Problem:Diverticulitis of large intestine with perforation and abscess without bleeding        HPI:  Mrs. 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  She came to the emergency department today for abdominal pain that began yesterday.  The pain is located to the bilateral lower quadrants of her abdomen.  Patient denies nausea and vomiting.  She reports she frequently has diarrhea.  Patient reports that her pain has been controlled with pain medication given in the emergency department.  Patient's CT of her abdomen and pelvis with contrast that demonstrates acute diverticulitis with bowel per for duration and early abscess formation.  Patient was started on empiric Zosyn.  General surgery was consulted-appreciate recommendations.  Patient spoke with her GI physician Dr. Morales over the telephone.  Surgical intervention is recommended.  Dr. Kearns with colon and Rectal surgery will further evaluate patient tomorrow.  Patient admitted to hospital medicine for further management.      Overview/Hospital Course:  Perforated diverticulitis s/p sigmoid and descending colectomy.  She is tolerating diet and had a BM. Wang removed.  Esophagram consistent with esophageal dysmotility.      Interval History:  Patient passed bowel movement.  She passed voiding trial.  Wang was removed.  Patient will attempt ambulating today.  Plan to continue IV antibiotics for 4 days postop.    Review of Systems   Constitutional:  Negative for chills and fatigue.   HENT:   Negative for congestion and drooling.    Respiratory:  Negative for cough and shortness of breath.    Cardiovascular:  Negative for chest pain and leg swelling.   Gastrointestinal:  Positive for abdominal pain. Negative for constipation, nausea, rectal pain and vomiting.   Musculoskeletal:  Negative for back pain.   Skin:  Negative for pallor and rash.   Psychiatric/Behavioral:  Negative for agitation.    Objective:     Vital Signs (Most Recent):  Temp: 98 °F (36.7 °C) (01/20/23 1151)  Pulse: 79 (01/20/23 1449)  Resp: 20 (01/20/23 1151)  BP: (!) 170/74 (01/20/23 1449)  SpO2: 96 % (01/20/23 1151)   Vital Signs (24h Range):  Temp:  [97.7 °F (36.5 °C)-98.5 °F (36.9 °C)] 98 °F (36.7 °C)  Pulse:  [70-79] 79  Resp:  [16-20] 20  SpO2:  [95 %-97 %] 96 %  BP: (139-203)/(64-81) 170/74     Weight: 64.5 kg (142 lb 3.2 oz)  Body mass index is 24.41 kg/m².    Intake/Output Summary (Last 24 hours) at 1/20/2023 1539  Last data filed at 1/20/2023 1451  Gross per 24 hour   Intake 2020.03 ml   Output 375 ml   Net 1645.03 ml        Physical Exam  Vitals reviewed.   Constitutional:       General: She is not in acute distress.  HENT:      Head: Normocephalic.   Eyes:      General:         Right eye: No discharge.         Left eye: No discharge.      Pupils: Pupils are equal, round, and reactive to light.   Cardiovascular:      Rate and Rhythm: Normal rate and regular rhythm.   Pulmonary:      Effort: No respiratory distress.      Breath sounds: No wheezing.   Abdominal:      Tenderness: There is abdominal tenderness.      Comments: Surgical incision well approximated   Musculoskeletal:      Cervical back: Neck supple. No rigidity.      Right lower leg: No edema.      Left lower leg: No edema.   Skin:     General: Skin is warm.      Capillary Refill: Capillary refill takes less than 2 seconds.   Neurological:      General: No focal deficit present.      Mental Status: She is alert.      Comments: CN II-XII intact. No pronator drift.  Moving all 4 extermities       Significant Labs: All pertinent labs within the past 24 hours have been reviewed.  CBC:   Recent Labs   Lab 01/19/23  0440   WBC 7.90   HGB 9.9*   HCT 30.0*          CMP:   Recent Labs   Lab 01/19/23 0440 01/20/23  0429    141   K 3.1* 3.8   * 114*   CO2 22* 23    80   BUN 16 14   CREATININE 0.8 0.7   CALCIUM 7.2* 8.2*   ALBUMIN  --  2.4*   ANIONGAP 7* 4*         Significant Imaging: I have reviewed all pertinent imaging results/findings within the past 24 hours.      Assessment/Plan:      * Diverticulitis of large intestine with perforation and abscess without bleeding  S/p partial colectomy of signoid and descending colon with re-anastomosis.  Continue zosyn until 1/21 (4 days postop)  Surgery following  ambulate  Monitor for return of bowel function  Diet as tolerated    PT and OT recommending SNF.   CM following            Agitation  Reports of agitation and confusion in the afternoons.  Not present earlier in the hospitalization.  Neuro exam unrevealing.  Thought to be sundowning versus hospital-acquired delirium.    Increase activity   Continue monitoring   Tele sitter at night      Esophageal dysmotility  Two episodes of chest discomfort with swallowing.  Esophagram consistent with esophageal dysmotility   Family and primary care provider requesting gastroenterology consult.       Elevated blood pressure reading  Elevated blood pressure   Start amlodipine 5 milligrams p.o. daily   Hydralazine p.r.n. systolic BP >180mmHg      Advanced care planning/counseling discussion  Palliative Care is following      Drug-induced polyneuropathy  Reported neuropathy symptoms after taking cipro per the patient. Sensory neuropathy, no motor findings on exam.  Continue mirtazipine  Patient declines gabapentin      Primary insomnia  Continue mirtazazpine and geodon       Hyperlipidemia  Continue atorvastatin in place of simvastatin while inpatient      UTI (urinary tract  infection)  -Culture pending  -Patient on broad-spectrum antibiotics for her diverticulitis        VTE Risk Mitigation (From admission, onward)         Ordered     enoxaparin injection 40 mg  Daily         01/17/23 1543     IP VTE HIGH RISK PATIENT  Once         01/16/23 1645     Place sequential compression device  Until discontinued         01/16/23 1645                Discharge Planning   PAULA:      Code Status: DNR   Is the patient medically ready for discharge?:     Reason for patient still in hospital (select all that apply): Treatment and Consult recommendations  Discharge Plan A:  Skilled nursing facility                 Jose Lee MD  Department of Hospital Medicine   [facility] ([place])

## 2023-01-20 NOTE — ASSESSMENT & PLAN NOTE
Two episodes of chest discomfort with swallowing.  Esophagram consistent with esophageal dysmotility   Family and primary care provider requesting gastroenterology consult.

## 2023-01-20 NOTE — PLAN OF CARE
POC reviewed w/ pt. AAOx4. VSS on RA. C/o pain treated w/ scheduled and PRN pain medication per MAR. Turn Q2/frequent weight shift encouraged. Instructed to call for help ambulating. No injuries, falls, or trauma occurred during shift. Purposeful rounding completed. Bed alarm set, bed low and locked, side rails up x3, call light within reach.

## 2023-01-20 NOTE — TELEPHONE ENCOUNTER
----- Message from Claudia Kearns MD sent at 1/20/2023  7:57 AM CST -----  MsRobel VeePascual needs follow up in 7-10 days, post op follow up.  Prefers [facility] location.

## 2023-01-20 NOTE — ASSESSMENT & PLAN NOTE
Reports of agitation and confusion in the afternoons.  Not present earlier in the hospitalization.  Neuro exam unrevealing.  Thought to be sundowning versus hospital-acquired delirium.    Increase activity   Continue monitoring   Tele sitter at night

## 2023-01-20 NOTE — SUBJECTIVE & OBJECTIVE
Interval History:  Patient passed bowel movement.  She passed voiding trial.  Wang was removed.  Patient will attempt ambulating today.  Plan to continue IV antibiotics for 4 days postop.    Review of Systems   Constitutional:  Negative for chills and fatigue.   HENT:  Negative for congestion and drooling.    Respiratory:  Negative for cough and shortness of breath.    Cardiovascular:  Negative for chest pain and leg swelling.   Gastrointestinal:  Positive for abdominal pain. Negative for constipation, nausea, rectal pain and vomiting.   Musculoskeletal:  Negative for back pain.   Skin:  Negative for pallor and rash.   Psychiatric/Behavioral:  Negative for agitation.    Objective:     Vital Signs (Most Recent):  Temp: 98 °F (36.7 °C) (01/20/23 1151)  Pulse: 79 (01/20/23 1449)  Resp: 20 (01/20/23 1151)  BP: (!) 170/74 (01/20/23 1449)  SpO2: 96 % (01/20/23 1151)   Vital Signs (24h Range):  Temp:  [97.7 °F (36.5 °C)-98.5 °F (36.9 °C)] 98 °F (36.7 °C)  Pulse:  [70-79] 79  Resp:  [16-20] 20  SpO2:  [95 %-97 %] 96 %  BP: (139-203)/(64-81) 170/74     Weight: 64.5 kg (142 lb 3.2 oz)  Body mass index is 24.41 kg/m².    Intake/Output Summary (Last 24 hours) at 1/20/2023 1539  Last data filed at 1/20/2023 1451  Gross per 24 hour   Intake 2020.03 ml   Output 375 ml   Net 1645.03 ml        Physical Exam  Vitals reviewed.   Constitutional:       General: She is not in acute distress.  HENT:      Head: Normocephalic.   Eyes:      General:         Right eye: No discharge.         Left eye: No discharge.      Pupils: Pupils are equal, round, and reactive to light.   Cardiovascular:      Rate and Rhythm: Normal rate and regular rhythm.   Pulmonary:      Effort: No respiratory distress.      Breath sounds: No wheezing.   Abdominal:      Tenderness: There is abdominal tenderness.      Comments: Surgical incision well approximated   Musculoskeletal:      Cervical back: Neck supple. No rigidity.      Right lower leg: No edema.      Left  lower leg: No edema.   Skin:     General: Skin is warm.      Capillary Refill: Capillary refill takes less than 2 seconds.   Neurological:      General: No focal deficit present.      Mental Status: She is alert.      Comments: CN II-XII intact. No pronator drift. Moving all 4 extermities       Significant Labs: All pertinent labs within the past 24 hours have been reviewed.  CBC:   Recent Labs   Lab 01/19/23  0440   WBC 7.90   HGB 9.9*   HCT 30.0*          CMP:   Recent Labs   Lab 01/19/23  0440 01/20/23  0429    141   K 3.1* 3.8   * 114*   CO2 22* 23    80   BUN 16 14   CREATININE 0.8 0.7   CALCIUM 7.2* 8.2*   ALBUMIN  --  2.4*   ANIONGAP 7* 4*         Significant Imaging: I have reviewed all pertinent imaging results/findings within the past 24 hours.

## 2023-01-21 LAB
BACTERIA BLD CULT: NORMAL
BACTERIA BLD CULT: NORMAL
POCT GLUCOSE: 78 MG/DL (ref 70–110)

## 2023-01-21 PROCEDURE — 99232 PR SUBSEQUENT HOSPITAL CARE,LEVL II: ICD-10-PCS | Mod: ,,, | Performed by: STUDENT IN AN ORGANIZED HEALTH CARE EDUCATION/TRAINING PROGRAM

## 2023-01-21 PROCEDURE — 99231 SBSQ HOSP IP/OBS SF/LOW 25: CPT | Mod: ,,, | Performed by: SURGERY

## 2023-01-21 PROCEDURE — 97530 THERAPEUTIC ACTIVITIES: CPT | Mod: CQ

## 2023-01-21 PROCEDURE — 11000001 HC ACUTE MED/SURG PRIVATE ROOM

## 2023-01-21 PROCEDURE — 99232 SBSQ HOSP IP/OBS MODERATE 35: CPT | Mod: ,,, | Performed by: STUDENT IN AN ORGANIZED HEALTH CARE EDUCATION/TRAINING PROGRAM

## 2023-01-21 PROCEDURE — 97110 THERAPEUTIC EXERCISES: CPT | Mod: CQ

## 2023-01-21 PROCEDURE — 97116 GAIT TRAINING THERAPY: CPT | Mod: CQ

## 2023-01-21 PROCEDURE — 99231 PR SUBSEQUENT HOSPITAL CARE,LEVL I: ICD-10-PCS | Mod: ,,, | Performed by: SURGERY

## 2023-01-21 PROCEDURE — 63600175 PHARM REV CODE 636 W HCPCS: Performed by: NURSE PRACTITIONER

## 2023-01-21 PROCEDURE — 25000003 PHARM REV CODE 250: Performed by: NURSE PRACTITIONER

## 2023-01-21 PROCEDURE — 25000003 PHARM REV CODE 250: Performed by: SURGERY

## 2023-01-21 PROCEDURE — 25000003 PHARM REV CODE 250: Performed by: STUDENT IN AN ORGANIZED HEALTH CARE EDUCATION/TRAINING PROGRAM

## 2023-01-21 PROCEDURE — 63600175 PHARM REV CODE 636 W HCPCS: Performed by: SURGERY

## 2023-01-21 RX ORDER — AMLODIPINE BESYLATE 5 MG/1
10 TABLET ORAL DAILY
Status: DISCONTINUED | OUTPATIENT
Start: 2023-01-21 | End: 2023-01-25 | Stop reason: HOSPADM

## 2023-01-21 RX ADMIN — AMLODIPINE BESYLATE 10 MG: 5 TABLET ORAL at 09:01

## 2023-01-21 RX ADMIN — PIPERACILLIN AND TAZOBACTAM 4.5 G: 4; .5 INJECTION, POWDER, LYOPHILIZED, FOR SOLUTION INTRAVENOUS; PARENTERAL at 06:01

## 2023-01-21 RX ADMIN — IBUPROFEN 400 MG: 400 TABLET, FILM COATED ORAL at 09:01

## 2023-01-21 RX ADMIN — ACETAMINOPHEN 650 MG: 325 TABLET, FILM COATED ORAL at 06:01

## 2023-01-21 RX ADMIN — PIPERACILLIN AND TAZOBACTAM 4.5 G: 4; .5 INJECTION, POWDER, LYOPHILIZED, FOR SOLUTION INTRAVENOUS; PARENTERAL at 11:01

## 2023-01-21 RX ADMIN — ENOXAPARIN SODIUM 40 MG: 40 INJECTION SUBCUTANEOUS at 06:01

## 2023-01-21 RX ADMIN — MIRTAZAPINE 7.5 MG: 7.5 TABLET ORAL at 06:01

## 2023-01-21 RX ADMIN — IBUPROFEN 400 MG: 400 TABLET, FILM COATED ORAL at 03:01

## 2023-01-21 RX ADMIN — ACETAMINOPHEN 650 MG: 325 TABLET, FILM COATED ORAL at 05:01

## 2023-01-21 RX ADMIN — Medication 6 MG: at 09:01

## 2023-01-21 RX ADMIN — ATORVASTATIN CALCIUM 10 MG: 10 TABLET, FILM COATED ORAL at 09:01

## 2023-01-21 RX ADMIN — ZIPRASIDONE HCL 20 MG: 20 CAPSULE ORAL at 09:01

## 2023-01-21 RX ADMIN — PIPERACILLIN AND TAZOBACTAM 4.5 G: 4; .5 INJECTION, POWDER, LYOPHILIZED, FOR SOLUTION INTRAVENOUS; PARENTERAL at 03:01

## 2023-01-21 RX ADMIN — ACETAMINOPHEN 650 MG: 325 TABLET, FILM COATED ORAL at 11:01

## 2023-01-21 NOTE — SUBJECTIVE & OBJECTIVE
Interval History:  Patient doing well from surgery standpoint.    Positive bowel movements.    Pain well-controlled.    Minimal tenderness.    Discussed discharge with patient to likely go to rehab.        Medications:  Continuous Infusions:  Scheduled Meds:   acetaminophen  650 mg Oral Q6H    amLODIPine  10 mg Oral Daily    atorvastatin  10 mg Oral Daily    enoxaparin  40 mg Subcutaneous Daily    ibuprofen  400 mg Oral Q6H    mirtazapine  7.5 mg Oral Daily    mupirocin   Nasal BID    piperacillin-tazobactam (ZOSYN) IVPB  4.5 g Intravenous Q8H    ziprasidone  20 mg Oral Nightly     PRN Meds:benzonatate, dextrose 10%, dextrose 10%, gabapentin, glucagon (human recombinant), glucose, glucose, guaiFENesin 100 mg/5 ml, hydrALAZINE, melatonin, naloxone, ondansetron, oxyCODONE, prochlorperazine, sodium chloride 0.9%     Review of patient's allergies indicates:   Allergen Reactions    Ciprofloxacin (bulk) Other (See Comments)     Made feet numb and cold    Flagyl [metronidazole] Other (See Comments)     Loss of feeling in feet     Objective:     Vital Signs (Most Recent):  Temp: 98.3 °F (36.8 °C) (01/21/23 1125)  Pulse: 70 (01/21/23 1125)  Resp: 18 (01/21/23 1125)  BP: (!) 154/69 (01/21/23 1125)  SpO2: 97 % (01/21/23 1125)   Vital Signs (24h Range):  Temp:  [98.3 °F (36.8 °C)-98.8 °F (37.1 °C)] 98.3 °F (36.8 °C)  Pulse:  [70-83] 70  Resp:  [16-20] 18  SpO2:  [95 %-99 %] 97 %  BP: (134-183)/(64-91) 154/69     Weight: 64.5 kg (142 lb 3.2 oz)  Body mass index is 24.41 kg/m².    Intake/Output - Last 3 Shifts         01/19 0700 01/20 0659 01/20 0700 01/21 0659 01/21 0700 01/22 0659    P.O. 320 500 240    I.V. (mL/kg) 1184.1 (18.4)      IV Piggyback 395.9 181.3     Total Intake(mL/kg) 1900 (29.5) 681.3 (10.6) 240 (3.7)    Urine (mL/kg/hr) 725 (0.5) 1200 (0.8) 750 (1.4)    Stool 0 0 0    Total Output 725 1200 750    Net +1175 -518.7 -510           Urine Occurrence  2 x 1 x    Stool Occurrence 1 x 5 x 2 x            Physical  Exam  Vitals and nursing note reviewed.   Constitutional:       Appearance: She is well-developed.   HENT:      Head: Normocephalic and atraumatic.   Cardiovascular:      Rate and Rhythm: Normal rate.      Heart sounds: Normal heart sounds.   Pulmonary:      Effort: Pulmonary effort is normal.   Abdominal:      General: Bowel sounds are normal. There is no distension.      Palpations: Abdomen is soft.      Tenderness: There is no abdominal tenderness (minimal).   Musculoskeletal:         General: Normal range of motion.      Cervical back: Normal range of motion.   Skin:     General: Skin is warm and dry.      Capillary Refill: Capillary refill takes less than 2 seconds.   Neurological:      Mental Status: She is alert and oriented to person, place, and time.   Psychiatric:         Behavior: Behavior normal.       Significant Labs:  I have reviewed all pertinent lab results within the past 24 hours.  CBC:   Recent Labs   Lab 01/19/23  0440   WBC 7.90   RBC 3.15*   HGB 9.9*   HCT 30.0*      MCV 95   MCH 31.4*   MCHC 33.0     CMP:   Recent Labs   Lab 01/16/23  1206 01/17/23  0505 01/20/23  0429   GLU 96   < > 80   CALCIUM 8.4*   < > 8.2*   ALBUMIN 3.6  --  2.4*   PROT 6.9  --   --       < > 141   K 4.8   < > 3.8   CO2 19*   < > 23      < > 114*   BUN 14   < > 14   CREATININE 0.8   < > 0.7   ALKPHOS 74  --   --    ALT 29  --   --    AST 34  --   --    BILITOT 0.9  --   --     < > = values in this interval not displayed.

## 2023-01-21 NOTE — ASSESSMENT & PLAN NOTE
Reported neuropathy symptoms after taking cipro per the patient. Sensory neuropathy, no motor findings on exam.  Continue mirtazipine  Patient declines gabapentin  Outpatient neurology follow-up

## 2023-01-21 NOTE — CONSULTS
.[facility])  Gastroenterology  Consult Note    Patient Name: Cheyenne Garner  MRN: [id number]  Admission Date: 1/16/2023  Hospital Length of Stay: 5 days  Code Status: DNR   Primary Care Physician: Mary Cortes MD  Principal Problem:Diverticulitis of large intestine with perforation and abscess without bleeding    Inpatient consult to Gastroenterology  Consult performed by: Avtar Watson MD  Consult ordered by: Jose Lee MD      Subjective:     Chief complaint:  Dysphagia    HPI:  87-year-old woman who presented with complicated diverticulitis with a perforation.  She has since undergone a colon resection with primary anastomosis and seems to be doing well.  She tells me that occasionally she will feel a squeezing discomfort in her esophagus when eating.  There is no associated nausea or vomiting.  No heartburn.  This sensation improves on its own fairly quickly.      Past medical history:  Past Medical History:   Diagnosis Date    Cataract     Hyperlipidemia     Inflammatory bowel disease     Sarcoidosis with granulomatous hepatitis     UTI (urinary tract infection) 7/17/2013       Past surgical history:  Past Surgical History:   Procedure Laterality Date    BLADDER SURGERY      BREAST SURGERY      COLECTOMY, SIGMOID N/A 1/17/2023    Procedure: COLECTOMY, SIGMOID;  Surgeon: Claudia Kearns MD;  Location: [facility];  Service: Colon and Rectal;  Laterality: N/A;    EYE SURGERY      FLEXIBLE SIGMOIDOSCOPY  1/17/2023    Procedure: SIGMOIDOSCOPY, FLEXIBLE;  Surgeon: Claudia Kearns MD;  Location: [facility];  Service: Colon and Rectal;;    HIP SURGERY      HYSTERECTOMY      JOINT REPLACEMENT      MOBILIZATION OF SPLENIC FLEXURE  1/17/2023    Procedure: MOBILIZATION, SPLENIC FLEXURE;  Surgeon: Claudia Kearns MD;  Location: [facility];  Service: Colon and Rectal;;    SINUS SURGERY      TOTAL REDUCTION MAMMOPLASTY         Social history:  Social History     Socioeconomic History    Marital status:     Tobacco Use    Smoking status: Former     Types: Cigarettes    Smokeless tobacco: Never   Substance and Sexual Activity    Alcohol use: Yes     Alcohol/week: 3.0 standard drinks     Types: 1 Glasses of wine, 1 Cans of beer, 1 Shots of liquor per week    Drug use: No    Sexual activity: Not Currently     Social Determinants of Health     Financial Resource Strain: Low Risk     Difficulty of Paying Living Expenses: Not very hard   Food Insecurity: No Food Insecurity    Worried About Running Out of Food in the Last Year: Never true    Ran Out of Food in the Last Year: Never true   Transportation Needs: No Transportation Needs    Lack of Transportation (Medical): No    Lack of Transportation (Non-Medical): No   Physical Activity: Inactive    Days of Exercise per Week: 0 days    Minutes of Exercise per Session: 0 min   Stress: No Stress Concern Present    Feeling of Stress : Not at all   Social Connections: Moderately Integrated    Frequency of Communication with Friends and Family: Once a week    Frequency of Social Gatherings with Friends and Family: Twice a week    Attends Sabianist Services: 1 to 4 times per year    Active Member of Clubs or Organizations: No    Attends Club or Organization Meetings: Never    Marital Status:    Housing Stability: Low Risk     Unable to Pay for Housing in the Last Year: No    Number of Places Lived in the Last Year: 1    Unstable Housing in the Last Year: No       Family history:  Family History   Problem Relation Age of Onset    Cancer Mother     Breast cancer Sister     Cancer Sister        Medications:  Medications Prior to Admission   Medication Sig Dispense Refill Last Dose    dextroamphetamine-amphetamine 10 mg Tab Take by mouth once daily.   1/15/2023    gabapentin (NEURONTIN) 400 MG capsule Take 1 capsule (400 mg total) by mouth 3 (three) times daily. 90 capsule 11 1/15/2023    ergocalciferol (ERGOCALCIFEROL) 50,000 unit Cap Take 1 capsule (50,000 Units total)  by mouth twice a week. 20 capsule 2 Unknown    hydrocortisone (ANUSOL-HC) 2.5 % rectal cream Place rectally 2 (two) times daily. 1 each 1 Unknown    mirtazapine (REMERON) 7.5 MG Tab Take 1 tablet by mouth Daily.   Unknown    simvastatin (ZOCOR) 10 MG tablet Take 1 tablet (10 mg total) by mouth every evening to control cholesterol 90 tablet 3 Unknown    traZODone (DESYREL) 50 MG tablet Take 50 mg by mouth every evening.   Unknown    ziprasidone (GEODON) 20 MG Cap Take 1 capsule (20 mg total) by mouth nightly.   Unknown       Allergies:  Review of patient's allergies indicates:   Allergen Reactions    Ciprofloxacin (bulk) Other (See Comments)     Made feet numb and cold    Flagyl [metronidazole] Other (See Comments)     Loss of feeling in feet       Review of systems:  CONSTITUTIONAL: Negative for fever, chills,  weight loss, weight gain.  HEENT: Negative for hearing or vision changes, nasal congestion, dry mouth, sore throat.  CARDIOVASCULAR: Negative for chest pain or palpitations.  RESPIRATORY: Negative for SOB or cough.  GASTROINTESTINAL: See HPI  GENITOURINARY: Negative for dysuria or hematuria.  MUSCULOSKELETAL: Negative for joint or muscle pain.  SKIN: Negative for rashes/lesions.  NEUROLOGIC: Negative for headaches, numbness/tingling.  ENDOCRINE: Negative for excessive thirst.  HEMATOLOGIC: Negative for abnormal bruising.  Aside from above positives, complete 10 point review of systems negative.    Objective:     Vital Signs (Most Recent):  Temp: 98.3 °F (36.8 °C) (01/21/23 1125)  Pulse: 70 (01/21/23 1125)  Resp: 18 (01/21/23 1125)  BP: (!) 154/69 (01/21/23 1125)  SpO2: 97 % (01/21/23 1125)   Vital Signs (24h Range):  Temp:  [98.3 °F (36.8 °C)-98.8 °F (37.1 °C)] 98.3 °F (36.8 °C)  Pulse:  [70-83] 70  Resp:  [16-20] 18  SpO2:  [95 %-99 %] 97 %  BP: (134-183)/(64-91) 154/69     Physical examination:  General: well developed, elderly and frail, no apparent distress  HENT: NCAT, hearing grossly intact, no palpable  or visible thyroid mass  Eyes:  anicteric sclera  LYMH: No cervical or supraclavicular lymphadenopathy   Cardiovascular: Regular rate and rhythm. No murmurs appreciated.  Lungs: Non-labored respirations. Breath sounds equal.   Abdomen: soft, midline abdominal incision without erythema or discharge, mild appropriate tenderness, no distention, normal bowel sounds  Extremities: No C/C/E  Neuro: AA&O x 3  Psych: Appropriate mood and affect.   Skin: No jaundice  Musculoskeletal: no deformity    Labs:  CBC: No results for input(s): WBC, HGB, HCT, PLT in the last 48 hours.  CMP:   Recent Labs   Lab 01/20/23  0429   GLU 80   CALCIUM 8.2*   ALBUMIN 2.4*      K 3.8   CO2 23   *   BUN 14   CREATININE 0.7       Imaging:    Esophagram:  Esophagus is normal in caliber.  Numerous non propulsive tertiary wave contractions with contrast stasis consistent with dysmotility.  Contrast flows readily across the gastroesophageal junction without obstruction.  There is likely a small hiatal hernia.      Assessment:   87-year-old woman admitted with complicated diverticulitis who has now undergone a sigmoid resection and seems to be doing well.  Our group was consulted because of an abnormal esophagram.  She seems to be having minimal dysphagia symptoms as described above.  No evidence of obstruction, malignancy, etc..  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  This diagnosis was discussed with the patient.  Unfortunately, there is no specific treatment for this.  She would be unlikely to benefit from EGD or dilatation.    Plan:   1. Sit upright at 90° when eating  2. Take small bites and follow with sips of water     Will not follow closely.  Please call if needed.      Thank you for your consult.     Avtar Watson MD  Gastroenterology  [facility] ([place])

## 2023-01-21 NOTE — ASSESSMENT & PLAN NOTE
Postop day 4 Status post left hemicolectomy.  Patient doing great.    From surgery standpoint patient is stable and okay for discharge to rehab.    Staple still in place and will need to be taken out at 10-14 days after surgery.        .

## 2023-01-21 NOTE — PLAN OF CARE
POC reviewed with pt and she verbalized understanding. Pt reports pain is managed with current regimen. Intermittent confusion/forgetful - redirected as needed. Abd incision remains WDL - staples intact. Avasys camera removed per pt and family request - educated on fall risk. Bed alarm in use. Will continue with plan of care.     Problem: Adult Inpatient Plan of Care  Goal: Plan of Care Review  Outcome: Ongoing, Progressing  Goal: Patient-Specific Goal (Individualized)  Outcome: Ongoing, Progressing  Goal: Absence of Hospital-Acquired Illness or Injury  Outcome: Ongoing, Progressing  Goal: Optimal Comfort and Wellbeing  Outcome: Ongoing, Progressing  Goal: Readiness for Transition of Care  Outcome: Ongoing, Progressing     Problem: Fall Injury Risk  Goal: Absence of Fall and Fall-Related Injury  Outcome: Ongoing, Progressing     Problem: Coping Ineffective  Goal: Effective Coping  Outcome: Ongoing, Progressing     Problem: Infection  Goal: Absence of Infection Signs and Symptoms  Outcome: Ongoing, Progressing

## 2023-01-21 NOTE — ASSESSMENT & PLAN NOTE
S/p partial colectomy of signoid and descending colon with re-anastomosis.  Continue zosyn until 1/21 (4 days postop)  Surgery following  ambulate  Monitor for return of bowel function  Diet as tolerated    PT and OT recommending SNF.   CM following  Patient is inquiring if she may return home 24 hour care and home health services; will discuss with primary care physician

## 2023-01-21 NOTE — PROGRESS NOTES
[facility] Medicine  Progress Note    Patient Name: Cheyenne Garner  MRN: [id number]  Patient Class: IP- Inpatient   Admission Date: 1/16/2023  Length of Stay: 5 days  Attending Physician: Jose Lee MD  Primary Care Provider: Mary Cortes MD        Subjective:     Principal Problem:Diverticulitis of large intestine with perforation and abscess without bleeding        HPI:  Mrs. 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  She came to the emergency department today for abdominal pain that began yesterday.  The pain is located to the bilateral lower quadrants of her abdomen.  Patient denies nausea and vomiting.  She reports she frequently has diarrhea.  Patient reports that her pain has been controlled with pain medication given in the emergency department.  Patient's CT of her abdomen and pelvis with contrast that demonstrates acute diverticulitis with bowel per for duration and early abscess formation.  Patient was started on empiric Zosyn.  General surgery was consulted-appreciate recommendations.  Patient spoke with her GI physician Dr. Morales over the telephone.  Surgical intervention is recommended.  Dr. Kearns with colon and Rectal surgery will further evaluate patient tomorrow.  Patient admitted to hospital medicine for further management.      Overview/Hospital Course:  Patient was found to have perforated diverticulitis s/p sigmoid and descending colectomy.  She is tolerating diet and had a BM. Wang removed.  Esophagram consistent with esophageal dysmotility.      Interval History:  Patient seen sitting in chair today.  She is inquiring she have rehabilitation with 24 hour care done at home.  Patient was seen by gastroenterology consult and is felt to not likely benefit from EGD or dilation.    Review of Systems    Constitutional:  Negative for chills and fatigue.   HENT:  Negative for congestion and drooling.    Respiratory:  Negative for cough and shortness of breath.    Cardiovascular:  Negative for chest pain and leg swelling.   Gastrointestinal:  Positive for abdominal pain. Negative for constipation, nausea, rectal pain and vomiting.   Musculoskeletal:  Negative for back pain.   Skin:  Negative for pallor and rash.   Psychiatric/Behavioral:  Negative for agitation.    Objective:     Vital Signs (Most Recent):  Temp: 98.3 °F (36.8 °C) (01/21/23 1125)  Pulse: 70 (01/21/23 1125)  Resp: 18 (01/21/23 1125)  BP: (!) 154/69 (01/21/23 1125)  SpO2: 97 % (01/21/23 1125)   Vital Signs (24h Range):  Temp:  [98.3 °F (36.8 °C)-98.8 °F (37.1 °C)] 98.3 °F (36.8 °C)  Pulse:  [70-83] 70  Resp:  [16-20] 18  SpO2:  [95 %-99 %] 97 %  BP: (134-183)/(64-91) 154/69     Weight: 64.5 kg (142 lb 3.2 oz)  Body mass index is 24.41 kg/m².    Intake/Output Summary (Last 24 hours) at 1/21/2023 1615  Last data filed at 1/21/2023 1301  Gross per 24 hour   Intake 601.26 ml   Output 1950 ml   Net -1348.74 ml        Physical Exam  Vitals reviewed.   Constitutional:       General: She is not in acute distress.  HENT:      Head: Normocephalic.   Eyes:      General:         Right eye: No discharge.         Left eye: No discharge.      Pupils: Pupils are equal, round, and reactive to light.   Cardiovascular:      Rate and Rhythm: Normal rate and regular rhythm.   Pulmonary:      Effort: No respiratory distress.      Breath sounds: No wheezing.   Abdominal:      Tenderness: There is abdominal tenderness.      Comments: Surgical incision well approximated   Musculoskeletal:      Cervical back: Neck supple. No rigidity.      Right lower leg: No edema.      Left lower leg: No edema.   Skin:     General: Skin is warm.      Capillary Refill: Capillary refill takes less than 2 seconds.   Neurological:      General: No focal deficit present.      Mental Status: She  is alert.      Comments: CN II-XII intact. No pronator drift. Moving all 4 extermities       Significant Labs: All pertinent labs within the past 24 hours have been reviewed.  CBC:   No results for input(s): WBC, HGB, HCT, PLT in the last 48 hours.    CMP:   Recent Labs   Lab 01/20/23  0429      K 3.8   *   CO2 23   GLU 80   BUN 14   CREATININE 0.7   CALCIUM 8.2*   ALBUMIN 2.4*   ANIONGAP 4*         Significant Imaging: I have reviewed all pertinent imaging results/findings within the past 24 hours.      Assessment/Plan:      * Diverticulitis of large intestine with perforation and abscess without bleeding  S/p partial colectomy of signoid and descending colon with re-anastomosis.  Continue zosyn until 1/21 (4 days postop)  Surgery following  ambulate  Monitor for return of bowel function  Diet as tolerated    PT and OT recommending SNF.   CM following  Patient is inquiring if she may return home 24 hour care and home health services; will discuss with primary care physician            Agitation  Reports of agitation and confusion in the afternoons.  Not present earlier in the hospitalization.  Neuro exam unrevealing.  Thought to be sundowning versus hospital-acquired delirium.    Increase activity   Continue monitoring      Esophageal dysmotility  Two episodes of chest discomfort with swallowing.  Esophagram consistent with esophageal dysmotility   Gastroenterology evaluated the patient consultation.  Unlikely have benefit from EGD with dilation.    No specific treatment   Sit upright with meals, continue speech pathology if desired      Elevated blood pressure reading  Elevated blood pressure   Continue amlodipine  Hydralazine p.r.n. systolic BP >180mmHg      Advanced care planning/counseling discussion  Palliative Care is following      Drug-induced polyneuropathy  Reported neuropathy symptoms after taking cipro per the patient. Sensory neuropathy, no motor findings on exam.  Continue  mirtazipine  Patient declines gabapentin  Outpatient neurology follow-up      Primary insomnia  Continue mirtazazpine and geodon       Hyperlipidemia  Continue atorvastatin in place of simvastatin while inpatient      UTI (urinary tract infection)  Urine culture E coli sensitive to Zosyn    Status post treatment        VTE Risk Mitigation (From admission, onward)         Ordered     enoxaparin injection 40 mg  Daily         01/17/23 1543     IP VTE HIGH RISK PATIENT  Once         01/16/23 1645     Place sequential compression device  Until discontinued         01/16/23 1645                Discharge Planning   PAULA:      Code Status: DNR   Is the patient medically ready for discharge?:     Reason for patient still in hospital (select all that apply): Treatment and Consult recommendations  Discharge Plan A: Home Health                  Jose Lee MD  Department of Hospital Medicine   [facility] - Med [facility] ([place])

## 2023-01-21 NOTE — ASSESSMENT & PLAN NOTE
Two episodes of chest discomfort with swallowing.  Esophagram consistent with esophageal dysmotility   Gastroenterology evaluated the patient consultation.  Unlikely have benefit from EGD with dilation.    No specific treatment   Sit upright with meals, continue speech pathology if desired

## 2023-01-21 NOTE — PROGRESS NOTES
[facility] S[facility] ([place])  General Surgery  Progress Note    Subjective:     History of Present Illness:  87 y.o. woman with a history of cataracts, former tobacco use, inflammatory bowel disease, hyperlipidemia, diverticulosis with abscess treated with cipro/flagyl and development of peripheral neuropathy, bladder surgery, hysterectomy, abdominoplasty and depression presenting with weakness and abdominal pain.  She reports she felt very weak today and is experiencing diffuse abdominal pain.  She reports a prior episode of diverticulitis with an abscess treated nonoperatively with antibiotics.  The last CT scan (11/2022) done prior to this admission demonstrated near complete resolution of the previous pericolonic abscess from June/August 2022 with pericolonic inflammation and stranding.  On this admission the CT scan shows diverticulitis with pneumoperitoneum.  The patient denies fevers, chills and reports she always has diarrhea, denies melena or blood per rectum.  She denies urinary symptoms.        Post-Op Info:  Procedure(s) (LRB):  COLECTOMY, SIGMOID (N/A)  SIGMOIDOSCOPY, FLEXIBLE  MOBILIZATION, SPLENIC FLEXURE   4 Days Post-Op     Interval History:  Patient doing well from surgery standpoint.    Positive bowel movements.    Pain well-controlled.    Minimal tenderness.    Discussed discharge with patient to likely go to rehab.        Medications:  Continuous Infusions:  Scheduled Meds:   acetaminophen  650 mg Oral Q6H    amLODIPine  10 mg Oral Daily    atorvastatin  10 mg Oral Daily    enoxaparin  40 mg Subcutaneous Daily    ibuprofen  400 mg Oral Q6H    mirtazapine  7.5 mg Oral Daily    mupirocin   Nasal BID    piperacillin-tazobactam (ZOSYN) IVPB  4.5 g Intravenous Q8H    ziprasidone  20 mg Oral Nightly     PRN Meds:benzonatate, dextrose 10%, dextrose 10%, gabapentin, glucagon (human recombinant), glucose, glucose, guaiFENesin 100 mg/5 ml, hydrALAZINE, melatonin, naloxone, ondansetron, oxyCODONE,  prochlorperazine, sodium chloride 0.9%     Review of patient's allergies indicates:   Allergen Reactions    Ciprofloxacin (bulk) Other (See Comments)     Made feet numb and cold    Flagyl [metronidazole] Other (See Comments)     Loss of feeling in feet     Objective:     Vital Signs (Most Recent):  Temp: 98.3 °F (36.8 °C) (01/21/23 1125)  Pulse: 70 (01/21/23 1125)  Resp: 18 (01/21/23 1125)  BP: (!) 154/69 (01/21/23 1125)  SpO2: 97 % (01/21/23 1125)   Vital Signs (24h Range):  Temp:  [98.3 °F (36.8 °C)-98.8 °F (37.1 °C)] 98.3 °F (36.8 °C)  Pulse:  [70-83] 70  Resp:  [16-20] 18  SpO2:  [95 %-99 %] 97 %  BP: (134-183)/(64-91) 154/69     Weight: 64.5 kg (142 lb 3.2 oz)  Body mass index is 24.41 kg/m².    Intake/Output - Last 3 Shifts         01/19 0700  01/20 0659 01/20 0700  01/21 0659 01/21 0700  01/22 0659    P.O. 320 500 240    I.V. (mL/kg) 1184.1 (18.4)      IV Piggyback 395.9 181.3     Total Intake(mL/kg) 1900 (29.5) 681.3 (10.6) 240 (3.7)    Urine (mL/kg/hr) 725 (0.5) 1200 (0.8) 750 (1.4)    Stool 0 0 0    Total Output 725 1200 750    Net +1175 -518.7 -510           Urine Occurrence  2 x 1 x    Stool Occurrence 1 x 5 x 2 x            Physical Exam  Vitals and nursing note reviewed.   Constitutional:       Appearance: She is well-developed.   HENT:      Head: Normocephalic and atraumatic.   Cardiovascular:      Rate and Rhythm: Normal rate.      Heart sounds: Normal heart sounds.   Pulmonary:      Effort: Pulmonary effort is normal.   Abdominal:      General: Bowel sounds are normal. There is no distension.      Palpations: Abdomen is soft.      Tenderness: There is no abdominal tenderness (minimal).   Musculoskeletal:         General: Normal range of motion.      Cervical back: Normal range of motion.   Skin:     General: Skin is warm and dry.      Capillary Refill: Capillary refill takes less than 2 seconds.   Neurological:      Mental Status: She is alert and oriented to person, place, and time.    Psychiatric:         Behavior: Behavior normal.       Significant Labs:  I have reviewed all pertinent lab results within the past 24 hours.  CBC:   Recent Labs   Lab 01/19/23  0440   WBC 7.90   RBC 3.15*   HGB 9.9*   HCT 30.0*      MCV 95   MCH 31.4*   MCHC 33.0     CMP:   Recent Labs   Lab 01/16/23  1206 01/17/23  0505 01/20/23  0429   GLU 96   < > 80   CALCIUM 8.4*   < > 8.2*   ALBUMIN 3.6  --  2.4*   PROT 6.9  --   --       < > 141   K 4.8   < > 3.8   CO2 19*   < > 23      < > 114*   BUN 14   < > 14   CREATININE 0.8   < > 0.7   ALKPHOS 74  --   --    ALT 29  --   --    AST 34  --   --    BILITOT 0.9  --   --     < > = values in this interval not displayed.         Assessment/Plan:     * Diverticulitis of large intestine with perforation and abscess without bleeding  Postop day 4 Status post left hemicolectomy.  Patient doing great.    From surgery standpoint patient is stable and okay for discharge to rehab.    Staple still in place and will need to be taken out at 10-14 days after surgery.        .        Cayden Scott MD  General Surgery  [facility] - Med Surg ([place])

## 2023-01-21 NOTE — PLAN OF CARE
POC reviewed. No significant events this shift. Purposeful rounding completed. VSS. Assessment per flowsheets. Received IV antibiotics according to MAR. Midline incision intact with no drainage noted. No injuries, falls, or trauma occurred during shift. Bed low and locked, side rails up x3, call light within reach. Safety maintained.     Problem: Adult Inpatient Plan of Care  Goal: Plan of Care Review  Outcome: Ongoing, Progressing  Goal: Patient-Specific Goal (Individualized)  Outcome: Ongoing, Progressing  Goal: Absence of Hospital-Acquired Illness or Injury  Outcome: Ongoing, Progressing  Goal: Optimal Comfort and Wellbeing  Outcome: Ongoing, Progressing  Goal: Readiness for Transition of Care  Outcome: Ongoing, Progressing     Problem: Fall Injury Risk  Goal: Absence of Fall and Fall-Related Injury  Outcome: Ongoing, Progressing     Problem: Infection  Goal: Absence of Infection Signs and Symptoms  Outcome: Ongoing, Progressing

## 2023-01-21 NOTE — PT/OT/SLP PROGRESS
Physical Therapy Treatment    Patient Name:  Cheyenne Garner   MRN:  [id number]    Recommendations:     Discharge Recommendations: nursing facility, skilled  Discharge Equipment Recommendations:  (TBD)  Barriers to discharge: None    Assessment:     Cheyenne Garner is a 87 y.o. female admitted with a medical diagnosis of Diverticulitis of large intestine with perforation and abscess without bleeding.  She presents with the following impairments/functional limitations: weakness, impaired endurance, impaired self care skills, impaired functional mobility, gait instability, decreased lower extremity function, decreased upper extremity function, impaired balance, decreased safety awareness.    Patient is agreeable to therapy today. Patient performs TE at EOB participates in transfers and gait training requiring extended time to complete interventions d/t pain and general weakness.     Rehab Prognosis: Good; patient would benefit from acute skilled PT services to address these deficits and reach maximum level of function.    Recent Surgery: Procedure(s) (LRB):  COLECTOMY, SIGMOID (N/A)  SIGMOIDOSCOPY, FLEXIBLE  MOBILIZATION, SPLENIC FLEXURE 4 Days Post-Op    Plan:     During this hospitalization, patient to be seen 5 x/week to address the identified rehab impairments via gait training, therapeutic activities, therapeutic exercises, neuromuscular re-education and progress toward the following goals:    Plan of Care Expires:  02/19/23    Subjective     Chief Complaint: general pain   Patient/Family Comments/goals: none stated   Pain/Comfort:  Pain Rating 1: 5/10  Location - Side 1: Left  Pain Addressed 1: Pre-medicate for activity, Reposition, Distraction, Cessation of Activity      Objective:     Communicated with nurse Hunter prior to session.  Patient found HOB elevated with bed alarm, peripheral IV, PureWick upon PT entry to room.     General Precautions: Standard, fall, NPO  Orthopedic Precautions: N/A  Braces:  N/A  Respiratory Status: Room air     Functional Mobility:  Bed Mobility:     Scooting: contact guard assistance  Supine to Sit: minimum assistance  Sit to Supine: minimum assistance  Transfers:     Sit to Stand:  minimum assistance with hand-held assist  Gait: 20 ft HH step through pattern slow [employer].       AM-PAC 6 CLICK MOBILITY  Turning over in bed (including adjusting bedclothes, sheets and blankets)?: 3  Sitting down on and standing up from a chair with arms (e.g., wheelchair, bedside commode, etc.): 3  Moving from lying on back to sitting on the side of the bed?: 3  Moving to and from a bed to a chair (including a wheelchair)?: 2  Need to walk in hospital room?: 2  Climbing 3-5 steps with a railing?: 1  Basic Mobility Total Score: 14       Treatment & Education:  EOB TE BLE: 2 x 10 heel raises, LAQ, heel slides, abd/add    Patient left HOB elevated with all lines intact, call button in reach, and nurse notified..    GOALS:   Multidisciplinary Problems       Physical Therapy Goals          Problem: Physical Therapy    Goal Priority Disciplines Outcome Goal Variances Interventions   Physical Therapy Goal     PT, PT/OT Ongoing, Progressing     Description: Goals to be met by: 2/29/23    Patient will perform the following to increase strength, improve mobility, and return to prior level of function:    1. Supine <> sit with CGA.  2. Sit<>stand with SBA with least restrictive assistive device.  3. Gait x 50 feet with SBA with least restrictive assistive device.                           Time Tracking:     PT Received On: 01/21/23  PT Start Time: 1007     PT Stop Time: 1050  PT Total Time (min): 43 min     Billable Minutes: Gait Training 10, Therapeutic Activity 23, and Therapeutic Exercise 10    Treatment Type: Treatment  PT/PTA: PTA     PTA Visit Number: 1 01/21/2023

## 2023-01-21 NOTE — SUBJECTIVE & OBJECTIVE
Interval History:  Patient seen sitting in chair today.  She is inquiring she have rehabilitation with 24 hour care done at home.  Patient was seen by gastroenterology consult and is felt to not likely benefit from EGD or dilation.    Review of Systems   Constitutional:  Negative for chills and fatigue.   HENT:  Negative for congestion and drooling.    Respiratory:  Negative for cough and shortness of breath.    Cardiovascular:  Negative for chest pain and leg swelling.   Gastrointestinal:  Positive for abdominal pain. Negative for constipation, nausea, rectal pain and vomiting.   Musculoskeletal:  Negative for back pain.   Skin:  Negative for pallor and rash.   Psychiatric/Behavioral:  Negative for agitation.    Objective:     Vital Signs (Most Recent):  Temp: 98.3 °F (36.8 °C) (01/21/23 1125)  Pulse: 70 (01/21/23 1125)  Resp: 18 (01/21/23 1125)  BP: (!) 154/69 (01/21/23 1125)  SpO2: 97 % (01/21/23 1125)   Vital Signs (24h Range):  Temp:  [98.3 °F (36.8 °C)-98.8 °F (37.1 °C)] 98.3 °F (36.8 °C)  Pulse:  [70-83] 70  Resp:  [16-20] 18  SpO2:  [95 %-99 %] 97 %  BP: (134-183)/(64-91) 154/69     Weight: 64.5 kg (142 lb 3.2 oz)  Body mass index is 24.41 kg/m².    Intake/Output Summary (Last 24 hours) at 1/21/2023 1615  Last data filed at 1/21/2023 1301  Gross per 24 hour   Intake 601.26 ml   Output 1950 ml   Net -1348.74 ml        Physical Exam  Vitals reviewed.   Constitutional:       General: She is not in acute distress.  HENT:      Head: Normocephalic.   Eyes:      General:         Right eye: No discharge.         Left eye: No discharge.      Pupils: Pupils are equal, round, and reactive to light.   Cardiovascular:      Rate and Rhythm: Normal rate and regular rhythm.   Pulmonary:      Effort: No respiratory distress.      Breath sounds: No wheezing.   Abdominal:      Tenderness: There is abdominal tenderness.      Comments: Surgical incision well approximated   Musculoskeletal:      Cervical back: Neck supple. No  rigidity.      Right lower leg: No edema.      Left lower leg: No edema.   Skin:     General: Skin is warm.      Capillary Refill: Capillary refill takes less than 2 seconds.   Neurological:      General: No focal deficit present.      Mental Status: She is alert.      Comments: CN II-XII intact. No pronator drift. Moving all 4 extermities       Significant Labs: All pertinent labs within the past 24 hours have been reviewed.  CBC:   No results for input(s): WBC, HGB, HCT, PLT in the last 48 hours.    CMP:   Recent Labs   Lab 01/20/23  0429      K 3.8   *   CO2 23   GLU 80   BUN 14   CREATININE 0.7   CALCIUM 8.2*   ALBUMIN 2.4*   ANIONGAP 4*         Significant Imaging: I have reviewed all pertinent imaging results/findings within the past 24 hours.

## 2023-01-21 NOTE — ASSESSMENT & PLAN NOTE
Reports of agitation and confusion in the afternoons.  Not present earlier in the hospitalization.  Neuro exam unrevealing.  Thought to be sundowning versus hospital-acquired delirium.    Increase activity   Continue monitoring

## 2023-01-22 LAB — TB INDURATION 48 - 72 HR READ: 0 MM

## 2023-01-22 PROCEDURE — 25000003 PHARM REV CODE 250: Performed by: SURGERY

## 2023-01-22 PROCEDURE — 63600175 PHARM REV CODE 636 W HCPCS: Performed by: NURSE PRACTITIONER

## 2023-01-22 PROCEDURE — 25000003 PHARM REV CODE 250: Performed by: STUDENT IN AN ORGANIZED HEALTH CARE EDUCATION/TRAINING PROGRAM

## 2023-01-22 PROCEDURE — 25000003 PHARM REV CODE 250: Performed by: NURSE PRACTITIONER

## 2023-01-22 PROCEDURE — 99222 1ST HOSP IP/OBS MODERATE 55: CPT | Mod: ,,, | Performed by: STUDENT IN AN ORGANIZED HEALTH CARE EDUCATION/TRAINING PROGRAM

## 2023-01-22 PROCEDURE — 63600175 PHARM REV CODE 636 W HCPCS: Performed by: SURGERY

## 2023-01-22 PROCEDURE — 99222 PR INITIAL HOSPITAL CARE,LEVL II: ICD-10-PCS | Mod: ,,, | Performed by: STUDENT IN AN ORGANIZED HEALTH CARE EDUCATION/TRAINING PROGRAM

## 2023-01-22 PROCEDURE — 11000001 HC ACUTE MED/SURG PRIVATE ROOM

## 2023-01-22 RX ADMIN — ZIPRASIDONE HCL 20 MG: 20 CAPSULE ORAL at 09:01

## 2023-01-22 RX ADMIN — IBUPROFEN 400 MG: 400 TABLET, FILM COATED ORAL at 09:01

## 2023-01-22 RX ADMIN — PIPERACILLIN AND TAZOBACTAM 4.5 G: 4; .5 INJECTION, POWDER, LYOPHILIZED, FOR SOLUTION INTRAVENOUS; PARENTERAL at 03:01

## 2023-01-22 RX ADMIN — ACETAMINOPHEN 650 MG: 325 TABLET, FILM COATED ORAL at 03:01

## 2023-01-22 RX ADMIN — ENOXAPARIN SODIUM 40 MG: 40 INJECTION SUBCUTANEOUS at 04:01

## 2023-01-22 RX ADMIN — ACETAMINOPHEN 650 MG: 325 TABLET, FILM COATED ORAL at 04:01

## 2023-01-22 RX ADMIN — AMLODIPINE BESYLATE 10 MG: 5 TABLET ORAL at 09:01

## 2023-01-22 RX ADMIN — Medication 6 MG: at 09:01

## 2023-01-22 RX ADMIN — IBUPROFEN 400 MG: 400 TABLET, FILM COATED ORAL at 04:01

## 2023-01-22 RX ADMIN — MIRTAZAPINE 7.5 MG: 7.5 TABLET ORAL at 04:01

## 2023-01-22 RX ADMIN — IBUPROFEN 400 MG: 400 TABLET, FILM COATED ORAL at 03:01

## 2023-01-22 RX ADMIN — ATORVASTATIN CALCIUM 10 MG: 10 TABLET, FILM COATED ORAL at 09:01

## 2023-01-22 RX ADMIN — OXYCODONE HYDROCHLORIDE 5 MG: 5 TABLET ORAL at 12:01

## 2023-01-22 RX ADMIN — ACETAMINOPHEN 650 MG: 325 TABLET, FILM COATED ORAL at 09:01

## 2023-01-22 NOTE — ASSESSMENT & PLAN NOTE
Resolved  Reports of agitation and confusion in the afternoons.  Not present earlier in the hospitalization.  Neuro exam unrevealing.  Thought to be sundowning versus hospital-acquired delirium.    Increase activity   Continue monitoring

## 2023-01-22 NOTE — ASSESSMENT & PLAN NOTE
S/p partial colectomy of signoid and descending colon with re-anastomosis.  D/c zosyn today  Surgery following  Up in chair, ambulate as tolerated  Continue diet    PT and OT recommending SNF.   CM following  Patient is considering 24 hour home care vs SNF

## 2023-01-22 NOTE — PLAN OF CARE
POC reviewed with pt and she verbalized understanding. VSS on RA. Reports improvement in abd pain at rest; mild pain noted with movement. Shifts weight in bed independently. Safety precautions in place. Denies needs at this time. Will continue with plan of care.    Problem: Adult Inpatient Plan of Care  Goal: Plan of Care Review  Outcome: Ongoing, Progressing  Goal: Patient-Specific Goal (Individualized)  Outcome: Ongoing, Progressing  Goal: Absence of Hospital-Acquired Illness or Injury  Outcome: Ongoing, Progressing  Goal: Optimal Comfort and Wellbeing  Outcome: Ongoing, Progressing  Goal: Readiness for Transition of Care  Outcome: Ongoing, Progressing     Problem: Fall Injury Risk  Goal: Absence of Fall and Fall-Related Injury  Outcome: Ongoing, Progressing     Problem: Coping Ineffective  Goal: Effective Coping  Outcome: Ongoing, Progressing     Problem: Infection  Goal: Absence of Infection Signs and Symptoms  Outcome: Ongoing, Progressing     Problem: Skin Injury Risk Increased  Goal: Skin Health and Integrity  Outcome: Ongoing, Progressing

## 2023-01-22 NOTE — PLAN OF CARE
"   08/07/19 1446   Behavioral Health   Hallucinations denies / not responding to hallucinations   Thinking distractable   Insight denial of illness;poor   Judgement impaired   Affect irritable;incongruent   Mood elated;irritable;labile   1. Wish to be Dead No   2. Non-Specific Active Suicidal Thoughts  No   Elopement Statements about wanting to leave   Activity restless;hyperactive (agitated, impulsive)   Speech flight of ideas;pressured;rambling;tangential;circumstantial   Psychomotor / Gait hyperactive   Psycho Education   Type of Intervention 1:1 intervention   Response participates, initiates socially appropriate   Hours 0.5   Treatment Detail check in   Activities of Daily Living   Hygiene/Grooming independent   Oral Hygiene independent   Dress scrubs (behavioral health)   Room Organization independent   Pt appears to be very manic. He is hyperverbal, he constantly talks to himself even when no one else is around or no one else is listening, but he does not seem to be responding to internal stimuli. His speech is very pressured and tangential, at times circumstantial. At around 1330, pt became very agitated, making statements about wanting to leave immediately and he stated that staff wouldn't be able to stop him. He was able to calm on his own. Pt has poor insight. He denies all symptoms. He did admit that, \"My thoughts are racing, but they're racing in the right direction.\"  " During my shift, pt AAOx4, NAD. VS stable. Pt remains afebrile. SPO2 wnl on room air. Pt reported pain to abd/incision site. Pain managed with Tylenol, Ibuprofen and Oxycodone PO. IV abx therapy in progress. No adverse reaction noted. Purewick in place collecting yellow  urine. Pt remains free from falls or injury. Bed is lowered and locked. Call light is within reach. Pt has no known needs at this time.

## 2023-01-22 NOTE — SUBJECTIVE & OBJECTIVE
Interval History:  Patient seen sitting in chair today. She is sitting upright more easily. She is weighting home care vs SNF.    Review of Systems   Constitutional:  Negative for chills and fatigue.   HENT:  Negative for congestion and drooling.    Respiratory:  Negative for cough and shortness of breath.    Cardiovascular:  Negative for chest pain and leg swelling.   Gastrointestinal:  Positive for abdominal pain. Negative for constipation, nausea, rectal pain and vomiting.   Musculoskeletal:  Negative for back pain.   Skin:  Negative for pallor and rash.   Psychiatric/Behavioral:  Negative for agitation.    Objective:     Vital Signs (Most Recent):  Temp: 98.5 °F (36.9 °C) (01/22/23 1216)  Pulse: 81 (01/22/23 1247)  Resp: 18 (01/22/23 1216)  BP: (!) 173/80 (01/22/23 1247)  SpO2: 95 % (01/22/23 1216)   Vital Signs (24h Range):  Temp:  [97.5 °F (36.4 °C)-98.8 °F (37.1 °C)] 98.5 °F (36.9 °C)  Pulse:  [] 81  Resp:  [16-18] 18  SpO2:  [94 %-97 %] 95 %  BP: (129-181)/(59-82) 173/80     Weight: 64.5 kg (142 lb 3.2 oz)  Body mass index is 24.41 kg/m².    Intake/Output Summary (Last 24 hours) at 1/22/2023 1308  Last data filed at 1/22/2023 1306  Gross per 24 hour   Intake 734.15 ml   Output 1000 ml   Net -265.85 ml        Physical Exam  Vitals reviewed.   Constitutional:       General: She is not in acute distress.  HENT:      Head: Normocephalic.   Eyes:      General:         Right eye: No discharge.         Left eye: No discharge.      Pupils: Pupils are equal, round, and reactive to light.   Cardiovascular:      Rate and Rhythm: Normal rate and regular rhythm.   Pulmonary:      Effort: No respiratory distress.      Breath sounds: No wheezing.   Abdominal:      Tenderness: There is abdominal tenderness.      Comments: Surgical incision well approximated   Musculoskeletal:      Cervical back: Neck supple. No rigidity.      Right lower leg: No edema.      Left lower leg: No edema.   Skin:     General: Skin is warm.       Capillary Refill: Capillary refill takes less than 2 seconds.   Neurological:      General: No focal deficit present.      Mental Status: She is alert.      Comments: CN II-XII intact. No pronator drift. Moving all 4 extermities       Significant Labs: All pertinent labs within the past 24 hours have been reviewed.  CBC:   No results for input(s): WBC, HGB, HCT, PLT in the last 48 hours.    CMP:   No results for input(s): NA, K, CL, CO2, GLU, BUN, CREATININE, CALCIUM, PROT, ALBUMIN, BILITOT, ALKPHOS, AST, ALT, ANIONGAP, EGFRNONAA in the last 48 hours.    Invalid input(s): ESTGFAFRICA      Significant Imaging: I have reviewed all pertinent imaging results/findings within the past 24 hours.

## 2023-01-22 NOTE — PROGRESS NOTES
[facility] Medicine  Progress Note    Patient Name: Cheyenne Garner  MRN: [id number]  Patient Class: IP- Inpatient   Admission Date: 1/16/2023  Length of Stay: 6 days  Attending Physician: Jose Lee MD  Primary Care Provider: Mary Cortes MD        Subjective:     Principal Problem:Diverticulitis of large intestine with perforation and abscess without bleeding        HPI:  Mrs. 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  She came to the emergency department today for abdominal pain that began yesterday.  The pain is located to the bilateral lower quadrants of her abdomen.  Patient denies nausea and vomiting.  She reports she frequently has diarrhea.  Patient reports that her pain has been controlled with pain medication given in the emergency department.  Patient's CT of her abdomen and pelvis with contrast that demonstrates acute diverticulitis with bowel per for duration and early abscess formation.  Patient was started on empiric Zosyn.  General surgery was consulted-appreciate recommendations.  Patient spoke with her GI physician Dr. Morales over the telephone.  Surgical intervention is recommended.  Dr. Kearns with colon and Rectal surgery will further evaluate patient tomorrow.  Patient admitted to hospital medicine for further management.      Overview/Hospital Course:  Patient was found to have perforated diverticulitis s/p sigmoid and descending colectomy.  She is tolerating diet and had a BM. Wang removed.  Esophagram consistent with esophageal dysmotility.  PT and OT recommended skilled nursing facility, but patient is wearing options regarding home care versus skilled nursing care      Interval History:  Patient seen sitting in chair today. She is sitting upright more easily. She is weighting home care vs  SNF.    Review of Systems   Constitutional:  Negative for chills and fatigue.   HENT:  Negative for congestion and drooling.    Respiratory:  Negative for cough and shortness of breath.    Cardiovascular:  Negative for chest pain and leg swelling.   Gastrointestinal:  Positive for abdominal pain. Negative for constipation, nausea, rectal pain and vomiting.   Musculoskeletal:  Negative for back pain.   Skin:  Negative for pallor and rash.   Psychiatric/Behavioral:  Negative for agitation.    Objective:     Vital Signs (Most Recent):  Temp: 98.5 °F (36.9 °C) (01/22/23 1216)  Pulse: 81 (01/22/23 1247)  Resp: 18 (01/22/23 1216)  BP: (!) 173/80 (01/22/23 1247)  SpO2: 95 % (01/22/23 1216)   Vital Signs (24h Range):  Temp:  [97.5 °F (36.4 °C)-98.8 °F (37.1 °C)] 98.5 °F (36.9 °C)  Pulse:  [] 81  Resp:  [16-18] 18  SpO2:  [94 %-97 %] 95 %  BP: (129-181)/(59-82) 173/80     Weight: 64.5 kg (142 lb 3.2 oz)  Body mass index is 24.41 kg/m².    Intake/Output Summary (Last 24 hours) at 1/22/2023 1308  Last data filed at 1/22/2023 1306  Gross per 24 hour   Intake 734.15 ml   Output 1000 ml   Net -265.85 ml        Physical Exam  Vitals reviewed.   Constitutional:       General: She is not in acute distress.  HENT:      Head: Normocephalic.   Eyes:      General:         Right eye: No discharge.         Left eye: No discharge.      Pupils: Pupils are equal, round, and reactive to light.   Cardiovascular:      Rate and Rhythm: Normal rate and regular rhythm.   Pulmonary:      Effort: No respiratory distress.      Breath sounds: No wheezing.   Abdominal:      Tenderness: There is abdominal tenderness.      Comments: Surgical incision well approximated   Musculoskeletal:      Cervical back: Neck supple. No rigidity.      Right lower leg: No edema.      Left lower leg: No edema.   Skin:     General: Skin is warm.      Capillary Refill: Capillary refill takes less than 2 seconds.   Neurological:      General: No focal deficit present.       Mental Status: She is alert.      Comments: CN II-XII intact. No pronator drift. Moving all 4 extermities       Significant Labs: All pertinent labs within the past 24 hours have been reviewed.  CBC:   No results for input(s): WBC, HGB, HCT, PLT in the last 48 hours.    CMP:   No results for input(s): NA, K, CL, CO2, GLU, BUN, CREATININE, CALCIUM, PROT, ALBUMIN, BILITOT, ALKPHOS, AST, ALT, ANIONGAP, EGFRNONAA in the last 48 hours.    Invalid input(s): ESTGFAFRICA      Significant Imaging: I have reviewed all pertinent imaging results/findings within the past 24 hours.      Assessment/Plan:      * Diverticulitis of large intestine with perforation and abscess without bleeding  S/p partial colectomy of signoid and descending colon with re-anastomosis.  D/c zosyn today  Surgery following  Up in chair, ambulate as tolerated  Continue diet    PT and OT recommending SNF.   CM following  Patient is considering 24 hour home care vs SNF            Agitation  Resolved  Reports of agitation and confusion in the afternoons.  Not present earlier in the hospitalization.  Neuro exam unrevealing.  Thought to be sundowning versus hospital-acquired delirium.    Increase activity   Continue monitoring      Esophageal dysmotility  Two episodes of chest discomfort with swallowing.  Esophagram consistent with esophageal dysmotility   Gastroenterology evaluated the patient consultation.  Unlikely have benefit from EGD with dilation.    No specific treatment   Sit upright with meals, continue speech pathology if desired      Elevated blood pressure reading  Elevated blood pressure   Continue amlodipine  Hydralazine p.r.n. systolic BP >180mmHg      Advanced care planning/counseling discussion  Palliative Care is following      Drug-induced polyneuropathy  Reported neuropathy symptoms after taking cipro per the patient. Sensory neuropathy, no motor findings on exam.  Continue mirtazipine  Patient declines gabapentin  Outpatient  neurology follow-up      Primary insomnia  Continue mirtazazpine and geodon       Hyperlipidemia  Continue atorvastatin in place of simvastatin while inpatient      UTI (urinary tract infection)  Urine culture E coli sensitive to Zosyn    Status post treatment        VTE Risk Mitigation (From admission, onward)         Ordered     enoxaparin injection 40 mg  Daily         01/17/23 1543     IP VTE HIGH RISK PATIENT  Once         01/16/23 1645     Place sequential compression device  Until discontinued         01/16/23 1645                Discharge Planning   PAULA:      Code Status: DNR   Is the patient medically ready for discharge?:     Reason for patient still in hospital (select all that apply): Pending disposition  Discharge Plan A: Home Health                  Jose Lee MD  Department of Hospital Medicine   [facility] ([place])

## 2023-01-23 LAB
FINAL PATHOLOGIC DIAGNOSIS: NORMAL
Lab: NORMAL

## 2023-01-23 PROCEDURE — 63600175 PHARM REV CODE 636 W HCPCS: Performed by: SURGERY

## 2023-01-23 PROCEDURE — 97110 THERAPEUTIC EXERCISES: CPT | Mod: CQ

## 2023-01-23 PROCEDURE — 97116 GAIT TRAINING THERAPY: CPT | Mod: CQ

## 2023-01-23 PROCEDURE — 25000003 PHARM REV CODE 250: Performed by: SURGERY

## 2023-01-23 PROCEDURE — 25000003 PHARM REV CODE 250: Performed by: STUDENT IN AN ORGANIZED HEALTH CARE EDUCATION/TRAINING PROGRAM

## 2023-01-23 PROCEDURE — 99231 PR SUBSEQUENT HOSPITAL CARE,LEVL I: ICD-10-PCS | Mod: ,,, | Performed by: STUDENT IN AN ORGANIZED HEALTH CARE EDUCATION/TRAINING PROGRAM

## 2023-01-23 PROCEDURE — 94761 N-INVAS EAR/PLS OXIMETRY MLT: CPT

## 2023-01-23 PROCEDURE — 11000001 HC ACUTE MED/SURG PRIVATE ROOM

## 2023-01-23 PROCEDURE — 25000003 PHARM REV CODE 250: Performed by: NURSE PRACTITIONER

## 2023-01-23 PROCEDURE — 97535 SELF CARE MNGMENT TRAINING: CPT

## 2023-01-23 PROCEDURE — 99231 SBSQ HOSP IP/OBS SF/LOW 25: CPT | Mod: ,,, | Performed by: STUDENT IN AN ORGANIZED HEALTH CARE EDUCATION/TRAINING PROGRAM

## 2023-01-23 RX ADMIN — ZIPRASIDONE HCL 20 MG: 20 CAPSULE ORAL at 08:01

## 2023-01-23 RX ADMIN — ENOXAPARIN SODIUM 40 MG: 40 INJECTION SUBCUTANEOUS at 04:01

## 2023-01-23 RX ADMIN — ATORVASTATIN CALCIUM 10 MG: 10 TABLET, FILM COATED ORAL at 09:01

## 2023-01-23 RX ADMIN — ACETAMINOPHEN 650 MG: 325 TABLET, FILM COATED ORAL at 10:01

## 2023-01-23 RX ADMIN — MIRTAZAPINE 7.5 MG: 7.5 TABLET ORAL at 04:01

## 2023-01-23 RX ADMIN — IBUPROFEN 400 MG: 400 TABLET, FILM COATED ORAL at 09:01

## 2023-01-23 RX ADMIN — ACETAMINOPHEN 650 MG: 325 TABLET, FILM COATED ORAL at 09:01

## 2023-01-23 RX ADMIN — Medication 6 MG: at 08:01

## 2023-01-23 RX ADMIN — IBUPROFEN 400 MG: 400 TABLET, FILM COATED ORAL at 10:01

## 2023-01-23 RX ADMIN — IBUPROFEN 400 MG: 400 TABLET, FILM COATED ORAL at 04:01

## 2023-01-23 RX ADMIN — ACETAMINOPHEN 650 MG: 325 TABLET, FILM COATED ORAL at 04:01

## 2023-01-23 RX ADMIN — OXYCODONE HYDROCHLORIDE 5 MG: 5 TABLET ORAL at 06:01

## 2023-01-23 RX ADMIN — AMLODIPINE BESYLATE 10 MG: 5 TABLET ORAL at 09:01

## 2023-01-23 NOTE — PLAN OF CARE
POC reviewed. No significant events this shift. Pt stable on RA. Complaints of pain treated with PRN pain medication per MAR. Pt voids and shifts in bed with assist. Bed low and locked, side rails up x3, call light within reach.    Problem: Adult Inpatient Plan of Care  Goal: Plan of Care Review  Outcome: Ongoing, Progressing  Goal: Patient-Specific Goal (Individualized)  Outcome: Ongoing, Progressing  Goal: Absence of Hospital-Acquired Illness or Injury  Outcome: Ongoing, Progressing  Goal: Optimal Comfort and Wellbeing  Outcome: Ongoing, Progressing  Goal: Readiness for Transition of Care  Outcome: Ongoing, Progressing     Problem: Fall Injury Risk  Goal: Absence of Fall and Fall-Related Injury  Outcome: Ongoing, Progressing     Problem: Coping Ineffective  Goal: Effective Coping  Outcome: Ongoing, Progressing     Problem: Infection  Goal: Absence of Infection Signs and Symptoms  Outcome: Ongoing, Progressing     Problem: Skin Injury Risk Increased  Goal: Skin Health and Integrity  Outcome: Ongoing, Progressing

## 2023-01-23 NOTE — PROGRESS NOTES
Surgery Inpatient Progress Note    Date: 01/23/2023    Overnight events: Patient does not like soft diet.  Passing flatus, having bowel function.     O:   Vitals:    01/23/23 0750   BP: (!) 176/81   Pulse: 77   Resp: 16   Temp: 98 °F (36.7 °C)       Physical Exam     Gen: well developed female, NAD   HEENT: normocephalic, atraumatic, PERRL, EOMI   CV: RRR, no murmurs  Res: nonlabored, CTAB   Abd: soft, midline incision approximated with staples, no erythema or drainage, appropriately tender to palpation    MSK: no gross deformities      Assessment and plan:   Cheyenne Garner is a 87 y.o. female who presents with recurrent, perforated diverticulitis, now s/p exploratory laparotomy and left hemicolectomy on 1/17      Perforated diverticulitis   - completed course of antibiotics   - diet as tolerated  - PT/OT, OOB TID, ambulate in hallways, patient planning to go to rehab after dc.   - will continue to follow       Claudia Kearns MD  Staff Surgeon   Colon & Rectal Surgery

## 2023-01-23 NOTE — PLAN OF CARE
During my shift, pt AAOx4, NAD. VS stable. Pt remains afebrile. SPO2 wnl on room air. Pt reported pain to abd/incision site. Pain managed with Tylenol, Ibuprofen PO. Abd midline incision site wnl and open to air. Staples intact. Purewick in place collecting yellow  urine. Pt remains free from falls or injury. Bed is lowered and locked. Call light is within reach. Pt has no known needs at this time.

## 2023-01-23 NOTE — SUBJECTIVE & OBJECTIVE
Interval History:  No acute changes overnight.  Awaiting discharge to skilled nursing facility.    Review of Systems   Constitutional:  Negative for chills and fatigue.   HENT:  Negative for congestion and drooling.    Respiratory:  Negative for cough and shortness of breath.    Cardiovascular:  Negative for chest pain and leg swelling.   Gastrointestinal:  Positive for abdominal pain. Negative for constipation, nausea, rectal pain and vomiting.   Musculoskeletal:  Negative for back pain.   Skin:  Negative for pallor and rash.   Psychiatric/Behavioral:  Negative for agitation.    Objective:     Vital Signs (Most Recent):  Temp: 98.5 °F (36.9 °C) (01/23/23 1217)  Pulse: 75 (01/23/23 1217)  Resp: 16 (01/23/23 1217)  BP: (!) 157/70 (01/23/23 1217)  SpO2: 95 % (01/23/23 1217)   Vital Signs (24h Range):  Temp:  [97.9 °F (36.6 °C)-98.7 °F (37.1 °C)] 98.5 °F (36.9 °C)  Pulse:  [71-88] 75  Resp:  [16-18] 16  SpO2:  [95 %-99 %] 95 %  BP: (141-176)/(65-81) 157/70     Weight: 64.5 kg (142 lb 3.2 oz)  Body mass index is 24.41 kg/m².    Intake/Output Summary (Last 24 hours) at 1/23/2023 1523  Last data filed at 1/23/2023 1357  Gross per 24 hour   Intake 238 ml   Output 702 ml   Net -464 ml        Physical Exam  Vitals reviewed.   Constitutional:       General: She is not in acute distress.  HENT:      Head: Normocephalic.   Eyes:      General:         Right eye: No discharge.         Left eye: No discharge.      Pupils: Pupils are equal, round, and reactive to light.   Cardiovascular:      Rate and Rhythm: Normal rate and regular rhythm.   Pulmonary:      Effort: No respiratory distress.      Breath sounds: No wheezing.   Abdominal:      Tenderness: There is abdominal tenderness.      Comments: Surgical incision well approximated   Musculoskeletal:      Cervical back: Neck supple. No rigidity.      Right lower leg: No edema.      Left lower leg: No edema.   Skin:     General: Skin is warm.      Capillary Refill: Capillary refill  takes less than 2 seconds.   Neurological:      General: No focal deficit present.      Mental Status: She is alert.      Comments: CN II-XII intact. No pronator drift. Moving all 4 extermities       Significant Labs: All pertinent labs within the past 24 hours have been reviewed.  CBC:   No results for input(s): WBC, HGB, HCT, PLT in the last 48 hours.    CMP:   No results for input(s): NA, K, CL, CO2, GLU, BUN, CREATININE, CALCIUM, PROT, ALBUMIN, BILITOT, ALKPHOS, AST, ALT, ANIONGAP, EGFRNONAA in the last 48 hours.    Invalid input(s): ESTGFAFRICA      Significant Imaging: I have reviewed all pertinent imaging results/findings within the past 24 hours.

## 2023-01-23 NOTE — PLAN OF CARE
Met with patient at bedside to discuss discharge dispo - plan to transfer to SNF tomorrow - accepted at [facility] - patient agrees with plan - MAR, CXR, & PPD forwarded via [employer] - order placed for COVID test tomorrow am - awaiting 142

## 2023-01-23 NOTE — ASSESSMENT & PLAN NOTE
Two episodes of chest discomfort with swallowing.  Esophagram consistent with esophageal dysmotility   Gastroenterology evaluated the patient consultation.  Unlikely have benefit from EGD with dilation.    No specific treatment   Sit upright with meals, continue speech pathology if desired  Patient is refusing bariatric soft diet

## 2023-01-23 NOTE — ASSESSMENT & PLAN NOTE
S/p partial colectomy of signoid and descending colon with re-anastomosis.  She completed a course of postoperative antibiotics.  Surgery following  Up in chair, ambulate as tolerated  Continue diet    PT and OT recommending SNF.   CM following  Pending placement at skilled nursing facility.

## 2023-01-23 NOTE — PLAN OF CARE
01/23/23 1319   Discharge Reassessment   Assessment Type Discharge Planning Reassessment   Did the patient's condition or plan change since previous assessment? No   Discharge Plan discussed with: Patient   Discharge Plan A Skilled Nursing Facility   Discharge Plan B Home with family;Home Health   DME Needed Upon Discharge  other (see comments)  (TBD)   Discharge Barriers Identified None   Why the patient remains in the hospital Placement issues   Post-Acute Status   Post-Acute Authorization Placement   Post-Acute Placement Status Referrals Sent   Patient choice form signed by patient/caregiver List with quality metrics by geographic area provided;List from CMS Compare   Discharge Delays None known at this time     Pt is medically clear for discharge once there is an accepting facility for SNF.  CM sent referral to [facility] today and others were sent Friday.

## 2023-01-23 NOTE — PT/OT/SLP PROGRESS
Physical Therapy Treatment    Patient Name:  Cheyenne Garner   MRN:  [id number]    Recommendations:     Discharge Recommendations: nursing facility, skilled  Discharge Equipment Recommendations:  (TBD)  Barriers to discharge: None    Assessment:     Cheyenne Garner is a 87 y.o. female admitted with a medical diagnosis of Diverticulitis of large intestine with perforation and abscess without bleeding.  She presents with the following impairments/functional limitations: weakness, gait instability, impaired balance, impaired endurance, impaired functional mobility, impaired self care skills, decreased lower extremity function, decreased safety awareness, decreased upper extremity function.    Supine>sit with [place]  Sit>stand with no AD, CGA and HHA; with RW and SBA  Amb 2 x 40' with CGA and HHA, no AD, standing rest break btwn bouts, decreased gait speed, federica and B step length, decreased stability but no LoB; + 70' with RW and CGA, improved stability with RW  Pt weak and deconditioned from a week in bed, progressing well toward goals  Rec SNF    Rehab Prognosis: Good; patient would benefit from acute skilled PT services to address these deficits and reach maximum level of function.    Recent Surgery: Procedure(s) (LRB):  COLECTOMY, SIGMOID (N/A)  SIGMOIDOSCOPY, FLEXIBLE  MOBILIZATION, SPLENIC FLEXURE 6 Days Post-Op    Plan:     During this hospitalization, patient to be seen 5 x/week to address the identified rehab impairments via gait training, therapeutic activities, therapeutic exercises, neuromuscular re-education and progress toward the following goals:    Plan of Care Expires:  02/19/23    Subjective     Chief Complaint: Lying here has taken a toll on me  Patient/Family Comments/goals: I have good posture because my  mother made me wear a brace when I was young  Pain/Comfort:  Pain Rating 1: other (see comments) (unrated)  Location - Side 1: Bilateral  Location 1: abdomen  Pain Addressed 1: Reposition,  Distraction, Cessation of Activity  Pain Rating Post-Intervention 1: other (see comments) (unrated)      Objective:     Communicated with nurse Gordillo prior to session.  Patient found HOB elevated with bed alarm, peripheral IV, PureWick upon PT entry to room.     General Precautions: Standard, fall, NPO  Orthopedic Precautions: N/A  Braces: N/A  Respiratory Status: Room air     Functional Mobility:  Bed Mobility:     Supine to Sit: minimum assistance  Sit to Supine: moderate assistance  Transfers:     Sit to Stand:  contact guard assistance with hand-held assist and with RW and SBA  Gait: 2 x 40' with CGA and HHA, no AD, standing rest break btwn bouts, decreased gait speed, federica and B step length, decreased stability but no LoB; + 70' with RW and CGA, improved stability with RW      AM-PAC 6 CLICK MOBILITY  Turning over in bed (including adjusting bedclothes, sheets and blankets)?: 3  Sitting down on and standing up from a chair with arms (e.g., wheelchair, bedside commode, etc.): 3  Moving from lying on back to sitting on the side of the bed?: 3  Moving to and from a bed to a chair (including a wheelchair)?: 2  Need to walk in hospital room?: 2  Climbing 3-5 steps with a railing?: 1  Basic Mobility Total Score: 14       Treatment & Education:  Seated therex: LAQ, heel raises, scapular retractions x 10  Gait training with/without AD as noted    Patient left HOB elevated with all lines intact, call button in reach, bed alarm on, and family member present..    GOALS:   Multidisciplinary Problems       Physical Therapy Goals          Problem: Physical Therapy    Goal Priority Disciplines Outcome Goal Variances Interventions   Physical Therapy Goal     PT, PT/OT Ongoing, Progressing     Description: Goals to be met by: 2/29/23    Patient will perform the following to increase strength, improve mobility, and return to prior level of function:    1. Supine <> sit with CGA.  2. Sit<>stand with SBA with least restrictive  assistive device.  3. Gait x 50 feet with SBA with least restrictive assistive device.                           Time Tracking:     PT Received On: 01/23/23  PT Start Time: 1445     PT Stop Time: 1512  PT Total Time (min): 27 min     Billable Minutes: Gait Training 18 and Therapeutic Exercise 9    Treatment Type: Treatment  PT/PTA: PTA     PTA Visit Number: 2     01/23/2023

## 2023-01-23 NOTE — ASSESSMENT & PLAN NOTE
Elevated blood pressure in the setting of recent surgery and acute hospitalization.  No history of hypertension or hypertensive treatment.  Continue amlodipine for now  Hydralazine p.r.n. systolic BP >180mmHg

## 2023-01-23 NOTE — PROGRESS NOTES
[facility] Medicine  Progress Note    Patient Name: Cheyenne Garner  MRN: [id number]  Patient Class: IP- Inpatient   Admission Date: 1/16/2023  Length of Stay: 7 days  Attending Physician: Jose Lee MD  Primary Care Provider: Mary Cortes MD        Subjective:     Principal Problem:Diverticulitis of large intestine with perforation and abscess without bleeding        HPI:  Mrs. 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  She came to the emergency department today for abdominal pain that began yesterday.  The pain is located to the bilateral lower quadrants of her abdomen.  Patient denies nausea and vomiting.  She reports she frequently has diarrhea.  Patient reports that her pain has been controlled with pain medication given in the emergency department.  Patient's CT of her abdomen and pelvis with contrast that demonstrates acute diverticulitis with bowel per for duration and early abscess formation.  Patient was started on empiric Zosyn.  General surgery was consulted-appreciate recommendations.  Patient spoke with her GI physician Dr. Morales over the telephone.  Surgical intervention is recommended.  Dr. Kearns with colon and Rectal surgery will further evaluate patient tomorrow.  Patient admitted to hospital medicine for further management.      Overview/Hospital Course:  Patient was found to have perforated diverticulitis s/p sigmoid and descending colectomy.  She completed IV antibiotics.  She is tolerating diet and had a bowel movement.  Awaiting discharge to skilled nursing facility.      During hospitalization, patient had 2 episodes of dysphagia with regurgitation of food.  Initial evaluation by speech pathology was unrevealing.  Esophagram performed showed esophageal dysmotility.  At the request of the  patient's [facility] physician, Gastroenterology evaluated the patient.  Bariatric soft diet was initially recommended to the patient.      Interval History:  No acute changes overnight.  Awaiting discharge to skilled nursing facility.    Review of Systems   Constitutional:  Negative for chills and fatigue.   HENT:  Negative for congestion and drooling.    Respiratory:  Negative for cough and shortness of breath.    Cardiovascular:  Negative for chest pain and leg swelling.   Gastrointestinal:  Positive for abdominal pain. Negative for constipation, nausea, rectal pain and vomiting.   Musculoskeletal:  Negative for back pain.   Skin:  Negative for pallor and rash.   Psychiatric/Behavioral:  Negative for agitation.    Objective:     Vital Signs (Most Recent):  Temp: 98.5 °F (36.9 °C) (01/23/23 1217)  Pulse: 75 (01/23/23 1217)  Resp: 16 (01/23/23 1217)  BP: (!) 157/70 (01/23/23 1217)  SpO2: 95 % (01/23/23 1217)   Vital Signs (24h Range):  Temp:  [97.9 °F (36.6 °C)-98.7 °F (37.1 °C)] 98.5 °F (36.9 °C)  Pulse:  [71-88] 75  Resp:  [16-18] 16  SpO2:  [95 %-99 %] 95 %  BP: (141-176)/(65-81) 157/70     Weight: 64.5 kg (142 lb 3.2 oz)  Body mass index is 24.41 kg/m².    Intake/Output Summary (Last 24 hours) at 1/23/2023 1523  Last data filed at 1/23/2023 1357  Gross per 24 hour   Intake 238 ml   Output 702 ml   Net -464 ml        Physical Exam  Vitals reviewed.   Constitutional:       General: She is not in acute distress.  HENT:      Head: Normocephalic.   Eyes:      General:         Right eye: No discharge.         Left eye: No discharge.      Pupils: Pupils are equal, round, and reactive to light.   Cardiovascular:      Rate and Rhythm: Normal rate and regular rhythm.   Pulmonary:      Effort: No respiratory distress.      Breath sounds: No wheezing.   Abdominal:      Tenderness: There is abdominal tenderness.      Comments: Surgical incision well approximated   Musculoskeletal:      Cervical back: Neck supple. No  rigidity.      Right lower leg: No edema.      Left lower leg: No edema.   Skin:     General: Skin is warm.      Capillary Refill: Capillary refill takes less than 2 seconds.   Neurological:      General: No focal deficit present.      Mental Status: She is alert.      Comments: CN II-XII intact. No pronator drift. Moving all 4 extermities       Significant Labs: All pertinent labs within the past 24 hours have been reviewed.  CBC:   No results for input(s): WBC, HGB, HCT, PLT in the last 48 hours.    CMP:   No results for input(s): NA, K, CL, CO2, GLU, BUN, CREATININE, CALCIUM, PROT, ALBUMIN, BILITOT, ALKPHOS, AST, ALT, ANIONGAP, EGFRNONAA in the last 48 hours.    Invalid input(s): ESTGFAFRICA      Significant Imaging: I have reviewed all pertinent imaging results/findings within the past 24 hours.      Assessment/Plan:      * Diverticulitis of large intestine with perforation and abscess without bleeding  S/p partial colectomy of signoid and descending colon with re-anastomosis.  She completed a course of postoperative antibiotics.  Surgery following  Up in chair, ambulate as tolerated  Continue diet    PT and OT recommending SNF.   CM following  Pending placement at skilled nursing facility.            Agitation  Resolved  Reports of agitation and confusion in the afternoons.  Not present earlier in the hospitalization.  Neuro exam unrevealing.  Thought to be sundowning versus hospital-acquired delirium.    Increase activity   Continue monitoring      Esophageal dysmotility  Two episodes of chest discomfort with swallowing.  Esophagram consistent with esophageal dysmotility   Gastroenterology evaluated the patient consultation.  Unlikely have benefit from EGD with dilation.    No specific treatment   Sit upright with meals, continue speech pathology if desired  Patient is refusing bariatric soft diet      Elevated blood pressure reading  Elevated blood pressure in the setting of recent surgery and acute  hospitalization.  No history of hypertension or hypertensive treatment.  Continue amlodipine for now  Hydralazine p.r.n. systolic BP >180mmHg      Advanced care planning/counseling discussion  Palliative Care is following      Drug-induced polyneuropathy  Reported neuropathy symptoms after taking cipro per the patient. Sensory neuropathy, no motor findings on exam.  Continue mirtazipine  Patient declines gabapentin  Outpatient neurology follow-up      Primary insomnia  Continue mirtazazpine and geodon       Hyperlipidemia  Continue atorvastatin in place of simvastatin while inpatient      UTI (urinary tract infection)  Urine culture E coli sensitive to Zosyn    Status post treatment        VTE Risk Mitigation (From admission, onward)         Ordered     enoxaparin injection 40 mg  Daily         01/17/23 1543     IP VTE HIGH RISK PATIENT  Once         01/16/23 1645     Place sequential compression device  Until discontinued         01/16/23 1645                Discharge Planning   PAULA:      Code Status: DNR   Is the patient medically ready for discharge?:     Reason for patient still in hospital (select all that apply): Pending disposition  Discharge Plan A: Skilled Nursing Facility   Discharge Delays: None known at this time              Jose Lee MD  Department of Hospital Medicine   [facility] ([place])

## 2023-01-24 PROBLEM — R45.1 AGITATION: Status: RESOLVED | Noted: 2023-01-20 | Resolved: 2023-01-24

## 2023-01-24 LAB — SARS-COV-2 RDRP RESP QL NAA+PROBE: NEGATIVE

## 2023-01-24 PROCEDURE — 25000003 PHARM REV CODE 250: Performed by: SURGERY

## 2023-01-24 PROCEDURE — 97530 THERAPEUTIC ACTIVITIES: CPT | Mod: CQ

## 2023-01-24 PROCEDURE — 63600175 PHARM REV CODE 636 W HCPCS: Performed by: SURGERY

## 2023-01-24 PROCEDURE — 25000003 PHARM REV CODE 250: Performed by: STUDENT IN AN ORGANIZED HEALTH CARE EDUCATION/TRAINING PROGRAM

## 2023-01-24 PROCEDURE — 99232 SBSQ HOSP IP/OBS MODERATE 35: CPT | Mod: ,,, | Performed by: HOSPITALIST

## 2023-01-24 PROCEDURE — 99232 PR SUBSEQUENT HOSPITAL CARE,LEVL II: ICD-10-PCS | Mod: ,,, | Performed by: HOSPITALIST

## 2023-01-24 PROCEDURE — 25000003 PHARM REV CODE 250: Performed by: NURSE PRACTITIONER

## 2023-01-24 PROCEDURE — 94761 N-INVAS EAR/PLS OXIMETRY MLT: CPT

## 2023-01-24 PROCEDURE — 97116 GAIT TRAINING THERAPY: CPT | Mod: CQ

## 2023-01-24 PROCEDURE — 11000001 HC ACUTE MED/SURG PRIVATE ROOM

## 2023-01-24 PROCEDURE — U0002 COVID-19 LAB TEST NON-CDC: HCPCS | Performed by: STUDENT IN AN ORGANIZED HEALTH CARE EDUCATION/TRAINING PROGRAM

## 2023-01-24 PROCEDURE — 97535 SELF CARE MNGMENT TRAINING: CPT

## 2023-01-24 RX ORDER — AMLODIPINE BESYLATE 10 MG/1
10 TABLET ORAL DAILY
Start: 2023-01-25 | End: 2023-01-25 | Stop reason: HOSPADM

## 2023-01-24 RX ORDER — HYDROCORTISONE 25 MG/G
CREAM TOPICAL 2 TIMES DAILY PRN
Qty: 30 G | Refills: 1 | Status: SHIPPED | OUTPATIENT
Start: 2023-01-24 | End: 2024-04-03

## 2023-01-24 RX ORDER — GABAPENTIN 100 MG/1
200 CAPSULE ORAL 3 TIMES DAILY PRN
Start: 2023-01-24 | End: 2023-02-07

## 2023-01-24 RX ADMIN — IBUPROFEN 400 MG: 400 TABLET, FILM COATED ORAL at 09:01

## 2023-01-24 RX ADMIN — ZIPRASIDONE HCL 20 MG: 20 CAPSULE ORAL at 10:01

## 2023-01-24 RX ADMIN — AMLODIPINE BESYLATE 10 MG: 5 TABLET ORAL at 09:01

## 2023-01-24 RX ADMIN — IBUPROFEN 400 MG: 400 TABLET, FILM COATED ORAL at 04:01

## 2023-01-24 RX ADMIN — ACETAMINOPHEN 650 MG: 325 TABLET, FILM COATED ORAL at 04:01

## 2023-01-24 RX ADMIN — ATORVASTATIN CALCIUM 10 MG: 10 TABLET, FILM COATED ORAL at 09:01

## 2023-01-24 RX ADMIN — ENOXAPARIN SODIUM 40 MG: 40 INJECTION SUBCUTANEOUS at 04:01

## 2023-01-24 RX ADMIN — Medication 6 MG: at 10:01

## 2023-01-24 RX ADMIN — IBUPROFEN 400 MG: 400 TABLET, FILM COATED ORAL at 10:01

## 2023-01-24 RX ADMIN — MIRTAZAPINE 7.5 MG: 7.5 TABLET ORAL at 04:01

## 2023-01-24 RX ADMIN — ACETAMINOPHEN 650 MG: 325 TABLET, FILM COATED ORAL at 10:01

## 2023-01-24 NOTE — PT/OT/SLP PROGRESS
Occupational Therapy   Treatment    Name: Cheyenne Garner  MRN: [id number]  Admitting Diagnosis:  Diverticulitis of large intestine with perforation and abscess without bleeding  7 Days Post-Op    Recommendations:     Discharge Recommendations: nursing facility, skilled  Discharge Equipment Recommendations:   (Defer to next level)  Barriers to discharge:  Decreased caregiver support (Current functional level.)    Assessment:     Cheyenne Garner is a 87 y.o. female with a medical diagnosis of Diverticulitis of large intestine with perforation and abscess without bleeding.  She presents alert and agreeable to participate in OT tx session to progress with ADL and ADL mobility Indep.  Pt reporting she is in agreement with SNF as discharge plan. Performance deficits affecting function are weakness, impaired endurance, impaired self care skills, impaired functional mobility, gait instability, impaired balance, decreased lower extremity function, decreased ROM, impaired skin, decreased safety awareness.     Rehab Prognosis:   Good to return to PLOF ; patient would benefit from acute skilled OT services to address these deficits and reach maximum level of function.       Plan:     Patient to be seen 4 x/week to address the above listed problems via self-care/home management, therapeutic activities  Plan of Care Expires: 02/03/23  Plan of Care Reviewed with: patient    Subjective     Pain/Comfort:  Pain Rating 1:  (Pt not expressing pain)    Objective:     Communicated with: nurseKisha, prior to session.  Patient found HOB elevated with peripheral IV, PureWick upon OT entry to room.    General Precautions: Standard, fall    Orthopedic Precautions:N/A  Braces: N/A  Respiratory Status: Room air     Occupational Performance:     Bed Mobility:    Supine to sit: Mod I   Sit to supine: Mod I   Scooting/bridging to HOB     Functional Mobility/Transfers:  Sit to stand: SBA with RW  Tranfers: SBA with RW  Functional  Mobility: SBA with RW standing at sink for bilateral UE grooming tasks without LOB    Activities of Daily Living:  Grooming: SBA with RW standing at sink  UB Dressing: Set up to change hospital gowns  LB Dressing: Pt donned slip on shoes at SBA  Toileting: pt is using female external catheter; pt reports she has daily diarrhea/incontinence      [facility] 6 Click ADL: 16    Treatment & Education:  Role of OT, POC, safety with ADL and ADL mobility, correct placement of RW when standing at sink during grooming, discharge recs    Patient left HOB elevated with all lines intact, call button in reach, and bed alarm on    GOALS:   Multidisciplinary Problems       Occupational Therapy Goals          Problem: Occupational Therapy    Goal Priority Disciplines Outcome Interventions   Occupational Therapy Goal     OT, PT/OT Ongoing, Progressing    Description: Goals to be met by: 2/3/2023     Patient will increase functional independence with ADLs by performing:    LE Dressing with Minimal Assistance.  Grooming while standing at sink with Contact Guard Assistance.  Toileting from bedside commode with Minimal Assistance for hygiene and clothing management.   Toilet transfer to bedside commode with Stand-by Assistance.    UE Dressing with Stand-by Assistance.  MET 1/23/2023                        Time Tracking:     OT Date of Treatment: 01/24/23  OT Start Time: 1114  OT Stop Time: 1131  OT Total Time (min): 17 min    Billable Minutes:Self Care/Home Management 17    OT/EDMOND: OT          1/24/2023

## 2023-01-24 NOTE — ASSESSMENT & PLAN NOTE
Two episodes of chest discomfort with swallowing.  Esophagram consistent with esophageal dysmotility   Gastroenterology evaluated the patient consultation.  Unlikely have benefit from EGD with dilation.    No specific treatment   Sit upright with meals, continue speech pathology if desired  Patient is refusing bariatric soft diet, doing well on cardiac.

## 2023-01-24 NOTE — PT/OT/SLP PROGRESS
"Physical Therapy Treatment    Patient Name:  Cheyenne Garner   MRN:  [id number]    Recommendations:     Discharge Recommendations: nursing facility, skilled  Discharge Equipment Recommendations:  (TBD)  Barriers to discharge: None    Assessment:     Cheyenne Garner is a 87 y.o. female admitted with a medical diagnosis of Diverticulitis of large intestine with perforation and abscess without bleeding.  She presents with the following impairments/functional limitations: weakness, gait instability, impaired endurance, impaired functional mobility, impaired self care skills, impaired skin, decreased coordination, decreased lower extremity function, decreased safety awareness, decreased upper extremity function.    Supine>sit with SBA  Sit>stand SBA with RW  Toilet t/f with RW and SBA, step t/f  Amb 2 x 70' with RW and CGA progressing to SBA, pt with decreased gait speed, federica and B step length, decreased stability but no LoB  Pt with good mobility, limited by weakness and decreased endurance  Rec SNF    Rehab Prognosis: Good; patient would benefit from acute skilled PT services to address these deficits and reach maximum level of function.    Recent Surgery: Procedure(s) (LRB):  COLECTOMY, SIGMOID (N/A)  SIGMOIDOSCOPY, FLEXIBLE  MOBILIZATION, SPLENIC FLEXURE 7 Days Post-Op    Plan:     During this hospitalization, patient to be seen 5 x/week to address the identified rehab impairments via gait training, therapeutic activities, therapeutic exercises, neuromuscular re-education and progress toward the following goals:    Plan of Care Expires:  02/19/23    Subjective     Chief Complaint: I need the bathroom  Patient/Family Comments/goals: pt "on the fence" about whether to go home or to the skilled facility  Pain/Comfort:  Pain Rating 1: other (see comments) (some pain reported now that pt is "waking up", unrated)  Location - Side 1: Bilateral  Location 1: abdomen  Pain Addressed 1: Reposition, Distraction, " Cessation of Activity  Pain Rating Post-Intervention 1: other (see comments) (pt appears comfortably situated in recliner)      Objective:     Communicated with nurse Beard prior to session.  Patient found HOB elevated with peripheral IV upon PT entry to room.     General Precautions: Standard, fall, NPO  Orthopedic Precautions: N/A  Braces: N/A  Respiratory Status: Room air     Functional Mobility:  Bed Mobility:     Supine to Sit: stand by assistance  Transfers:     Sit to Stand:  stand by assistance with rolling walker  Toilet Transfer: stand by assistance with  rolling walker  using  Step Transfer  Gait: 2 x 70' with RW and CGA progressing to SBA, pt with decreased gait speed, federica and B step length, decreased stability but no LoB      AM-PAC 6 CLICK MOBILITY  Turning over in bed (including adjusting bedclothes, sheets and blankets)?: 3  Sitting down on and standing up from a chair with arms (e.g., wheelchair, bedside commode, etc.): 3  Moving from lying on back to sitting on the side of the bed?: 3  Moving to and from a bed to a chair (including a wheelchair)?: 2  Need to walk in hospital room?: 2  Climbing 3-5 steps with a railing?: 1  Basic Mobility Total Score: 14       Treatment & Education:  Gait training as noted    Patient left up in chair with all lines intact, call button in reach, nurse Beard notified, and breakfast served ..    GOALS:   Multidisciplinary Problems       Physical Therapy Goals          Problem: Physical Therapy    Goal Priority Disciplines Outcome Goal Variances Interventions   Physical Therapy Goal     PT, PT/OT Ongoing, Progressing     Description: Goals to be met by: 2/29/23    Patient will perform the following to increase strength, improve mobility, and return to prior level of function:    1. Supine <> sit with CGA.  2. Sit<>stand with SBA with least restrictive assistive device.  3. Gait x 50 feet with SBA with least restrictive assistive device.                            Time Tracking:     PT Received On: 01/24/23  PT Start Time: 0842     PT Stop Time: 0915  PT Total Time (min): 33 min     Billable Minutes: Gait Training 17 and Therapeutic Activity 16    Treatment Type: Treatment  PT/PTA: PTA     PTA Visit Number: 3     01/24/2023

## 2023-01-24 NOTE — PT/OT/SLP PROGRESS
Occupational Therapy   Treatment    Name: Cheyenne Garner  MRN: [id number]  Admitting Diagnosis:  Diverticulitis of large intestine with perforation and abscess without bleeding  6 Days Post-Op    Recommendations:     Discharge Recommendations: nursing facility, skilled  Discharge Equipment Recommendations:   (Defer to next level of care)  Barriers to discharge:  Decreased caregiver support (Current functional status)    Assessment:     Cheyenne Garner is a 87 y.o. female with a medical diagnosis of Diverticulitis of large intestine with perforation and abscess without bleeding.  She presents agreeable to participating in OT tx session.  Pt reporting she has diarrhea daily and doesn't know when it is starting so she always wears adults incontinence briefs.  Pt stating she has suffered with this for about 15 years.  Pt requiring Min A/HHA for ADL mobility getting to bathroom and for toilet transfers.  Performance deficits affecting function are weakness, impaired endurance, impaired self care skills, impaired functional mobility, gait instability, decreased coordination, decreased lower extremity function, decreased upper extremity function, pain, decreased safety awareness, impaired skin.     Rehab Prognosis:  Good; patient would benefit from acute skilled OT services to address these deficits and reach maximum level of function.       Plan:     Patient to be seen 4 x/week to address the above listed problems via self-care/home management, therapeutic activities  Plan of Care Expires: 02/03/23  Plan of Care Reviewed with: patient    Subjective     Pain/Comfort:  Pain Rating 1:  (Pt reporting severe abdominal pain when sitting on toilet during tx session.)  Location 1: abdomen  Pain Addressed 1: Nurse notified (Nurse administered narcotic pain medication.)  Pain Rating Post-Intervention 1:  (Pt still in pain at end of tx session but more comfortable lying in bed.)    Objective:     Communicated with: nurse,  Kisha, prior to session.  Patient found HOB elevated with peripheral IV (Incontinence brief, female external catheter) upon OT entry to room.  Pt had diarrhea in incontinence brief and unaware. Pt reporting she has diarrhea daily and unaware when it happens.    General Precautions: Standard, fall, NPO    Orthopedic Precautions:N/A  Braces: N/A  Respiratory Status: Room air     Occupational Performance:     Bed Mobility:    Supine to sit: Min A  Sit to supine: Min A     Functional Mobility/Transfers:  Sit to stand: Min A  Toilet transfer: Min A  Functional Mobility: No LOB, due to pain, limited activity tolerance.     Activities of Daily Living:  Toileting: Mod A due to bowel incontinence in brief; fatigue and needing assistance to complete hygiene after it taking an extended time   UB Dressing: Set up/SBA       [facility] 6 Click ADL: 16    Treatment & Education:  Role of OT, POC, safety with ADL and ADL mobility, ADL mobility training, use of call button    Patient left HOB elevated with all lines intact, call button in reach, and nurse notified    GOALS:   Multidisciplinary Problems       Occupational Therapy Goals          Problem: Occupational Therapy    Goal Priority Disciplines Outcome Interventions   Occupational Therapy Goal     OT, PT/OT Ongoing, Progressing    Description: Goals to be met by: 2/3/2023     Patient will increase functional independence with ADLs by performing:    LE Dressing with Minimal Assistance.  Grooming while standing at sink with Contact Guard Assistance.  Toileting from bedside commode with Minimal Assistance for hygiene and clothing management.   Toilet transfer to bedside commode with Stand-by Assistance.    UE Dressing with Stand-by Assistance.  MET 1/23/2023                        Time Tracking:     OT Date of Treatment: 01/23/23  OT Start Time: 1812  OT Stop Time: 1845  OT Total Time (min): 33 min    Billable Minutes:Self Care/Home Management 33    OT/EDMOND: OT          1/23/2023

## 2023-01-24 NOTE — PLAN OF CARE
[facility] ([place])      HOME HEALTH ORDERS  FACE TO FACE ENCOUNTER    Patient Name: Cheyenne Garner  YOB: 1935    PCP: Mary Cortes MD   PCP Address: [address] [address] / [facility] [address]  PCP Phone Number: [phone]  PCP Fax: [phone]    Encounter Date:  1/25/2022    Admit to Home Health    Diagnoses:  Active Hospital Problems    Diagnosis  POA    *Diverticulitis of large intestine with perforation and abscess without bleeding [K57.20]  Yes    Elevated blood pressure reading [R03.0]  No    Esophageal dysmotility [K22.4]  No    Advanced care planning/counseling discussion [Z71.89]  Not Applicable    Drug-induced polyneuropathy [G62.0]  Yes    Primary insomnia [F51.01]  Yes    Hyperlipidemia [E78.5]  Yes      Resolved Hospital Problems    Diagnosis Date Resolved POA    Agitation [R45.1] 01/24/2023 No       Follow Up Appointments:  Future Appointments   Date Time Provider Department Center   1/27/2023 10:20 AM Claudia Kearns MD [facility] COLREC [facility]   3/27/2023  1:00 PM INJECTION, [facility] CHEMO [facility]MO [facility]       Allergies:  Review of patient's allergies indicates:   Allergen Reactions    Ciprofloxacin (bulk) Other (See Comments)     Made feet numb and cold    Eggs [egg derived] Itching    Flagyl [metronidazole] Other (See Comments)     Loss of feeling in feet       Medications: Review discharge medications with patient and family and provide education.      Current Discharge Medication List        CONTINUE these medications which have CHANGED    Details   gabapentin (NEURONTIN) 100 MG capsule Take 2 capsules (200 mg total) by mouth 3 (three) times daily as needed (Neuropathy symptoms).    Associated Diagnoses: Neuropathy      hydrocortisone (ANUSOL-HC) 2.5 % rectal cream Place rectally 2 (two) times daily as needed for Hemorrhoids.  Qty: 30 g, Refills: 1           CONTINUE these medications which have NOT CHANGED    Details    dextroamphetamine-amphetamine 10 mg Tab Take by mouth once daily.      ergocalciferol (ERGOCALCIFEROL) 50,000 unit Cap Take 1 capsule (50,000 Units total) by mouth twice a week.  Qty: 20 capsule, Refills: 2      mirtazapine (REMERON) 7.5 MG Tab Take 1 tablet by mouth Daily.      simvastatin (ZOCOR) 10 MG tablet Take 1 tablet (10 mg total) by mouth every evening to control cholesterol  Qty: 90 tablet, Refills: 3    Comments: pt requesting refill      traZODone (DESYREL) 50 MG tablet Take 50 mg by mouth every evening.      ziprasidone (GEODON) 20 MG Cap Take 1 capsule (20 mg total) by mouth nightly.               I have seen and examined this patient within the last 30 days. My clinical findings that support the need for the home health skilled services and home bound status are the following:no   Weakness/numbness causing balance and gait disturbance due to Weakness/Debility making it taxing to leave home.     Diet:   cardiac diet      Referrals/ Consults  Physical Therapy to evaluate and treat. Evaluate for home safety and equipment needs; Establish/upgrade home exercise program. Perform / instruct on therapeutic exercises, gait training, transfer training, and Range of Motion.  Occupational Therapy to evaluate and treat. Evaluate home environment for safety and equipment needs. Perform/Instruct on transfers, ADL training, ROM, and therapeutic exercises.    Activities:   activity as tolerated    Nursing:   Agency to admit patient within 24 hours of hospital discharge unless specified on physician order or at patient request    SN to complete comprehensive assessment including routine vital signs. Instruct on disease process and s/s of complications to report to MD. Review/verify medication list sent home with the patient at time of discharge  and instruct patient/caregiver as needed. Frequency may be adjusted depending on start of care date.     Skilled nurse to perform up to 3 visits PRN for symptoms related to  diagnosis    Notify MD if SBP > 160 or < 90; DBP > 90 or < 50; HR > 120 or < 50; Temp > 101; O2 < 88%    Ok to schedule additional visits based on staff availability and patient request on consecutive days within the home health episode.    When multiple disciplines ordered:    Start of Care occurs on Sunday - Wednesday schedule remaining discipline evaluations as ordered on separate consecutive days following the start of care.    Thursday SOC -schedule subsequent evaluations Friday and Monday the following week.     Friday - Saturday SOC - schedule subsequent discipline evaluations on consecutive days starting Monday of the following week.    For all post-discharge communication and subsequent orders please contact patient's primary care physician.       I certify that this patient is confined to her home and needs intermittent skilled nursing care, physical therapy, and occupational therapy.

## 2023-01-24 NOTE — CARE UPDATE
01/23/23 1930   Patient Assessment/Suction   Level of Consciousness (AVPU) alert   Respiratory Effort Normal;Unlabored   Expansion/Accessory Muscles/Retractions no retractions;expansion symmetric;no use of accessory muscles   All Lung Fields Breath Sounds clear;equal bilaterally   Rhythm/Pattern, Respiratory depth regular;unlabored;pattern regular   PRE-TX-O2   Device (Oxygen Therapy) room air   SpO2 97 %   Pulse Oximetry Type Intermittent   $ Pulse Oximetry - Multiple Charge Pulse Oximetry - Multiple   Pulse 75   Resp 16

## 2023-01-24 NOTE — PLAN OF CARE
Problem: Occupational Therapy  Goal: Occupational Therapy Goal  Description: Goals to be met by: 2/3/2023     Patient will increase functional independence with ADLs by performing:    LE Dressing with Minimal Assistance.  Grooming while standing at sink with Contact Guard Assistance.  Toileting from bedside commode with Minimal Assistance for hygiene and clothing management.   Toilet transfer to bedside commode with Stand-by Assistance.    UE Dressing with Stand-by Assistance.  MET 1/23/2023   Outcome: Ongoing, Progressing     Recommend discharge to SNF.

## 2023-01-24 NOTE — PLAN OF CARE
SKILLED NURSING ORDERS    01/24/2023  [facility] ([facility]  [facility] ([facility])  [address]  [place] [address]  Dept: [phone]  Loc: [phone]     Admit to Skilled Nursing     Diagnoses:  Active Hospital Problems    Diagnosis  POA    *Diverticulitis of large intestine with perforation and abscess without bleeding [K57.20]  Yes    Elevated blood pressure reading [R03.0]  No    Esophageal dysmotility [K22.4]  No    Advanced care planning/counseling discussion [Z71.89]  Not Applicable    Drug-induced polyneuropathy [G62.0]  Yes    Primary insomnia [F51.01]  Yes    Hyperlipidemia [E78.5]  Yes      Resolved Hospital Problems    Diagnosis Date Resolved POA    Agitation [R45.1] 01/24/2023 No       Patient is homebound due to:  Diverticulitis of large intestine with perforation and abscess without bleeding    Allergies:  Review of patient's allergies indicates:   Allergen Reactions    Ciprofloxacin (bulk) Other (See Comments)     Made feet numb and cold    Eggs [egg derived] Itching    Flagyl [metronidazole] Other (See Comments)     Loss of feeling in feet       Vitals:  Routine    Diet:  Cardiac    Activities:   As tolerated    Goals of Care Treatment Preferences:  Code Status: DNR             Nursing Precautions:  Fall and Pressure ulcer prevention    Consults:   PT to evaluate and treat- 5 times a week and OT to evaluate and treat- 5 times a week       Medications: Discontinue all previous medication orders, if any. See new list below.     Medication List        START taking these medications      amLODIPine 10 MG tablet  Commonly known as: NORVASC  Take 1 tablet (10 mg total) by mouth once daily.  Start taking on: January 25, 2023            CHANGE how you take these medications      gabapentin 100 MG capsule  Commonly known as: NEURONTIN  Take 2 capsules (200 mg total) by mouth 3 (three) times daily as needed (Neuropathy symptoms).  What changed:   medication strength  how much to  take  when to take this  reasons to take this     hydrocortisone 2.5 % rectal cream  Commonly known as: ANUSOL-HC  Place rectally 2 (two) times daily as needed for Hemorrhoids.  What changed:   when to take this  reasons to take this            CONTINUE taking these medications      dextroamphetamine-amphetamine 10 mg Tab  Take by mouth once daily.     ergocalciferol 50,000 unit Cap  Commonly known as: ERGOCALCIFEROL  Take 1 capsule (50,000 Units total) by mouth twice a week.     mirtazapine 7.5 MG Tab  Commonly known as: REMERON  Take 1 tablet by mouth Daily.     simvastatin 10 MG tablet  Commonly known as: ZOCOR  Take 1 tablet (10 mg total) by mouth every evening to control cholesterol     traZODone 50 MG tablet  Commonly known as: DESYREL  Take 50 mg by mouth every evening.     ziprasidone 20 MG Cap  Commonly known as: GEODON  Take 1 capsule (20 mg total) by mouth nightly.                Immunizations Administered as of 1/24/2023       Name Date Dose VIS Date Route Exp Date    COVID-19, MRNA, LN-S, PF (Moderna) 9/10/2021 -- -- Intramuscular --    Site: Left arm     Lot: [id number]     COVID-19, MRNA, LN-S, PF (Pfizer) (Purple Cap) 1/29/2021  8:53 AM 0.3 mL 12/12/2020 Intramuscular 1/29/2021    Site: Left deltoid     Given By: Jaquelin Jones RN     : [employer]     Lot: [id number]     COVID-19, MRNA, LN-S, PF (Pfizer) (Purple Cap) 1/7/2021 12:56 PM 0.3 mL 12/12/2020 Intramuscular 1/7/2021    Site: Left deltoid     Given By: Marjorie Moss RN     : [employer]     Lot: [id number]               _________________________________  Adali Deshpande MD  01/24/2023

## 2023-01-24 NOTE — ASSESSMENT & PLAN NOTE
S/p partial colectomy of sigmoid and descending colon with re-anastomosis.  She completed a course of postoperative antibiotics.  Surgery following  Up in chair, ambulate as tolerated  Continue diet  Staples out likely Friday at post-op office visit with Dr. Kearns.    PT and OT recommending SNF.   CM following  Patient much prefers to go home and has assistance around the clock from her caregiver.

## 2023-01-24 NOTE — PROGRESS NOTES
"[facility] Medicine  Progress Note    Patient Name: Cheyenne Garner  MRN: [id number]  Patient Class: IP- Inpatient   Admission Date: 1/16/2023  Length of Stay: 8 days  Attending Physician: Adali Hale MD  Primary Care Provider: Mary Cortes MD        Subjective:     Principal Problem:Diverticulitis of large intestine with perforation and abscess without bleeding        HPI:  From H&P by Letty Jovel DNP:  "Mrs. 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  She came to the emergency department today for abdominal pain that began yesterday.  The pain is located to the bilateral lower quadrants of her abdomen.  Patient denies nausea and vomiting.  She reports she frequently has diarrhea.  Patient reports that her pain has been controlled with pain medication given in the emergency department.  Patient's CT of her abdomen and pelvis with contrast that demonstrates acute diverticulitis with bowel per for duration and early abscess formation.  Patient was started on empiric Zosyn.  General surgery was consulted-appreciate recommendations.  Patient spoke with her GI physician Dr. Morales over the telephone.  Surgical intervention is recommended.  Dr. Kearns with colon and Rectal surgery will further evaluate patient tomorrow.  Patient admitted to hospital medicine for further management."      Overview/Hospital Course:  Patient was found to have perforated diverticulitis s/p sigmoid and descending colectomy.  She completed IV antibiotics.  She is tolerating diet and had a bowel movement.  PT/OT evaluated and recommended SNF, but she prefers to return to home where she has a caregiver.     During hospitalization, patient had 2 episodes of dysphagia with regurgitation of food.  Initial evaluation by speech pathology was " unrevealing.  Esophagram performed showed esophageal dysmotility.  At the request of the patient's [facility] physician, Gastroenterology evaluated the patient.  Bariatric soft diet was recommended to the patient.  She preferred to advance to a regular diet and is doing well on that.      Interval History:  Assumed care of patient today, known to me from previous admissions for diverticulitis.  She does not see why she needs to go to SNF as she has a caregiver in her home who can look after her.  Asking if she can go home instead.  Currently no complaints, incision is clean and not painful, has no abdominal pain and is tolerating food.    Review of Systems   Constitutional:  Negative for chills and fever.   Respiratory:  Negative for cough and shortness of breath.    Cardiovascular:  Negative for chest pain and palpitations.   Objective:     Vital Signs (Most Recent):  Temp: 98.2 °F (36.8 °C) (01/24/23 1203)  Pulse: 70 (01/24/23 1203)  Resp: 16 (01/24/23 1203)  BP: (!) 154/66 (01/24/23 1203)  SpO2: 97 % (01/24/23 1203)   Vital Signs (24h Range):  Temp:  [97.5 °F (36.4 °C)-98.5 °F (36.9 °C)] 98.2 °F (36.8 °C)  Pulse:  [69-84] 70  Resp:  [16-18] 16  SpO2:  [94 %-97 %] 97 %  BP: (110-154)/(58-78) 154/66     Weight: 64.5 kg (142 lb 3.2 oz)  Body mass index is 24.41 kg/m².    Intake/Output Summary (Last 24 hours) at 1/24/2023 1625  Last data filed at 1/23/2023 1848  Gross per 24 hour   Intake --   Output 2 ml   Net -2 ml      Physical Exam  Cardiovascular:      Rate and Rhythm: Normal rate and regular rhythm.      Heart sounds: Normal heart sounds. No murmur heard.    No gallop.   Pulmonary:      Effort: Pulmonary effort is normal.      Breath sounds: Normal breath sounds.   Abdominal:      General: Bowel sounds are normal.      Palpations: Abdomen is soft.      Comments: Midline surgical scar with staples, clean and dry, no dressing.   Skin:     General: Skin is warm and dry.   Neurological:      General: No focal  deficit present.      Mental Status: She is alert.   Psychiatric:         Mood and Affect: Mood normal.         Behavior: Behavior normal.       Significant Labs: All pertinent labs within the past 24 hours have been reviewed.    Significant Imaging: I have reviewed all pertinent imaging results/findings within the past 24 hours.      Assessment/Plan:      * Diverticulitis of large intestine with perforation and abscess without bleeding  S/p partial colectomy of sigmoid and descending colon with re-anastomosis.  She completed a course of postoperative antibiotics.  Surgery following  Up in chair, ambulate as tolerated  Continue diet  Staples out likely Friday at post-op office visit with Dr. Kearns.    PT and OT recommending SNF.   CM following  Patient much prefers to go home and has assistance around the clock from her caregiver.          Esophageal dysmotility  Two episodes of chest discomfort with swallowing.  Esophagram consistent with esophageal dysmotility   Gastroenterology evaluated the patient consultation.  Unlikely have benefit from EGD with dilation.    No specific treatment   Sit upright with meals, continue speech pathology if desired  Patient is refusing bariatric soft diet, doing well on cardiac.      Elevated blood pressure reading  Elevated blood pressure in the setting of recent surgery and acute hospitalization.  No history of hypertension or hypertensive treatment.  Continue amlodipine for now  Hydralazine p.r.n. systolic BP >180mmHg      Advanced care planning/counseling discussion  Palliative Care is following      Drug-induced polyneuropathy  Reported neuropathy symptoms after taking cipro per the patient. Sensory neuropathy, no motor findings on exam.  Continue mirtazipine  Patient declines gabapentin  Outpatient neurology follow-up      Primary insomnia  Continue mirtazazpine and geodon       Hyperlipidemia  Continue atorvastatin in place of simvastatin while inpatient      UTI (urinary tract  infection)  Urine culture E coli sensitive to Zosyn    Status post treatment        VTE Risk Mitigation (From admission, onward)         Ordered     enoxaparin injection 40 mg  Daily         01/17/23 1543     IP VTE HIGH RISK PATIENT  Once         01/16/23 1645     Place sequential compression device  Until discontinued         01/16/23 1645                Discharge Planning   PAULA:      Code Status: DNR   Is the patient medically ready for discharge?:     Reason for patient still in hospital (select all that apply): Patient trending condition, Consult recommendations, PT / OT recommendations and Pending disposition  Discharge Plan A: Skilled Nursing Facility   Discharge Delays: None known at this time              Adali Deshpande MD  Department of Hospital Medicine   [facility] ([place])

## 2023-01-24 NOTE — SUBJECTIVE & OBJECTIVE
Interval History:  Assumed care of patient today, known to me from previous admissions for diverticulitis.  She does not see why she needs to go to SNF as she has a caregiver in her home who can look after her.  Asking if she can go home instead.  Currently no complaints, incision is clean and not painful, has no abdominal pain and is tolerating food.    Review of Systems   Constitutional:  Negative for chills and fever.   Respiratory:  Negative for cough and shortness of breath.    Cardiovascular:  Negative for chest pain and palpitations.   Objective:     Vital Signs (Most Recent):  Temp: 98.2 °F (36.8 °C) (01/24/23 1203)  Pulse: 70 (01/24/23 1203)  Resp: 16 (01/24/23 1203)  BP: (!) 154/66 (01/24/23 1203)  SpO2: 97 % (01/24/23 1203)   Vital Signs (24h Range):  Temp:  [97.5 °F (36.4 °C)-98.5 °F (36.9 °C)] 98.2 °F (36.8 °C)  Pulse:  [69-84] 70  Resp:  [16-18] 16  SpO2:  [94 %-97 %] 97 %  BP: (110-154)/(58-78) 154/66     Weight: 64.5 kg (142 lb 3.2 oz)  Body mass index is 24.41 kg/m².    Intake/Output Summary (Last 24 hours) at 1/24/2023 1625  Last data filed at 1/23/2023 1848  Gross per 24 hour   Intake --   Output 2 ml   Net -2 ml      Physical Exam  Cardiovascular:      Rate and Rhythm: Normal rate and regular rhythm.      Heart sounds: Normal heart sounds. No murmur heard.    No gallop.   Pulmonary:      Effort: Pulmonary effort is normal.      Breath sounds: Normal breath sounds.   Abdominal:      General: Bowel sounds are normal.      Palpations: Abdomen is soft.      Comments: Midline surgical scar with staples, clean and dry, no dressing.   Skin:     General: Skin is warm and dry.   Neurological:      General: No focal deficit present.      Mental Status: She is alert.   Psychiatric:         Mood and Affect: Mood normal.         Behavior: Behavior normal.       Significant Labs: All pertinent labs within the past 24 hours have been reviewed.    Significant Imaging: I have reviewed all pertinent imaging  results/findings within the past 24 hours.

## 2023-01-24 NOTE — PROGRESS NOTES
Surgery Inpatient Progress Note    Date: 01/24/2023    Overnight events: reports some cramping in her lower abdomen.  Passing flatus, having bowel movements.  Tolerating a diet without nausea or vomiting.     O:   Vitals:    01/24/23 0806   BP: (!) 124/58   Pulse: 69   Resp: 16   Temp: 98.3 °F (36.8 °C)       Physical Exam   Gen: well developed female, NAD   HEENT: normocephalic, atraumatic, PERRL, EOMI   CV: RRR, no murmurs  Res: nonlabored, CTAB   Abd: soft, midline incision approximated with staples, no erythema or drainage, no tenderness on palpation   MSK: no gross deformities        Assessment and plan:   Cheyenne Garner is a 87 y.o. female who presents with recurrent, perforated diverticulitis, now s/p exploratory laparotomy and left hemicolectomy on 1/17      Perforated diverticulitis   - completed course of antibiotics   - diet as tolerated  - patient with planned follow up on Friday 1/27, will reschedule if discharged later this week.  Plan to remove staples 10-14 days post op.   - patient participating in rehab       Claudia Kearns MD  Staff Surgeon   Colon & Rectal Surgery

## 2023-01-24 NOTE — PLAN OF CARE
BETTY called [facility] to make sure they have all the paperwork they need to accept patient today.  Spoke with Jose Antonio, [phone], who stated he will get back with me and let me know.

## 2023-01-25 ENCOUNTER — PATIENT OUTREACH (OUTPATIENT)
Dept: ADMINISTRATIVE | Facility: OTHER | Age: 88
End: 2023-01-25
Payer: MEDICARE

## 2023-01-25 VITALS
OXYGEN SATURATION: 98 % | BODY MASS INDEX: 24.28 KG/M2 | TEMPERATURE: 98 F | RESPIRATION RATE: 16 BRPM | SYSTOLIC BLOOD PRESSURE: 142 MMHG | WEIGHT: 142.19 LBS | DIASTOLIC BLOOD PRESSURE: 65 MMHG | HEART RATE: 77 BPM | HEIGHT: 64 IN

## 2023-01-25 PROCEDURE — 99238 PR HOSPITAL DISCHARGE DAY,<30 MIN: ICD-10-PCS | Mod: ,,, | Performed by: HOSPITALIST

## 2023-01-25 PROCEDURE — 94761 N-INVAS EAR/PLS OXIMETRY MLT: CPT

## 2023-01-25 PROCEDURE — 25000003 PHARM REV CODE 250: Performed by: STUDENT IN AN ORGANIZED HEALTH CARE EDUCATION/TRAINING PROGRAM

## 2023-01-25 PROCEDURE — 99238 HOSP IP/OBS DSCHRG MGMT 30/<: CPT | Mod: ,,, | Performed by: HOSPITALIST

## 2023-01-25 PROCEDURE — 25000003 PHARM REV CODE 250: Performed by: SURGERY

## 2023-01-25 RX ADMIN — ACETAMINOPHEN 650 MG: 325 TABLET, FILM COATED ORAL at 10:01

## 2023-01-25 RX ADMIN — AMLODIPINE BESYLATE 10 MG: 5 TABLET ORAL at 08:01

## 2023-01-25 RX ADMIN — OXYCODONE HYDROCHLORIDE 5 MG: 5 TABLET ORAL at 08:01

## 2023-01-25 RX ADMIN — IBUPROFEN 400 MG: 400 TABLET, FILM COATED ORAL at 10:01

## 2023-01-25 RX ADMIN — ACETAMINOPHEN 650 MG: 325 TABLET, FILM COATED ORAL at 04:01

## 2023-01-25 RX ADMIN — IBUPROFEN 400 MG: 400 TABLET, FILM COATED ORAL at 04:01

## 2023-01-25 RX ADMIN — ATORVASTATIN CALCIUM 10 MG: 10 TABLET, FILM COATED ORAL at 08:01

## 2023-01-25 RX ADMIN — IBUPROFEN 400 MG: 400 TABLET, FILM COATED ORAL at 03:01

## 2023-01-25 NOTE — PLAN OF CARE
DME orders reviewed by [place] with [facility], patient denied;   Orders forwarded to another DME provider for processing; requested to be delivered to patient's home

## 2023-01-25 NOTE — PLAN OF CARE
01/25/23 1209   Final Note   Assessment Type Final Discharge Note   Anticipated Discharge Disposition Home-Health   Hospital Resources/Appts/Education Provided Appointments scheduled and added to AVS   Post-Acute Status   Post-Acute Authorization Home Health   Home Health Status Set-up Complete/Auth obtained   Patient choice form signed by patient/caregiver List with quality metrics by geographic area provided;List from CMS Compare   Discharge Delays None known at this time     Pt's follow up appts have been added to the AVS.  Pt has been set up with [facility] who will visit pt tomorrow.  Pt is clear for discharge from a case management standpoint.

## 2023-01-25 NOTE — PLAN OF CARE
POC reviewed with patient lying comfortably in bed. Awake and alert. Stable on room air. Purposeful rounding completed. Complaints of pain treated with scheduled pain medication according to MAR. No other complaints verbalized during shift. No injuries, falls, or trauma occurred during shift. Bed low and locked with side rails up x 3 and call light within reach. Pt in no apparent distress. Safety maintained      Problem: Adult Inpatient Plan of Care  Goal: Plan of Care Review  Outcome: Ongoing, Progressing  Goal: Patient-Specific Goal (Individualized)  Outcome: Ongoing, Progressing  Goal: Absence of Hospital-Acquired Illness or Injury  Outcome: Ongoing, Progressing  Goal: Optimal Comfort and Wellbeing  Outcome: Ongoing, Progressing  Goal: Readiness for Transition of Care  Outcome: Ongoing, Progressing     Problem: Fall Injury Risk  Goal: Absence of Fall and Fall-Related Injury  Outcome: Ongoing, Progressing

## 2023-01-25 NOTE — PLAN OF CARE
Recommendations  Continue with cardiac diet as tolerated  Encourage PO intake at meals  Monitor nutrition related labs    Goals: Patient to continue to meet 75% EEN/EPN by RD follow up   Nutrition Goal Status: goal met  Communication of RD Recs:  (POC)

## 2023-01-25 NOTE — PROGRESS NOTES
IP Liaison - Final Visit Note    Patient: Cheyenne Garner  MRN:  [id number]  Date of Service:  1/25/2023  Completed by:  CHARLEE Rudd    Reason for Visit   Patient presents with    IP Liaison Follow-up Visit            Patient Summary     Discharge Date: 1/25/2023  Discharge telephone number/address: [phone] [facility] [address] [facility] [address]  Follow up provider: Fran Deras MD  Follow up appointments: 2/7/2023, 11:00am  Home Health agency & telephone number: [facility] /[phone]  DME ordered &  name: n/a  Assigned OPCM RN/SW: n/a  Report sent to follow up team (PCP/OPCM) via in basket message: n/a  Community Resources arranged including agency name & contact info: No support & services have been documented.      CHARLEE Rudd

## 2023-01-25 NOTE — PLAN OF CARE
Patient educated on discharge instructions and verbalized understanding.  All questions answered to patient's satisfaction.  IV removed without complication. Patient to be transported off unit via wheelchair.

## 2023-01-25 NOTE — PROGRESS NOTES
Surgery Inpatient Progress Note    Date: 01/25/2023    Overnight events: Doing well.  Would like to go home.     O:   Vitals:    01/25/23 0847   BP:    Pulse:    Resp: 18   Temp:        Physical Exam   Gen: well developed female, NAD   HEENT: normocephalic, atraumatic, PERRL, EOMI   CV: RRR, no murmurs  Res: nonlabored, CTAB   Abd: soft, midline incision approximated with staples, no erythema or drainage, no tenderness on palpation   MSK: no gross deformities     Assessment and plan:   Cheyenne Garner is a 87 y.o. female who presents with     recurrent, perforated diverticulitis, now s/p exploratory laparotomy and left hemicolectomy on 1/17      Perforated diverticulitis   - completed course of antibiotics   - diet as tolerated  - patient with planned follow up on Friday 1/27, will reschedule if discharged later this week.  Plan to remove staples 10-14 days post op.   - patient participating in rehab     Claudia Kearns MD  Staff Surgeon   Colon & Rectal Surgery

## 2023-01-25 NOTE — PT/OT/SLP PROGRESS
Physical Therapy      Patient Name:  Cheyenne Garner   MRN:  [id number]    Patient not seen today secondary to pt requesting that nursing assist her with hygiene (post-BM) and getting dressed for discharge. Pt going home this afternoon

## 2023-01-25 NOTE — PLAN OF CARE
01/25/23 7227   Post-Acute Status   Discharge Delays (!) Home Medical Equipment (Insurance, Delivery)

## 2023-01-25 NOTE — PROGRESS NOTES
[facility] ([place])  Adult Nutrition  Progress Note    SUMMARY       Recommendations  Continue with cardiac diet as tolerated  Encourage PO intake at meals  Monitor nutrition related labs    Goals: Patient to continue to meet 75% EEN/EPN by RD follow up   Nutrition Goal Status: goal met  Communication of RD Recs:  (POC)    Assessment and Plan  Nutrition Problem  Altered GI Function    Related to (etiology):   Diverticulitis dx    Signs and Symptoms (as evidenced by):   S/p partial colectomy of sigmoid and descending colon    Interventions:  Mineral modified diet  Fat modified diet  Collaboration with other providers    Nutrition Diagnosis Status:   New    Reason for Assessment  Reason For Assessment: length of stay  Diagnosis:  (Diverticulitis of large intestine with perforation and abscess without bleeding)  Relevant Medical History:   Patient Active Problem List   Diagnosis    Hematuria, microscopic    UTI (urinary tract infection)    Depression    Hyperlipidemia    Sarcoidosis    Attention deficit hyperactivity disorder (ADHD), predominantly inattentive type    Osteoarthritis of hip    Primary osteoarthritis involving multiple joints    Osteoporosis    Primary insomnia    Colitis    Diverticulitis of large intestine with perforation and abscess without bleeding    Generalized weakness    Non-intractable vomiting with nausea    Colonic diverticular abscess    Advance care planning    Drug-induced polyneuropathy    Advanced care planning/counseling discussion    Elevated blood pressure reading    Esophageal dysmotility     Interdisciplinary Rounds: did not attend  General Information Comments:   1/24: patient currently on cardiac diet, tolerating. Patient with 2 episodes of difficulty swallowing r/t esophageal dysmotility. Reported to RD no difficulty chewing or swallowing at this time. No N/V/D/C. No recent weight loss per patient. Trace edema. PIV Ismael 18- incision to abdomen. NFPE completed 1/24 mild SQ  "fat loss/muscle wasting r/t advanced age.     Nutrition Discharge Planning: dc on cardiac diet    Nutrition Risk Screen  Nutrition Risk Screen: no indicators present    Nutrition/Diet History  Spiritual, Cultural Beliefs, Caodaism Practices, Values that Affect Care: no  Factors Affecting Nutritional Intake: altered gastrointestinal function    Anthropometrics  Temp: 97.7 °F (36.5 °C)  Height Method: Stated  Height: 5' 4" (162.6 cm)  Height (inches): 64 in  Weight Method: Bed Scale  Weight: 64.5 kg (142 lb 3.2 oz)  Weight (lb): 142.2 lb  Ideal Body Weight (IBW), Female: 120 lb  % Ideal Body Weight, Female (lb): 118.5 %  BMI (Calculated): 24.4  BMI Grade: 18.5-24.9 - normal       Lab/Procedures/Meds  Pertinent Labs Reviewed: reviewed  CBC:  No results for input(s): WBC, HGB, HCT, PLT in the last 24 hours.  CMP:  No results for input(s): GLUCOSE, CALCIUM, ALBUMIN, PROT, NA, K, CO2, CL, BUN, CREATININE, ALKPHOS, ALT, AST, BILITOT in the last 24 hours.    Pertinent Medications Reviewed: reviewed  Scheduled Meds:   acetaminophen  650 mg Oral Q6H    amLODIPine  10 mg Oral Daily    atorvastatin  10 mg Oral Daily    enoxaparin  40 mg Subcutaneous Daily    ibuprofen  400 mg Oral Q6H    mirtazapine  7.5 mg Oral Daily    ziprasidone  20 mg Oral Nightly     Continuous Infusions:  PRN Meds:.benzonatate, dextrose 10%, dextrose 10%, gabapentin, glucagon (human recombinant), glucose, glucose, guaiFENesin 100 mg/5 ml, hydrALAZINE, melatonin, naloxone, ondansetron, oxyCODONE, prochlorperazine, sodium chloride 0.9%  Estimated/Assessed Needs  Weight Used For Calorie Calculations: 64.5 kg (142 lb 3.2 oz)  Energy Calorie Requirements (kcal): 1500  Energy Need Method: Lin-[place] (msj x 1.4)  Protein Requirements: 65-84g (1.0-1.3g/kg)  Weight Used For Protein Calculations: 64.5 kg (142 lb 3.2 oz)  Fluid Requirements (mL): 1 mL/kcal  Estimated Fluid Requirement Method:  (or per MD)  RDA Method (mL): 1500  CHO Requirement: " 188      Nutrition Prescription Ordered  Current Diet Order: cardiac    Evaluation of Received Nutrient/Fluid Intake  I/O: + 3.5 L since admit  Energy Calories Required: meeting needs  Protein Required: meeting needs  Fluid Required: meeting needs  Comments: LBM 1/22  Tolerance: tolerating  % Intake of Estimated Energy Needs: 75 - 100 %  % Meal Intake: 75 - 100 %  Intake/Output - Last 3 Shifts         01/22 0700 01/23 0659 01/23 0700 01/24 0659 01/24 0700 01/25 0659    P.O. 478      IV Piggyback       Total Intake(mL/kg) 478 (7.4)      Urine (mL/kg/hr) 700 (0.5) 2 (0)     Stool 0 2     Total Output 700 4     Net -222 -4            Stool Occurrence 1 x            Nutrition Risk  Level of Risk/Frequency of Follow-up:  (1-2 x/week)     Monitor and Evaluation  Food and Nutrient Intake: energy intake, food and beverage intake  Food and Nutrient Adminstration: diet order  Knowledge/Beliefs/Attitudes: beliefs and attitudes  Physical Activity and Function: nutrition-related ADLs and IADLs  Anthropometric Measurements: weight, weight change, body mass index  Biochemical Data, Medical Tests and Procedures: electrolyte and renal panel, gastrointestinal profile, glucose/endocrine profile, inflammatory profile, lipid profile  Nutrition-Focused Physical Findings: overall appearance     Nutrition Follow-Up  RD Follow-up?: Yes  Mariajose Muñoz, Registration Eligible, Provisional LDN

## 2023-01-25 NOTE — DISCHARGE SUMMARY
"[facility] Medicine  Discharge Summary      Patient Name: Cheyenne Garner  MRN: [id number]  NELSY: [id number]  Patient Class: IP- Inpatient  Admission Date: 1/16/2023  Hospital Length of Stay: 9 days  Discharge Date and Time:  01/25/2023 4:10 PM  Attending Physician: Adali Hale MD   Discharging Provider: Adali Hale MD  Primary Care Provider: Mary Cortes MD    Primary Care Team: Networked reference to record PCT     HPI:   From H&P by Letty Jovel DNP:  "Mrs. 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  She came to the emergency department today for abdominal pain that began yesterday.  The pain is located to the bilateral lower quadrants of her abdomen.  Patient denies nausea and vomiting.  She reports she frequently has diarrhea.  Patient reports that her pain has been controlled with pain medication given in the emergency department.  Patient's CT of her abdomen and pelvis with contrast that demonstrates acute diverticulitis with bowel per for duration and early abscess formation.  Patient was started on empiric Zosyn.  General surgery was consulted-appreciate recommendations.  Patient spoke with her GI physician Dr. Morales over the telephone.  Surgical intervention is recommended.  Dr. Kearns with colon and Rectal surgery will further evaluate patient tomorrow.  Patient admitted to hospital medicine for further management."      Procedure(s) (LRB):  COLECTOMY, SIGMOID (N/A)  SIGMOIDOSCOPY, FLEXIBLE  MOBILIZATION, SPLENIC FLEXURE      Hospital Course:   Patient was found to have perforated diverticulitis s/p sigmoid and descending colectomy.  She completed IV antibiotics.  She is tolerating diet and had a bowel movement.  PT/OT evaluated and recommended SNF, but she prefers to return to home where she has a " caregiver.     During hospitalization, patient had 2 episodes of dysphagia with regurgitation of food.  Initial evaluation by speech pathology was unrevealing.  Esophagram performed showed esophageal dysmotility.  At the request of the patient's [facility] physician, Gastroenterology evaluated the patient.  Bariatric soft diet was recommended to the patient.  She preferred to advance to a regular diet and is doing well on that.  Patient discharged home with home health and will follow up with Dr. Kearns for wound check and removal of staples this Friday.       Goals of Care Treatment Preferences:  Code Status: DNR           Consults:   Consults (From admission, onward)        Status Ordering Provider     Inpatient consult to Gastroenterology  Once        Provider:  Avtar Watson MD    Completed JULIA AGUIRRE     Inpatient consult to Palliative Care  Once        Provider:  Fernando Turner MD    Completed USMAN NEVES     Inpatient consult to General surgery  Once        Provider:  Claudia Kearns MD    Completed USMAN NEVES            Final Active Diagnoses:    Diagnosis Date Noted POA    PRINCIPAL PROBLEM:  Diverticulitis of large intestine with perforation and abscess without bleeding [K57.20] 06/10/2022 Yes    Elevated blood pressure reading [R03.0] 01/20/2023 No    Esophageal dysmotility [K22.4] 01/20/2023 No    Advanced care planning/counseling discussion [Z71.89] 01/17/2023 Not Applicable    Drug-induced polyneuropathy [G62.0] 09/28/2022 Yes    Primary insomnia [F51.01] 09/14/2020 Yes    Hyperlipidemia [E78.5] 10/06/2016 Yes      Problems Resolved During this Admission:    Diagnosis Date Noted Date Resolved POA    Agitation [R45.1] 01/20/2023 01/24/2023 No       Discharged Condition: stable    Disposition: [facility]-Health Care [facility]    Follow Up:   Follow-up Information     Claudia Kearns MD Follow up.    Specialty: Colon and Rectal Surgery  Why: As scheduled Friday 1/27/22  Contact  "information:  [address]  [facility] [address]  [phone]             Mary Cortes MD Follow up.    Specialty: Internal Medicine  Why: Follow up in 1-2 weeks  Contact information:  [address]  [address]  [facility] [address]  [phone]             DME Direct. Call.    Why: If you have not recieved your home delivery by 1/26/2023 end of day  Contact information:  [address]  [facility] [id number] [phone]                     Patient Instructions:      COMMODE FOR HOME USE     Order Specific Question Answer Comments   Type: Standard    Height: 5' 4" (1.626 m)    Weight: 64.5 kg (142 lb 3.2 oz)    Does patient have medical equipment at home? cane, straight    Does patient have medical equipment at home? grab bar    Length of need (1-99 months): 99      Diet Cardiac     Activity as tolerated         Medications:  Reconciled Home Medications:      Medication List      CHANGE how you take these medications    gabapentin 100 MG capsule  Commonly known as: NEURONTIN  Take 2 capsules (200 mg total) by mouth 3 (three) times daily as needed (Neuropathy symptoms).  What changed:   · medication strength  · how much to take  · when to take this  · reasons to take this     hydrocortisone 2.5 % rectal cream  Commonly known as: ANUSOL-HC  Place rectally 2 (two) times daily as needed for Hemorrhoids.  What changed:   · when to take this  · reasons to take this        CONTINUE taking these medications    dextroamphetamine-amphetamine 10 mg Tab  Take by mouth once daily.     ergocalciferol 50,000 unit Cap  Commonly known as: ERGOCALCIFEROL  Take 1 capsule (50,000 Units total) by mouth twice a week.     mirtazapine 7.5 MG Tab  Commonly known as: REMERON  Take 1 tablet by mouth Daily.     simvastatin 10 MG tablet  Commonly known as: ZOCOR  Take 1 tablet (10 mg total) by mouth every evening to control cholesterol     traZODone 50 MG tablet  Commonly known as: DESYREL  Take 50 mg by mouth every " evening.     ziprasidone 20 MG Cap  Commonly known as: GEODON  Take 1 capsule (20 mg total) by mouth nightly.            Time spent on the discharge of patient: >30 minutes         Adali Deshpande MD  Department of Hospital Medicine  [facility]

## 2023-01-26 PROCEDURE — G0180 MD CERTIFICATION HHA PATIENT: HCPCS | Mod: ,,, | Performed by: HOSPITALIST

## 2023-01-26 PROCEDURE — G0180 PR HOME HEALTH MD CERTIFICATION: ICD-10-PCS | Mod: ,,, | Performed by: HOSPITALIST

## 2023-01-27 ENCOUNTER — TELEPHONE (OUTPATIENT)
Dept: SURGERY | Facility: CLINIC | Age: 88
End: 2023-01-27
Payer: MEDICARE

## 2023-01-30 ENCOUNTER — TELEPHONE (OUTPATIENT)
Dept: SURGERY | Facility: CLINIC | Age: 88
End: 2023-01-30
Payer: MEDICARE

## 2023-01-30 ENCOUNTER — OFFICE VISIT (OUTPATIENT)
Dept: SURGERY | Facility: CLINIC | Age: 88
End: 2023-01-30
Payer: MEDICARE

## 2023-01-30 VITALS
HEART RATE: 80 BPM | HEIGHT: 64 IN | WEIGHT: 134.94 LBS | BODY MASS INDEX: 23.04 KG/M2 | OXYGEN SATURATION: 95 % | DIASTOLIC BLOOD PRESSURE: 57 MMHG | RESPIRATION RATE: 19 BRPM | SYSTOLIC BLOOD PRESSURE: 120 MMHG

## 2023-01-30 DIAGNOSIS — K57.20 DIVERTICULITIS OF LARGE INTESTINE WITH PERFORATION AND ABSCESS WITHOUT BLEEDING: Primary | ICD-10-CM

## 2023-01-30 PROCEDURE — 99999 PR PBB SHADOW E&M-EST. PATIENT-LVL III: ICD-10-PCS | Mod: PBBFAC,,, | Performed by: SURGERY

## 2023-01-30 PROCEDURE — 99024 PR POST-OP FOLLOW-UP VISIT: ICD-10-PCS | Mod: POP,,, | Performed by: SURGERY

## 2023-01-30 PROCEDURE — 99024 POSTOP FOLLOW-UP VISIT: CPT | Mod: POP,,, | Performed by: SURGERY

## 2023-01-30 PROCEDURE — 99213 OFFICE O/P EST LOW 20 MIN: CPT | Mod: PBBFAC | Performed by: SURGERY

## 2023-01-30 PROCEDURE — 99999 PR PBB SHADOW E&M-EST. PATIENT-LVL III: CPT | Mod: PBBFAC,,, | Performed by: SURGERY

## 2023-01-30 NOTE — TELEPHONE ENCOUNTER
Left message for pt to return call regarding 1 month f/u. CRS provided. Portal informed as option to communicate.     ----- Message from Felicita Coy sent at 1/30/2023 11:46 AM CST -----  Contact: [phone]  PT, would like to Scheduled a  Appointment, please contact @ number provided     1 mo f/u     [phone]

## 2023-01-30 NOTE — PROGRESS NOTES
Colon & Rectal Surgery Clinic Follow Up    HPI:   Cheyenne Garner is a 87 y.o. female who presents for follow up after ex lap, descending colectomy for perforated diverticulitis on 1/17.  She is tolerating a diet without nausea or vomiting.  Having bowel function.  Having 3-4 loose bowel movements per day.  Has chronic diarrhea managed by Dr. Morales, takes cholestyramine.  Still feeling weak.         Objective:   Vitals:    01/30/23 1043   BP: (!) 120/57   Pulse: 80   Resp: 19        Physical Exam   Gen: elderly female, NAD  HEENT: normocephalic, atraumatic, PERRL, EOMI   CV: RRR, no murmurs  Resp: nonlabored, CTAB   Abd: soft, NTND, midline incision approximated with staples, small area of wound separation at umbilicus, no erythema or purulent drainage   MSK: no gross deformities     Pathology:   Value: RELIAPATH DIAGNOSIS:   SIGMOID COLON, COLECTOMY:   - Benign colonic parenchyma with diverticulitis with perforation, abscess and   acute serositis.   - Twelve(12) benign reactive lymph nodes.        Assessment and Plan:   Cheyenne Garner  is a 87 y.o. female who presents for follow up after descending colectomy for perforated diverticulitis     - She is overall healing well.  We discussed that fatigue is expected as she is only 10 days post op.  This may persist for up to 8 weeks after surgery.    - We also reviewed that she had a left hemicolectomy and may have increased frequency of bowel movements.  Will start on fiber con 2 tabs TID and imodium PRN.    - Staples removed in clinic.   - Follow up in 1 month if diarrhea persists.       Claudia Kearns MD  Staff Surgeon   Colon & Rectal Surgery

## 2023-01-31 ENCOUNTER — OUTPATIENT CASE MANAGEMENT (OUTPATIENT)
Dept: ADMINISTRATIVE | Facility: OTHER | Age: 88
End: 2023-01-31
Payer: MEDICARE

## 2023-01-31 NOTE — LETTER
February 2, 2023    Cheyenne Veelister  [address]  [place] [address]             [facility]  [address] [address] Dear Ms. Garner:    I am writing from the Outpatient Complex Care Management Department at [facility].  I received a referral from [facility] to contact you or your caregiver regarding any needs you may have. I have attempted to contact you or your cargeiver by phone two times unsuccessfully.  Please contact the Outpatient Complex Care Management Department at [phone] if you would like to discuss your needs.      Sincerely,     Rebecca Gannon, [facility]

## 2023-02-02 ENCOUNTER — OUTPATIENT CASE MANAGEMENT (OUTPATIENT)
Dept: ADMINISTRATIVE | Facility: OTHER | Age: 88
End: 2023-02-02
Payer: MEDICARE

## 2023-02-02 NOTE — PROGRESS NOTES
Sw received missed call from patient and Sw returned call to Pt. Pt declined to complete Social Work assessment. Pt reported having all her needs met and reported having assistance with medication and transportation. Pt advised to communicate with her PCP if needs arise in future and Pt voiced understanding.

## 2023-02-02 NOTE — PROGRESS NOTES
Attempt: #2  This LCSW attempted to reach patient/caregiver to provide resource and left msg requesting a return call.  Letter with contact information was sent via US mail to patient/caregiver.  Referral source notified.

## 2023-02-08 PROBLEM — Z90.49 HISTORY OF PARTIAL SURGICAL REMOVAL OF COLON: Status: ACTIVE | Noted: 2023-02-08

## 2023-02-22 ENCOUNTER — EXTERNAL HOME HEALTH (OUTPATIENT)
Dept: HOME HEALTH SERVICES | Facility: HOSPITAL | Age: 88
End: 2023-02-22
Payer: MEDICARE

## 2023-02-24 ENCOUNTER — PATIENT MESSAGE (OUTPATIENT)
Dept: RESEARCH | Facility: HOSPITAL | Age: 88
End: 2023-02-24
Payer: MEDICARE

## 2023-02-28 ENCOUNTER — TELEPHONE (OUTPATIENT)
Dept: SURGERY | Facility: CLINIC | Age: 88
End: 2023-02-28
Payer: MEDICARE

## 2023-02-28 NOTE — TELEPHONE ENCOUNTER
Left message for pt to return call regarding concerns for f/u. CRS number provided.       ----- Message from Nuvia Owens sent at 2/28/2023  1:39 PM CST -----  Regarding: pt Advice  Contact: Pt [phone]  Pt is calling to speak with provider plan of care. Pt would like to know if she should follow up with Lin or Dr Kearns.  Please Call

## 2023-03-29 ENCOUNTER — INFUSION (OUTPATIENT)
Dept: INFUSION THERAPY | Facility: OTHER | Age: 88
End: 2023-03-29
Attending: INTERNAL MEDICINE
Payer: MEDICARE

## 2023-03-29 VITALS
TEMPERATURE: 98 F | WEIGHT: 134.06 LBS | OXYGEN SATURATION: 95 % | HEIGHT: 63 IN | HEART RATE: 78 BPM | DIASTOLIC BLOOD PRESSURE: 72 MMHG | SYSTOLIC BLOOD PRESSURE: 153 MMHG | RESPIRATION RATE: 18 BRPM | BODY MASS INDEX: 23.75 KG/M2

## 2023-03-29 DIAGNOSIS — M81.0 OSTEOPOROSIS WITHOUT CURRENT PATHOLOGICAL FRACTURE, UNSPECIFIED OSTEOPOROSIS TYPE: Primary | ICD-10-CM

## 2023-03-29 PROCEDURE — 63600175 PHARM REV CODE 636 W HCPCS: Mod: JZ,JG | Performed by: INTERNAL MEDICINE

## 2023-03-29 PROCEDURE — 96372 THER/PROPH/DIAG INJ SC/IM: CPT

## 2023-03-29 RX ADMIN — DENOSUMAB 60 MG: 60 INJECTION SUBCUTANEOUS at 02:03

## 2023-03-29 NOTE — PLAN OF CARE
Patient tolerated prolia injection. Bandaid to injection site. AVS given to patient who understands her next appt is 9/29/23.

## 2023-05-23 ENCOUNTER — PATIENT MESSAGE (OUTPATIENT)
Dept: RESEARCH | Facility: HOSPITAL | Age: 88
End: 2023-05-23
Payer: MEDICARE

## 2023-06-20 ENCOUNTER — OFFICE VISIT (OUTPATIENT)
Dept: DERMATOLOGY | Facility: CLINIC | Age: 88
End: 2023-06-20
Payer: MEDICARE

## 2023-06-20 VITALS — WEIGHT: 134 LBS | BODY MASS INDEX: 23.74 KG/M2

## 2023-06-20 DIAGNOSIS — D22.9 NEVUS OF MULTIPLE SITES: ICD-10-CM

## 2023-06-20 DIAGNOSIS — D48.5 NEOPLASM OF UNCERTAIN BEHAVIOR OF SKIN: Primary | ICD-10-CM

## 2023-06-20 DIAGNOSIS — L82.1 SEBORRHEIC KERATOSES: ICD-10-CM

## 2023-06-20 DIAGNOSIS — D18.01 CHERRY ANGIOMA: ICD-10-CM

## 2023-06-20 DIAGNOSIS — L81.4 LENTIGINES: ICD-10-CM

## 2023-06-20 DIAGNOSIS — Z85.828 HISTORY OF SKIN CANCER: ICD-10-CM

## 2023-06-20 PROCEDURE — 11102 TANGNTL BX SKIN SINGLE LES: CPT | Mod: S$PBB,,, | Performed by: DERMATOLOGY

## 2023-06-20 PROCEDURE — 88305 TISSUE EXAM BY PATHOLOGIST: ICD-10-PCS | Mod: 26,,, | Performed by: DERMATOLOGY

## 2023-06-20 PROCEDURE — 99999 PR PBB SHADOW E&M-EST. PATIENT-LVL III: ICD-10-PCS | Mod: PBBFAC,,, | Performed by: DERMATOLOGY

## 2023-06-20 PROCEDURE — 88305 TISSUE EXAM BY PATHOLOGIST: CPT | Mod: 26,,, | Performed by: DERMATOLOGY

## 2023-06-20 PROCEDURE — 11102 PR TANGENTIAL BIOPSY, SKIN, SINGLE LESION: ICD-10-PCS | Mod: S$PBB,,, | Performed by: DERMATOLOGY

## 2023-06-20 PROCEDURE — 88305 TISSUE EXAM BY PATHOLOGIST: CPT | Performed by: DERMATOLOGY

## 2023-06-20 PROCEDURE — 11102 TANGNTL BX SKIN SINGLE LES: CPT | Mod: PBBFAC,PO | Performed by: DERMATOLOGY

## 2023-06-20 PROCEDURE — 99214 OFFICE O/P EST MOD 30 MIN: CPT | Mod: 25,S$PBB,, | Performed by: DERMATOLOGY

## 2023-06-20 PROCEDURE — 99999 PR PBB SHADOW E&M-EST. PATIENT-LVL III: CPT | Mod: PBBFAC,,, | Performed by: DERMATOLOGY

## 2023-06-20 PROCEDURE — 99213 OFFICE O/P EST LOW 20 MIN: CPT | Mod: PBBFAC,PO | Performed by: DERMATOLOGY

## 2023-06-20 PROCEDURE — 99214 PR OFFICE/OUTPT VISIT, EST, LEVL IV, 30-39 MIN: ICD-10-PCS | Mod: 25,S$PBB,, | Performed by: DERMATOLOGY

## 2023-06-20 NOTE — PROGRESS NOTES
Subjective:      Patient ID:  Cheyenne Garner is a 87 y.o. female who presents for   Chief Complaint   Patient presents with    Skin Check     UBSE    Lesion     Left arm, several months      Would like skin check, new spot on left arm does not bleed.    Lesion - Initial  Affected locations: left arm, right arm, chest, back and abdomen    Review of Systems   Constitutional:  Negative for fever, chills, weight loss, weight gain, fatigue, night sweats and malaise.   Skin:  Negative for daily sunscreen use, activity-related sunscreen use and wears hat.   Hematologic/Lymphatic: Does not bruise/bleed easily.     Objective:   Physical Exam   Constitutional: She appears well-developed and well-nourished. No distress.   Neurological: She is alert and oriented to person, place, and time. She is not disoriented.   Psychiatric: She has a normal mood and affect.   Skin:   Areas Examined (abnormalities noted in diagram):   Scalp / Hair Palpated and Inspected  Head / Face Inspection Performed  Neck Inspection Performed  Chest / Axilla Inspection Performed  Abdomen Inspection Performed  Genitals / Buttocks / Groin Inspection Performed  Back Inspection Performed  RUE Inspected  LUE Inspection Performed  RLE Inspected  LLE Inspection Performed  Nails and Digits Inspection Performed               Diagram Legend     Erythematous scaling macule/papule c/w actinic keratosis       Vascular papule c/w angioma      Pigmented verrucoid papule/plaque c/w seborrheic keratosis      Yellow umbilicated papule c/w sebaceous hyperplasia      Irregularly shaped tan macule c/w lentigo     1-2 mm smooth white papules consistent with Milia      Movable subcutaneous cyst with punctum c/w epidermal inclusion cyst      Subcutaneous movable cyst c/w pilar cyst      Firm pink to brown papule c/w dermatofibroma      Pedunculated fleshy papule(s) c/w skin tag(s)      Evenly pigmented macule c/w junctional nevus     Mildly variegated pigmented, slightly  "irregular-bordered macule c/w mildly atypical nevus      Flesh colored to evenly pigmented papule c/w intradermal nevus       Pink pearly papule/plaque c/w basal cell carcinoma      Erythematous hyperkeratotic cursted plaque c/w SCC      Surgical scar with no sign of skin cancer recurrence      Open and closed comedones      Inflammatory papules and pustules      Verrucoid papule consistent consistent with wart     Erythematous eczematous patches and plaques     Dystrophic onycholytic nail with subungual debris c/w onychomycosis     Umbilicated papule    Erythematous-base heme-crusted tan verrucoid plaque consistent with inflamed seborrheic keratosis     Erythematous Silvery Scaling Plaque c/w Psoriasis     See annotation      Assessment / Plan:      Pathology Orders:       Normal Orders This Visit    Specimen to Pathology, Dermatology     Questions:    Procedure Type: Dermatology and skin neoplasms    Number of Specimens: 1    ------------------------: -------------------------    Spec 1 Procedure: Biopsy    Spec 1 Clinical Impression: hyperkeratotic ak    Spec 1 Source: left arm    Release to patient:           Neoplasm of uncertain behavior of skin  -  Shave removal procedure note:    Shave removal performed after verbal consent including risk of infection, scar, recurrence, need for additional treatment of site. Area prepped with alcohol, anesthetized 1% lidocaine with epinephrine. Lesional tissue shaved  followed by cautery and hyfrecation x 3. Lesion defect size 6mm No complications. Dressing applied. Wound care explained.          Seborrheic keratoses  Seborrheic keratosis scattered, told benign no treatment needed.  Brochure provided.      Lentigines  The "ABCD" rules to observe pigmented lesions were reviewed.      Klein angiomas  This is a benign vascular lesion. Reassurance given. No treatment required.       Nevus of multiple sites  The "ABCD" rules to observe pigmented lesions were reviewed.  Brochure " provided      History of skin cancers  Area(s) of previous NMSC evaluated with no signs of recurrence.    Recommend daily sun protection/avoidance and use of at least SPF 30, broad spectrum sunscreen (OTC drug).                Follow up in about 6 months (around 12/20/2023).

## 2023-06-28 LAB
FINAL PATHOLOGIC DIAGNOSIS: NORMAL
GROSS: NORMAL
Lab: NORMAL
MICROSCOPIC EXAM: NORMAL

## 2023-06-29 ENCOUNTER — PATIENT MESSAGE (OUTPATIENT)
Dept: DERMATOLOGY | Facility: CLINIC | Age: 88
End: 2023-06-29
Payer: MEDICARE

## 2023-09-26 ENCOUNTER — INFUSION (OUTPATIENT)
Dept: INFUSION THERAPY | Facility: OTHER | Age: 88
End: 2023-09-26
Attending: INTERNAL MEDICINE
Payer: MEDICARE

## 2023-09-26 VITALS
OXYGEN SATURATION: 100 % | RESPIRATION RATE: 14 BRPM | DIASTOLIC BLOOD PRESSURE: 67 MMHG | TEMPERATURE: 98 F | SYSTOLIC BLOOD PRESSURE: 139 MMHG | HEART RATE: 86 BPM

## 2023-09-26 DIAGNOSIS — M81.0 OSTEOPOROSIS WITHOUT CURRENT PATHOLOGICAL FRACTURE, UNSPECIFIED OSTEOPOROSIS TYPE: Primary | ICD-10-CM

## 2023-09-26 PROCEDURE — 63600175 PHARM REV CODE 636 W HCPCS: Mod: JZ,JG | Performed by: INTERNAL MEDICINE

## 2023-09-26 PROCEDURE — 96372 THER/PROPH/DIAG INJ SC/IM: CPT

## 2023-09-26 RX ADMIN — DENOSUMAB 60 MG: 60 INJECTION SUBCUTANEOUS at 02:09

## 2023-09-26 NOTE — PLAN OF CARE
Patient tolerated her subcutaneous injection of Prolia to her right upper arm. REports no discomfort. VSS. NAD. Discussed discharge instructions. Verbalized understanding. Setup her next injection. Handout given to patient. No other questions asked. Patient discharged to home with family.   1

## 2023-10-16 ENCOUNTER — TELEPHONE (OUTPATIENT)
Dept: OBSTETRICS AND GYNECOLOGY | Facility: CLINIC | Age: 88
End: 2023-10-16
Payer: MEDICARE

## 2023-10-16 NOTE — TELEPHONE ENCOUNTER
Pt called to schedule a appointment for a new pt annual with Dr Amin only. Advised pt nothing available at this time. Pt would like for Dr Amin's office to give her a call to see if they can get her in. Offered pt appointment with PA or NP pt refused.

## 2023-10-16 NOTE — TELEPHONE ENCOUNTER
----- Message from Juli Baron sent at 10/16/2023 12:02 PM CDT -----  Name of Who is Calling:RICKIE BLOCK [[id number]]           What is the request in detail: pt stated she needs a new patient appt with the office and a mammogram order placed .Please contact to further discuss and advise.            Can the clinic reply by [facility]: no           What Number to Call Back if not in [facility]:[phone]

## 2023-11-06 ENCOUNTER — LAB VISIT (OUTPATIENT)
Dept: LAB | Facility: OTHER | Age: 88
End: 2023-11-06
Attending: INTERNAL MEDICINE
Payer: MEDICARE

## 2023-11-06 DIAGNOSIS — E78.5 HYPERLIPIDEMIA, UNSPECIFIED HYPERLIPIDEMIA TYPE: ICD-10-CM

## 2023-11-06 DIAGNOSIS — R53.1 GENERALIZED WEAKNESS: ICD-10-CM

## 2023-11-06 DIAGNOSIS — R79.9 ABNORMAL FINDING OF BLOOD CHEMISTRY, UNSPECIFIED: ICD-10-CM

## 2023-11-06 DIAGNOSIS — D86.9 SARCOIDOSIS: ICD-10-CM

## 2023-11-06 DIAGNOSIS — R73.9 ELEVATED BLOOD SUGAR: ICD-10-CM

## 2023-11-06 DIAGNOSIS — E53.8 VITAMIN B12 DEFICIENCY: ICD-10-CM

## 2023-11-06 DIAGNOSIS — G62.0 DRUG-INDUCED POLYNEUROPATHY: ICD-10-CM

## 2023-11-06 LAB
25(OH)D3+25(OH)D2 SERPL-MCNC: 29 NG/ML (ref 30–96)
ALBUMIN SERPL BCP-MCNC: 4.2 G/DL (ref 3.5–5.2)
ALP SERPL-CCNC: 59 U/L (ref 55–135)
ALT SERPL W/O P-5'-P-CCNC: 18 U/L (ref 10–44)
ANION GAP SERPL CALC-SCNC: 12 MMOL/L (ref 8–16)
AST SERPL-CCNC: 23 U/L (ref 10–40)
BASOPHILS # BLD AUTO: 0.08 K/UL (ref 0–0.2)
BASOPHILS NFR BLD: 1.2 % (ref 0–1.9)
BILIRUB SERPL-MCNC: 0.4 MG/DL (ref 0.1–1)
BUN SERPL-MCNC: 18 MG/DL (ref 8–23)
CALCIUM SERPL-MCNC: 8.7 MG/DL (ref 8.7–10.5)
CHLORIDE SERPL-SCNC: 106 MMOL/L (ref 95–110)
CO2 SERPL-SCNC: 22 MMOL/L (ref 23–29)
CREAT SERPL-MCNC: 0.9 MG/DL (ref 0.5–1.4)
DIFFERENTIAL METHOD: ABNORMAL
EOSINOPHIL # BLD AUTO: 0.1 K/UL (ref 0–0.5)
EOSINOPHIL NFR BLD: 1.1 % (ref 0–8)
ERYTHROCYTE [DISTWIDTH] IN BLOOD BY AUTOMATED COUNT: 12.7 % (ref 11.5–14.5)
EST. GFR  (NO RACE VARIABLE): >60 ML/MIN/1.73 M^2
ESTIMATED AVG GLUCOSE: 94 MG/DL (ref 68–131)
FERRITIN SERPL-MCNC: 148 NG/ML (ref 20–300)
GLUCOSE SERPL-MCNC: 81 MG/DL (ref 70–110)
HBA1C MFR BLD: 4.9 % (ref 4–5.6)
HCT VFR BLD AUTO: 45.5 % (ref 37–48.5)
HGB BLD-MCNC: 15 G/DL (ref 12–16)
IMM GRANULOCYTES # BLD AUTO: 0.03 K/UL (ref 0–0.04)
IMM GRANULOCYTES NFR BLD AUTO: 0.5 % (ref 0–0.5)
LYMPHOCYTES # BLD AUTO: 2.1 K/UL (ref 1–4.8)
LYMPHOCYTES NFR BLD: 32 % (ref 18–48)
MCH RBC QN AUTO: 31.4 PG (ref 27–31)
MCHC RBC AUTO-ENTMCNC: 33 G/DL (ref 32–36)
MCV RBC AUTO: 95 FL (ref 82–98)
MONOCYTES # BLD AUTO: 0.5 K/UL (ref 0.3–1)
MONOCYTES NFR BLD: 7.3 % (ref 4–15)
NEUTROPHILS # BLD AUTO: 3.8 K/UL (ref 1.8–7.7)
NEUTROPHILS NFR BLD: 57.9 % (ref 38–73)
NRBC BLD-RTO: 0 /100 WBC
PLATELET # BLD AUTO: 231 K/UL (ref 150–450)
PLATELET BLD QL SMEAR: ABNORMAL
PMV BLD AUTO: 10.5 FL (ref 9.2–12.9)
POTASSIUM SERPL-SCNC: 3.8 MMOL/L (ref 3.5–5.1)
PROT SERPL-MCNC: 7.3 G/DL (ref 6–8.4)
RBC # BLD AUTO: 4.77 M/UL (ref 4–5.4)
SODIUM SERPL-SCNC: 140 MMOL/L (ref 136–145)
T4 FREE SERPL-MCNC: 1.01 NG/DL (ref 0.71–1.51)
TSH SERPL DL<=0.005 MIU/L-ACNC: 1.02 UIU/ML (ref 0.4–4)
WBC # BLD AUTO: 6.47 K/UL (ref 3.9–12.7)

## 2023-11-06 PROCEDURE — 83036 HEMOGLOBIN GLYCOSYLATED A1C: CPT | Performed by: INTERNAL MEDICINE

## 2023-11-06 PROCEDURE — 82728 ASSAY OF FERRITIN: CPT | Performed by: INTERNAL MEDICINE

## 2023-11-06 PROCEDURE — 80053 COMPREHEN METABOLIC PANEL: CPT | Performed by: INTERNAL MEDICINE

## 2023-11-06 PROCEDURE — 85025 COMPLETE CBC W/AUTO DIFF WBC: CPT | Performed by: INTERNAL MEDICINE

## 2023-11-06 PROCEDURE — 84439 ASSAY OF FREE THYROXINE: CPT | Performed by: INTERNAL MEDICINE

## 2023-11-06 PROCEDURE — 82306 VITAMIN D 25 HYDROXY: CPT | Performed by: INTERNAL MEDICINE

## 2023-11-06 PROCEDURE — 84443 ASSAY THYROID STIM HORMONE: CPT | Performed by: INTERNAL MEDICINE

## 2023-11-06 PROCEDURE — 36415 COLL VENOUS BLD VENIPUNCTURE: CPT | Performed by: INTERNAL MEDICINE

## 2024-01-25 ENCOUNTER — HOSPITAL ENCOUNTER (OUTPATIENT)
Dept: RADIOLOGY | Facility: OTHER | Age: 89
Discharge: HOME | End: 2024-01-25
Attending: INTERNAL MEDICINE
Payer: MEDICARE

## 2024-01-25 DIAGNOSIS — Z12.31 SCREENING MAMMOGRAM FOR BREAST CANCER: ICD-10-CM

## 2024-01-25 PROCEDURE — 77067 SCR MAMMO BI INCL CAD: CPT | Mod: TC

## 2024-01-25 PROCEDURE — 77063 BREAST TOMOSYNTHESIS BI: CPT | Mod: 26,,, | Performed by: RADIOLOGY

## 2024-01-25 PROCEDURE — 77067 SCR MAMMO BI INCL CAD: CPT | Mod: 26,,, | Performed by: RADIOLOGY

## 2024-03-20 ENCOUNTER — OFFICE VISIT (OUTPATIENT)
Dept: DERMATOLOGY | Facility: CLINIC | Age: 89
End: 2024-03-20
Payer: MEDICARE

## 2024-03-20 VITALS — BODY MASS INDEX: 21.9 KG/M2 | WEIGHT: 126 LBS

## 2024-03-20 DIAGNOSIS — L81.4 LENTIGINES: ICD-10-CM

## 2024-03-20 DIAGNOSIS — Z85.828 HISTORY OF SKIN CANCER: ICD-10-CM

## 2024-03-20 DIAGNOSIS — L57.0 MULTIPLE ACTINIC KERATOSES: Primary | ICD-10-CM

## 2024-03-20 PROCEDURE — 99999 PR PBB SHADOW E&M-EST. PATIENT-LVL III: CPT | Mod: PBBFAC,,, | Performed by: DERMATOLOGY

## 2024-03-20 PROCEDURE — 99214 OFFICE O/P EST MOD 30 MIN: CPT | Mod: S$PBB,,, | Performed by: DERMATOLOGY

## 2024-03-20 PROCEDURE — 99213 OFFICE O/P EST LOW 20 MIN: CPT | Mod: PBBFAC,PO | Performed by: DERMATOLOGY

## 2024-03-20 RX ORDER — FLUOROURACIL 50 MG/G
CREAM TOPICAL
Qty: 40 G | Refills: 3 | Status: SHIPPED | OUTPATIENT
Start: 2024-03-20 | End: 2024-04-03

## 2024-03-20 NOTE — PROGRESS NOTES
Subjective:      Patient ID:  Cheyenne Garner is a 88 y.o. female who presents for   Chief Complaint   Patient presents with    Follow-up    Lesion     Right hand     Had the sccis removed on her upper left arm last visit, now has rough spots on her right dorsal hand which she states she picks at.      Follow-up - Follow-up  Affected locations: face, left arm, right arm, chest, torso, back and abdomen  Signs / symptoms: asymptomatic    Lesion      Review of Systems   Constitutional:  Negative for fever, chills, weight loss, weight gain, fatigue, night sweats and malaise.   Skin:  Negative for daily sunscreen use, activity-related sunscreen use and wears hat.   Hematologic/Lymphatic: Does not bruise/bleed easily.       Objective:   Physical Exam   Constitutional: She appears well-developed and well-nourished. No distress.   Neurological: She is alert and oriented to person, place, and time. She is not disoriented.   Psychiatric: She has a normal mood and affect.   Skin:   Areas Examined (abnormalities noted in diagram):   Head / Face Inspection Performed  Neck Inspection Performed  Chest / Axilla Inspection Performed  Abdomen Inspection Performed  Back Inspection Performed  RUE Inspected  LUE Inspection Performed  RLE Inspected  LLE Inspection Performed  Nails and Digits Inspection Performed                     Diagram Legend     Erythematous scaling macule/papule c/w actinic keratosis       Vascular papule c/w angioma      Pigmented verrucoid papule/plaque c/w seborrheic keratosis      Yellow umbilicated papule c/w sebaceous hyperplasia      Irregularly shaped tan macule c/w lentigo     1-2 mm smooth white papules consistent with Milia      Movable subcutaneous cyst with punctum c/w epidermal inclusion cyst      Subcutaneous movable cyst c/w pilar cyst      Firm pink to brown papule c/w dermatofibroma      Pedunculated fleshy papule(s) c/w skin tag(s)      Evenly pigmented macule c/w junctional nevus      "Mildly variegated pigmented, slightly irregular-bordered macule c/w mildly atypical nevus      Flesh colored to evenly pigmented papule c/w intradermal nevus       Pink pearly papule/plaque c/w basal cell carcinoma      Erythematous hyperkeratotic cursted plaque c/w SCC      Surgical scar with no sign of skin cancer recurrence      Open and closed comedones      Inflammatory papules and pustules      Verrucoid papule consistent consistent with wart     Erythematous eczematous patches and plaques     Dystrophic onycholytic nail with subungual debris c/w onychomycosis     Umbilicated papule    Erythematous-base heme-crusted tan verrucoid plaque consistent with inflamed seborrheic keratosis     Erythematous Silvery Scaling Plaque c/w Psoriasis     See annotation      Assessment / Plan:        Multiple actinic keratoses  -     fluorouraciL (EFUDEX) 5 % cream; Use hs for 2 weeks  Dispense: 40 g; Refill: 3   Use on right hand for 2 weeks    Lentigines  The "ABCD" rules to observe pigmented lesions were reviewed.      History of skin cancers/ ? Melanoma as a child  Area(s) of previous NMSC evaluated with no signs of recurrence.  . No lesions suspicious for malignancy noted.    Recommend daily sun protection/avoidance and use of at least SPF 30, broad spectrum sunscreen (OTC drug).                Follow up in about 6 months (around 9/20/2024).  "

## 2024-06-05 ENCOUNTER — HOSPITAL ENCOUNTER (OUTPATIENT)
Dept: RADIOLOGY | Facility: OTHER | Age: 89
Discharge: HOME | End: 2024-06-05
Attending: INTERNAL MEDICINE
Payer: MEDICARE

## 2024-06-05 DIAGNOSIS — M81.0 OSTEOPOROSIS WITHOUT CURRENT PATHOLOGICAL FRACTURE, UNSPECIFIED OSTEOPOROSIS TYPE: ICD-10-CM

## 2024-06-05 DIAGNOSIS — R05.9 COUGH, UNSPECIFIED TYPE: ICD-10-CM

## 2024-06-05 PROCEDURE — 77080 DXA BONE DENSITY AXIAL: CPT | Mod: 26,,, | Performed by: RADIOLOGY

## 2024-06-05 PROCEDURE — 71046 X-RAY EXAM CHEST 2 VIEWS: CPT | Mod: TC,FY

## 2024-06-05 PROCEDURE — 71046 X-RAY EXAM CHEST 2 VIEWS: CPT | Mod: 26,,, | Performed by: RADIOLOGY

## 2024-06-05 PROCEDURE — 77080 DXA BONE DENSITY AXIAL: CPT | Mod: TC

## 2024-06-19 ENCOUNTER — TELEPHONE (OUTPATIENT)
Dept: INFUSION THERAPY | Facility: OTHER | Age: 89
End: 2024-06-19
Payer: MEDICARE

## 2024-06-19 NOTE — TELEPHONE ENCOUNTER
----- Message from Terri Chan MA sent at 6/18/2024  2:49 PM CDT -----  Name of Who is Calling: Geena with Dr Cortes office calling on behalf of RICKIE BLOCK [[id number]]                What is the request in detail: Pt needs to get her prolia injection that she missed rescheduled. Please assist.                Can the clinic reply by [facility]: No                What Number to Call Back if not in [facility]: [phone]

## 2024-08-20 ENCOUNTER — INFUSION (OUTPATIENT)
Dept: INFUSION THERAPY | Facility: OTHER | Age: 89
End: 2024-08-20
Attending: INTERNAL MEDICINE
Payer: MEDICARE

## 2024-08-20 VITALS
SYSTOLIC BLOOD PRESSURE: 151 MMHG | OXYGEN SATURATION: 99 % | RESPIRATION RATE: 17 BRPM | HEART RATE: 76 BPM | DIASTOLIC BLOOD PRESSURE: 68 MMHG

## 2024-08-20 DIAGNOSIS — M81.0 OSTEOPOROSIS WITHOUT CURRENT PATHOLOGICAL FRACTURE, UNSPECIFIED OSTEOPOROSIS TYPE: Primary | ICD-10-CM

## 2024-08-20 PROCEDURE — 96372 THER/PROPH/DIAG INJ SC/IM: CPT

## 2024-08-20 PROCEDURE — 63600175 PHARM REV CODE 636 W HCPCS: Mod: JZ,JG | Performed by: INTERNAL MEDICINE

## 2024-08-20 RX ORDER — ZOLEDRONIC ACID 5 MG/100ML
5 INJECTION, SOLUTION INTRAVENOUS
Status: DISCONTINUED | OUTPATIENT
Start: 2024-08-20 | End: 2024-08-20 | Stop reason: HOSPADM

## 2024-08-20 RX ORDER — ZOLEDRONIC ACID 5 MG/100ML
5 INJECTION, SOLUTION INTRAVENOUS
Status: CANCELLED
Start: 2024-09-24

## 2024-08-20 RX ADMIN — DENOSUMAB 60 MG: 60 INJECTION SUBCUTANEOUS at 02:08

## 2024-08-20 NOTE — PLAN OF CARE
Problem: Adult Inpatient Plan of Care  Goal: Plan of Care Review  Outcome: Progressing     Problem: Fall Injury Risk  Goal: Absence of Fall and Fall-Related Injury  Outcome: Progressing       Patient arrived to clinic today for SQ Prolia injection. VS and assessment completed with no acute issues noted. Injection given in L arm and band aid applied. All questions answered, scheduled for next appt on 2-20-25 and left clinic ambulatory in NAD.

## 2024-10-04 ENCOUNTER — OFFICE VISIT (OUTPATIENT)
Dept: DERMATOLOGY | Facility: CLINIC | Age: 89
End: 2024-10-04
Payer: MEDICARE

## 2024-10-04 VITALS — WEIGHT: 119 LBS | BODY MASS INDEX: 21.08 KG/M2

## 2024-10-04 DIAGNOSIS — L57.0 ACTINIC KERATOSIS: Primary | ICD-10-CM

## 2024-10-04 DIAGNOSIS — L81.4 LENTIGINES: ICD-10-CM

## 2024-10-04 DIAGNOSIS — Z85.828 HISTORY OF SKIN CANCER: ICD-10-CM

## 2024-10-04 PROCEDURE — 99212 OFFICE O/P EST SF 10 MIN: CPT | Mod: PBBFAC,PO | Performed by: DERMATOLOGY

## 2024-10-04 PROCEDURE — 99999 PR PBB SHADOW E&M-EST. PATIENT-LVL II: CPT | Mod: PBBFAC,,, | Performed by: DERMATOLOGY

## 2024-10-04 NOTE — PROGRESS NOTES
Subjective:      Patient ID:  Cheyenne Garner is a 89 y.o. female who presents for   Chief Complaint   Patient presents with    Spot     Right side face, several months      Would like skin check new spot on right cheek.      Spot - Initial  Affected locations: face  Signs / symptoms: asymptomatic      Review of Systems   Constitutional:  Negative for fever, chills, weight loss, weight gain, fatigue, night sweats and malaise.   Skin:  Positive for wears hat. Negative for daily sunscreen use and activity-related sunscreen use.   Hematologic/Lymphatic: Does not bruise/bleed easily.       Objective:   Physical Exam   Constitutional: She appears well-developed and well-nourished. No distress.   Neurological: She is alert and oriented to person, place, and time. She is not disoriented.   Psychiatric: She has a normal mood and affect.   Skin:   Areas Examined (abnormalities noted in diagram):   Head / Face Inspection Performed  Neck Inspection Performed  Chest / Axilla Inspection Performed  Abdomen Inspection Performed  Back Inspection Performed  RUE Inspected  LUE Inspection Performed  RLE Inspected  LLE Inspection Performed  Nails and Digits Inspection Performed                     Diagram Legend     Erythematous scaling macule/papule c/w actinic keratosis       Vascular papule c/w angioma      Pigmented verrucoid papule/plaque c/w seborrheic keratosis      Yellow umbilicated papule c/w sebaceous hyperplasia      Irregularly shaped tan macule c/w lentigo     1-2 mm smooth white papules consistent with Milia      Movable subcutaneous cyst with punctum c/w epidermal inclusion cyst      Subcutaneous movable cyst c/w pilar cyst      Firm pink to brown papule c/w dermatofibroma      Pedunculated fleshy papule(s) c/w skin tag(s)      Evenly pigmented macule c/w junctional nevus     Mildly variegated pigmented, slightly irregular-bordered macule c/w mildly atypical nevus      Flesh colored to evenly pigmented papule c/w  "intradermal nevus       Pink pearly papule/plaque c/w basal cell carcinoma      Erythematous hyperkeratotic cursted plaque c/w SCC      Surgical scar with no sign of skin cancer recurrence      Open and closed comedones      Inflammatory papules and pustules      Verrucoid papule consistent consistent with wart     Erythematous eczematous patches and plaques     Dystrophic onycholytic nail with subungual debris c/w onychomycosis     Umbilicated papule    Erythematous-base heme-crusted tan verrucoid plaque consistent with inflamed seborrheic keratosis     Erythematous Silvery Scaling Plaque c/w Psoriasis     See annotation      Assessment / Plan:        Actinic keratosis   Cryosurgery Procedure Note    Verbal consent from the patient is obtained and the patient is aware of the precancerous quality and need for treatment of these lesions. Liquid nitrogen cryosurgery is applied to the 2 actinic keratoses, as detailed in the physical exam, to produce a freeze injury.      Lentigines  The "ABCD" rules to observe pigmented lesions were reviewed.      History of skin cancer  See PE  Area(s) of previous NMSC evaluated with no signs of recurrence.    Upper body skin examination performed today including at least 6 points as noted in physical examination. No lesions suspicious for malignancy noted.    Recommend daily sun protection/avoidance and use of at least SPF 30, broad spectrum sunscreen (OTC drug).                Follow up in about 6 months (around 4/4/2025).  "

## 2024-10-10 PROBLEM — I70.0 AORTIC ATHEROSCLEROSIS: Status: ACTIVE | Noted: 2024-10-10

## 2024-10-18 ENCOUNTER — OFFICE VISIT (OUTPATIENT)
Dept: DERMATOLOGY | Facility: CLINIC | Age: 89
End: 2024-10-18
Payer: MEDICARE

## 2024-10-18 ENCOUNTER — PATIENT MESSAGE (OUTPATIENT)
Dept: DERMATOLOGY | Facility: CLINIC | Age: 89
End: 2024-10-18

## 2024-10-18 VITALS — BODY MASS INDEX: 21.38 KG/M2 | WEIGHT: 123 LBS

## 2024-10-18 DIAGNOSIS — L82.1 SEBORRHEIC KERATOSES: ICD-10-CM

## 2024-10-18 DIAGNOSIS — L30.9 DERMATITIS: Primary | ICD-10-CM

## 2024-10-18 PROCEDURE — 99999 PR PBB SHADOW E&M-EST. PATIENT-LVL III: CPT | Mod: PBBFAC,,, | Performed by: DERMATOLOGY

## 2024-10-18 PROCEDURE — 99213 OFFICE O/P EST LOW 20 MIN: CPT | Mod: PBBFAC,PO | Performed by: DERMATOLOGY

## 2024-10-18 RX ORDER — MUPIROCIN 20 MG/G
OINTMENT TOPICAL
Qty: 30 G | Refills: 3 | Status: SHIPPED | OUTPATIENT
Start: 2024-10-18

## 2024-10-18 NOTE — PROGRESS NOTES
Subjective:      Patient ID:  Cheyenne Garner is a 89 y.o. female who presents for   Chief Complaint   Patient presents with    Spot     right cheek      She has some erythema and a small crust at site of cryo 2 weeks ago.  Also spots on hands     Spot - Initial  Affected locations: right cheek  Signs / symptoms: bleeding      Review of Systems   Constitutional:  Negative for fever, chills, weight loss, weight gain, fatigue, night sweats and malaise.   Skin:  Positive for daily sunscreen use and activity-related sunscreen use. Negative for wears hat.   Hematologic/Lymphatic: Does not bruise/bleed easily.       Objective:   Physical Exam   Constitutional: She appears well-developed and well-nourished.   Neurological: She is alert.   Psychiatric: She has a normal mood and affect.   Skin:   Areas Examined (abnormalities noted in diagram):   Head / Face Inspection Performed  Nails and Digits Inspection Performed                Diagram Legend     Erythematous scaling macule/papule c/w actinic keratosis       Vascular papule c/w angioma      Pigmented verrucoid papule/plaque c/w seborrheic keratosis      Yellow umbilicated papule c/w sebaceous hyperplasia      Irregularly shaped tan macule c/w lentigo     1-2 mm smooth white papules consistent with Milia      Movable subcutaneous cyst with punctum c/w epidermal inclusion cyst      Subcutaneous movable cyst c/w pilar cyst      Firm pink to brown papule c/w dermatofibroma      Pedunculated fleshy papule(s) c/w skin tag(s)      Evenly pigmented macule c/w junctional nevus     Mildly variegated pigmented, slightly irregular-bordered macule c/w mildly atypical nevus      Flesh colored to evenly pigmented papule c/w intradermal nevus       Pink pearly papule/plaque c/w basal cell carcinoma      Erythematous hyperkeratotic cursted plaque c/w SCC      Surgical scar with no sign of skin cancer recurrence      Open and closed comedones      Inflammatory papules and pustules       Verrucoid papule consistent consistent with wart     Erythematous eczematous patches and plaques     Dystrophic onycholytic nail with subungual debris c/w onychomycosis     Umbilicated papule    Erythematous-base heme-crusted tan verrucoid plaque consistent with inflamed seborrheic keratosis     Erythematous Silvery Scaling Plaque c/w Psoriasis     See annotation      Assessment / Plan:        Dermatitis  -     mupirocin (BACTROBAN) 2 % ointment; Use qd for right cheek  Dispense: 30 g; Refill: 3  [place] posay face cleanser  Apply [place] cicalfate to area bid  Call if does not resolve    Seborrheic keratoses  Cryosurgery procedure note:    Verbal consent from the patient is obtained including, but not limited to, risk of hypopigmentation/hyperpigmentation, scar, recurrence of lesion. Liquid nitrogen cryosurgery is applied to 2 lesions dorsal hands to produce a freeze injury.             Follow up in about 3 months (around 1/18/2025).

## 2024-10-26 NOTE — ANESTHESIA PROCEDURE NOTES
Intubation    Date/Time: 1/17/2023 1:19 PM  Performed by: Eleno Murray CRNA  Authorized by: Ryley Anne MD     Intubation:     Induction:  Intravenous    Intubated:  Postinduction    Mask Ventilation:  Easy mask    Attempts:  1    Attempted By:  CRNA    Method of Intubation:  Video laryngoscopy    Blade:  Stern 3    Laryngeal View Grade: Grade IIA - cords partially seen      Difficult Airway Encountered?: No      Complications:  None    Airway Device:  Oral endotracheal tube    Airway Device Size:  7.0    Style/Cuff Inflation:  Cuffed (inflated to minimal occlusive pressure)    Inflation Amount (mL):  5    Tube secured:  21    Secured at:  The lips    Placement Verified By:  Capnometry    Complicating Factors:  Anterior larynx and poor neck/head extension    Findings Post-Intubation:  BS equal bilateral and atraumatic/condition of teeth unchanged     Infectious Disease

## 2025-02-11 ENCOUNTER — HOSPITAL ENCOUNTER (OUTPATIENT)
Dept: RADIOLOGY | Facility: OTHER | Age: 89
Discharge: HOME | End: 2025-02-11
Attending: INTERNAL MEDICINE
Payer: MEDICARE

## 2025-02-11 DIAGNOSIS — Z12.31 SCREENING MAMMOGRAM FOR BREAST CANCER: ICD-10-CM

## 2025-02-11 PROCEDURE — 77067 SCR MAMMO BI INCL CAD: CPT | Mod: 26,,, | Performed by: RADIOLOGY

## 2025-02-11 PROCEDURE — 77063 BREAST TOMOSYNTHESIS BI: CPT | Mod: TC

## 2025-02-11 PROCEDURE — 77063 BREAST TOMOSYNTHESIS BI: CPT | Mod: 26,,, | Performed by: RADIOLOGY

## 2025-04-14 ENCOUNTER — TELEPHONE (OUTPATIENT)
Dept: DERMATOLOGY | Facility: CLINIC | Age: 89
End: 2025-04-14
Payer: MEDICARE

## 2025-04-14 NOTE — TELEPHONE ENCOUNTER
Spoke with patient, she reports that she went to see another doctor since Dr. Coelho is out of the office.  She will call back if she needs seen at a later time.

## 2025-04-14 NOTE — TELEPHONE ENCOUNTER
"----- Message from [employer] Callejas sent at 4/14/2025 10:33 AM CDT -----  Regarding: same day appt request  PATIENT CALLPt called to schedule EP appt. CC: burning facial rash + redness since yesterday. Her primary derm provider (Dr Anny Coelho)  is on vacation, declined scheduling tomorrow w/ Dr Franco. States that her face is "redder than blood" and she needs to be seen today. Please call back at [phone]  "

## 2025-05-16 PROCEDURE — 84439 ASSAY OF FREE THYROXINE: CPT | Performed by: INTERNAL MEDICINE

## 2025-05-16 PROCEDURE — 81001 URINALYSIS AUTO W/SCOPE: CPT | Performed by: INTERNAL MEDICINE

## 2025-06-02 RX ORDER — DULOXETIN HYDROCHLORIDE 60 MG/1
60 CAPSULE, DELAYED RELEASE ORAL DAILY
Qty: 30 CAPSULE | Refills: 2 | Status: SHIPPED | OUTPATIENT
Start: 2025-06-02 | End: 2025-06-05

## 2025-06-11 ENCOUNTER — OFFICE VISIT (OUTPATIENT)
Facility: CLINIC | Age: 89
End: 2025-06-11
Payer: MEDICARE

## 2025-06-11 VITALS
DIASTOLIC BLOOD PRESSURE: 68 MMHG | SYSTOLIC BLOOD PRESSURE: 123 MMHG | HEIGHT: 63 IN | BODY MASS INDEX: 22.46 KG/M2 | HEART RATE: 81 BPM | WEIGHT: 126.75 LBS

## 2025-06-11 DIAGNOSIS — I73.9 PERIPHERAL ARTERIAL DISEASE: ICD-10-CM

## 2025-06-11 DIAGNOSIS — I87.2 VENOUS INSUFFICIENCY: ICD-10-CM

## 2025-06-11 DIAGNOSIS — G62.9 POLYNEUROPATHY: Primary | ICD-10-CM

## 2025-06-11 PROCEDURE — 99214 OFFICE O/P EST MOD 30 MIN: CPT | Mod: S$PBB,,, | Performed by: NEUROLOGICAL SURGERY

## 2025-06-11 PROCEDURE — 99213 OFFICE O/P EST LOW 20 MIN: CPT | Mod: PBBFAC | Performed by: NEUROLOGICAL SURGERY

## 2025-06-11 PROCEDURE — 99999 PR PBB SHADOW E&M-EST. PATIENT-LVL III: CPT | Mod: PBBFAC,,, | Performed by: NEUROLOGICAL SURGERY

## 2025-06-11 RX ORDER — DULOXETIN HYDROCHLORIDE 60 MG/1
60 CAPSULE, DELAYED RELEASE ORAL DAILY
Qty: 90 CAPSULE | Refills: 3 | Status: SHIPPED | OUTPATIENT
Start: 2025-06-11

## 2025-06-11 RX ORDER — DULOXETIN HYDROCHLORIDE 60 MG/1
60 CAPSULE, DELAYED RELEASE ORAL DAILY
COMMUNITY
End: 2025-06-11 | Stop reason: SDUPTHER

## 2025-06-11 NOTE — PROGRESS NOTES
"Name: Cheyenne Trevizo  MRN: [id number]   CSN: [id number]      Date: 06/11/2025      History of Present Illness    CHIEF COMPLAINT:  Ms. Trevizo presents today for follow up of neuropathy.    NEUROPATHY:  She experiences burning and tingling sensations with a feeling of tightness described as "crinkling saran wrap." She also reports shooting pains that occur at night. She believes her neuropathy may be related to previous intravenous Cipro administration, though she is uncertain about this causation. She reports initial improvement with Duloxetine 30 mg.    CIRCULATION:  She notes blue discoloration of her feet of unknown cause.    CURRENT MEDICATIONS:  She is currently taking Duloxetine 30 mg and a statin.      ROS:  General: -fever, -chills, -fatigue, -weight gain, -weight loss  Eyes: -vision changes, -redness, -discharge  ENT: -ear pain, -nasal congestion, -sore throat  Cardiovascular: -chest pain, -palpitations, -lower extremity edema  Respiratory: -cough, -shortness of breath  Gastrointestinal: -abdominal pain, -nausea, -vomiting, -diarrhea, -constipation, -blood in stool  Genitourinary: -dysuria, -hematuria, -frequency  Musculoskeletal: -joint pain, -muscle pain  Skin: -rash, -lesion  Neurological: -headache, -dizziness, -numbness, +tingling, +burning sensation, +shooting pain sensation, +nightime pain  Psychiatric: -anxiety, -depression, -sleep difficulty              Past Medical History: The patient  has a past medical history of Cataract, Hyperlipidemia, Inflammatory bowel disease, Sarcoidosis with granulomatous hepatitis, and UTI (urinary tract infection) (7/17/2013).    Social History: The patient  reports that she has quit smoking. Her smoking use included cigarettes. She has never used smokeless tobacco. She reports current alcohol use of about 3.0 standard drinks of alcohol per week. She reports that she does not use drugs.    Family History: Their family history includes Breast cancer in her sister; " "Cancer in her mother and sister.    Allergies: Ciprofloxacin (bulk), Eggs [egg derived], and Flagyl [metronidazole]     Meds: Scheduled Meds:  Continuous Infusions:  PRN Meds:.    Exam:  Physical Exam    General: No acute distress. Well-developed. Well-nourished.  Eyes: EOMI. Sclerae anicteric.  HENT: Normocephalic. Atraumatic. Nares patent. Moist oral mucosa.  Ears: Bilateral TMs clear. Bilateral EACs clear.  Cardiovascular: Regular rate. Regular rhythm. No murmurs. No rubs. No gallops. Normal S1, S2. Delayed capillary refill in feet.  Respiratory: Normal respiratory effort. Clear to auscultation bilaterally. No rales. No rhonchi. No wheezing.  Abdomen: Soft. Non-tender. Non-distended. Normoactive bowel sounds.  Musculoskeletal: No  obvious deformity.  Extremities: No lower extremity edema.  Neurological: Alert & oriented x3. No slurred speech. Normal gait.  Psychiatric: Normal mood. Normal affect. Good insight. Good judgment.  Skin: Warm. Dry. No rash. Feet appear blue.          /68   Pulse 81   Ht 5' 3" (1.6 m)   Wt 57.5 kg (126 lb 12.2 oz)   BMI 22.46 kg/m²     Constitutional  Well-developed, well-nourished, appears stated age   Ophthalmoscopic  No papilledema with no hemorrhages or exudates bilaterally   Cardiovascular  Radial pulses 2+ and symmetric, no LE edema bilaterally   Neurological    * Mental status      - Orientation  Oriented to person, place, time, and situation     - Memory   Intact recent and remote     - Attention/concentration  Attentive, vigilant during exam     - Language  Naming & repetition intact, +2-step commands     - Fund of knowledge  Aware of current events     - Executive  Well-organized thoughts     - Other     * Cranial nerves       - CN II  PERRL, visual fields full to confrontation     - CN III, IV, VI  Extraocular movements full, normal pursuits and saccades     - CN V  Sensation V1 - V3 intact     - CN VII  Face strong and symmetric bilaterally     - CN VIII  Hearing " intact bilaterally     - CN IX, X  Palate raises midline and symmetric     - CN XI  SCM and trapezius 5/5 bilaterally     - CN XII  Tongue midline   * Motor  Muscle bulk normal, strength 5/5 throughout   * Sensory   Intact to temperature and vibration throughout   * Coordination  No dysmetria with finger-to-nose or heel-to-shin   * Gait  See below.   * Deep tendon reflexes  2+ and symmetric throughout   Babinski downgoing bilaterally     Laboratory/Radiological:Reviewed  - Results:  Appointment on 05/16/2025   Component Date Value Ref Range Status    Free T4 05/16/2025 1.11  0.71 - 1.51 ng/dL Final    Color, UA 05/16/2025 Yellow  Straw, Lois, Yellow, Light-Orange Final    Appearance, UA 05/16/2025 Clear  Clear Final    pH, UA 05/16/2025 7.0  5.0 - 8.0 Final    Spec Grav UA 05/16/2025 1.020  1.005 - 1.030 Final    Protein, UA 05/16/2025 Negative  Negative Final    Glucose, UA 05/16/2025 Negative  Negative Final    Ketones, UA 05/16/2025 Negative  Negative Final    Bilirubin, UA 05/16/2025 Negative  Negative Final    Blood, UA 05/16/2025 Negative  Negative Final    Nitrites, UA 05/16/2025 Positive (A)  Negative Final    Urobilinogen, UA 05/16/2025 Negative  <2.0 EU/dL Final    Leukocyte Esterase, UA 05/16/2025 Negative  Negative Final    Extra Tube 05/16/2025 Hold for add-ons.   Final    RBC, UA 05/16/2025 3  0 - 4 /HPF Final    WBC, UA 05/16/2025 1  0 - 5 /HPF Final    Bacteria, UA 05/16/2025 None  None, Rare, Occasional /HPF Final    Squamous Epithelial Cells, UA 05/16/2025 2  /HPF Final    Microscopic Comment 05/16/2025    Final       - Independent review of images:        Assessment & Plan    IMPRESSION:  - Assessed neuropathy symptoms, likely attributed to wear and tear or possible side effect from previous Cipro treatment.  - Determined current Duloxetine dose (30 mg) is subtherapeutic; increased to more effective dose of 60 mg daily.  - Observed poor circulation in feet, evidenced by slow capillary refill and  blue discoloration, which is likely a vascular rather than neurological issue.  - Identified need for vascular evaluation to address circulation issues, which are distinct from neurological symptoms.    NEUROPATHY:  - Explained that neuropathy often has no identifiable underlying cause (60-70% of cases).  - Discussed Duloxetine as a generally well-tolerated medication for nerve pain, with once-daily dosing and minimal side effects.  - Increased Duloxetine to more effective dose of 60 mg daily - take two 30 mg capsules together at the same time, preferably at night.    PERIPHERAL VASCULAR DISEASE:  - Referred to vascular surgery for evaluation of circulation in feet.    PLAN SUMMARY:  - Referred to vascular surgery for foot circulation evaluation  - Increased Duloxetine to 60 mg daily (two 30 mg capsules together, preferably at night)  - Discussed Duloxetine as treatment for nerve pain, explaining its benefits and minimal side effects          Risks/benefits, potential side effects of medications, and alternative therapies discussed as appropriate.    This note was generated with the assistance of ambient listening technology. Verbal consent was obtained by the patient and accompanying visitor(s) for the recording of patient appointment to facilitate this note. I attest to having reviewed and edited the generated note for accuracy, though some syntax or spelling errors may persist. Please contact the author of this note for any clarification.       SUSANNA Deras M.D.

## 2025-07-08 ENCOUNTER — HOSPITAL ENCOUNTER (OUTPATIENT)
Dept: CARDIOLOGY | Facility: OTHER | Age: 89
Discharge: HOME | End: 2025-07-08
Attending: INTERNAL MEDICINE
Payer: MEDICARE

## 2025-07-08 DIAGNOSIS — M79.601 PAIN OF RIGHT UPPER EXTREMITY: ICD-10-CM

## 2025-07-08 LAB
OHS QRS DURATION: 88 MS
OHS QTC CALCULATION: 477 MS

## 2025-07-08 PROCEDURE — 93010 ELECTROCARDIOGRAM REPORT: CPT | Mod: ,,, | Performed by: INTERNAL MEDICINE

## 2025-07-08 PROCEDURE — 93005 ELECTROCARDIOGRAM TRACING: CPT

## 2025-07-31 ENCOUNTER — TELEPHONE (OUTPATIENT)
Dept: VASCULAR SURGERY | Facility: CLINIC | Age: 89
End: 2025-07-31
Payer: MEDICARE

## 2025-07-31 DIAGNOSIS — I73.9 PAD (PERIPHERAL ARTERY DISEASE): Primary | ICD-10-CM

## 2025-07-31 NOTE — TELEPHONE ENCOUNTER
Spoke to pt. Advised she will need to have ABIs done prior to her appt w/ Dr. Currie on 8/13/25. Pt scheduled for ABIs on 8/13/25 at 10:30 am. Appt letter mailed. Patient verbalized understanding and had no further questions.

## 2025-08-13 ENCOUNTER — HOSPITAL ENCOUNTER (OUTPATIENT)
Dept: VASCULAR SURGERY | Facility: CLINIC | Age: 89
Discharge: HOME | End: 2025-08-13
Attending: SURGERY
Payer: MEDICARE

## 2025-08-13 ENCOUNTER — INITIAL CONSULT (OUTPATIENT)
Dept: VASCULAR SURGERY | Facility: CLINIC | Age: 89
End: 2025-08-13
Payer: MEDICARE

## 2025-08-13 VITALS
TEMPERATURE: 98 F | HEART RATE: 71 BPM | SYSTOLIC BLOOD PRESSURE: 132 MMHG | BODY MASS INDEX: 23.13 KG/M2 | WEIGHT: 125.69 LBS | DIASTOLIC BLOOD PRESSURE: 84 MMHG | HEIGHT: 62 IN

## 2025-08-13 DIAGNOSIS — G62.9 POLYNEUROPATHY: ICD-10-CM

## 2025-08-13 DIAGNOSIS — I87.2 VENOUS INSUFFICIENCY: ICD-10-CM

## 2025-08-13 DIAGNOSIS — I73.9 PAD (PERIPHERAL ARTERY DISEASE): ICD-10-CM

## 2025-08-13 DIAGNOSIS — I73.9 PERIPHERAL ARTERIAL DISEASE: ICD-10-CM

## 2025-08-13 PROCEDURE — 99999 PR PBB SHADOW E&M-EST. PATIENT-LVL IV: CPT | Mod: PBBFAC,,, | Performed by: SURGERY

## 2025-08-13 PROCEDURE — 99214 OFFICE O/P EST MOD 30 MIN: CPT | Mod: PBBFAC | Performed by: SURGERY

## 2025-08-13 PROCEDURE — 93923 UPR/LXTR ART STDY 3+ LVLS: CPT | Mod: PBBFAC | Performed by: SURGERY

## 2025-08-13 RX ORDER — ERYTHROMYCIN 5 MG/G
OINTMENT OPHTHALMIC
COMMUNITY
Start: 2025-08-12

## 2025-08-13 RX ORDER — NYSTATIN 100000 U/G
OINTMENT TOPICAL
COMMUNITY
Start: 2025-07-29

## 2025-08-13 RX ORDER — CICLOPIROX 80 MG/ML
SOLUTION TOPICAL
COMMUNITY
Start: 2025-06-24

## 2025-08-13 RX ORDER — MELOXICAM 15 MG/1
15 TABLET ORAL
COMMUNITY
Start: 2025-07-25

## 2025-08-13 RX ORDER — FLUOROMETHOLONE 1 MG/ML
1 SUSPENSION/ DROPS OPHTHALMIC 3 TIMES DAILY
COMMUNITY
Start: 2025-08-12

## 2025-08-25 ENCOUNTER — TELEPHONE (OUTPATIENT)
Dept: INFUSION THERAPY | Facility: OTHER | Age: 89
End: 2025-08-25
Payer: MEDICARE

## 2025-08-26 ENCOUNTER — LAB VISIT (OUTPATIENT)
Dept: LAB | Facility: OTHER | Age: 89
End: 2025-08-26
Attending: INTERNAL MEDICINE
Payer: MEDICARE

## 2025-09-02 ENCOUNTER — OFFICE VISIT (OUTPATIENT)
Dept: SLEEP MEDICINE | Facility: CLINIC | Age: 89
End: 2025-09-02
Attending: INTERNAL MEDICINE
Payer: MEDICARE

## 2025-09-02 ENCOUNTER — INFUSION (OUTPATIENT)
Dept: INFUSION THERAPY | Facility: OTHER | Age: 89
End: 2025-09-02
Attending: INTERNAL MEDICINE
Payer: MEDICARE

## 2025-09-02 VITALS
SYSTOLIC BLOOD PRESSURE: 143 MMHG | BODY MASS INDEX: 22.15 KG/M2 | WEIGHT: 125 LBS | DIASTOLIC BLOOD PRESSURE: 73 MMHG | HEIGHT: 63 IN | HEART RATE: 69 BPM

## 2025-09-02 DIAGNOSIS — F51.09 OTHER INSOMNIA NOT DUE TO A SUBSTANCE OR KNOWN PHYSIOLOGICAL CONDITION: Primary | ICD-10-CM

## 2025-09-02 DIAGNOSIS — M81.0 OSTEOPOROSIS WITHOUT CURRENT PATHOLOGICAL FRACTURE, UNSPECIFIED OSTEOPOROSIS TYPE: Primary | ICD-10-CM

## 2025-09-02 DIAGNOSIS — R53.82 CHRONIC FATIGUE: ICD-10-CM

## 2025-09-02 DIAGNOSIS — R40.0 DAYTIME SLEEPINESS: ICD-10-CM

## 2025-09-02 DIAGNOSIS — R35.1 NOCTURIA: ICD-10-CM

## 2025-09-02 DIAGNOSIS — F41.9 ANXIETY: ICD-10-CM

## 2025-09-02 PROCEDURE — 63600175 PHARM REV CODE 636 W HCPCS: Mod: JZ,TB | Performed by: INTERNAL MEDICINE

## 2025-09-02 PROCEDURE — 99999 PR PBB SHADOW E&M-EST. PATIENT-LVL V: CPT | Mod: PBBFAC,,, | Performed by: PHYSICIAN ASSISTANT

## 2025-09-02 PROCEDURE — 96372 THER/PROPH/DIAG INJ SC/IM: CPT

## 2025-09-02 PROCEDURE — 99204 OFFICE O/P NEW MOD 45 MIN: CPT | Mod: S$PBB,,, | Performed by: PHYSICIAN ASSISTANT

## 2025-09-02 PROCEDURE — 99215 OFFICE O/P EST HI 40 MIN: CPT | Mod: PBBFAC,25 | Performed by: PHYSICIAN ASSISTANT

## 2025-09-02 RX ADMIN — DENOSUMAB 60 MG: 60 INJECTION SUBCUTANEOUS at 02:09

## 2025-09-05 ENCOUNTER — TELEPHONE (OUTPATIENT)
Dept: SLEEP MEDICINE | Facility: OTHER | Age: 89
End: 2025-09-05
Payer: MEDICARE

## (undated) DEVICE — SUT SILK 3-0 SH 18IN BLACK

## (undated) DEVICE — SUT VICRYL PLUS 2-0 18IN

## (undated) DEVICE — ELECTRODE REM PLYHSV RETURN 9

## (undated) DEVICE — SOL PVP-I SCRUB 7.5% 4OZ

## (undated) DEVICE — STAPLER EEA28

## (undated) DEVICE — SUT 3-0 12-18IN SILK

## (undated) DEVICE — STAPLER ECHELON PWR 45MM

## (undated) DEVICE — TRAY FOLEY 16FR INFECTION CONT

## (undated) DEVICE — SUT 1 48IN PDS II VIO MONO

## (undated) DEVICE — SUT VICRYL PLUS 3-0 SH 18IN

## (undated) DEVICE — SUT CTD VICRYL 0 UND BR SUT

## (undated) DEVICE — SUT VICRYL PLUS 3-0 18IN

## (undated) DEVICE — CLIPPER BLADE MOD 4406 (CAREF)

## (undated) DEVICE — DRESSING ABSRBNT ISLAND 3.6X8

## (undated) DEVICE — ELECTRODE BLADE TEFLON 6

## (undated) DEVICE — STAPLER SKIN PROXIMATE WIDE

## (undated) DEVICE — SOL IRR SOD CHL .9% POUR

## (undated) DEVICE — APPLICATOR CHLORAPREP ORN 26ML

## (undated) DEVICE — SUT VICRYL 3-0 27 SH

## (undated) DEVICE — STAPLER EEA TRI-STAPLE MD 31MM

## (undated) DEVICE — EVACUATOR WOUND BULB 100CC

## (undated) DEVICE — GLOVE BIOGEL SKINSENSE PI 6.0

## (undated) DEVICE — DRAPE LAP T SHT W/ INSTR PAD

## (undated) DEVICE — Device

## (undated) DEVICE — UNDERGLOVES BIOGEL PI SZ 6 LF

## (undated) DEVICE — SUT PROLENE 2-0 SH 36IN BLU

## (undated) DEVICE — SUT 2-0 12-18IN SILK

## (undated) DEVICE — APPLIER LIGACLIP MED 11IN

## (undated) DEVICE — RELOAD ECHELON ENDOPATH 45MM

## (undated) DEVICE — SUT PROLENE 2-0 KS BL MONO